# Patient Record
Sex: FEMALE | Race: WHITE | NOT HISPANIC OR LATINO | Employment: FULL TIME | URBAN - METROPOLITAN AREA
[De-identification: names, ages, dates, MRNs, and addresses within clinical notes are randomized per-mention and may not be internally consistent; named-entity substitution may affect disease eponyms.]

---

## 2017-08-22 ENCOUNTER — APPOINTMENT (OUTPATIENT)
Dept: WOMENS IMAGING | Facility: HOSPITAL | Age: 67
End: 2017-08-22

## 2017-08-22 PROCEDURE — G0202 SCR MAMMO BI INCL CAD: HCPCS | Performed by: RADIOLOGY

## 2017-08-22 PROCEDURE — 77063 BREAST TOMOSYNTHESIS BI: CPT | Performed by: RADIOLOGY

## 2017-08-30 ENCOUNTER — APPOINTMENT (OUTPATIENT)
Dept: WOMENS IMAGING | Facility: HOSPITAL | Age: 67
End: 2017-08-30

## 2017-08-30 PROCEDURE — G0206 DX MAMMO INCL CAD UNI: HCPCS | Performed by: RADIOLOGY

## 2017-09-21 ENCOUNTER — TRANSCRIBE ORDERS (OUTPATIENT)
Dept: ADMINISTRATIVE | Facility: HOSPITAL | Age: 67
End: 2017-09-21

## 2017-09-21 DIAGNOSIS — M81.0 OSTEOPOROSIS: Primary | ICD-10-CM

## 2017-09-27 PROBLEM — M81.0 SENILE OSTEOPOROSIS: Status: ACTIVE | Noted: 2017-09-27

## 2017-09-27 RX ORDER — ZOLEDRONIC ACID 5 MG/100ML
5 INJECTION, SOLUTION INTRAVENOUS ONCE
Status: CANCELLED | OUTPATIENT
Start: 2017-09-28

## 2017-09-28 ENCOUNTER — HOSPITAL ENCOUNTER (OUTPATIENT)
Dept: INFUSION THERAPY | Facility: HOSPITAL | Age: 67
Discharge: HOME | End: 2017-09-28
Admitting: INTERNAL MEDICINE

## 2017-09-28 VITALS
OXYGEN SATURATION: 97 % | WEIGHT: 165 LBS | RESPIRATION RATE: 20 BRPM | TEMPERATURE: 98.5 F | HEART RATE: 78 BPM | SYSTOLIC BLOOD PRESSURE: 122 MMHG | DIASTOLIC BLOOD PRESSURE: 77 MMHG | BODY MASS INDEX: 29.23 KG/M2

## 2017-09-28 DIAGNOSIS — M81.0 OSTEOPOROSIS: ICD-10-CM

## 2017-09-28 PROCEDURE — 25010000002 ZOLEDRONIC ACID 5 MG/100ML SOLUTION: Performed by: INTERNAL MEDICINE

## 2017-09-28 PROCEDURE — 96365 THER/PROPH/DIAG IV INF INIT: CPT

## 2017-09-28 RX ORDER — ZOLEDRONIC ACID 5 MG/100ML
5 INJECTION, SOLUTION INTRAVENOUS ONCE
Status: COMPLETED | OUTPATIENT
Start: 2017-09-28 | End: 2017-09-28

## 2017-09-28 RX ADMIN — ZOLEDRONIC ACID 5 MG: 0.05 INJECTION, SOLUTION INTRAVENOUS at 14:54

## 2017-11-08 RX ORDER — OMEPRAZOLE 40 MG/1
CAPSULE, DELAYED RELEASE ORAL
Qty: 90 CAPSULE | Refills: 3 | OUTPATIENT
Start: 2017-11-08

## 2017-11-29 ENCOUNTER — TELEPHONE (OUTPATIENT)
Dept: GASTROENTEROLOGY | Facility: CLINIC | Age: 67
End: 2017-11-29

## 2017-11-29 RX ORDER — OMEPRAZOLE 40 MG/1
40 CAPSULE, DELAYED RELEASE ORAL DAILY
Qty: 90 CAPSULE | Refills: 0 | Status: SHIPPED | OUTPATIENT
Start: 2017-11-29 | End: 2018-01-04 | Stop reason: SDUPTHER

## 2017-11-29 NOTE — TELEPHONE ENCOUNTER
----- Message from Mauro Armenta sent at 11/29/2017  2:39 PM EST -----  Regarding: refill meds   Contact: [phone]   pt is calling for a refill on omeprazole (priLOSEC) 40 MG capsule . Pt has a f/u with np on jan 4th. Can you fill the medication until her appt

## 2017-11-29 NOTE — TELEPHONE ENCOUNTER
Called pt back. Advised can call in a refill for her omeprazole until her appt in January and I just need to know where to send her script. Pt verb understanding and states she uses [facility] mail order pharmacy.   Medication e-scribed.

## 2018-01-04 ENCOUNTER — OFFICE VISIT (OUTPATIENT)
Dept: GASTROENTEROLOGY | Facility: CLINIC | Age: 68
End: 2018-01-04

## 2018-01-04 VITALS
TEMPERATURE: 98.1 F | BODY MASS INDEX: 31.79 KG/M2 | WEIGHT: 179.4 LBS | HEIGHT: 63 IN | SYSTOLIC BLOOD PRESSURE: 124 MMHG | DIASTOLIC BLOOD PRESSURE: 78 MMHG

## 2018-01-04 DIAGNOSIS — R19.7 DIARRHEA, UNSPECIFIED TYPE: Primary | ICD-10-CM

## 2018-01-04 DIAGNOSIS — R13.10 DYSPHAGIA, UNSPECIFIED TYPE: ICD-10-CM

## 2018-01-04 DIAGNOSIS — K21.9 GASTROESOPHAGEAL REFLUX DISEASE, ESOPHAGITIS PRESENCE NOT SPECIFIED: ICD-10-CM

## 2018-01-04 PROCEDURE — 99214 OFFICE O/P EST MOD 30 MIN: CPT | Performed by: NURSE PRACTITIONER

## 2018-01-04 RX ORDER — OMEPRAZOLE 40 MG/1
40 CAPSULE, DELAYED RELEASE ORAL 2 TIMES DAILY
Qty: 180 CAPSULE | Refills: 3 | Status: SHIPPED | OUTPATIENT
Start: 2018-01-04 | End: 2018-02-05 | Stop reason: SDUPTHER

## 2018-01-04 RX ORDER — SUCRALFATE 1 G/1
1 TABLET ORAL 4 TIMES DAILY PRN
Qty: 180 TABLET | Refills: 3 | Status: SHIPPED | OUTPATIENT
Start: 2018-01-04 | End: 2018-01-26

## 2018-01-04 NOTE — PROGRESS NOTES
Chief Complaint   Patient presents with   • Heartburn   • Irritable Bowel Syndrome   • Diarrhea         HPI    Pt is being seen today for Diarrhea and GERD.  She was last seen in the office October 2016.  In 2016 pt reported symptoms worse after laproscopic cholecystectomy in 2015.  We had prescribed WelChol, cholestyramine, and Colestid but all medications were 2 expensive with her insurance.  She continues to have 3-8 bowel movements per day which is slightly better than the amount reported in 2016.  Stools are described as watery and loose.  Generally occurring post prandial with fecal urgency.  Rare fecal incontinence.  She denies fever, chills, weight loss, abdominal pain, nausea, vomiting, bright red blood per rectum, or melena.  Associated symptoms include mild bloating and mild abdominal cramping relieved with defecation.  Previous workup includes negative celiac. Stool studies were ordered but not completed.  Sodium does help relieve the diarrhea but she does not take it on a regular basis.  Her last colonoscopy was performed 2010 with results as detailed below.  She denies a family history of celiac disease, irritable bowel disease or colon cancer.     GERD- EGD with grade 1 esophagitis and esophageal web dilation in 2012.  She is currently taking Protonix 40 mg daily and sometimes will take it twice a day.  She continues to have frequent, daily heartburn.  She states it as a tightening in her mid chest that resolves without intervention.  She is also having increased choking while eating  and reports difficulty swallowing.  Risk factors include spicy foods, caffeine.  She denies early satiety or weight loss. Denies nausea or vomiting    Past Medical History:   Diagnosis Date   • Disease of thyroid gland    • GERD (gastroesophageal reflux disease)    • Hypertension    • Osteoporosis    • TIA (transient ischemic attack)      Past Surgical History:   Procedure Laterality Date   • CHOLECYSTECTOMY     •  COLONOSCOPY  11/10/2010    prep of colon fair,IH,stool in transverse colon,hepatic flexure and ascending colon,ileum normal,IBS   • ENDOSCOPY  11/27/2012    grd 1 reflux egitis,web upperd 3rd esoph   • FOOT NEUROMA SURGERY Left    • IR CEREBRAL ANEURYSM COILING     • THUMB ARTHROSCOPY         Current Outpatient Prescriptions   Medication Sig Dispense Refill   • hydrochlorothiazide (HYDRODIURIL) 25 MG tablet Take 25 mg by mouth daily.     • levothyroxine (SYNTHROID, LEVOTHROID) 50 MCG tablet Take 50 mcg by mouth daily.     • omeprazole (PRILOSEC) 40 MG capsule Take 1 capsule by mouth 2 (Two) Times a Day. 180 capsule 3   • cholestyramine light 4 g powder Take 1 packet by mouth 3 (Three) Times a Day. mixed with a liquid 378 g 11   • sucralfate (CARAFATE) 1 g tablet Take 1 tablet by mouth 4 (Four) Times a Day As Needed (heartburn). 180 tablet 3     No current facility-administered medications for this visit.        PRN Meds:.    Allergies   Allergen Reactions   • Penicillins Hives       Social History     Social History   • Marital status:      Spouse name: N/A   • Number of children: N/A   • Years of education: N/A     Occupational History   • Not on file.     Social History Main Topics   • Smoking status: Never Smoker   • Smokeless tobacco: Not on file   • Alcohol use No   • Drug use: Not on file   • Sexual activity: Not on file     Other Topics Concern   • Not on file     Social History Narrative       Family History   Problem Relation Age of Onset   • Irritable bowel syndrome Daughter        Review of Systems   Constitutional: Negative for appetite change, chills, diaphoresis, fatigue, fever and unexpected weight change.   HENT: Positive for trouble swallowing.    Respiratory: Negative for choking and shortness of breath.    Cardiovascular: Negative for chest pain.   Gastrointestinal: Positive for abdominal distention, abdominal pain and diarrhea. Negative for blood in stool, constipation, nausea and  "vomiting.   Musculoskeletal: Negative for back pain.   Skin: Negative for color change.   Allergic/Immunologic: Negative for immunocompromised state.   Neurological: Negative for dizziness.   Hematological: Does not bruise/bleed easily.   Psychiatric/Behavioral: Negative.        Vitals:    01/04/18 1437   BP: 124/78   Temp: 98.1 °F (36.7 °C)     /78  Temp 98.1 °F (36.7 °C)  Ht 160 cm (63\")  Wt 81.4 kg (179 lb 6.4 oz)  BMI 31.78 kg/m2  Physical Exam   Constitutional: She is oriented to person, place, and time. She appears well-developed and well-nourished.   HENT:   Head: Normocephalic and atraumatic.   Eyes: Pupils are equal, round, and reactive to light.   Cardiovascular: Normal rate, regular rhythm and normal heart sounds.    Pulmonary/Chest: Effort normal and breath sounds normal.   Abdominal: Soft. Bowel sounds are normal. She exhibits no shifting dullness, no distension, no pulsatile liver, no fluid wave, no abdominal bruit, no ascites, no pulsatile midline mass and no mass. There is no hepatosplenomegaly. There is no tenderness. There is no rigidity and no guarding. No hernia.   Musculoskeletal: Normal range of motion.   Neurological: She is alert and oriented to person, place, and time.   Skin: Skin is warm and dry.   Psychiatric: She has a normal mood and affect. Her behavior is normal. Thought content normal.   Nursing note and vitals reviewed.      ASSESSMENT AND PLAN    Sari was seen today for heartburn, irritable bowel syndrome and diarrhea.    Diagnoses and all orders for this visit:    Diarrhea, unspecified type  -     Case Request; Standing  -     Case Request    Gastroesophageal reflux disease, esophagitis presence not specified  -     Case Request; Standing  -     Case Request    Dysphagia, unspecified type    Other orders  -     sucralfate (CARAFATE) 1 g tablet; Take 1 tablet by mouth 4 (Four) Times a Day As Needed (heartburn).  -     omeprazole (PRILOSEC) 40 MG capsule; Take 1 capsule by " mouth 2 (Two) Times a Day.  -     Implement Anesthesia orders day of procedure.; Standing  -     Obtain informed consent; Standing  -     Verify bowel prep was successful; Standing  -     Give tap water enema if bowel prep was insufficient; Standing  -     cholestyramine light 4 g powder; Take 1 packet by mouth 3 (Three) Times a Day. mixed with a liquid      Patient is overdue for a colonoscopy.  She is also in need of lower endoscopic evaluation with biopsies for persistent diarrhea.  I have again prescribed Cholestyramine. If it is still not affordable she can take imodium daily with caution given regarding constipation.   Seen you current PPI until EGD with Dr. Reza  Please begin a probiotic.  You can try activia yogurt, align, or florastor.  These can be found over-the-counter at a local grocery store.         Anju PUGH   [facility]  [address] [place] [address]  Office: [phone]

## 2018-01-18 ENCOUNTER — APPOINTMENT (OUTPATIENT)
Dept: CT IMAGING | Facility: HOSPITAL | Age: 68
End: 2018-01-18

## 2018-01-18 ENCOUNTER — HOSPITAL ENCOUNTER (EMERGENCY)
Facility: HOSPITAL | Age: 68
Discharge: HOME | End: 2018-01-19
Attending: EMERGENCY MEDICINE | Admitting: EMERGENCY MEDICINE

## 2018-01-18 DIAGNOSIS — R23.2 FACIAL FLUSHING: Primary | ICD-10-CM

## 2018-01-18 LAB
ALBUMIN SERPL-MCNC: 4.4 G/DL (ref 3.5–5.2)
ALBUMIN/GLOB SERPL: 1.2 G/DL
ALP SERPL-CCNC: 85 U/L (ref 39–117)
ALT SERPL W P-5'-P-CCNC: 9 U/L (ref 1–33)
ANION GAP SERPL CALCULATED.3IONS-SCNC: 13.5 MMOL/L
AST SERPL-CCNC: 12 U/L (ref 1–32)
BASOPHILS # BLD AUTO: 0.05 10*3/MM3 (ref 0–0.2)
BASOPHILS NFR BLD AUTO: 0.6 % (ref 0–1.5)
BILIRUB SERPL-MCNC: 0.4 MG/DL (ref 0.1–1.2)
BUN BLD-MCNC: 11 MG/DL (ref 8–23)
BUN/CREAT SERPL: 9.5 (ref 7–25)
CALCIUM SPEC-SCNC: 10.2 MG/DL (ref 8.6–10.5)
CHLORIDE SERPL-SCNC: 98 MMOL/L (ref 98–107)
CO2 SERPL-SCNC: 25.5 MMOL/L (ref 22–29)
CREAT BLD-MCNC: 1.16 MG/DL (ref 0.57–1)
DEPRECATED RDW RBC AUTO: 42.8 FL (ref 37–54)
EOSINOPHIL # BLD AUTO: 0.19 10*3/MM3 (ref 0–0.7)
EOSINOPHIL NFR BLD AUTO: 2.3 % (ref 0.3–6.2)
ERYTHROCYTE [DISTWIDTH] IN BLOOD BY AUTOMATED COUNT: 13.1 % (ref 11.7–13)
GFR SERPL CREATININE-BSD FRML MDRD: 46 ML/MIN/1.73
GLOBULIN UR ELPH-MCNC: 3.8 GM/DL
GLUCOSE BLD-MCNC: 100 MG/DL (ref 65–99)
HCT VFR BLD AUTO: 44.9 % (ref 35.6–45.5)
HGB BLD-MCNC: 14.6 G/DL (ref 11.9–15.5)
IMM GRANULOCYTES # BLD: 0.02 10*3/MM3 (ref 0–0.03)
IMM GRANULOCYTES NFR BLD: 0.2 % (ref 0–0.5)
LYMPHOCYTES # BLD AUTO: 3.19 10*3/MM3 (ref 0.9–4.8)
LYMPHOCYTES NFR BLD AUTO: 37.8 % (ref 19.6–45.3)
MCH RBC QN AUTO: 29.3 PG (ref 26.9–32)
MCHC RBC AUTO-ENTMCNC: 32.5 G/DL (ref 32.4–36.3)
MCV RBC AUTO: 90 FL (ref 80.5–98.2)
MONOCYTES # BLD AUTO: 0.56 10*3/MM3 (ref 0.2–1.2)
MONOCYTES NFR BLD AUTO: 6.6 % (ref 5–12)
NEUTROPHILS # BLD AUTO: 4.43 10*3/MM3 (ref 1.9–8.1)
NEUTROPHILS NFR BLD AUTO: 52.5 % (ref 42.7–76)
PLATELET # BLD AUTO: 369 10*3/MM3 (ref 140–500)
PMV BLD AUTO: 10 FL (ref 6–12)
POTASSIUM BLD-SCNC: 4 MMOL/L (ref 3.5–5.2)
PROT SERPL-MCNC: 8.2 G/DL (ref 6–8.5)
RBC # BLD AUTO: 4.99 10*6/MM3 (ref 3.9–5.2)
SODIUM BLD-SCNC: 137 MMOL/L (ref 136–145)
TROPONIN T SERPL-MCNC: <0.01 NG/ML (ref 0–0.03)
WBC NRBC COR # BLD: 8.44 10*3/MM3 (ref 4.5–10.7)

## 2018-01-18 PROCEDURE — 99284 EMERGENCY DEPT VISIT MOD MDM: CPT

## 2018-01-18 PROCEDURE — 70450 CT HEAD/BRAIN W/O DYE: CPT

## 2018-01-18 PROCEDURE — 93005 ELECTROCARDIOGRAM TRACING: CPT | Performed by: PHYSICIAN ASSISTANT

## 2018-01-18 PROCEDURE — 36415 COLL VENOUS BLD VENIPUNCTURE: CPT | Performed by: PHYSICIAN ASSISTANT

## 2018-01-18 PROCEDURE — 84484 ASSAY OF TROPONIN QUANT: CPT | Performed by: PHYSICIAN ASSISTANT

## 2018-01-18 PROCEDURE — 85025 COMPLETE CBC W/AUTO DIFF WBC: CPT | Performed by: PHYSICIAN ASSISTANT

## 2018-01-18 PROCEDURE — 93010 ELECTROCARDIOGRAM REPORT: CPT | Performed by: INTERNAL MEDICINE

## 2018-01-18 PROCEDURE — 80053 COMPREHEN METABOLIC PANEL: CPT | Performed by: PHYSICIAN ASSISTANT

## 2018-01-18 RX ORDER — GABAPENTIN 300 MG/1
300 CAPSULE ORAL
COMMUNITY
End: 2018-02-05 | Stop reason: SDUPTHER

## 2018-01-18 NOTE — ED NOTES
"Patient states she has an aneurysm that has been coiled in 2012. Tuesday she developed a \"numb feeling to my face, like maxim had a few too many glasses of wine, but I haven't\" She also report today she had a \"light headed feeling\" so she decided to come get checked. Reports slight headache denies nausea currently and denies vision changes.       Kirsten Park RN  01/18/18 5970    "

## 2018-01-19 ENCOUNTER — TELEPHONE (OUTPATIENT)
Dept: SOCIAL WORK | Facility: HOSPITAL | Age: 68
End: 2018-01-19

## 2018-01-19 VITALS
SYSTOLIC BLOOD PRESSURE: 146 MMHG | HEART RATE: 76 BPM | WEIGHT: 165 LBS | BODY MASS INDEX: 29.23 KG/M2 | OXYGEN SATURATION: 99 % | DIASTOLIC BLOOD PRESSURE: 98 MMHG | HEIGHT: 63 IN | RESPIRATION RATE: 18 BRPM | TEMPERATURE: 97.5 F

## 2018-01-19 RX ORDER — AMLODIPINE BESYLATE 5 MG/1
5 TABLET ORAL DAILY
Qty: 30 TABLET | Refills: 0 | Status: SHIPPED | OUTPATIENT
Start: 2018-01-19 | End: 2018-06-04

## 2018-01-19 NOTE — ED PROVIDER NOTES
"The patient presents complaining of intermittent facial flushing and diffuse numbness of entire lower face bilat below the eyes for 3 days. She states the feeling is similar to \"having a couple drinks\". She also has had an intermittent headache over this period of time, and that felt like a \"high BP\" headache. The headache is present, currently a 4/10 without neck/back pain. She has not taken any medication for her headache. She had an aneurysm coiled in 2011 without bleeding. At that time she had an extremely high BP. Notified pt of imaging which did not show any bleed. Discussed plan to discharge the pt. Pt should follow up with a PCP. Pt understands and agrees with plan, all questions addressed.    Limited physical exam:  Patient is nontoxic appearing  Lungs/cardiovascular: CTAB, RRR  Abdomen: non tender, no guarding or rebound  Back/extremities: NROM, normal SLR,   Neuro: strength/sens/ROM, speech, vision intact, neck supple, nonfocal neurologic exam,    I supervised care provided by the midlevel provider.  We have discussed this patient's history, physical exam, and treatment plan.  I have reviewed the note and personally saw and examined the patient and agree with the plan of care.    Documentation assistance provided by monika Rodriguez.  Information recorded by the scribe was done at my direction and has been verified and validated by me.             Reza Rodriguez  01/19/18 0030       Angelika Ramirez MD  01/21/18 5705    "

## 2018-01-19 NOTE — TELEPHONE ENCOUNTER
"F/u phone call per provider request. Patient reports needing a new PCP due to her regular PCP changing to tank. Offered to contact [facility] liaison to obtain new PCP. Pt agreed. Contacted Eugenia alejandra/[facility] who obtained appt w/ for 3/1/18 @4595. Called to notify patient of appt date and time; Pt also has further questions about new RX prescribed and if she should take w/HCTZ. Pt also described continued symptoms of face flushed/facial numbness Discussed questions w/ who advised pt could take both medications as prescribed. Dr. Mesa recommended to monitor blood pressure and \"if she begins to feel funny, have blood pressure checked\" and return for any new or worsening symptoms or problems w/blood pressure. No further needs at this time. Sari Cason RN    "

## 2018-01-19 NOTE — ED PROVIDER NOTES
EMERGENCY DEPARTMENT ENCOUNTER    CHIEF COMPLAINT  Chief Complaint: Intermittent facial numbness  History given by: Patient  History limited by: Nothing  Room Number: 37/37  PMD: No Known Provider      HPI:  Pt is a 67 y.o. female who presents complaining of intermittent facial numbness onset 2 days ago with symptoms worsening in severity today. The pt reports feeling a flushed feeling, numbness, and a hot feeling in her face 2 days ago. The pt states she was working on her computer when the symptoms started. The pt states it felt like her BP was elevated, but when she checked it, it was normal. The pt states she has had intermittent episodes of the facial numbness since the onset of symptoms 2 days ago. Pt reports HA, intermittent lightheadedness, sinus congestion, and chronic CP that is currently being worked up by a gastroenterologist. Pt denies focal weakness, hyperventilation, aphasia, facial paralysis, and SOA. Pt reports a hx of an aneurysm in 2012.    Duration: 2 days ago with symptoms worsening in severity today  Onset: Gradual  Timing: Intermittent  Location: Face  Radiation: None  Quality: Numbness  Intensity/Severity: Moderate  Progression: Worsening  Associated Symptoms: HA, intermittent lightheadedness, sinus congestion, and chronic CP that is currently being worked up by a gastroenterologist  Aggravating Factors: None stated  Alleviating Factors: None stated  Previous Episodes: None  Treatment before arrival: Nothing    PAST MEDICAL HISTORY  Active Ambulatory Problems     Diagnosis Date Noted   • Osteoporosis 08/05/2016   • Senile osteoporosis 09/27/2017   • Gastroesophageal reflux disease 01/04/2018   • Diarrhea 01/04/2018     Resolved Ambulatory Problems     Diagnosis Date Noted   • No Resolved Ambulatory Problems     Past Medical History:   Diagnosis Date   • Disease of thyroid gland    • GERD (gastroesophageal reflux disease)    • Hypertension    • Osteoporosis    • TIA (transient ischemic  attack)        PAST SURGICAL HISTORY  Past Surgical History:   Procedure Laterality Date   • CHOLECYSTECTOMY     • COLONOSCOPY  11/10/2010    prep of colon fair,IH,stool in transverse colon,hepatic flexure and ascending colon,ileum normal,IBS   • ENDOSCOPY  11/27/2012    grd 1 reflux egitis,web upperd 3rd esoph   • FOOT NEUROMA SURGERY Left    • IR CEREBRAL ANEURYSM COILING     • THUMB ARTHROSCOPY         FAMILY HISTORY  Family History   Problem Relation Age of Onset   • Irritable bowel syndrome Daughter        SOCIAL HISTORY  Social History     Social History   • Marital status:      Spouse name: N/A   • Number of children: N/A   • Years of education: N/A     Occupational History   • Not on file.     Social History Main Topics   • Smoking status: Never Smoker   • Smokeless tobacco: Not on file   • Alcohol use No   • Drug use: Not on file   • Sexual activity: Not on file     Other Topics Concern   • Not on file     Social History Narrative       ALLERGIES  Penicillins    REVIEW OF SYSTEMS  Review of Systems   Constitutional: Negative for chills and fever.   HENT: Positive for congestion (Sinus). Negative for sore throat and trouble swallowing.    Eyes: Negative for visual disturbance.   Respiratory: Negative for cough and shortness of breath.    Cardiovascular: Positive for chest pain (Chronic that is being worked up by a gastroenterologist). Negative for palpitations and leg swelling.   Gastrointestinal: Negative for abdominal pain, diarrhea and vomiting.   Endocrine: Negative.    Genitourinary: Negative for decreased urine volume, dysuria and frequency.   Musculoskeletal: Negative for neck pain.   Skin: Negative for rash.   Allergic/Immunologic: Negative.    Neurological: Positive for light-headedness (Intermittent), numbness (Facial) and headaches. Negative for syncope and weakness.   Hematological: Negative.    Psychiatric/Behavioral: Negative.    All other systems reviewed and are  negative.      PHYSICAL EXAM  ED Triage Vitals   Temp Heart Rate Resp BP SpO2   01/18/18 1412 01/18/18 1412 01/18/18 1412 01/18/18 1435 01/18/18 1412   97.6 °F (36.4 °C) 94 16 164/101 99 %      Temp src Heart Rate Source Patient Position BP Location FiO2 (%)   01/18/18 1412 01/18/18 1720 01/18/18 2219 01/18/18 2219 --   Tympanic Monitor Sitting Left arm        Physical Exam   Constitutional: She is oriented to person, place, and time and well-developed, well-nourished, and in no distress. No distress.   HENT:   Head: Normocephalic and atraumatic.   Eyes: EOM are normal. Pupils are equal, round, and reactive to light.   Neck: Normal range of motion. Neck supple.   Cardiovascular: Normal rate, regular rhythm and normal heart sounds.    Pulmonary/Chest: Effort normal and breath sounds normal. No respiratory distress.   Abdominal: Soft. Bowel sounds are normal. There is no tenderness. There is no rebound and no guarding.   Musculoskeletal: Normal range of motion. She exhibits no edema.   Neurological: She is alert and oriented to person, place, and time. She has normal sensation and normal strength.   The pt has no focal neuro deficits.   Skin: Skin is warm and dry. No rash noted.   Psychiatric: Mood and affect normal.   Nursing note and vitals reviewed.      LAB RESULTS  Lab Results (last 24 hours)     Procedure Component Value Units Date/Time    CBC & Differential [[id number]] Collected:  01/18/18 1701    Specimen:  Blood Updated:  01/18/18 1736    Narrative:       The following orders were created for panel order CBC & Differential.  Procedure                               Abnormality         Status                     ---------                               -----------         ------                     CBC Auto Differential[[id number]]        Abnormal            Final result                 Please view results for these tests on the individual orders.    Comprehensive Metabolic Panel [[id number]]  (Abnormal)  Collected:  01/18/18 1701    Specimen:  Blood Updated:  01/18/18 1738     Glucose 100 (H) mg/dL      BUN 11 mg/dL      Creatinine 1.16 (H) mg/dL      Sodium 137 mmol/L      Potassium 4.0 mmol/L      Chloride 98 mmol/L      CO2 25.5 mmol/L      Calcium 10.2 mg/dL      Total Protein 8.2 g/dL      Albumin 4.40 g/dL      ALT (SGPT) 9 U/L      AST (SGOT) 12 U/L      Alkaline Phosphatase 85 U/L      Total Bilirubin 0.4 mg/dL      eGFR Non African Amer 46 (L) mL/min/1.73      Globulin 3.8 gm/dL      A/G Ratio 1.2 g/dL      BUN/Creatinine Ratio 9.5     Anion Gap 13.5 mmol/L     Troponin [[id number]]  (Normal) Collected:  01/18/18 1701    Specimen:  Blood Updated:  01/18/18 1738     Troponin T <0.010 ng/mL     Narrative:       Troponin T Reference Ranges:  Less than 0.03 ng/mL:    Negative for AMI  0.03 to 0.09 ng/mL:      Indeterminant for AMI  Greater than 0.09 ng/mL: Positive for AMI    CBC Auto Differential [[id number]]  (Abnormal) Collected:  01/18/18 1701    Specimen:  Blood Updated:  01/18/18 1736     WBC 8.44 10*3/mm3      RBC 4.99 10*6/mm3      Hemoglobin 14.6 g/dL      Hematocrit 44.9 %      MCV 90.0 fL      MCH 29.3 pg      MCHC 32.5 g/dL      RDW 13.1 (H) %      RDW-SD 42.8 fl      MPV 10.0 fL      Platelets 369 10*3/mm3      Neutrophil % 52.5 %      Lymphocyte % 37.8 %      Monocyte % 6.6 %      Eosinophil % 2.3 %      Basophil % 0.6 %      Immature Grans % 0.2 %      Neutrophils, Absolute 4.43 10*3/mm3      Lymphocytes, Absolute 3.19 10*3/mm3      Monocytes, Absolute 0.56 10*3/mm3      Eosinophils, Absolute 0.19 10*3/mm3      Basophils, Absolute 0.05 10*3/mm3      Immature Grans, Absolute 0.02 10*3/mm3           I ordered the above labs and reviewed the results    RADIOLOGY  CT Head Without Contrast   Preliminary Result   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   The GDC coils result in marked beam hardening and streak artifact which   markedly obscure many portions of the brain  parenchyma in 4 axial   slices. However, otherwise, there is no evidence to suggest acute   intracranial pathology.        These findings were discussed with Kirsten Colon on 01/18/2018 at   approximately 3:06 PM.       Radiation dose reduction techniques were utilized, including automated   exposure control and exposure modulation based on body size.                   I ordered the above noted radiological studies. Interpreted by radiologist. Reviewed by me in PACS.       PROCEDURES  Procedures    EKG           EKG time: 1502  Rhythm/Rate: 78 Normal Sinus  P waves and NC: Normal  QRS, axis: Normal   ST and T waves: Normal     Interpreted Contemporaneously by me, independently viewed  Unchanged compared to prior 11-16-14      PROGRESS AND CONSULTS  ED Course     2317  Upon initial encounter, discussed the negative lab and CT results with the pt.    The pt is not a tPA candidate due to onset of symptoms 2 days ago.    0008  Reviewed pt's history and workup with Dr. Ramirez.  After a bedside evaluation; Dr. Ramirez agrees with the plan of care.    MEDICAL DECISION MAKING  Results were reviewed/discussed with the patient and they were also made aware of online access. Pt also made aware that some labs, such as cultures, will not be resulted during ER visit and follow up with PMD is necessary.     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  The GDC coils result in marked beam hardening and streak artifact which  markedly obscure many portions of the brain parenchyma in 4 axial  slices. However, otherwise, there is no evidence to suggest acute  intracranial pathology. )  Tests in the medicine section of CPT®: reviewed and ordered (Refer to the procedure  section of the note for EKG results  )  Decide to obtain previous medical records or to obtain history from someone other than the patient: yes    Patient Progress  Patient progress: stable         DIAGNOSIS  Final diagnoses:   Facial flushing       DISPOSITION  DISCHARGE    Patient discharged in stable condition.    Reviewed implications of results, diagnosis, meds, responsibility to follow up, warning signs and symptoms of possible worsening, potential complications and reasons to return to ER.    Patient/Family voiced understanding of above instructions.    Discussed plan for discharge, as there is no emergent indication for admission.  Pt/family is agreeable and understands need for follow up and repeat testing.  Pt is aware that discharge does not mean that nothing is wrong but it indicates no emergency is present that requires admission and they must continue care with follow-up as given below or physician of their choice.     FOLLOW-UP  [facility] REFERRAL SERVICE  [facility] [address] [phone]  Schedule an appointment as soon as possible for a visit           Medication List      New Prescriptions          amLODIPine 5 MG tablet   Commonly known as:  NORVASC   Take 1 tablet by mouth Daily.               Latest Documented Vital Signs:  As of 2:00 AM  BP- 146/98 HR- 76 Temp- 97.5 °F (36.4 °C) O2 sat- 99%    --  Documentation assistance provided by monika Thomas for Arian Peterson.  Information recorded by the scribhan was done at my direction and has been verified and validated by me.     Kerwin Thomas  01/19/18 0035       SUZY Koehler  01/19/18 0206

## 2018-01-26 ENCOUNTER — HOSPITAL ENCOUNTER (OUTPATIENT)
Dept: GENERAL RADIOLOGY | Facility: HOSPITAL | Age: 68
Discharge: HOME | End: 2018-01-26
Admitting: NURSE PRACTITIONER

## 2018-01-26 ENCOUNTER — OFFICE VISIT (OUTPATIENT)
Dept: FAMILY MEDICINE CLINIC | Facility: CLINIC | Age: 68
End: 2018-01-26

## 2018-01-26 VITALS
DIASTOLIC BLOOD PRESSURE: 70 MMHG | TEMPERATURE: 97.9 F | BODY MASS INDEX: 31.8 KG/M2 | HEART RATE: 79 BPM | OXYGEN SATURATION: 99 % | HEIGHT: 63 IN | SYSTOLIC BLOOD PRESSURE: 122 MMHG | WEIGHT: 179.5 LBS

## 2018-01-26 DIAGNOSIS — R09.81 SINUS CONGESTION: Primary | ICD-10-CM

## 2018-01-26 DIAGNOSIS — I10 ESSENTIAL HYPERTENSION: ICD-10-CM

## 2018-01-26 PROCEDURE — 70210 X-RAY EXAM OF SINUSES: CPT

## 2018-01-26 PROCEDURE — 99213 OFFICE O/P EST LOW 20 MIN: CPT | Performed by: NURSE PRACTITIONER

## 2018-01-26 RX ORDER — LISINOPRIL 10 MG/1
10 TABLET ORAL DAILY
Qty: 30 TABLET | Refills: 3 | Status: SHIPPED | OUTPATIENT
Start: 2018-01-26 | End: 2018-03-26 | Stop reason: SDUPTHER

## 2018-01-26 NOTE — PROGRESS NOTES
Subjective   Sari Self is a 67 y.o. female presents with recent ER visit. Had facial flushing, lips tingling, headache. BP at that time seemed normal, 135/88. Did go up to 146/91. Went to ER due to hx of aneurysm. ER work up was negative, started on amlodipine 5 mg. Now with lower extremity swelling and still having facial numbness. Compares to drinking a couple of glasses of wine, feeling flushed, ears get hot and she can feel her face, but it feels different. Increased nasal congestion but no pain reported.     Hypertension   This is a chronic problem. The current episode started more than 1 year ago. The problem has been rapidly worsening since onset. The problem is resistant. Associated symptoms include chest pain, headaches, malaise/fatigue, palpitations and peripheral edema. Pertinent negatives include no anxiety, blurred vision, neck pain, orthopnea, PND, shortness of breath or sweats. Agents associated with hypertension include thyroid hormones. Risk factors for coronary artery disease include family history, obesity, post-menopausal state, sedentary lifestyle and stress. Compliance problems include no compliance problems, diet, exercise and psychosocial issues.         The following portions of the patient's history were reviewed and updated as appropriate: allergies, current medications, past family history, past medical history, past social history, past surgical history and problem list.    Review of Systems   Constitutional: Positive for malaise/fatigue.   Eyes: Negative.  Negative for blurred vision.   Respiratory: Negative.  Negative for shortness of breath.    Cardiovascular: Positive for chest pain and palpitations. Negative for orthopnea and PND.   Gastrointestinal: Negative.    Endocrine: Negative.    Genitourinary: Negative.    Musculoskeletal: Negative.  Negative for neck pain.   Skin: Negative.    Allergic/Immunologic: Negative.    Neurological: Positive for headaches.   Hematological:  Negative.    Psychiatric/Behavioral: Negative.        Objective   Physical Exam   Constitutional: She is oriented to person, place, and time. She appears well-developed and well-nourished.   HENT:   Head: Normocephalic and atraumatic.   Right Ear: Tympanic membrane, external ear and ear canal normal.   Left Ear: Tympanic membrane, external ear and ear canal normal.   Nose: Nose normal. Right sinus exhibits no maxillary sinus tenderness and no frontal sinus tenderness. Left sinus exhibits no maxillary sinus tenderness and no frontal sinus tenderness.   Mouth/Throat: Uvula is midline, oropharynx is clear and moist and mucous membranes are normal. No tonsillar exudate.   Eyes: Conjunctivae are normal. Pupils are equal, round, and reactive to light.   Neck: Neck supple.   Cardiovascular: Normal rate, regular rhythm and normal heart sounds.  Exam reveals no gallop and no friction rub.    No murmur heard.  Pulmonary/Chest: Effort normal and breath sounds normal. No respiratory distress. She has no wheezes. She has no rales.   Lymphadenopathy:     She has no cervical adenopathy.   Neurological: She is alert and oriented to person, place, and time.   Skin: Skin is warm and dry.   Psychiatric: She has a normal mood and affect.   Vitals reviewed.      Assessment/Plan   Sari was seen today for hypertension.    Diagnoses and all orders for this visit:    Sinus congestion  -     XR nasal bones    Other orders  -     lisinopril (PRINIVIL,ZESTRIL) 10 MG tablet; Take 1 tablet by mouth Daily.      Will DC amlodipine and start prinivil due to lower extremity swelling that started after the med was started.   For continued symptoms, to follow up with PCP

## 2018-01-29 ENCOUNTER — TELEPHONE (OUTPATIENT)
Dept: FAMILY MEDICINE CLINIC | Facility: CLINIC | Age: 68
End: 2018-01-29

## 2018-01-29 NOTE — TELEPHONE ENCOUNTER
----- Message from Stephenie Gupta MA sent at 1/29/2018  8:37 AM EST -----  Informed pt. Pt forgot to ask you. She was wondering if we can refill her gabapentin until she see ? She also said that over the weekend that her cheeks and ears get really red and she is concerned. I told pt I would ask you what she should do and told her we would probably be sending her to an ENT?

## 2018-02-05 ENCOUNTER — TELEPHONE (OUTPATIENT)
Dept: FAMILY MEDICINE CLINIC | Facility: CLINIC | Age: 68
End: 2018-02-05

## 2018-02-05 DIAGNOSIS — R23.2 FACIAL FLUSHING: Primary | ICD-10-CM

## 2018-02-05 RX ORDER — GABAPENTIN 300 MG/1
300 CAPSULE ORAL
Qty: 180 CAPSULE | Refills: 0 | Status: SHIPPED | OUTPATIENT
Start: 2018-02-05 | End: 2018-05-17 | Stop reason: SDUPTHER

## 2018-02-05 NOTE — TELEPHONE ENCOUNTER
----- Message from LEXY Martinez sent at 2/5/2018  8:13 AM EST -----  Referral to derm for facial flushing, ok to refill gabapentin

## 2018-02-06 RX ORDER — OMEPRAZOLE 40 MG/1
40 CAPSULE, DELAYED RELEASE ORAL 2 TIMES DAILY
Qty: 180 CAPSULE | Refills: 2 | Status: SHIPPED | OUTPATIENT
Start: 2018-02-06 | End: 2019-07-16 | Stop reason: SDUPTHER

## 2018-02-27 ENCOUNTER — ANESTHESIA EVENT (OUTPATIENT)
Dept: GASTROENTEROLOGY | Facility: HOSPITAL | Age: 68
End: 2018-02-27

## 2018-02-27 ENCOUNTER — ANESTHESIA (OUTPATIENT)
Dept: GASTROENTEROLOGY | Facility: HOSPITAL | Age: 68
End: 2018-02-27

## 2018-02-27 ENCOUNTER — HOSPITAL ENCOUNTER (OUTPATIENT)
Facility: HOSPITAL | Age: 68
Setting detail: HOSPITAL OUTPATIENT SURGERY
Discharge: HOME | End: 2018-02-27
Attending: INTERNAL MEDICINE | Admitting: INTERNAL MEDICINE

## 2018-02-27 VITALS
BODY MASS INDEX: 30.48 KG/M2 | RESPIRATION RATE: 16 BRPM | HEART RATE: 68 BPM | DIASTOLIC BLOOD PRESSURE: 70 MMHG | TEMPERATURE: 97.7 F | WEIGHT: 172 LBS | HEIGHT: 63 IN | OXYGEN SATURATION: 100 % | SYSTOLIC BLOOD PRESSURE: 118 MMHG

## 2018-02-27 DIAGNOSIS — K21.9 GASTROESOPHAGEAL REFLUX DISEASE, ESOPHAGITIS PRESENCE NOT SPECIFIED: ICD-10-CM

## 2018-02-27 DIAGNOSIS — R19.7 DIARRHEA, UNSPECIFIED TYPE: ICD-10-CM

## 2018-02-27 PROCEDURE — 88305 TISSUE EXAM BY PATHOLOGIST: CPT | Performed by: INTERNAL MEDICINE

## 2018-02-27 PROCEDURE — 43239 EGD BIOPSY SINGLE/MULTIPLE: CPT | Performed by: INTERNAL MEDICINE

## 2018-02-27 PROCEDURE — 88312 SPECIAL STAINS GROUP 1: CPT | Performed by: INTERNAL MEDICINE

## 2018-02-27 PROCEDURE — S0260 H&P FOR SURGERY: HCPCS | Performed by: INTERNAL MEDICINE

## 2018-02-27 PROCEDURE — 45380 COLONOSCOPY AND BIOPSY: CPT | Performed by: INTERNAL MEDICINE

## 2018-02-27 PROCEDURE — 25010000002 PROPOFOL 10 MG/ML EMULSION: Performed by: NURSE ANESTHETIST, CERTIFIED REGISTERED

## 2018-02-27 RX ORDER — SODIUM CHLORIDE 0.9 % (FLUSH) 0.9 %
3 SYRINGE (ML) INJECTION AS NEEDED
Status: DISCONTINUED | OUTPATIENT
Start: 2018-02-27 | End: 2018-02-27 | Stop reason: HOSPADM

## 2018-02-27 RX ORDER — PROPOFOL 10 MG/ML
VIAL (ML) INTRAVENOUS AS NEEDED
Status: DISCONTINUED | OUTPATIENT
Start: 2018-02-27 | End: 2018-02-27 | Stop reason: SURG

## 2018-02-27 RX ORDER — PROPOFOL 10 MG/ML
VIAL (ML) INTRAVENOUS CONTINUOUS PRN
Status: DISCONTINUED | OUTPATIENT
Start: 2018-02-27 | End: 2018-02-27 | Stop reason: SURG

## 2018-02-27 RX ORDER — SODIUM CHLORIDE, SODIUM LACTATE, POTASSIUM CHLORIDE, CALCIUM CHLORIDE 600; 310; 30; 20 MG/100ML; MG/100ML; MG/100ML; MG/100ML
1000 INJECTION, SOLUTION INTRAVENOUS CONTINUOUS PRN
Status: DISCONTINUED | OUTPATIENT
Start: 2018-02-27 | End: 2018-02-27 | Stop reason: HOSPADM

## 2018-02-27 RX ORDER — LIDOCAINE HYDROCHLORIDE 10 MG/ML
0.5 INJECTION, SOLUTION INFILTRATION; PERINEURAL ONCE AS NEEDED
Status: DISCONTINUED | OUTPATIENT
Start: 2018-02-27 | End: 2018-02-27 | Stop reason: HOSPADM

## 2018-02-27 RX ADMIN — PROPOFOL 180 MCG/KG/MIN: 10 INJECTION, EMULSION INTRAVENOUS at 15:45

## 2018-02-27 RX ADMIN — PROPOFOL 100 MG: 10 INJECTION, EMULSION INTRAVENOUS at 15:46

## 2018-02-27 RX ADMIN — SODIUM CHLORIDE, POTASSIUM CHLORIDE, SODIUM LACTATE AND CALCIUM CHLORIDE 1000 ML: 600; 310; 30; 20 INJECTION, SOLUTION INTRAVENOUS at 15:26

## 2018-02-27 RX ADMIN — PROPOFOL 50 MG: 10 INJECTION, EMULSION INTRAVENOUS at 15:55

## 2018-02-27 RX ADMIN — PROPOFOL 80 MG: 10 INJECTION, EMULSION INTRAVENOUS at 15:58

## 2018-02-27 NOTE — ANESTHESIA PREPROCEDURE EVALUATION
Anesthesia Evaluation     Patient summary reviewed and Nursing notes reviewed   NPO Solid Status: > 8 hours  NPO Liquid Status: > 4 hours           Airway   Mallampati: III  TM distance: <3 FB  Neck ROM: full  possible difficult intubation, small opening and Narrow palate  Dental - normal exam     Pulmonary - normal exam   Cardiovascular - normal exam    (+) hypertension (two meds),       Neuro/Psych  (+) TIA (s/p cerebral aneurysm coiling ),     GI/Hepatic/Renal/Endo    (+)   hypothyroidism,     Musculoskeletal     Abdominal  - normal exam    Bowel sounds: normal.   Substance History      OB/GYN          Other                      Anesthesia Plan    ASA 3     MAC     Anesthetic plan and risks discussed with patient.

## 2018-02-27 NOTE — ANESTHESIA POSTPROCEDURE EVALUATION
Patient: Sari Self    Procedure Summary     Date Anesthesia Start Anesthesia Stop Room / Location    02/27/18 1542 1608  [facility]       Procedure Diagnosis Surgeon Provider    ESOPHAGOGASTRODUODENOSCOPY WITH COLD BIOPSIES (N/A Esophagus); COLONOSCOPY TO CECUM AND INTO TERMINAL ILEUM WITH COLD BIOPSIES AND COLD  BIOPSY POLYPECTOMY (N/A ) Gastroesophageal reflux disease, esophagitis presence not specified; Diarrhea, unspecified type  (Gastroesophageal reflux disease, esophagitis presence not specified [K21.9]; Diarrhea, unspecified type [R19.7]) MD Valeria Hinson MD          Anesthesia Type: MAC  Last vitals  BP   111/66 (02/27/18 1607)   Temp   36.8 °C (98.2 °F) (02/27/18 1502)   Pulse   80 (02/27/18 1607)   Resp   16 (02/27/18 1607)     SpO2   97 % (02/27/18 1607)     Post Anesthesia Care and Evaluation    Patient location during evaluation: PACU  Patient participation: complete - patient participated  Level of consciousness: awake and alert  Pain management: adequate  Airway patency: patent  Anesthetic complications: No anesthetic complications    Cardiovascular status: acceptable  Respiratory status: acceptable  Hydration status: acceptable

## 2018-03-01 PROBLEM — D12.6 TUBULAR ADENOMA OF COLON: Status: ACTIVE | Noted: 2018-03-01

## 2018-03-01 PROBLEM — A04.8 HELICOBACTER PYLORI (H. PYLORI) INFECTION: Status: ACTIVE | Noted: 2018-03-01

## 2018-03-01 LAB
CYTO UR: NORMAL
LAB AP CASE REPORT: NORMAL
Lab: NORMAL
PATH REPORT.FINAL DX SPEC: NORMAL
PATH REPORT.GROSS SPEC: NORMAL

## 2018-03-02 NOTE — PROGRESS NOTES
Path results show H. pylori positive and tubular adenoma    Call in omeprazole 20 mg by mouth twice a day for 10 days  Bismuth 262mg   tablets, 2 tabs  by mouth 3 times a day for 10 days  Doxycycline 100 mg by mouth twice a day for 10 days  Flagyl 500 mg by mouth 3 times a day for 10 days    In 6 weeks to get an H. pylori breath test off of all antibiotics and PPI 2 weeks  Colonoscopy recall 5 years    Nurse practitioner office visit 12 weeks to check symptoms

## 2018-03-05 ENCOUNTER — TELEPHONE (OUTPATIENT)
Dept: GASTROENTEROLOGY | Facility: CLINIC | Age: 68
End: 2018-03-05

## 2018-03-05 DIAGNOSIS — A04.8 BACTERIAL INFECTION DUE TO HELICOBACTER PYLORI: Primary | ICD-10-CM

## 2018-03-05 RX ORDER — OMEPRAZOLE 20 MG/1
20 TABLET, DELAYED RELEASE ORAL 2 TIMES DAILY
Qty: 20 TABLET | Refills: 0 | Status: SHIPPED | OUTPATIENT
Start: 2018-03-05 | End: 2018-03-15

## 2018-03-05 RX ORDER — METRONIDAZOLE 500 MG/1
500 TABLET ORAL 3 TIMES DAILY
Qty: 30 TABLET | Refills: 0 | Status: SHIPPED | OUTPATIENT
Start: 2018-03-05 | End: 2018-03-15

## 2018-03-05 RX ORDER — DOXYCYCLINE HYCLATE 100 MG/1
100 CAPSULE ORAL 2 TIMES DAILY
Qty: 20 CAPSULE | Refills: 0 | Status: SHIPPED | OUTPATIENT
Start: 2018-03-05 | End: 2018-03-15

## 2018-03-05 NOTE — TELEPHONE ENCOUNTER
----- Message from Finesse Reza MD sent at 3/2/2018 11:16 AM EST -----  Path results show H. pylori positive and tubular adenoma    Call in omeprazole 20 mg by mouth twice a day for 10 days  Bismuth 262mg   tablets, 2 tabs  by mouth 3 times a day for 10 days  Doxycycline 100 mg by mouth twice a day for 10 days  Flagyl 500 mg by mouth 3 times a day for 10 days    In 6 weeks to get an H. pylori breath test off of all antibiotics and PPI 2 weeks  Colonoscopy recall 5 years    Nurse practitioner office visit 12 weeks to check symptoms

## 2018-03-05 NOTE — TELEPHONE ENCOUNTER
Called pt and advised of Dr Reza's note. Advised that I will call her medications into her pharmacy for her to start and she should hold her normal daily dose of omeprazole while on this course. Advised that we can schedule both the breath test appt and her f/u appt. Appt for breath test scheduled for 4/12 at 9 AM and f/u scheduled for 5/29 at 9 AM. Advised she should hold omeprazole for 2 weeks prior to lab appt. Advised she can try Tums, Maalox, Pepcid or Zantac in that two weeks if needed. Pt verb understanding.   Medications e-scribed.   Lab order placed for breath test.     Health Maintenance updated to reflect c.s due 2/2023.

## 2018-03-09 ENCOUNTER — TELEPHONE (OUTPATIENT)
Dept: GASTROENTEROLOGY | Facility: CLINIC | Age: 68
End: 2018-03-09

## 2018-03-09 RX ORDER — ONDANSETRON HYDROCHLORIDE 8 MG/1
8 TABLET, FILM COATED ORAL EVERY 8 HOURS PRN
Qty: 60 TABLET | Refills: 2 | Status: SHIPPED | OUTPATIENT
Start: 2018-03-09 | End: 2018-07-25

## 2018-03-09 NOTE — TELEPHONE ENCOUNTER
----- Message from Mauro Armenta sent at 3/9/2018  8:58 AM EST -----  Regarding: medication   Contact: [phone]  Pt is calling about medications (FLAGYL) 500 MG tablet & doxycycline, stated they are making her nausea. Pt would like a call back.

## 2018-03-09 NOTE — TELEPHONE ENCOUNTER
Called pt back. Pt states she is having nausea and diarrhea from the H Pylori treatment. Pt states she had the diarrhea since before treatment, but it is definetly worse now and the nausea is new. She states she has taken Zofran before and it worked well for her. She also heard from a friend that Zofran helps with nausea without the drowsy side effect and also can sometimes help with diarrhea. Advised will send a message to Dr Reza with an update and ask about getting a script for Zofran for her. Pt verb understanding.

## 2018-03-09 NOTE — TELEPHONE ENCOUNTER
"Per Dr Reza: \"Yes the antibiotics cause nausea and diarrhea but she must continue them.  Zofran 8 mg by mouth every 8 hours when necessary, #60, 2 refills\"    Called pt and advised pf note from Dr Reza. Advised will call the Zofran into her [facility] pharmacy. Pt verb understanding.   Medication e-scribed.   "

## 2018-03-26 RX ORDER — LISINOPRIL 10 MG/1
10 TABLET ORAL DAILY
Qty: 90 TABLET | Refills: 1 | Status: SHIPPED | OUTPATIENT
Start: 2018-03-26 | End: 2018-06-25 | Stop reason: SDUPTHER

## 2018-04-09 ENCOUNTER — RESULTS ENCOUNTER (OUTPATIENT)
Dept: GASTROENTEROLOGY | Facility: CLINIC | Age: 68
End: 2018-04-09

## 2018-04-09 DIAGNOSIS — A04.8 BACTERIAL INFECTION DUE TO HELICOBACTER PYLORI: ICD-10-CM

## 2018-04-18 LAB — UREA BREATH TEST QL: NEGATIVE

## 2018-04-19 ENCOUNTER — TELEPHONE (OUTPATIENT)
Dept: GASTROENTEROLOGY | Facility: CLINIC | Age: 68
End: 2018-04-19

## 2018-05-18 RX ORDER — GABAPENTIN 300 MG/1
CAPSULE ORAL
Qty: 180 CAPSULE | Refills: 0 | Status: SHIPPED | OUTPATIENT
Start: 2018-05-18 | End: 2019-06-26

## 2018-06-25 RX ORDER — LISINOPRIL 10 MG/1
10 TABLET ORAL DAILY
Qty: 90 TABLET | Refills: 1 | Status: SHIPPED | OUTPATIENT
Start: 2018-06-25 | End: 2018-07-17

## 2018-07-02 ENCOUNTER — APPOINTMENT (OUTPATIENT)
Dept: CT IMAGING | Facility: HOSPITAL | Age: 68
End: 2018-07-02

## 2018-07-02 ENCOUNTER — OFFICE VISIT (OUTPATIENT)
Dept: GASTROENTEROLOGY | Facility: CLINIC | Age: 68
End: 2018-07-02

## 2018-07-02 ENCOUNTER — HOSPITAL ENCOUNTER (EMERGENCY)
Facility: HOSPITAL | Age: 68
Discharge: HOME | End: 2018-07-02
Attending: EMERGENCY MEDICINE | Admitting: EMERGENCY MEDICINE

## 2018-07-02 ENCOUNTER — HOSPITAL ENCOUNTER (EMERGENCY)
Facility: HOSPITAL | Age: 68
Discharge: HOME | End: 2018-07-02
Attending: EMERGENCY MEDICINE | Admitting: NURSE PRACTITIONER

## 2018-07-02 VITALS
DIASTOLIC BLOOD PRESSURE: 83 MMHG | HEIGHT: 63 IN | TEMPERATURE: 97.9 F | RESPIRATION RATE: 15 BRPM | WEIGHT: 172 LBS | BODY MASS INDEX: 30.48 KG/M2 | OXYGEN SATURATION: 94 % | HEART RATE: 70 BPM | SYSTOLIC BLOOD PRESSURE: 125 MMHG

## 2018-07-02 VITALS
WEIGHT: 172 LBS | RESPIRATION RATE: 16 BRPM | HEART RATE: 99 BPM | SYSTOLIC BLOOD PRESSURE: 142 MMHG | TEMPERATURE: 97.8 F | DIASTOLIC BLOOD PRESSURE: 89 MMHG | OXYGEN SATURATION: 99 % | BODY MASS INDEX: 30.48 KG/M2 | HEIGHT: 63 IN

## 2018-07-02 VITALS
DIASTOLIC BLOOD PRESSURE: 80 MMHG | SYSTOLIC BLOOD PRESSURE: 124 MMHG | WEIGHT: 172 LBS | BODY MASS INDEX: 30.48 KG/M2 | TEMPERATURE: 98 F | HEIGHT: 63 IN

## 2018-07-02 DIAGNOSIS — K58.0 IRRITABLE BOWEL SYNDROME WITH DIARRHEA: ICD-10-CM

## 2018-07-02 DIAGNOSIS — D12.6 TUBULAR ADENOMA OF COLON: ICD-10-CM

## 2018-07-02 DIAGNOSIS — R10.2 PELVIC PAIN: Primary | ICD-10-CM

## 2018-07-02 DIAGNOSIS — K21.00 GASTROESOPHAGEAL REFLUX DISEASE WITH ESOPHAGITIS: Primary | ICD-10-CM

## 2018-07-02 DIAGNOSIS — R52 PAIN: ICD-10-CM

## 2018-07-02 DIAGNOSIS — M54.50 ACUTE LOW BACK PAIN WITHOUT SCIATICA, UNSPECIFIED BACK PAIN LATERALITY: ICD-10-CM

## 2018-07-02 DIAGNOSIS — T82.898A EXTRAVASATION INJURY OF INTRAVENOUS CATHETER SITE WITH OTHER COMPLICATION, INITIAL ENCOUNTER (HCC): Primary | ICD-10-CM

## 2018-07-02 DIAGNOSIS — A04.8 HELICOBACTER PYLORI (H. PYLORI) INFECTION: ICD-10-CM

## 2018-07-02 LAB
ALBUMIN SERPL-MCNC: 4.4 G/DL (ref 3.5–5.2)
ALBUMIN/GLOB SERPL: 1.5 G/DL
ALP SERPL-CCNC: 69 U/L (ref 39–117)
ALT SERPL W P-5'-P-CCNC: 15 U/L (ref 1–33)
ANION GAP SERPL CALCULATED.3IONS-SCNC: 12.5 MMOL/L
AST SERPL-CCNC: 14 U/L (ref 1–32)
BASOPHILS # BLD AUTO: 0.06 10*3/MM3 (ref 0–0.2)
BASOPHILS NFR BLD AUTO: 0.9 % (ref 0–1.5)
BILIRUB SERPL-MCNC: 0.4 MG/DL (ref 0.1–1.2)
BILIRUB UR QL STRIP: NEGATIVE
BUN BLD-MCNC: 17 MG/DL (ref 8–23)
BUN/CREAT SERPL: 15.2 (ref 7–25)
CALCIUM SPEC-SCNC: 9.4 MG/DL (ref 8.6–10.5)
CHLORIDE SERPL-SCNC: 103 MMOL/L (ref 98–107)
CLARITY UR: CLEAR
CO2 SERPL-SCNC: 23.5 MMOL/L (ref 22–29)
COLOR UR: ABNORMAL
CREAT BLD-MCNC: 1.12 MG/DL (ref 0.57–1)
DEPRECATED RDW RBC AUTO: 43.4 FL (ref 37–54)
EOSINOPHIL # BLD AUTO: 0.2 10*3/MM3 (ref 0–0.7)
EOSINOPHIL NFR BLD AUTO: 3 % (ref 0.3–6.2)
ERYTHROCYTE [DISTWIDTH] IN BLOOD BY AUTOMATED COUNT: 12.9 % (ref 11.7–13)
GFR SERPL CREATININE-BSD FRML MDRD: 48 ML/MIN/1.73
GLOBULIN UR ELPH-MCNC: 2.9 GM/DL
GLUCOSE BLD-MCNC: 93 MG/DL (ref 65–99)
GLUCOSE UR STRIP-MCNC: NEGATIVE MG/DL
HCT VFR BLD AUTO: 40.8 % (ref 35.6–45.5)
HGB BLD-MCNC: 12.8 G/DL (ref 11.9–15.5)
HGB UR QL STRIP.AUTO: NEGATIVE
IMM GRANULOCYTES # BLD: 0.02 10*3/MM3 (ref 0–0.03)
IMM GRANULOCYTES NFR BLD: 0.3 % (ref 0–0.5)
KETONES UR QL STRIP: NEGATIVE
LEUKOCYTE ESTERASE UR QL STRIP.AUTO: NEGATIVE
LIPASE SERPL-CCNC: 29 U/L (ref 13–60)
LYMPHOCYTES # BLD AUTO: 2.04 10*3/MM3 (ref 0.9–4.8)
LYMPHOCYTES NFR BLD AUTO: 30.4 % (ref 19.6–45.3)
MCH RBC QN AUTO: 28.8 PG (ref 26.9–32)
MCHC RBC AUTO-ENTMCNC: 31.4 G/DL (ref 32.4–36.3)
MCV RBC AUTO: 91.7 FL (ref 80.5–98.2)
MONOCYTES # BLD AUTO: 0.59 10*3/MM3 (ref 0.2–1.2)
MONOCYTES NFR BLD AUTO: 8.8 % (ref 5–12)
NEUTROPHILS # BLD AUTO: 3.79 10*3/MM3 (ref 1.9–8.1)
NEUTROPHILS NFR BLD AUTO: 56.6 % (ref 42.7–76)
NITRITE UR QL STRIP: NEGATIVE
PH UR STRIP.AUTO: 6 [PH] (ref 5–8)
PLATELET # BLD AUTO: 335 10*3/MM3 (ref 140–500)
PMV BLD AUTO: 9.4 FL (ref 6–12)
POTASSIUM BLD-SCNC: 3.9 MMOL/L (ref 3.5–5.2)
PROT SERPL-MCNC: 7.3 G/DL (ref 6–8.5)
PROT UR QL STRIP: NEGATIVE
RBC # BLD AUTO: 4.45 10*6/MM3 (ref 3.9–5.2)
SODIUM BLD-SCNC: 139 MMOL/L (ref 136–145)
SP GR UR STRIP: 1.01 (ref 1–1.03)
UROBILINOGEN UR QL STRIP: ABNORMAL
WBC NRBC COR # BLD: 6.7 10*3/MM3 (ref 4.5–10.7)

## 2018-07-02 PROCEDURE — 74177 CT ABD & PELVIS W/CONTRAST: CPT

## 2018-07-02 PROCEDURE — 99283 EMERGENCY DEPT VISIT LOW MDM: CPT

## 2018-07-02 PROCEDURE — 36415 COLL VENOUS BLD VENIPUNCTURE: CPT

## 2018-07-02 PROCEDURE — 83690 ASSAY OF LIPASE: CPT | Performed by: NURSE PRACTITIONER

## 2018-07-02 PROCEDURE — 25010000002 IOPAMIDOL 61 % SOLUTION: Performed by: EMERGENCY MEDICINE

## 2018-07-02 PROCEDURE — 85025 COMPLETE CBC W/AUTO DIFF WBC: CPT | Performed by: NURSE PRACTITIONER

## 2018-07-02 PROCEDURE — 80053 COMPREHEN METABOLIC PANEL: CPT | Performed by: NURSE PRACTITIONER

## 2018-07-02 PROCEDURE — 99214 OFFICE O/P EST MOD 30 MIN: CPT | Performed by: NURSE PRACTITIONER

## 2018-07-02 PROCEDURE — 74176 CT ABD & PELVIS W/O CONTRAST: CPT

## 2018-07-02 PROCEDURE — 81003 URINALYSIS AUTO W/O SCOPE: CPT | Performed by: NURSE PRACTITIONER

## 2018-07-02 RX ORDER — SODIUM CHLORIDE 0.9 % (FLUSH) 0.9 %
10 SYRINGE (ML) INJECTION AS NEEDED
Status: DISCONTINUED | OUTPATIENT
Start: 2018-07-02 | End: 2018-07-02 | Stop reason: HOSPADM

## 2018-07-02 RX ORDER — HYDROCODONE BITARTRATE AND ACETAMINOPHEN 5; 325 MG/1; MG/1
1 TABLET ORAL EVERY 6 HOURS PRN
Status: DISCONTINUED | OUTPATIENT
Start: 2018-07-02 | End: 2018-07-02

## 2018-07-02 RX ORDER — NAPROXEN 500 MG/1
500 TABLET ORAL 2 TIMES DAILY WITH MEALS
Qty: 25 TABLET | Refills: 0 | Status: SHIPPED | OUTPATIENT
Start: 2018-07-02 | End: 2018-10-18

## 2018-07-02 RX ADMIN — IOPAMIDOL 85 ML: 612 INJECTION, SOLUTION INTRAVENOUS at 15:18

## 2018-07-02 NOTE — DISCHARGE INSTRUCTIONS
Make a follow up with your Primary Care Provider and with Urology    Return Precautions    Although you are being discharged from the ED today, I encourage you to return for worsening symptoms.  Things can, and do, change such that treatment at home with medication may not be adequate.      Specifically, return for any of the following:    Chest pain, shortness of breath, pain or nausea and vomiting not controlled by medications provided.    Please make a follow up with your Primary Care Provider for a blood pressure recheck.

## 2018-07-02 NOTE — PROGRESS NOTES
Chief Complaint   Patient presents with   • Follow-up   • Hx of H pylori       Sari Self is a  68 y.o. female here for a follow up visit for GERD and H-pylori infection.     HPI  67 yo f presents today for follow up visit for GERD and H-pylori. She is a patient of Dr. Reza. She was last seen in the office on 1/4/18. She has hx GERD, IBS with diarrhea. She underwent EGD and Colonoscopy on 2/27/18. EGD was normal but path + H-pylori. Colonoscopy showed NBIH, diverticulosis and 1 colon polyp. Path TA. She was treated with Pylera x 2 weeks. She admits since stopping the meds for H-pylori all her previous chest pain is gone. She admits the reflux is well controlled now on omeprazole 40 mg daily. She denies any dysphagia, reflux, abd pain, N&V, diarrhea, constipation, rectal bleeding or melena. She admits her appetite is good and her weight is stable. She has been having issues lately with UTI, dysuria, pelvic pain and back pain. She has been to urgent care twice and is currently on levaquin (her 2nd ABX) and she is still having issues. She plans on going to ER for CT scan since she does not have a PCP or urologist.     Past Medical History:   Diagnosis Date   • Disease of thyroid gland    • Diverticulosis    • GERD (gastroesophageal reflux disease)    • Hypertension    • Osteoporosis    • TIA (transient ischemic attack)        Past Surgical History:   Procedure Laterality Date   • CHOLECYSTECTOMY     • COLONOSCOPY  11/10/2010    prep of colon fair,IH,stool in transverse colon,hepatic flexure and ascending colon,ileum normal,IBS   • COLONOSCOPY N/A 2/27/2018    IH, diverticulosis, one 5mm polyp, tubular adenoma with low grade dysplasia   • ENDOSCOPY  11/27/2012    grd 1 reflux egitis,web upperd 3rd esoph   • ENDOSCOPY N/A 2/27/2018    normal biopsies, H Pylori positive   • FOOT NEUROMA SURGERY Left    • IR CEREBRAL ANEURYSM COILING     • THUMB ARTHROSCOPY         Scheduled Meds:    Continuous Infusions:  No current  facility-administered medications for this visit.     PRN Meds:.    Allergies   Allergen Reactions   • Penicillins Hives       Social History     Social History   • Marital status:      Spouse name: N/A   • Number of children: N/A   • Years of education: N/A     Occupational History   • Not on file.     Social History Main Topics   • Smoking status: Never Smoker   • Smokeless tobacco: Never Used   • Alcohol use No   • Drug use: No   • Sexual activity: Not on file     Other Topics Concern   • Not on file     Social History Narrative   • No narrative on file       Family History   Problem Relation Age of Onset   • Irritable bowel syndrome Daughter        Review of Systems   Constitutional: Negative for appetite change, fatigue, fever and unexpected weight change.   HENT: Negative for trouble swallowing.    Respiratory: Negative for cough, choking, chest tightness and shortness of breath.    Cardiovascular: Negative for chest pain.   Gastrointestinal: Negative for abdominal distention, abdominal pain, anal bleeding, blood in stool, constipation, diarrhea, nausea, rectal pain and vomiting.   Genitourinary: Positive for dysuria and urgency.   Musculoskeletal: Positive for back pain.       Vitals:    07/02/18 0901   BP: 124/80   Temp: 98 °F (36.7 °C)       Physical Exam   Constitutional: She is oriented to person, place, and time. She appears well-developed and well-nourished. She does not appear ill. No distress.   HENT:   Head: Normocephalic.   Eyes: Pupils are equal, round, and reactive to light.   Cardiovascular: Normal rate, regular rhythm and normal heart sounds.    Pulmonary/Chest: Effort normal and breath sounds normal.   Abdominal: Soft. Bowel sounds are normal. She exhibits no distension and no mass. There is no hepatosplenomegaly. There is no tenderness. There is no rebound and no guarding. No hernia.   Musculoskeletal: Normal range of motion.   Neurological: She is alert and oriented to person, place,  and time.   Skin: Skin is warm and dry.   Psychiatric: She has a normal mood and affect. Her speech is normal and behavior is normal. Judgment normal.       No images are attached to the encounter.    Assessment & Plan    1. Gastroesophageal reflux disease with esophagitis    2. Helicobacter pylori (H. pylori) infection    3. Tubular adenoma of colon    4. Irritable bowel syndrome with diarrhea    I reviewed EGD and Colonoscopy results with patient. She has finished the H-pylori treatment and is doing much better. She is doing well on omeprazole 40 mg daily. She is off the carafate. She denies any more chest pain or reflux symptoms. Next colonoscopy will be due in 5 years. Follow up with Dr. Reza in 6 months. Call office with any issues.

## 2018-07-02 NOTE — ED PROVIDER NOTES
EMERGENCY DEPARTMENT ENCOUNTER    Room Number:  43/43  Date seen:  7/2/2018  Time seen: 11:39 AM  PCP: Provider Not In System    HPI:  Chief complaint:lower abd pain  Context:Sari Self is a 68 y.o. female who presents to the ED with c/o intermittent lower abd pain that has been ongoing for a month. Pt states she saw [facility] a month ago for hematuria, lower abd pain, and lower back and was given abx. She states she then went on vacation and had lower abd pain and lower back pain again. She states she then went back to [facility] and was put on Levaquin.    Timing:intermittent   Duration: A month  Location:lower abd  Quality:pain  Intensity/Severity:moderate  Associated Symptoms:lower back pain  Aggravating Factors:none stated  Alleviating Factors:none stated  Treatment before arrival:Saw [facility] and was put on Levaquin    MEDICAL RECORD REVIEW      ALLERGIES  Penicillins    PAST MEDICAL HISTORY  Active Ambulatory Problems     Diagnosis Date Noted   • Osteoporosis 08/05/2016   • Senile osteoporosis 09/27/2017   • Gastroesophageal reflux disease 01/04/2018   • Diarrhea 01/04/2018   • Tubular adenoma of colon 03/01/2018   • Helicobacter pylori (H. pylori) infection 03/01/2018     Resolved Ambulatory Problems     Diagnosis Date Noted   • No Resolved Ambulatory Problems     Past Medical History:   Diagnosis Date   • Disease of thyroid gland    • Diverticulosis    • GERD (gastroesophageal reflux disease)    • Hypertension    • Osteoporosis    • TIA (transient ischemic attack)        PAST SURGICAL HISTORY  Past Surgical History:   Procedure Laterality Date   • CHOLECYSTECTOMY     • COLONOSCOPY  11/10/2010    prep of colon fair,IH,stool in transverse colon,hepatic flexure and ascending colon,ileum normal,IBS   • COLONOSCOPY N/A 2/27/2018    IH, diverticulosis, one 5mm polyp, tubular adenoma with low grade dysplasia   • ENDOSCOPY  11/27/2012    grd 1 reflux egitis,web upperd 3rd esoph   • ENDOSCOPY N/A 2/27/2018    normal biopsies, H  Pylori positive   • FOOT NEUROMA SURGERY Left    • IR CEREBRAL ANEURYSM COILING     • THUMB ARTHROSCOPY         FAMILY HISTORY  Family History   Problem Relation Age of Onset   • Irritable bowel syndrome Daughter        SOCIAL HISTORY  Social History     Social History   • Marital status:      Spouse name: N/A   • Number of children: N/A   • Years of education: N/A     Occupational History   • Not on file.     Social History Main Topics   • Smoking status: Never Smoker   • Smokeless tobacco: Never Used   • Alcohol use No   • Drug use: No   • Sexual activity: Not on file     Other Topics Concern   • Not on file     Social History Narrative   • No narrative on file       REVIEW OF SYSTEMS  Review of Systems   Constitutional: Negative for activity change, appetite change, diaphoresis and fever.   HENT: Negative for trouble swallowing.    Eyes: Negative for visual disturbance.   Respiratory: Negative for cough, chest tightness, shortness of breath and wheezing.    Cardiovascular: Negative for chest pain, palpitations and leg swelling.   Gastrointestinal: Positive for abdominal pain (lower). Negative for diarrhea, nausea and vomiting.   Genitourinary: Negative for dysuria.   Musculoskeletal: Positive for back pain (lower).   Skin: Negative for rash.   Neurological: Negative for dizziness, speech difficulty and light-headedness.       PHYSICAL EXAM  ED Triage Vitals [07/02/18 1010]   Temp Heart Rate Resp BP SpO2   97.9 °F (36.6 °C) 80 -- -- 98 %      Temp src Heart Rate Source Patient Position BP Location FiO2 (%)   Tympanic -- -- -- --     Physical Exam   Constitutional: She is oriented to person, place, and time and well-developed, well-nourished, and in no distress. No distress.   HENT:   Head: Normocephalic and atraumatic.   Mouth/Throat: Uvula is midline and mucous membranes are normal.   Neck: Normal range of motion. Neck supple.   Cardiovascular: S1 normal, S2 normal and normal heart sounds.  Exam reveals  no gallop and no friction rub.    No murmur heard.  Pulmonary/Chest: Effort normal and breath sounds normal. She has no decreased breath sounds. She has no wheezes. She has no rhonchi. She has no rales.   Abdominal: Soft. Normal appearance. There is tenderness in the suprapubic area. There is no rebound and no guarding.   Musculoskeletal: Normal range of motion.   Neurological: She is alert and oriented to person, place, and time.   Skin: Skin is warm, dry and intact.   Psychiatric: Affect and judgment normal.   Nursing note and vitals reviewed.      LAB RESULTS  Recent Results (from the past 24 hour(s))   Urinalysis With Microscopic If Indicated (No Culture) - Urine, Clean Catch    Collection Time: 07/02/18 11:55 AM   Result Value Ref Range    Color, UA Dark Yellow (A) Yellow, Straw    Appearance, UA Clear Clear    pH, UA 6.0 5.0 - 8.0    Specific Gravity, UA 1.009 1.005 - 1.030    Glucose, UA Negative Negative    Ketones, UA Negative Negative    Bilirubin, UA Negative Negative    Blood, UA Negative Negative    Protein, UA Negative Negative    Leuk Esterase, UA Negative Negative    Nitrite, UA Negative Negative    Urobilinogen, UA 0.2 E.U./dL 0.2 - 1.0 E.U./dL   Comprehensive Metabolic Panel    Collection Time: 07/02/18 12:22 PM   Result Value Ref Range    Glucose 93 65 - 99 mg/dL    BUN 17 8 - 23 mg/dL    Creatinine 1.12 (H) 0.57 - 1.00 mg/dL    Sodium 139 136 - 145 mmol/L    Potassium 3.9 3.5 - 5.2 mmol/L    Chloride 103 98 - 107 mmol/L    CO2 23.5 22.0 - 29.0 mmol/L    Calcium 9.4 8.6 - 10.5 mg/dL    Total Protein 7.3 6.0 - 8.5 g/dL    Albumin 4.40 3.50 - 5.20 g/dL    ALT (SGPT) 15 1 - 33 U/L    AST (SGOT) 14 1 - 32 U/L    Alkaline Phosphatase 69 39 - 117 U/L    Total Bilirubin 0.4 0.1 - 1.2 mg/dL    eGFR Non African Amer 48 (L) >60 mL/min/1.73    Globulin 2.9 gm/dL    A/G Ratio 1.5 g/dL    BUN/Creatinine Ratio 15.2 7.0 - 25.0    Anion Gap 12.5 mmol/L   Lipase    Collection Time: 07/02/18 12:22 PM   Result Value  Ref Range    Lipase 29 13 - 60 U/L   CBC Auto Differential    Collection Time: 07/02/18 12:22 PM   Result Value Ref Range    WBC 6.70 4.50 - 10.70 10*3/mm3    RBC 4.45 3.90 - 5.20 10*6/mm3    Hemoglobin 12.8 11.9 - 15.5 g/dL    Hematocrit 40.8 35.6 - 45.5 %    MCV 91.7 80.5 - 98.2 fL    MCH 28.8 26.9 - 32.0 pg    MCHC 31.4 (L) 32.4 - 36.3 g/dL    RDW 12.9 11.7 - 13.0 %    RDW-SD 43.4 37.0 - 54.0 fl    MPV 9.4 6.0 - 12.0 fL    Platelets 335 140 - 500 10*3/mm3    Neutrophil % 56.6 42.7 - 76.0 %    Lymphocyte % 30.4 19.6 - 45.3 %    Monocyte % 8.8 5.0 - 12.0 %    Eosinophil % 3.0 0.3 - 6.2 %    Basophil % 0.9 0.0 - 1.5 %    Immature Grans % 0.3 0.0 - 0.5 %    Neutrophils, Absolute 3.79 1.90 - 8.10 10*3/mm3    Lymphocytes, Absolute 2.04 0.90 - 4.80 10*3/mm3    Monocytes, Absolute 0.59 0.20 - 1.20 10*3/mm3    Eosinophils, Absolute 0.20 0.00 - 0.70 10*3/mm3    Basophils, Absolute 0.06 0.00 - 0.20 10*3/mm3    Immature Grans, Absolute 0.02 0.00 - 0.03 10*3/mm3       I ordered the above labs and reviewed the results    RADIOLOGY  CT abd pelvis-NAD    I ordered the above noted radiological studies and reviewed the images on the PACS system.      MEDICATIONS GIVEN IN ER  Medications   iopamidol (ISOVUE-300) 61 % injection 100 mL (85 mL Intravenous Given 7/2/18 7510)         PROCEDURES  Procedures    COURSE & MEDICAL DECISION MAKING  Pertinent Labs and Imaging studies that were ordered and reviewed are noted above.  Results were reviewed/discussed with the patient and they were also made aware of online access.  Pt also made aware that some labs, such as cultures, will not be resulted during ER visit and follow up with PMD is necessary.     PROGRESS AND CONSULTS    Progress Notes:  1203   Reviewed pt's history and workup with Dr. Fowler.  After a bedside evaluation, Dr. Fowler agrees with the plan of care.    1553  The patient's history, physical exam, and lab findings were discussed with the physician, who also performed a face  "to face history and physical exam.  I discussed all results and noted any abnormalities with patient.  Discussed absoute need to recheck abnormalities with their family physician.  I answered any of the patient's questions.  Discussed plan for discharge, as there is no emergent indication for admission.  Pt is agreeable and understands need for follow up and repeat testing.  Pt is aware that discharge does not mean that nothing is wrong but it indicates no emergency is present and they must continue care with their family physician.  Pt is discharged with instructions to follow up with primary care doctor to have their blood pressure rechecked.     1558  BP- 135/84 HR- 73 Temp- 97.9 °F (36.6 °C) (Tympanic) O2 sat- 97%  Rechecked the patient who is in NAD and is resting comfortably. Informed pt of all testing being negative and the plan for discharge with instructions to follow up with urology.Pt understands and agrees with the plan, all questions answered.      Disposition vitals:  /83 (BP Location: Left arm, Patient Position: Sitting)   Pulse 70   Temp 97.9 °F (36.6 °C) (Tympanic)   Resp 15   Ht 160 cm (63\")   Wt 78 kg (172 lb)   SpO2 94%   BMI 30.47 kg/m²       DIAGNOSIS  Final diagnoses:   Pelvic pain   Acute low back pain without sciatica, unspecified back pain laterality       FOLLOW UP   SUZY Keyes  [address] [address] [phone]    Schedule an appointment as soon as possible for a visit in 2 days      Alonzo Connors MD  [address] [place], [place] [id number]  [phone]    Schedule an appointment as soon as possible for a visit         RX     Medication List      New Prescriptions    naproxen 500 MG tablet  Commonly known as:  NAPROSYN  Take 1 tablet by mouth 2 (Two) Times a Day With Meals.        Stop    levoFLOXacin 500 MG tablet  Commonly known as:  LEVAQUIN          Documentation assistance provided by monika Beal for LEXY Meng.  " Information recorded by the scribe was done at my direction and has been verified and validated by me.  Electronically signed by Jordan Beal on 7/2/2018 at time 7:12 PM           Jordan Beal  07/02/18 1559       Sonal Enriquez, LEXY  07/02/18 1912

## 2018-07-02 NOTE — ED NOTES
IV team called and reports will come to attempt IV access. Pt updated     Jacqueline Dooley RN  07/02/18 1638

## 2018-07-02 NOTE — ED NOTES
"Ct calls to report IV \"blown.\" will attempt access again.      Jacqueline Dooley RN  07/02/18 0816    "

## 2018-07-02 NOTE — ED PROVIDER NOTES
Pt presents to the ED complaining of lower abd pain for the last month. Pt also complains of intermittent hematuria and states that she has finished two round of abx without relief. Pt's UA is unremarkable and CT abd/pelvis shows nothing acute. On exam, the pt has minimal suprapubic abd tenderness without rebound or guarding. Pt is in NAD. I agree with the plan to discharge the pt home.    Attestation:  The YOGESH and I have discussed this patient's history, physical exam, and treatment plan.  I have reviewed the documentation and personally had a face to face interaction with the patient. I affirm the documentation and agree with the treatment and plan.  The attached note describes my personal findings.      Documentation assistance provided by bertha Morrison and Zahra Hare for Malcolm Albright. Information recorded by the scribe was done at my direction and has been verified and validated by me.     Zahra Alexandre  07/02/18 1608       Yamile Morrison  07/02/18 1803       Arvin Fowler MD  07/04/18 5645

## 2018-07-02 NOTE — ED PROVIDER NOTES
EMERGENCY DEPARTMENT ENCOUNTER    CHIEF COMPLAINT  Chief Complaint: Right arm swelling  History given by: pt  History limited by: none  Room Number: 34/34  PMD: Provider Not In System      HPI:  Pt is a 68 y.o. female who presents complaining ofpresents c/o RUE swelling that she first noticed earlier today after she had a CT with contrast. Pt denies any RUE pain. Per RN, the CT tech stated that the pt's IV would not flush earlier today but that the IV flushed well when the RN rechecked the pt's IV. Pt denies additional complaint.    Duration:  Couple hours ago  Onset: gradual  Timing: constant  Location: right arm  Radiation: none  Quality: none  Intensity/Severity: moderate  Progression: unchanged  Associated Symptoms: none  Aggravating Factors: none  Alleviating Factors: none  Previous Episodes: none  Treatment before arrival: none    PAST MEDICAL HISTORY  Active Ambulatory Problems     Diagnosis Date Noted   • Osteoporosis 08/05/2016   • Senile osteoporosis 09/27/2017   • Gastroesophageal reflux disease 01/04/2018   • Diarrhea 01/04/2018   • Tubular adenoma of colon 03/01/2018   • Helicobacter pylori (H. pylori) infection 03/01/2018     Resolved Ambulatory Problems     Diagnosis Date Noted   • No Resolved Ambulatory Problems     Past Medical History:   Diagnosis Date   • Disease of thyroid gland    • Diverticulosis    • GERD (gastroesophageal reflux disease)    • Hypertension    • Osteoporosis    • TIA (transient ischemic attack)        PAST SURGICAL HISTORY  Past Surgical History:   Procedure Laterality Date   • CHOLECYSTECTOMY     • COLONOSCOPY  11/10/2010    prep of colon fair,IH,stool in transverse colon,hepatic flexure and ascending colon,ileum normal,IBS   • COLONOSCOPY N/A 2/27/2018    IH, diverticulosis, one 5mm polyp, tubular adenoma with low grade dysplasia   • ENDOSCOPY  11/27/2012    grd 1 reflux egitis,web upperd 3rd esoph   • ENDOSCOPY N/A 2/27/2018    normal biopsies, H Pylori positive   • FOOT  NEUROMA SURGERY Left    • IR CEREBRAL ANEURYSM COILING     • THUMB ARTHROSCOPY         FAMILY HISTORY  Family History   Problem Relation Age of Onset   • Irritable bowel syndrome Daughter        SOCIAL HISTORY  Social History     Social History   • Marital status:      Spouse name: N/A   • Number of children: N/A   • Years of education: N/A     Occupational History   • Not on file.     Social History Main Topics   • Smoking status: Never Smoker   • Smokeless tobacco: Never Used   • Alcohol use No   • Drug use: No   • Sexual activity: Not on file     Other Topics Concern   • Not on file     Social History Narrative   • No narrative on file       ALLERGIES  Penicillins    REVIEW OF SYSTEMS  Review of Systems   Constitutional: Negative for fever.   HENT: Negative for sore throat.    Respiratory: Negative for cough and shortness of breath.    Cardiovascular: Negative for chest pain.   Gastrointestinal: Negative for abdominal pain, diarrhea and vomiting.   Genitourinary: Negative for dysuria.   Musculoskeletal: Negative for neck pain.        Right arm swelling   Skin: Negative for rash.   Neurological: Negative for weakness, numbness and headaches.   All other systems reviewed and are negative.      PHYSICAL EXAM  ED Triage Vitals   Temp Heart Rate Resp BP SpO2   07/02/18 1759 07/02/18 1759 07/02/18 1805 07/02/18 1805 07/02/18 1759   97.8 °F (36.6 °C) 110 16 135/93 98 %      Temp src Heart Rate Source Patient Position BP Location FiO2 (%)   07/02/18 1759 -- 07/02/18 1805 07/02/18 1805 --   Tympanic  Sitting Left arm        Physical Exam   Constitutional: She is oriented to person, place, and time. No distress.   HENT:   Head: Normocephalic and atraumatic.   Eyes: EOM are normal. Pupils are equal, round, and reactive to light.   Neck: Normal range of motion. Neck supple.   Cardiovascular:   Pulses:       Radial pulses are 2+ on the right side, and 2+ on the left side.   Pulmonary/Chest: Effort normal. No  respiratory distress.   Musculoskeletal: Normal range of motion. She exhibits no edema.        Right forearm: She exhibits no tenderness.   Swelling to right AC fossa without tenderness or erythema   Neurological: She is alert and oriented to person, place, and time. She has normal sensation and normal strength.   Skin: Skin is warm and dry. No rash noted.   Psychiatric: Mood and affect normal.   Nursing note and vitals reviewed.    PROCEDURES  Procedures      PROGRESS AND CONSULTS  ED Course as of Jul 02 2225   Mon Jul 02, 2018   1808 Pt is a 68 yof with a cc of right arm swelling onset after CT scan with IV dye at 430p today. Reports arm swelling and tenderness proximal to the IV site. No c/o paresthesia. No vomiting or myalgia.   EXAM: awake and alert. Mild tenderness to right upper arm with noted edema. Puncture site to the Right AC from presumed IV insertion is non tender without induration and not actively bleeding. Radial pulse 2+  [JS]      ED Course User Index  [JS] Miranda Yoon, LEXY     1854  Discussed the plan to observe the pt for several minutes prior to discharging the pt home with home care instructions. Pt understands and agrees with the plan, all questions answered.    1911  Rechecked pt. Pt is resting comfortably. I gave the pt instructions from radiology regarding care after IV infiltration, including using warm compresses and no pushing or pulling with RUE. Discussed the plan to discharge the pt home. Pt understands and agrees with the plan, all questions answered.       MEDICAL DECISION MAKING  Pt also made aware that follow up with PMD is necessary.     MDM  Number of Diagnoses or Management Options  Extravasation injury of intravenous catheter site with other complication, initial encounter (CMS/[facility]):      Amount and/or Complexity of Data Reviewed  Decide to obtain previous medical records or to obtain history from someone other than the patient: yes  Review and summarize past medical  records: yes (Pt was seen in the ED earlier today for lower abd pain. Pt had a CT abd/pelvis with contrast that showed nothing acute.)    Patient Progress  Patient progress: stable         DIAGNOSIS  Final diagnoses:   Extravasation injury of intravenous catheter site with other complication, initial encounter (CMS/[facility])       DISPOSITION  DISCHARGE    Patient discharged in stable condition.    Reviewed implications of results, diagnosis, meds, responsibility to follow up, warning signs and symptoms of possible worsening, potential complications and reasons to return to ER.    Patient/Family voiced understanding of above instructions.    Discussed plan for discharge, as there is no emergent indication for admission. Patient referred to primary care provider for BP management due to today's BP. Pt/family is agreeable and understands need for follow up and repeat testing.  Pt is aware that discharge does not mean that nothing is wrong but it indicates no emergency is present that requires admission and they must continue care with follow-up as given below or physician of their choice.     FOLLOW-UP  Stevenson Perez MD  [address] [address]  [phone]    Call   As needed, If symptoms worsen         Medication List      No changes were made to your prescriptions during this visit.           Latest Documented Vital Signs:  As of 10:25 PM  BP- 142/89 HR- 99 Temp- 97.8 °F (36.6 °C) (Tympanic) O2 sat- 99%    --  Documentation assistance provided by bertha Morrison and Zahra Hare for Dr. Fowler.  Information recorded by the monika was done at my direction and has been verified and validated by me.     Zahra Alexandre  07/02/18 1915       Yamile Morrison  07/02/18 2220       Arvin Fowler MD  07/04/18 4132

## 2018-07-02 NOTE — ED NOTES
"Pt reports to ED 43 c/o low back pain and \"urethral pain.\" Pt states that she originally went to urgent care on 6/4/18 for low back pain and peeing blood and received antibiotics. Returned to urgent care last Thursday because she was still not feeling better but was no longer peeing blood. Received a new antibiotic, but is still not feeling better today. Reports no new sex partners.    NP at bedside. NAD noted, VSS. Patient resting comfortably. Will continue to monitor. NP to treat.     Anel Forbes RN  07/02/18 1152    "

## 2018-07-17 ENCOUNTER — APPOINTMENT (OUTPATIENT)
Dept: GENERAL RADIOLOGY | Facility: HOSPITAL | Age: 68
End: 2018-07-17

## 2018-07-17 PROCEDURE — 72050 X-RAY EXAM NECK SPINE 4/5VWS: CPT | Performed by: EMERGENCY MEDICINE

## 2018-07-25 ENCOUNTER — OFFICE VISIT (OUTPATIENT)
Dept: ORTHOPEDIC SURGERY | Facility: CLINIC | Age: 68
End: 2018-07-25

## 2018-07-25 VITALS — BODY MASS INDEX: 29.23 KG/M2 | WEIGHT: 165 LBS | HEIGHT: 63 IN

## 2018-07-25 DIAGNOSIS — M25.512 CHRONIC LEFT SHOULDER PAIN: Primary | ICD-10-CM

## 2018-07-25 DIAGNOSIS — G89.29 CHRONIC LEFT SHOULDER PAIN: Primary | ICD-10-CM

## 2018-07-25 PROCEDURE — 73030 X-RAY EXAM OF SHOULDER: CPT | Performed by: ORTHOPAEDIC SURGERY

## 2018-07-25 PROCEDURE — 99203 OFFICE O/P NEW LOW 30 MIN: CPT | Performed by: ORTHOPAEDIC SURGERY

## 2018-07-25 PROCEDURE — 20610 DRAIN/INJ JOINT/BURSA W/O US: CPT | Performed by: ORTHOPAEDIC SURGERY

## 2018-07-25 RX ADMIN — METHYLPREDNISOLONE ACETATE 80 MG: 80 INJECTION, SUSPENSION INTRA-ARTICULAR; INTRALESIONAL; INTRAMUSCULAR; SOFT TISSUE at 13:18

## 2018-07-25 RX ADMIN — BUPIVACAINE HYDROCHLORIDE 2 ML: 5 INJECTION, SOLUTION EPIDURAL; INTRACAUDAL at 13:18

## 2018-07-25 NOTE — PROGRESS NOTES
Patient: Sari Self    YOB: 1950    Medical Record Number: [id number]    Chief Complaints:  Left shoulder pain    History of Present Illness:     68 y.o. female patient who presents for evaluation of her left shoulder.  She reports a long history of intermittent problems with the left shoulder.  Over the past month, she has been moving, doing quite a bit of lifting boxes.  She does not recall any specific injury but she feels that the problem has been aggravated recently due to overuse.  She describes her pain as moderate to severe, constant and aching.  She has noticed some occasional clicking and popping.  She has trouble sleeping because of the pain.  She reports increased pain with reaching and lifting.  She has not noticed any alleviating factors recently.  In the past, she has responded well to injections.    Allergies:   Allergies   Allergen Reactions   • Penicillins Hives       Home Medications:      Current Outpatient Prescriptions:   •  baclofen (LIORESAL) 10 MG tablet, Take 1 tablet by mouth 3 (Three) Times a Day for 15 days., Disp: 45 tablet, Rfl: 0  •  gabapentin (NEURONTIN) 300 MG capsule, TAKE 1 CAPSULE TWICE DAILY, Disp: 180 capsule, Rfl: 0  •  hydrochlorothiazide (HYDRODIURIL) 25 MG tablet, Take 25 mg by mouth daily., Disp: , Rfl:   •  levothyroxine (SYNTHROID, LEVOTHROID) 50 MCG tablet, Take 50 mcg by mouth daily., Disp: , Rfl:   •  meloxicam (MOBIC) 15 MG tablet, Take 1 tablet by mouth Daily for 15 days., Disp: 15 tablet, Rfl: 0  •  naproxen (NAPROSYN) 500 MG tablet, Take 1 tablet by mouth 2 (Two) Times a Day With Meals., Disp: 25 tablet, Rfl: 0  •  omeprazole (priLOSEC) 40 MG capsule, Take 1 capsule by mouth 2 (Two) Times a Day., Disp: 180 capsule, Rfl: 2    Past Medical History:   Diagnosis Date   • Disease of thyroid gland    • Diverticulosis    • GERD (gastroesophageal reflux disease)    • Hypertension    • Osteoporosis    • TIA (transient ischemic attack)        Past  "Surgical History:   Procedure Laterality Date   • CHOLECYSTECTOMY     • COLONOSCOPY  11/10/2010    prep of colon fair,IH,stool in transverse colon,hepatic flexure and ascending colon,ileum normal,IBS   • COLONOSCOPY N/A 2/27/2018    IH, diverticulosis, one 5mm polyp, tubular adenoma with low grade dysplasia   • ENDOSCOPY  11/27/2012    grd 1 reflux egitis,web upperd 3rd esoph   • ENDOSCOPY N/A 2/27/2018    normal biopsies, H Pylori positive   • FOOT NEUROMA SURGERY Left    • IR CEREBRAL ANEURYSM COILING     • THUMB ARTHROSCOPY         Social History     Occupational History   • Not on file.     Social History Main Topics   • Smoking status: Never Smoker   • Smokeless tobacco: Never Used   • Alcohol use No   • Drug use: No   • Sexual activity: Not on file      Social History     Social History Narrative   • No narrative on file       Family History   Problem Relation Age of Onset   • Irritable bowel syndrome Daughter        Review of Systems:      Constitutional: Denies fever, shaking or chills   Eyes: Denies change in visual acuity   HEENT: Denies nasal congestion or sore throat   Respiratory: Denies cough or shortness of breath   Cardiovascular: Denies chest pain or edema  Endocrine: Denies tremors, palpitations, intolerance of heat or cold, polyuria, polydipsia.  GI: Denies abdominal pain, nausea, vomiting, bloody stools or diarrhea  : Denies frequency, urgency, incontinence, retention, or nocturia.  Musculoskeletal: Denies numbness tingling or loss of motor function   Integument: Denies rash, lesion or ulceration   Neurologic: Denies headache or focal weakness, deficits  Heme: Denies spontaneous or excessive bleeding, epistaxis, hematuria, melena, fatigue, enlarged or tender lymph nodes.      All other pertinent positives and negatives as noted above in HPI.      Physical Exam: 68 y.o. female    Vitals:    07/25/18 1243   Weight: 74.8 kg (165 lb)   Height: 160 cm (63\")       General:  Patient is awake and " alert.  Appears in no acute distress or discomfort.    Psych:  Affect and demeanor are appropriate.    Eyes:  Conjunctiva and sclera appear grossly normal.  Eyes track well and EOM seem to be intact.    Ears:  No gross abnormalities.  Hearing adequate for the exam.    Cardiovascular:  Regular rate and rhythm.    Lungs:  Good chest expansion.  Breathing unlabored.    Lymph:  No palpable masses or adenopathy in the affected extremity    Extremities:  Left shoulder is examined.  Skin is benign.  No gross abnormalities on inspection including any atrophy, swellings, or masses.  No palpable masses or adenopathy. No focal tenderness.  Full shoulder motion.  No evident instability or apprehension.  Positive Neer and Ruiz manuevers.  Negative active compression maneuver.  Negative Speeds and Yergasons manuevers.  Moderate discomfort with elevation in the scapular plane and external rotation.  Strength is roughly 5 minus out of 5 with these maneuvers but her exam is limited by discomfort.  Good strength in the deltoid, biceps, triceps, and .  Intact sensation throughout the arm.  Brisk cap refill.  Palpable radial pulse.         Radiology:   AP, scapular Y, and axillary views of the left shoulder are ordered by myself and reviewed to evaluate the patient's complaint.  No comparison films are immediately available.  The x-rays show no obvious acute abnormalities, lesions, masses, significant degenerative changes, or other concerning findings.  The acromiohumeral interval is normal.  Glenoid version appears normal as well.    Assessment/Plan:  Left rotator cuff tendinopathy versus tear    We talked about options for her.  Given the long history of symptoms, it is reasonable to consider an MRI.  She tells me that she is not really interested in pursuing any surgical options, even if she does have a tear.  Based on her exam, if she has a tear, I think it is likely small or partial thickness.  As such, conservative  treatment is reasonable.  The risks, benefits, and alternatives to a repeat injection were discussed.  She consented.  I've also given her a referral to physical therapy.  Going forward, she will follow up as needed.    Jarret Butterfield MD    07/25/2018    Large Joint Arthrocentesis  Date/Time: 7/25/2018 1:18 PM  Consent given by: patient  Site marked: site marked  Timeout: Immediately prior to procedure a time out was called to verify the correct patient, procedure, equipment, support staff and site/side marked as required   Supporting Documentation  Indications: pain   Procedure Details  Location: shoulder - L subacromial bursa  Preparation: Patient was prepped and draped in the usual sterile fashion  Needle gauge: 21 G.  Approach: posterior  Medications administered: 80 mg methylPREDNISolone acetate 80 MG/ML; 2 mL bupivacaine (PF) 0.5 %  Patient tolerance: patient tolerated the procedure well with no immediate complications

## 2018-07-26 RX ORDER — METHYLPREDNISOLONE ACETATE 80 MG/ML
80 INJECTION, SUSPENSION INTRA-ARTICULAR; INTRALESIONAL; INTRAMUSCULAR; SOFT TISSUE
Status: COMPLETED | OUTPATIENT
Start: 2018-07-25 | End: 2018-07-25

## 2018-07-26 RX ORDER — BUPIVACAINE HYDROCHLORIDE 5 MG/ML
2 INJECTION, SOLUTION EPIDURAL; INTRACAUDAL
Status: COMPLETED | OUTPATIENT
Start: 2018-07-25 | End: 2018-07-25

## 2018-07-30 ENCOUNTER — TELEPHONE (OUTPATIENT)
Dept: ORTHOPEDIC SURGERY | Facility: CLINIC | Age: 68
End: 2018-07-30

## 2018-07-30 DIAGNOSIS — M25.512 LEFT SHOULDER PAIN, UNSPECIFIED CHRONICITY: Primary | ICD-10-CM

## 2018-07-30 RX ORDER — METHYLPREDNISOLONE 4 MG/1
TABLET ORAL
Qty: 21 TABLET | Refills: 0 | Status: SHIPPED | OUTPATIENT
Start: 2018-07-30 | End: 2018-10-18

## 2018-07-30 NOTE — TELEPHONE ENCOUNTER
I spoke to patient regarding pain after injection. She has tried meloxicam, naproxen, and Tylenol without relief. She will go ahead with PT as discussed with Dr. Butterfield. I am prescribing a Medrol dose emma for her today. I instructed her to take the medication per package instruction. Encouraged her to call the office is she is not better after completing the course of steroids. Patient acknowledged understanding.

## 2018-07-31 ENCOUNTER — TELEPHONE (OUTPATIENT)
Dept: ORTHOPEDIC SURGERY | Facility: CLINIC | Age: 68
End: 2018-07-31

## 2018-07-31 NOTE — TELEPHONE ENCOUNTER
RX WAS ESCRIBE  TO [facility] INSTEAD OF THE LOCAL PHARM [facility]     RX CALLED INTO THE CORRECT PHARM   LM ON PAT VM RX HAS BEEN CALLED INTO [facility] 07/31/18 2391

## 2018-08-06 ENCOUNTER — TREATMENT (OUTPATIENT)
Dept: PHYSICAL THERAPY | Facility: CLINIC | Age: 68
End: 2018-08-06

## 2018-08-06 DIAGNOSIS — M25.312 SHOULDER INSTABILITY, LEFT: ICD-10-CM

## 2018-08-06 DIAGNOSIS — M75.42 IMPINGEMENT SYNDROME OF LEFT SHOULDER: Primary | ICD-10-CM

## 2018-08-06 DIAGNOSIS — M25.512 LEFT SHOULDER PAIN, UNSPECIFIED CHRONICITY: ICD-10-CM

## 2018-08-06 PROCEDURE — 97110 THERAPEUTIC EXERCISES: CPT | Performed by: PHYSICAL THERAPIST

## 2018-08-06 PROCEDURE — 97161 PT EVAL LOW COMPLEX 20 MIN: CPT | Performed by: PHYSICAL THERAPIST

## 2018-08-06 PROCEDURE — G8984 CARRY CURRENT STATUS: HCPCS | Performed by: PHYSICAL THERAPIST

## 2018-08-06 PROCEDURE — G8985 CARRY GOAL STATUS: HCPCS | Performed by: PHYSICAL THERAPIST

## 2018-08-06 NOTE — PROGRESS NOTES
Physical Therapy Initial Evaluation and Plan of Care    Patient: Sari Self   : 1950  Diagnosis/ICD-10 Code:  Impingement syndrome of left shoulder M75.42; Left shoulder pain, unspecified chronicity M25.512; Shoulder instability, left M25.312  Referring practitioner: Jarret Butterfield MD    Subjective Evaluation    History of Present Illness  Mechanism of injury: I have dislocated my right shoulder 5x before. It hasn't happened in my left shoulder, but I still feel some instability like it is going to happen.     On  I was moving out of my house carrying boxes and lifting all day. I didn't notice anything until later on that day, but my L shoulder started hurting really bad. It hurt even sitting still. The pain was in my L shoulder and going down the arm.     I went to urgent care on 18. The doctor X-rayed my cervical spine, but did not look at my shoulder. He said that the issue was coming from my neck and he gave me muscle relaxers. I only took one, and it wasn't helping. I put heat on my neck and that helped a bit.     I saw Dr. Butterfield on 18. He did a shoulder x-ray which was normal, and he gave me a cortisone shot in my left shoulder. It did not help at all. I have been putting heat on it and have been just living with it. I got a dose pack when I called to say that the shot didn't help. I started it last Thursday and it has been helping.     Chief Complaint: I have the most difficulty with anything overhead or when I have to reach for something at work or home. I also have a lot of trouble sleeping and difficulty getting comfortable.       Occupation: Supervisor of medical records at [employer]   Activities: Cook and clean   PLOF: Independent  Medical Hx Reviewed.      Quality of life: excellent    Pain  Current pain rating: 3  At best pain rating: 3  At worst pain ratin  Location: L shoulder into the arm   Quality: dull ache and discomfort  Relieving factors: heat, medications  and rest  Aggravating factors: outstretched reach, overhead activity, lifting, sleeping and keyboarding    Social Support  Lives in: one-story house  Lives with: adult children (1 son - 34 years old)    Hand dominance: right    Diagnostic Tests  X-ray: normal (Shoulder x-ray on July 25th)    Treatments  Previous treatment: injection treatment, medication and physical therapy  Patient Goals  Patient goals for therapy: decreased pain, increased strength and independence with ADLs/IADLs             Objective       Postural Observations  Seated posture: poor  Standing posture: fair        Tenderness     Left Shoulder   Tenderness in the supraspinatus tendon.     Active Range of Motion   Left Shoulder   Flexion: 113 degrees with pain  Extension: 51 degrees   Abduction: 128 degrees with pain  External rotation 90°: 52 degrees with pain  Internal rotation 90°: WFL    Strength/Myotome Testing     Left Shoulder     Planes of Motion   Flexion: 4-   Abduction: 4-   External rotation at 0°: 4-   Internal rotation at 0°: 4+     Isolated Muscles   Lower trapezius: 3+   Middle deltoid: 3+   Rhomboids: 3+     Right Shoulder     Planes of Motion   Flexion: 4+   Abduction: 5   External rotation at 0°: 4+   Internal rotation at 0°: 5     Isolated Muscles   Lower trapezius: 3+   Middle deltoid: 3+   Rhomboids: 3+     Tests     Left Shoulder   Positive apprehension, empty can, Hawkin's, Neer's and relocation.          Assessment & Plan     Assessment  Impairments: abnormal muscle firing, abnormal or restricted ROM, activity intolerance, impaired physical strength, lacks appropriate home exercise program and pain with function  Assessment details: Pt presented with signs and symptoms consistent with L supraspinatus impingement such as positive findings noted during special tests (Neers, Ruiz, Empty Can), limitations in shoulder AROM, and tenderness over the L supraspinatus tendon.    Pt demonstrated decreased strength of the scapular  stabilizers which could be contributing to issues with impingement and instability. The nature of the pt's job sitting and typing, in addition to poor sitting posture may be further exacerbating symptoms. Gradual onset of pt's symptoms does not suggest a tear, but a progressive impingement acquired over time.    Pt would benefit from skilled therapy to address impairments and assist pt in performing daily activities without pain and limitations.   Prognosis: good  Functional Limitations: carrying objects, lifting, sleeping, pulling, pushing, uncomfortable because of pain, moving in bed, reaching behind back, reaching overhead and unable to perform repetitive tasks  Goals  Plan Goals: SHORT TERM GOALS: 6 visits  1. Pt is compliant with HEP.  2. Pt will increase L shoulder flexion, abduction, and ER by 0-10 degrees to assist pt in sleeping undisturbed.   3. Pt will increase MMT of all UE musculature by one grade to assist pt in performing overhead activities at home.   4. Pt will report pain < 5/10 to assist pt in performing self care activities with minimal discomfort.   5. Pt will improve DASH score to < 25% to assist pt in performing ADLs with minimal limitations/restrictions.  6. Pt will report less tenderness over L supraspinatus tendon to assist pt in tolerating work activities.     LONG TERM GOALS: 12 visits  1. Pt will be compliant with HEP and understand importance of performing exercises on a regular basis.   2. Pt will increase L shoulder flexion, abduction, and ER by 10-15 degrees to assist pt in sleeping undisturbed.   3. Pt will increase MMT of all UE musculature by two grades to assist pt in performing overhead activities at home.   4. Pt will report pain < 2/10 to assist pt in performing self care activities with minimal discomfort.  5. Pt will improve DASH score to < 15% to assist pt in performing ADLs with minimal limitations/restrictions.  6. Pt will demonstrate improvements in sitting posture as  observed in the clinic to assist pt in tolerating work-related activities without exacerbation of symptoms.                  Plan  Therapy options: will be seen for skilled physical therapy services  Planned modality interventions: cryotherapy, thermotherapy (hydrocollator packs), TENS and ultrasound  Planned therapy interventions: body mechanics training, fine motor coordination training, home exercise program, joint mobilization, manual therapy, motor coordination training, neuromuscular re-education, postural training, strengthening, stretching and therapeutic activities  Frequency: 2x week  Duration in visits: 12  Treatment plan discussed with: patient  Plan details: Treatment will focus on scapular strengthening, shoulder stability, and postural re-education to assist pt in tolerating work and personal daily activities without exacerbation of symptoms.    Pt will be provided with a comprehensive HEP to perform on a regular basis in conjunction with PT.         Manual Therapy:         mins  64242;  Therapeutic Exercise:    10     mins  03758;   +5 minutes concurrent   Neuromuscular Stevan:        mins  70964;    Therapeutic Activity:          mins  71323;     Gait Training:           mins  31858;     Ultrasound:          mins  88694;    Electrical Stimulation:         mins  36575 ( );  Dry Needling          mins self-pay    Timed Treatment:   10   mins   Total Treatment:     60   mins    Christina Meehan [facility]  Student Physical Therapist    PT SIGNATURE: Daniel Cotton PT   [place] Lic #[id number]    DATE TREATMENT INITIATED: 8/6/2018    Initial Certification  Certification Period: 11/4/2018  I certify that the therapy services are furnished while this patient is under my care.  The services outlined above are required by this patient, and will be reviewed every 90 days.     PHYSICIAN: Jarret Butterfield MD      DATE:     Please sign and return via fax to [phone].. Thank you, [facility].

## 2018-08-10 ENCOUNTER — TREATMENT (OUTPATIENT)
Dept: PHYSICAL THERAPY | Facility: CLINIC | Age: 68
End: 2018-08-10

## 2018-08-10 DIAGNOSIS — M25.312 SHOULDER INSTABILITY, LEFT: ICD-10-CM

## 2018-08-10 DIAGNOSIS — M75.42 IMPINGEMENT SYNDROME OF LEFT SHOULDER: Primary | ICD-10-CM

## 2018-08-10 DIAGNOSIS — M25.512 LEFT SHOULDER PAIN, UNSPECIFIED CHRONICITY: ICD-10-CM

## 2018-08-10 PROCEDURE — 97140 MANUAL THERAPY 1/> REGIONS: CPT | Performed by: PHYSICAL THERAPIST

## 2018-08-10 PROCEDURE — 97110 THERAPEUTIC EXERCISES: CPT | Performed by: PHYSICAL THERAPIST

## 2018-08-10 PROCEDURE — 97112 NEUROMUSCULAR REEDUCATION: CPT | Performed by: PHYSICAL THERAPIST

## 2018-08-10 NOTE — PROGRESS NOTES
Physical Therapy Daily Progress Note  Visit: 2    Sari Self reports: My L shoulder is sore today. I haven't really done anything differently, it is just bothering me. I am wondering if the cortisone is wearing off.    The exercises have been going well at home.     Subjective     Objective   See Exercise, Manual, and Modality Logs for complete treatment. Manual therapy performed by FLORIDALMA Koehler (student physical therapist).      Assessment & Plan     Assessment  Assessment details: Pt tolerated addition of new exercises well in clinic today.     Pt demonstrated improved L shoulder flexion AROM after AAROM/lats neuro re-ed exercise with theraband. Pt tolerated manual therapy well and continued to demonstrate improvements in L shoulder flexion/ER after several repetitions.     Plan  Plan details: Continue with current plan of strengthening, ROM, and stabilization exercises as pt tolerates.                  Manual Therapy:    10     mins  86603;  Therapeutic Exercise:    18     mins  26307;   + 10 minutes concurrent   Neuromuscular Stevan:    10    mins  41533;    Therapeutic Activity:          mins  71410;     Gait Training:           mins  29040;     Ultrasound:          mins  04727;    Electrical Stimulation:         mins  00102 ( );  Dry Needling          mins self-pay    Timed Treatment:   38   mins   Total Treatment:     60   mins    FLORIDALMA Koehler  Student Physical Therapist

## 2018-08-14 ENCOUNTER — TREATMENT (OUTPATIENT)
Dept: PHYSICAL THERAPY | Facility: CLINIC | Age: 68
End: 2018-08-14

## 2018-08-14 DIAGNOSIS — M75.42 IMPINGEMENT SYNDROME OF LEFT SHOULDER: Primary | ICD-10-CM

## 2018-08-14 DIAGNOSIS — M25.512 LEFT SHOULDER PAIN, UNSPECIFIED CHRONICITY: ICD-10-CM

## 2018-08-14 DIAGNOSIS — M25.312 SHOULDER INSTABILITY, LEFT: ICD-10-CM

## 2018-08-14 PROCEDURE — 97110 THERAPEUTIC EXERCISES: CPT | Performed by: PHYSICAL THERAPIST

## 2018-08-14 PROCEDURE — 97112 NEUROMUSCULAR REEDUCATION: CPT | Performed by: PHYSICAL THERAPIST

## 2018-08-14 PROCEDURE — 97140 MANUAL THERAPY 1/> REGIONS: CPT | Performed by: PHYSICAL THERAPIST

## 2018-08-14 NOTE — PROGRESS NOTES
Physical Therapy Daily Progress Note  Visit: 3    Sari Self reports: Last session before coming in my L shoulder was bothering me, but when I left PT, my shoulder was actually feeling a lot better. I have noticed that I am able to lay on my L shoulder for a little while longer than I used to without pain.    I have been feeling good the past few days, but last night both of my shoulders started hurting a lot. I have no idea what I did and the pain came out of nowhere. They are still bothering me today. I have had to take some pain pills, which I do not normally do.    Subjective     Objective   See Exercise, Manual, and Modality Logs for complete treatment. Manual therapy performed by FLORIDALMA Koehler (student physical therapist).      Assessment & Plan     Assessment  Assessment details: Pt tolerated an increase in intensity of exercises well in the clinic today.     Pt is demonstrating improvements in L shoulder flexion and ER PROM which could be contributing to pts reports of less pain and ability to sleep better when laying on that side.         Plan  Plan details: Continue with current plan of strengthening, stretching, and functional activities as pt tolerates.                  Manual Therapy:    10     mins  20336;  Therapeutic Exercise:    20     mins  78964;   + 20 minutes concurrent   Neuromuscular Stevan:    10    mins  66906;    Therapeutic Activity:          mins  54331;     Gait Training:           mins  71414;     Ultrasound:          mins  94709;    Electrical Stimulation:         mins  09191 ( );  Dry Needling          mins self-pay    Timed Treatment:   40   mins   Total Treatment:     60   mins    FLORIDALMA Koehler   Student Physical Therapist

## 2018-08-16 RX ORDER — LEVOTHYROXINE SODIUM 0.05 MG/1
50 TABLET ORAL DAILY
Qty: 90 TABLET | Refills: 0 | Status: CANCELLED | OUTPATIENT
Start: 2018-08-16

## 2018-08-16 RX ORDER — LEVOTHYROXINE SODIUM 0.05 MG/1
50 TABLET ORAL DAILY
Qty: 90 TABLET | Refills: 0 | Status: SHIPPED | OUTPATIENT
Start: 2018-08-16 | End: 2020-09-18 | Stop reason: SDUPTHER

## 2018-08-21 ENCOUNTER — TREATMENT (OUTPATIENT)
Dept: PHYSICAL THERAPY | Facility: CLINIC | Age: 68
End: 2018-08-21

## 2018-08-21 DIAGNOSIS — M25.312 SHOULDER INSTABILITY, LEFT: ICD-10-CM

## 2018-08-21 DIAGNOSIS — M25.512 LEFT SHOULDER PAIN, UNSPECIFIED CHRONICITY: ICD-10-CM

## 2018-08-21 DIAGNOSIS — M75.42 IMPINGEMENT SYNDROME OF LEFT SHOULDER: Primary | ICD-10-CM

## 2018-08-21 PROCEDURE — 97140 MANUAL THERAPY 1/> REGIONS: CPT | Performed by: PHYSICAL THERAPIST

## 2018-08-21 PROCEDURE — 97112 NEUROMUSCULAR REEDUCATION: CPT | Performed by: PHYSICAL THERAPIST

## 2018-08-21 PROCEDURE — 97110 THERAPEUTIC EXERCISES: CPT | Performed by: PHYSICAL THERAPIST

## 2018-08-21 NOTE — PROGRESS NOTES
Physical Therapy Daily Progress Note  Visit: 4    Sari Self reports: My (L) shoulder is feeling better but I still have pain in the (L) shoulder.      Subjective     Objective   See Exercise, Manual, and Modality Logs for complete treatment.       Assessment & Plan     Assessment  Assessment details: Pt is improving with less pain, but the needs further stability training for the (L) shdr to reduce re-injury.      Plan  Plan details: Progress ROM / strengthening / stabilization / functional activity as tolerated                   Manual Therapy:    10     mins  62488;  Therapeutic Exercise:    25     mins  99483;     Neuromuscular Stevan:    10    mins  19076;    Therapeutic Activity:          mins  15350;     Gait Training:           mins  14089;     Ultrasound:          mins  26815;    Electrical Stimulation:         mins  71473 ( );  Dry Needling          mins self-pay    Timed Treatment:   45   mins   Total Treatment:     45   mins    Daniel Cotton PT  [place] Lic. # [id number]  Physical Therapist

## 2018-08-28 ENCOUNTER — TREATMENT (OUTPATIENT)
Dept: PHYSICAL THERAPY | Facility: CLINIC | Age: 68
End: 2018-08-28

## 2018-08-28 DIAGNOSIS — M75.42 IMPINGEMENT SYNDROME OF LEFT SHOULDER: Primary | ICD-10-CM

## 2018-08-28 DIAGNOSIS — M25.512 LEFT SHOULDER PAIN, UNSPECIFIED CHRONICITY: ICD-10-CM

## 2018-08-28 DIAGNOSIS — M25.312 SHOULDER INSTABILITY, LEFT: ICD-10-CM

## 2018-08-28 PROCEDURE — 97112 NEUROMUSCULAR REEDUCATION: CPT | Performed by: PHYSICAL THERAPIST

## 2018-08-28 PROCEDURE — 97140 MANUAL THERAPY 1/> REGIONS: CPT | Performed by: PHYSICAL THERAPIST

## 2018-08-28 PROCEDURE — 97110 THERAPEUTIC EXERCISES: CPT | Performed by: PHYSICAL THERAPIST

## 2018-09-01 ENCOUNTER — HOSPITAL ENCOUNTER (EMERGENCY)
Facility: HOSPITAL | Age: 68
Discharge: HOME | End: 2018-09-01
Attending: EMERGENCY MEDICINE | Admitting: EMERGENCY MEDICINE

## 2018-09-01 ENCOUNTER — APPOINTMENT (OUTPATIENT)
Dept: CT IMAGING | Facility: HOSPITAL | Age: 68
End: 2018-09-01

## 2018-09-01 VITALS
RESPIRATION RATE: 15 BRPM | TEMPERATURE: 97.4 F | DIASTOLIC BLOOD PRESSURE: 97 MMHG | WEIGHT: 165 LBS | HEIGHT: 63 IN | HEART RATE: 89 BPM | SYSTOLIC BLOOD PRESSURE: 155 MMHG | BODY MASS INDEX: 29.23 KG/M2 | OXYGEN SATURATION: 100 %

## 2018-09-01 DIAGNOSIS — R51.9 LEFT-SIDED HEADACHE: Primary | ICD-10-CM

## 2018-09-01 LAB
ALBUMIN SERPL-MCNC: 4.4 G/DL (ref 3.5–5.2)
ALBUMIN/GLOB SERPL: 1.6 G/DL
ALP SERPL-CCNC: 72 U/L (ref 39–117)
ALT SERPL W P-5'-P-CCNC: 12 U/L (ref 1–33)
ANION GAP SERPL CALCULATED.3IONS-SCNC: 12.9 MMOL/L
AST SERPL-CCNC: 13 U/L (ref 1–32)
BASOPHILS # BLD AUTO: 0.04 10*3/MM3 (ref 0–0.2)
BASOPHILS NFR BLD AUTO: 0.5 % (ref 0–1.5)
BILIRUB SERPL-MCNC: 0.4 MG/DL (ref 0.1–1.2)
BUN BLD-MCNC: 13 MG/DL (ref 8–23)
BUN/CREAT SERPL: 12.7 (ref 7–25)
CALCIUM SPEC-SCNC: 9.6 MG/DL (ref 8.6–10.5)
CHLORIDE SERPL-SCNC: 107 MMOL/L (ref 98–107)
CO2 SERPL-SCNC: 20.1 MMOL/L (ref 22–29)
CREAT BLD-MCNC: 1.02 MG/DL (ref 0.57–1)
DEPRECATED RDW RBC AUTO: 44.5 FL (ref 37–54)
EOSINOPHIL # BLD AUTO: 0.16 10*3/MM3 (ref 0–0.7)
EOSINOPHIL NFR BLD AUTO: 2.1 % (ref 0.3–6.2)
ERYTHROCYTE [DISTWIDTH] IN BLOOD BY AUTOMATED COUNT: 13.5 % (ref 11.7–13)
ERYTHROCYTE [SEDIMENTATION RATE] IN BLOOD: 31 MM/HR (ref 0–30)
GFR SERPL CREATININE-BSD FRML MDRD: 54 ML/MIN/1.73
GLOBULIN UR ELPH-MCNC: 2.8 GM/DL
GLUCOSE BLD-MCNC: 91 MG/DL (ref 65–99)
HCT VFR BLD AUTO: 40.7 % (ref 35.6–45.5)
HGB BLD-MCNC: 12.8 G/DL (ref 11.9–15.5)
IMM GRANULOCYTES # BLD: 0.03 10*3/MM3 (ref 0–0.03)
IMM GRANULOCYTES NFR BLD: 0.4 % (ref 0–0.5)
LYMPHOCYTES # BLD AUTO: 2.76 10*3/MM3 (ref 0.9–4.8)
LYMPHOCYTES NFR BLD AUTO: 35.7 % (ref 19.6–45.3)
MCH RBC QN AUTO: 28.7 PG (ref 26.9–32)
MCHC RBC AUTO-ENTMCNC: 31.4 G/DL (ref 32.4–36.3)
MCV RBC AUTO: 91.3 FL (ref 80.5–98.2)
MONOCYTES # BLD AUTO: 0.55 10*3/MM3 (ref 0.2–1.2)
MONOCYTES NFR BLD AUTO: 7.1 % (ref 5–12)
NEUTROPHILS # BLD AUTO: 4.2 10*3/MM3 (ref 1.9–8.1)
NEUTROPHILS NFR BLD AUTO: 54.2 % (ref 42.7–76)
PLATELET # BLD AUTO: 309 10*3/MM3 (ref 140–500)
PMV BLD AUTO: 9.7 FL (ref 6–12)
POTASSIUM BLD-SCNC: 4.3 MMOL/L (ref 3.5–5.2)
PROT SERPL-MCNC: 7.2 G/DL (ref 6–8.5)
RBC # BLD AUTO: 4.46 10*6/MM3 (ref 3.9–5.2)
SODIUM BLD-SCNC: 140 MMOL/L (ref 136–145)
WBC NRBC COR # BLD: 7.74 10*3/MM3 (ref 4.5–10.7)

## 2018-09-01 PROCEDURE — 85025 COMPLETE CBC W/AUTO DIFF WBC: CPT | Performed by: EMERGENCY MEDICINE

## 2018-09-01 PROCEDURE — 80053 COMPREHEN METABOLIC PANEL: CPT | Performed by: EMERGENCY MEDICINE

## 2018-09-01 PROCEDURE — 85652 RBC SED RATE AUTOMATED: CPT | Performed by: EMERGENCY MEDICINE

## 2018-09-01 PROCEDURE — 99284 EMERGENCY DEPT VISIT MOD MDM: CPT

## 2018-09-01 PROCEDURE — 70450 CT HEAD/BRAIN W/O DYE: CPT

## 2018-09-01 RX ORDER — HYDROCODONE BITARTRATE AND ACETAMINOPHEN 5; 325 MG/1; MG/1
1 TABLET ORAL EVERY 6 HOURS PRN
Qty: 10 TABLET | Refills: 0 | Status: SHIPPED | OUTPATIENT
Start: 2018-09-01 | End: 2018-10-18

## 2018-09-01 NOTE — ED PROVIDER NOTES
" EMERGENCY DEPARTMENT ENCOUNTER    Room Number:  16/16  Date seen:  9/1/2018  Time seen: 3:30 PM  PCP: System, Provider Not In   Neurosurgeon: Dr. Yang   Historian: Patient      HPI  Chief Complaint: Headache  Context: Sari Self is a 68 y.o. female with h/o cerebral aneurysm, which was coiled in 2011 and for which pt is currently being followed by a neurosurgeon. Pt presents to the ED c/o intermittent episodes of \"throbbing\" left retro-orbital headache onset about 3 weeks ago. Pt reports that her episodes of headaches are gradual in onset. Pt states that she has taken Tylenol/Ibuprofen with transient sx relief. Pt has also had nausea, mild vomiting, and sinus drainage. Pt denies dizziness/lightheadedness, documented fever, neck pain/stiffness, focal weakness/numbness, speech/visual difficulties, photophobia, jaw pain, chest pain, dyspnea, abdominal pain, cough, and congestion. Pt called her neurosurgeon for this earlier today and was referred to the ER for further evaluation. There are no other complaints at this time.       Pain Location: Left retro-orbital aspect of the head  Radiation: None  Character: \"throbbing\"  Duration: Onset about 3 weeks ago  Severity: Moderate  Progression: Waxing and waning   Aggravating Factors: Nothing  Alleviating Factors: Tylenol/Ibuprofen           MEDICAL RECORD REVIEW    Pt had a cerebral aneurysm coiling performed in 2011. Pt had an MRA Head performed in 2016 that showed:   1. No change over the 2 prior MRAs dating back to 01/16/2013. There is a 9 mm nest of aneurysm coils along the posterior margin of the supracavernous segment of the left internal carotid artery where there is previous coiling of a large aneurysm at the site. There is a tiny 2.8 x 1.4 mm area of flow related signal at the neck of the aneurysm which may be some flow in the neck of the aneurysm but no flow signal is seen in the nest of coils in the aneurysm itself.  2. A 2 mm nodular protuberance off the " left side of the distal hypoplastic basilar artery likely a prominent infundibulum at the level of the left superior cerebellar artery origin. The remainder of the MRA of the head is normal.            PAST MEDICAL HISTORY  Active Ambulatory Problems     Diagnosis Date Noted   • Osteoporosis 08/05/2016   • Senile osteoporosis 09/27/2017   • Gastroesophageal reflux disease 01/04/2018   • Diarrhea 01/04/2018   • Tubular adenoma of colon 03/01/2018   • Helicobacter pylori (H. pylori) infection 03/01/2018     Resolved Ambulatory Problems     Diagnosis Date Noted   • No Resolved Ambulatory Problems     Past Medical History:   Diagnosis Date   • Allergic    • Arthritis    • Chronic diarrhea    • Disease of thyroid gland    • Diverticulosis    • GERD (gastroesophageal reflux disease)    • Hypertension    • Osteoporosis    • Stroke (CMS/HCC)    • TIA (transient ischemic attack)          PAST SURGICAL HISTORY  Past Surgical History:   Procedure Laterality Date   • CHOLECYSTECTOMY     • COLONOSCOPY  11/10/2010    prep of colon fair,IH,stool in transverse colon,hepatic flexure and ascending colon,ileum normal,IBS   • COLONOSCOPY N/A 2/27/2018    IH, diverticulosis, one 5mm polyp, tubular adenoma with low grade dysplasia   • ENDOSCOPY  11/27/2012    grd 1 reflux egitis,web upperd 3rd esoph   • ENDOSCOPY N/A 2/27/2018    normal biopsies, H Pylori positive   • FOOT NEUROMA SURGERY Left    • IR CEREBRAL ANEURYSM COILING     • THUMB ARTHROSCOPY           FAMILY HISTORY  Family History   Problem Relation Age of Onset   • Irritable bowel syndrome Daughter          SOCIAL HISTORY  Social History     Social History   • Marital status:      Spouse name: N/A   • Number of children: N/A   • Years of education: N/A     Occupational History   • Not on file.     Social History Main Topics   • Smoking status: Never Smoker   • Smokeless tobacco: Never Used   • Alcohol use 0.6 oz/week     1 Glasses of wine per week   • Drug use: No   •  Sexual activity: Not on file     Other Topics Concern   • Not on file     Social History Narrative   • No narrative on file         ALLERGIES  Penicillins        REVIEW OF SYSTEMS  Review of Systems   Constitutional: Negative for chills and fever (pt denies documented fever).   HENT: Negative for congestion and sore throat.         Sinus drainage   Eyes: Negative for photophobia and visual disturbance.   Respiratory: Negative for cough and shortness of breath.    Cardiovascular: Negative for chest pain, palpitations and leg swelling.   Gastrointestinal: Positive for nausea and vomiting. Negative for abdominal pain and diarrhea.   Genitourinary: Negative for difficulty urinating, dysuria, flank pain and frequency.   Musculoskeletal: Negative for myalgias, neck pain and neck stiffness.   Skin: Negative for rash.   Neurological: Positive for headaches. Negative for dizziness, speech difficulty, weakness, light-headedness and numbness.   Psychiatric/Behavioral: Negative.    All other systems reviewed and are negative.           PHYSICAL EXAM  ED Triage Vitals   Temp Heart Rate Resp BP SpO2   09/01/18 1359 09/01/18 1359 09/01/18 1359 09/01/18 1409 09/01/18 1359   97.4 °F (36.3 °C) (!) 134 16 114/87 95 %      Temp src Heart Rate Source Patient Position BP Location FiO2 (%)   09/01/18 1359 09/01/18 1359 -- -- --   Tympanic Monitor          Physical Exam   Constitutional: She is oriented to person, place, and time. No distress.   HENT:   Head: Normocephalic.   Nose: Right sinus exhibits no maxillary sinus tenderness and no frontal sinus tenderness. Left sinus exhibits no maxillary sinus tenderness and no frontal sinus tenderness.   Mouth/Throat: Mucous membranes are normal.   Eyes: Pupils are equal, round, and reactive to light. EOM are normal.   Neck: Normal range of motion. Neck supple. No neck rigidity.   No meningismus.    Cardiovascular: Normal rate, regular rhythm and normal heart sounds.    Pulmonary/Chest: Effort  normal and breath sounds normal. No respiratory distress. She has no decreased breath sounds. She has no wheezes. She has no rhonchi. She has no rales.   Abdominal: Soft. There is no tenderness. There is no rebound and no guarding.   Musculoskeletal: Normal range of motion.   Neurological: She is alert and oriented to person, place, and time. She has normal sensation.   No facial droop. There is no dysarthria, aphasia, or slurred speech.  Sensation is intact to light touch bilaterally and is symmetrical in the face, BUEs, and BLEs. Strength is 5/5 and symmetrical in the BUEs and BLEs.     Skin: Skin is warm and dry.   Psychiatric: Mood and affect normal.   Nursing note and vitals reviewed.          LAB RESULTS  Recent Results (from the past 24 hour(s))   Comprehensive Metabolic Panel    Collection Time: 09/01/18  3:53 PM   Result Value Ref Range    Glucose 91 65 - 99 mg/dL    BUN 13 8 - 23 mg/dL    Creatinine 1.02 (H) 0.57 - 1.00 mg/dL    Sodium 140 136 - 145 mmol/L    Potassium 4.3 3.5 - 5.2 mmol/L    Chloride 107 98 - 107 mmol/L    CO2 20.1 (L) 22.0 - 29.0 mmol/L    Calcium 9.6 8.6 - 10.5 mg/dL    Total Protein 7.2 6.0 - 8.5 g/dL    Albumin 4.40 3.50 - 5.20 g/dL    ALT (SGPT) 12 1 - 33 U/L    AST (SGOT) 13 1 - 32 U/L    Alkaline Phosphatase 72 39 - 117 U/L    Total Bilirubin 0.4 0.1 - 1.2 mg/dL    eGFR Non African Amer 54 (L) >60 mL/min/1.73    Globulin 2.8 gm/dL    A/G Ratio 1.6 g/dL    BUN/Creatinine Ratio 12.7 7.0 - 25.0    Anion Gap 12.9 mmol/L   Sedimentation Rate    Collection Time: 09/01/18  3:53 PM   Result Value Ref Range    Sed Rate 31 (H) 0 - 30 mm/hr   CBC Auto Differential    Collection Time: 09/01/18  3:53 PM   Result Value Ref Range    WBC 7.74 4.50 - 10.70 10*3/mm3    RBC 4.46 3.90 - 5.20 10*6/mm3    Hemoglobin 12.8 11.9 - 15.5 g/dL    Hematocrit 40.7 35.6 - 45.5 %    MCV 91.3 80.5 - 98.2 fL    MCH 28.7 26.9 - 32.0 pg    MCHC 31.4 (L) 32.4 - 36.3 g/dL    RDW 13.5 (H) 11.7 - 13.0 %    RDW-SD 44.5  37.0 - 54.0 fl    MPV 9.7 6.0 - 12.0 fL    Platelets 309 140 - 500 10*3/mm3    Neutrophil % 54.2 42.7 - 76.0 %    Lymphocyte % 35.7 19.6 - 45.3 %    Monocyte % 7.1 5.0 - 12.0 %    Eosinophil % 2.1 0.3 - 6.2 %    Basophil % 0.5 0.0 - 1.5 %    Immature Grans % 0.4 0.0 - 0.5 %    Neutrophils, Absolute 4.20 1.90 - 8.10 10*3/mm3    Lymphocytes, Absolute 2.76 0.90 - 4.80 10*3/mm3    Monocytes, Absolute 0.55 0.20 - 1.20 10*3/mm3    Eosinophils, Absolute 0.16 0.00 - 0.70 10*3/mm3    Basophils, Absolute 0.04 0.00 - 0.20 10*3/mm3    Immature Grans, Absolute 0.03 0.00 - 0.03 10*3/mm3       Ordered the above labs and reviewed the results.        RADIOLOGY  CT Head Without Contrast   Final Result           No acute intracranial hemorrhage or hydrocephalus is identified,   limited by hardware artifact, as described. If there is further clinical   concern, MRI could be considered for further evaluation.       This report was finalized on 9/1/2018 4:19 PM by Dr. Napoleon Mcnair M.D.                 Ordered the above noted radiological studies. Reviewed by me in PACS.  Spoke with Dr. Mcnair (radiologist) regarding CT Head scan results.          PROCEDURES  Procedures            MEDICATIONS GIVEN IN ER  Medications - No data to display          PROGRESS AND CONSULTS  ED Course as of Sep 01 1710   Sat Sep 01, 2018   1653 4:53 PM  Patient with left sided headache for three weeks.  Appears nontoxic.  Head CT shows no bleeding, mass.  Labs normal.  Will discharge home.  She will call her neurosurgeon for follow up MR angiogram.    [SL]      ED Course User Index  [SL] Matthew Dorsey MD       3:40 PM:  Sed rate, blood work, and CT Head ordered for further evaluation.     4:41 PM:  Rechecked pt. Pt is resting comfortably and appears in no acute distress. Informed pt that her sed rate is appropriate for her age. Pt's CT Head is negative for acute intracranial hemorrhage and hydrocephalus. Pt's coils are in place. Pt was strongly  recommended to have an outpatient MRA study performed and for pt to f/u with her neurosurgeon. Pt will be prescribed with rx for small amount of pain medicine for discomfort. Strict RTER warnings given. Pt agrees with plan for discharge.         MEDICAL DECISION MAKING      MDM  Number of Diagnoses or Management Options     Amount and/or Complexity of Data Reviewed  Clinical lab tests: ordered and reviewed (Hgb is 12.8.)  Tests in the radiology section of CPT®: ordered and reviewed (CT Head:  No acute intracranial hemorrhage or hydrocephalus is identified,  limited by hardware artifact, as described)  Discussion of test results with the performing providers: yes (CT Head results d/w radiologist.   )  Decide to obtain previous medical records or to obtain history from someone other than the patient: yes    Patient Progress  Patient progress: stable             DIAGNOSIS  Final diagnoses:   Left-sided headache             DISPOSITION  Pt discharged.      DISCHARGE    Patient discharged in stable condition.    Reviewed implications of results, diagnosis, meds, responsibility to follow up, warning signs and symptoms of possible worsening, potential complications and reasons to return to ER.    Patient/Family voiced understanding of above instructions.    Discussed plan for discharge, as there is no emergent indication for admission. Pt/family is agreeable and understands need for follow up and repeat testing. Pt is aware that discharge does not mean that nothing is wrong but it indicates no emergency is present that requires admission and they must continue care with follow-up as given below or physician of their choice.     FOLLOW-UP  Mikel Yang MD PhD  [address] [id number] [phone]    Call in 2 days           Medication List      New Prescriptions    HYDROcodone-acetaminophen 5-325 MG per tablet  Commonly known as:  NORCO  Take 1 tablet by mouth Every 6 (Six) Hours As  Needed for Mild Pain .                        Latest Documented Vital Signs:  As of 5:08 PM  BP- 155/97 HR- 89 Temp- 97.4 °F (36.3 °C) (Tympanic) O2 sat- 100%        --  Documentation assistance provided by monika Louis for Dr. Sunita MD.  Information recorded by the scribe was done at my direction and has been verified and validated by me.           Олег Louis  09/01/18 [id number]       Matthew Dorsey MD  09/01/18 0069

## 2018-09-12 ENCOUNTER — TRANSCRIBE ORDERS (OUTPATIENT)
Dept: ADMINISTRATIVE | Facility: HOSPITAL | Age: 68
End: 2018-09-12

## 2018-09-12 DIAGNOSIS — J32.8 OTHER CHRONIC SINUSITIS: Primary | ICD-10-CM

## 2018-09-17 ENCOUNTER — HOSPITAL ENCOUNTER (OUTPATIENT)
Dept: CT IMAGING | Facility: HOSPITAL | Age: 68
Discharge: HOME | End: 2018-09-17
Attending: OTOLARYNGOLOGY | Admitting: OTOLARYNGOLOGY

## 2018-09-17 DIAGNOSIS — J32.8 OTHER CHRONIC SINUSITIS: ICD-10-CM

## 2018-09-17 PROCEDURE — 70486 CT MAXILLOFACIAL W/O DYE: CPT

## 2018-12-14 ENCOUNTER — OFFICE VISIT (OUTPATIENT)
Dept: ORTHOPEDIC SURGERY | Facility: CLINIC | Age: 68
End: 2018-12-14

## 2018-12-14 VITALS — TEMPERATURE: 97.3 F | HEIGHT: 63 IN | BODY MASS INDEX: 30.12 KG/M2 | WEIGHT: 170 LBS

## 2018-12-14 DIAGNOSIS — IMO0002 BURSITIS/TENDONITIS, SHOULDER: Primary | ICD-10-CM

## 2018-12-14 PROCEDURE — 99213 OFFICE O/P EST LOW 20 MIN: CPT | Performed by: ORTHOPAEDIC SURGERY

## 2018-12-14 PROCEDURE — 20610 DRAIN/INJ JOINT/BURSA W/O US: CPT | Performed by: ORTHOPAEDIC SURGERY

## 2018-12-14 RX ADMIN — METHYLPREDNISOLONE ACETATE 80 MG: 80 INJECTION, SUSPENSION INTRA-ARTICULAR; INTRALESIONAL; INTRAMUSCULAR; SOFT TISSUE at 12:09

## 2018-12-14 RX ADMIN — LIDOCAINE HYDROCHLORIDE 4 ML: 20 INJECTION, SOLUTION EPIDURAL; INFILTRATION; INTRACAUDAL; PERINEURAL at 12:09

## 2018-12-14 NOTE — PROGRESS NOTES
New Left Shoulder      Patient: Sari Self        YOB: 1950    Medical Record Number: [id number]        Chief Complaints: Left shoulder pain    Chief Complaint   Patient presents with   • Left Shoulder - Establish Care       History of Present Illness: This is a  68-year-old female who presents complaining of left shoulder pain she saw Dr. Butterfield in July for something similar however I've seen her in the past and she wanted to follow-up with me.  In moving in July she was moving a thinks she overdid her shoulder.  She has had night pain at the time it did improve with injection from Dr. Butterfield she states her symptoms have returned she has pain with overhead activity pain with reaching she does have night pain symptoms are moderate to severe aching clicking worse with activity better with rest she is a supervisor at [employer] past medical history marked for osteoporosis COPD and thyroid disease      Allergies:   Allergies   Allergen Reactions   • Penicillins Hives       Medications:   Home Medications:  Current Outpatient Medications on File Prior to Visit   Medication Sig   • gabapentin (NEURONTIN) 300 MG capsule TAKE 1 CAPSULE TWICE DAILY   • hydrochlorothiazide (HYDRODIURIL) 25 MG tablet Take 25 mg by mouth daily.   • levothyroxine (SYNTHROID, LEVOTHROID) 50 MCG tablet Take 1 tablet by mouth Daily. PT NEEDS APPOINTMENT   • omeprazole (priLOSEC) 40 MG capsule Take 1 capsule by mouth 2 (Two) Times a Day.   • sulfamethoxazole-trimethoprim (BACTRIM DS,SEPTRA DS) 800-160 MG per tablet Take 1 tablet by mouth 2 (Two) Times a Day.     No current facility-administered medications on file prior to visit.      Current Medications:  Scheduled Meds:  Continuous Infusions:  No current facility-administered medications for this visit.   PRN Meds:.    Past Medical History:   Diagnosis Date   • Allergic    • Arthritis    • Chronic diarrhea    • Disease of thyroid gland    • Diverticulosis    • GERD  (gastroesophageal reflux disease)    • Hypertension    • Osteoporosis    • Stroke (CMS/HCC)    • TIA (transient ischemic attack)         Past Surgical History:   Procedure Laterality Date   • CHOLECYSTECTOMY     • COLONOSCOPY  11/10/2010    prep of colon fair,IH,stool in transverse colon,hepatic flexure and ascending colon,ileum normal,IBS   • ENDOSCOPY  11/27/2012    grd 1 reflux egitis,web upperd 3rd esoph   • FOOT NEUROMA SURGERY Left    • IR CEREBRAL ANEURYSM COILING     • THUMB ARTHROSCOPY          Social History     Occupational History   • Not on file   Tobacco Use   • Smoking status: Never Smoker   • Smokeless tobacco: Never Used   Substance and Sexual Activity   • Alcohol use: Yes     Alcohol/week: 0.6 oz     Types: 1 Glasses of wine per week   • Drug use: No   • Sexual activity: Not on file    Social History     Social History Narrative   • Not on file        Family History   Problem Relation Age of Onset   • Irritable bowel syndrome Daughter              Review of Systems: 14 point review of systems are remarkable for pertinent positives listed in the chart by the patient the remainder are negative    Review of Systems      Physical Exam: 68 y.o. female  General Appearance:    Alert, cooperative, in no acute distress                 There were no vitals filed for this visit.   Patient is alert and read ×3 no acute distress appears her above-listed at height weight and age.  Affect is normal respiratory rate is normal unlabored. Heart rate regular rate rhythm, sclera, dentition and hearing are normal for the purpose of this exam.    Ortho Exam  Physical exam of the left shoulder reveals no overlying skin changes no lymphedema no lymphadenopathy.  Patient has active flexion 180 with mild symptoms abduction is similar external rotation is to 50 and internal rotation to the upper lumbar spine with mild symptoms.  Patient has good rotator cuff strength 4+ over 5 with isometric strength testing with pain.   Patient has a positive impingement and a positive Ruiz sign.  Patient has good cervical range of motion which is full and asymptomatic no radicular symptoms.  Patient has a normal elbow exam.  Good distal pulses are presentPatient has pain with overhead activity and a positive Neer sign and a positive empty can sign  They have a positive drop arm any definitive painful arc    Large Joint Arthrocentesis: L subacromial bursa  Date/Time: 12/14/2018 12:09 PM  Consent given by: patient  Site marked: site marked  Timeout: Immediately prior to procedure a time out was called to verify the correct patient, procedure, equipment, support staff and site/side marked as required   Supporting Documentation  Indications: pain   Procedure Details  Location: shoulder - L subacromial bursa  Preparation: Patient was prepped and draped in the usual sterile fashion  Needle size: 22 G  Approach: posterior  Medications administered: 4 mL lidocaine PF 2% 2 %; 80 mg methylPREDNISolone acetate 80 MG/ML  Patient tolerance: patient tolerated the procedure well with no immediate complications                Radiology:   AP, Scapular Y and Axillary Lateral of the left shoulder were ordered/reviewed to evauate shoulder pain.  These were taken by Dr. Butterfield in July they show some acromial clavicular arthritis no acute bony pathology I have reviewed these I did not repeat her x-rays  Imaging Results (most recent)     None        Assessment/Plan:  Cortisone Injection. See procedure note.  Cortisone Injection for DIAGNOSTIC and THERAPUTIC purposes.

## 2018-12-16 RX ORDER — METHYLPREDNISOLONE ACETATE 80 MG/ML
80 INJECTION, SUSPENSION INTRA-ARTICULAR; INTRALESIONAL; INTRAMUSCULAR; SOFT TISSUE
Status: COMPLETED | OUTPATIENT
Start: 2018-12-14 | End: 2018-12-14

## 2018-12-16 RX ORDER — LIDOCAINE HYDROCHLORIDE 20 MG/ML
4 INJECTION, SOLUTION EPIDURAL; INFILTRATION; INTRACAUDAL; PERINEURAL
Status: COMPLETED | OUTPATIENT
Start: 2018-12-14 | End: 2018-12-14

## 2018-12-20 ENCOUNTER — APPOINTMENT (OUTPATIENT)
Dept: WOMENS IMAGING | Facility: HOSPITAL | Age: 68
End: 2018-12-20

## 2018-12-20 PROCEDURE — 77063 BREAST TOMOSYNTHESIS BI: CPT | Performed by: RADIOLOGY

## 2018-12-20 PROCEDURE — 77067 SCR MAMMO BI INCL CAD: CPT | Performed by: RADIOLOGY

## 2018-12-20 PROCEDURE — 77080 DXA BONE DENSITY AXIAL: CPT | Performed by: RADIOLOGY

## 2019-01-07 ENCOUNTER — APPOINTMENT (OUTPATIENT)
Dept: WOMENS IMAGING | Facility: HOSPITAL | Age: 69
End: 2019-01-07

## 2019-01-07 PROCEDURE — G0279 TOMOSYNTHESIS, MAMMO: HCPCS | Performed by: RADIOLOGY

## 2019-01-07 PROCEDURE — 76641 ULTRASOUND BREAST COMPLETE: CPT | Performed by: RADIOLOGY

## 2019-01-07 PROCEDURE — 77065 DX MAMMO INCL CAD UNI: CPT | Performed by: RADIOLOGY

## 2019-05-14 ENCOUNTER — OFFICE VISIT (OUTPATIENT)
Dept: ORTHOPEDIC SURGERY | Facility: CLINIC | Age: 69
End: 2019-05-14

## 2019-05-14 VITALS — BODY MASS INDEX: 30.48 KG/M2 | HEIGHT: 63 IN | WEIGHT: 172 LBS | TEMPERATURE: 97.6 F

## 2019-05-14 DIAGNOSIS — M25.562 CHRONIC PAIN OF BOTH KNEES: ICD-10-CM

## 2019-05-14 DIAGNOSIS — M25.562 ACUTE PAIN OF LEFT KNEE: Primary | ICD-10-CM

## 2019-05-14 DIAGNOSIS — G89.29 CHRONIC PAIN OF BOTH KNEES: ICD-10-CM

## 2019-05-14 DIAGNOSIS — M25.561 CHRONIC PAIN OF BOTH KNEES: ICD-10-CM

## 2019-05-14 PROCEDURE — 20610 DRAIN/INJ JOINT/BURSA W/O US: CPT | Performed by: ORTHOPAEDIC SURGERY

## 2019-05-14 PROCEDURE — 99213 OFFICE O/P EST LOW 20 MIN: CPT | Performed by: ORTHOPAEDIC SURGERY

## 2019-05-14 PROCEDURE — 73562 X-RAY EXAM OF KNEE 3: CPT | Performed by: ORTHOPAEDIC SURGERY

## 2019-05-14 RX ADMIN — METHYLPREDNISOLONE ACETATE 80 MG: 80 INJECTION, SUSPENSION INTRA-ARTICULAR; INTRALESIONAL; INTRAMUSCULAR; SOFT TISSUE at 13:56

## 2019-05-14 NOTE — PROGRESS NOTES
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   • levothyroxine (SYNTHROID, LEVOTHROID) 50 MCG tablet Take 1 tablet by mouth Daily. PT NEEDS APPOINTMENT   • nitrofurantoin, macrocrystal-monohydrate, (MACROBID) 100 MG capsule Take 1 capsule by mouth 2 (Two) Times a Day.   • omeprazole (priLOSEC) 40 MG capsule Take 1 capsule by mouth 2 (Two) Times a Day.   • sulfamethoxazole-trimethoprim (BACTRIM DS) 800-160 MG per tablet Take 1 tablet by mouth 2 (Two) Times a Day.     No current facility-administered medications on file prior to visit.      Current Medications:  Scheduled Meds:  Continuous Infusions:  No current facility-administered medications for this visit.   PRN Meds:.    Past Medical History:   Diagnosis Date   • Allergic    • Arthritis    • Chronic diarrhea    • Disease of thyroid gland    • Diverticulosis    • GERD (gastroesophageal reflux disease)    • Hypertension    • Osteoporosis    • Stroke (CMS/[facility])    • TIA (transient ischemic attack)         Past Surgical  History:   Procedure Laterality Date   • CHOLECYSTECTOMY     • COLONOSCOPY  11/10/2010    prep of colon fair,IH,stool in transverse colon,hepatic flexure and ascending colon,ileum normal,IBS   • COLONOSCOPY N/A 2/27/2018    IH, diverticulosis, one 5mm polyp, tubular adenoma with low grade dysplasia   • ENDOSCOPY  11/27/2012    grd 1 reflux egitis,web upperd 3rd esoph   • ENDOSCOPY N/A 2/27/2018    normal biopsies, H Pylori positive   • FOOT NEUROMA SURGERY Left    • IR CEREBRAL ANEURYSM COILING     • THUMB ARTHROSCOPY          Social History     Occupational History   • Not on file   Tobacco Use   • Smoking status: Never Smoker   • Smokeless tobacco: Never Used   Substance and Sexual Activity   • Alcohol use: Yes     Alcohol/week: 0.6 oz     Types: 1 Glasses of wine per week   • Drug use: No   • Sexual activity: Not on file    Social History     Social History Narrative   • Not on file        Family History   Problem Relation Age of Onset   • Irritable bowel syndrome Daughter              Review of Systems: 14 point review of systems are remarkable for the pertinent positives listed in the chart by the patient the remainder negative have reviewed    Review of Systems      Physical Exam: 68 y.o. female  General Appearance:    Alert, cooperative, in no acute distress                 There were no vitals filed for this visit.   Patient is alert and read ×3 no acute distress appears her above-listed at height weight and age.  Affect is normal respiratory rate is normal unlabored. Heart rate regular rate rhythm, sclera, dentition and hearing are normal for the purpose of this exam.        Ortho Exam Physical exam of the left knee reveals no effusion, no erythema.  The patient has no palpable tenderness along the medial joint line, no tenderness about the lateral joint line.  Patient does have crepitus with patellofemoral range of motion.  They also have subjective symptoms anteriorly during exam.  The patient has a  negative bounce home, negative Geraldine and a stable ligamentous exam.  Quad tone is reasonable and symmetric.  There are no overlying skin changes no lymphedema no lymphadenopathy.  There is good hip range of motion which is full symmetric and asymptomatic and a normal ankle exam.  Hamstrings and IT band are tight bilaterally.      Large Joint Arthrocentesis: L knee  Date/Time: 5/14/2019 1:56 PM  Consent given by: patient  Site marked: site marked  Timeout: Immediately prior to procedure a time out was called to verify the correct patient, procedure, equipment, support staff and site/side marked as required   Supporting Documentation  Indications: pain   Procedure Details  Location: knee - L knee  Preparation: Patient was prepped and draped in the usual sterile fashion  Needle size: 22 G  Approach: anteromedial  Medications administered: 80 mg methylPREDNISolone acetate 80 MG/ML; 4 mL lidocaine (cardiac)  Patient tolerance: patient tolerated the procedure well with no immediate complications                   Radiology:   AP, Lateral and merchant views of the left and right knee  were ordered/reviewed to evauateknee pain.  Have no compared to films she does have some medial patellofemoral OA on the left knee there is an area on the medial aspect of the patella on the merchant view that may be old it could be a small loose body or piece of cartilage that is loose  Imaging Results (most recent)     Procedure Component Value Units Date/Time    XR Knee 3 View Bilateral [[id number]] Resulted:  05/14/19 1323     Updated:  05/14/19 1323    Impression:       Ordering physician's impression is located in the Encounter Note dated 05/14/19. X-ray performed in the DR room.          Assessment/Plan:      Left knee pain this may be related the findings on x-ray it may be degenerative in origin she could have meniscal pathology plan is to proceed with an injection as a diagnostic and therapeutic tool she fails to improve we will  pursue other means of testing                          .loren

## 2019-05-15 ENCOUNTER — TELEPHONE (OUTPATIENT)
Dept: ORTHOPEDIC SURGERY | Facility: CLINIC | Age: 69
End: 2019-05-15

## 2019-05-15 RX ORDER — METHYLPREDNISOLONE ACETATE 80 MG/ML
80 INJECTION, SUSPENSION INTRA-ARTICULAR; INTRALESIONAL; INTRAMUSCULAR; SOFT TISSUE
Status: COMPLETED | OUTPATIENT
Start: 2019-05-14 | End: 2019-05-14

## 2019-05-15 RX ORDER — IBUPROFEN 800 MG/1
800 TABLET ORAL 3 TIMES DAILY
Qty: 90 TABLET | Refills: 0 | Status: SHIPPED | OUTPATIENT
Start: 2019-05-15 | End: 2020-10-28

## 2019-05-15 NOTE — TELEPHONE ENCOUNTER
Regarding: Prescription Question  Contact: [phone]  ----- Message from [employer], Generic sent at 5/14/2019  7:20 PM EDT -----  MY CHART MESSAGE:    I meant to ask you today if i could get an rx for 800 mg Ibuprofen? They work better than OTC for me. I have some Naproxen, but makes me swell so bad.

## 2019-05-23 ENCOUNTER — TELEPHONE (OUTPATIENT)
Dept: ORTHOPEDIC SURGERY | Facility: CLINIC | Age: 69
End: 2019-05-23

## 2019-05-23 DIAGNOSIS — M23.42 LOOSE BODY OF LEFT KNEE: ICD-10-CM

## 2019-05-23 DIAGNOSIS — S83.242A ACUTE MEDIAL MENISCUS TEAR OF LEFT KNEE, INITIAL ENCOUNTER: Primary | ICD-10-CM

## 2019-05-23 NOTE — TELEPHONE ENCOUNTER
Regarding: RE: Visit Follow-Up Question  Contact: [phone]  ----- Message from Mychart, Generic sent at 5/23/2019  4:18 PM EDT -----    Do I schedule it myself and use the order in the chart?    ----- Message -----  From: Maya JENNINGS  Sent: 5/23/19 3:27 PM  To: Sari Self  Subject: RE: Visit Follow-Up Question    Nuris Bender MD   You 29 minutes ago (2:57 PM)     Sari  I would proceed with an MRI and an order is in the chart.  Thanks      Documentation         ----- Message -----     From: Sari Self     Sent: 5/23/2019 11:42 AM EDT       To: Nuris Bender MD  Subject: Visit Follow-Up Question    Knee was better for a few days but today can hardly put weight on it again.

## 2019-05-28 ENCOUNTER — TELEPHONE (OUTPATIENT)
Dept: ORTHOPEDIC SURGERY | Facility: CLINIC | Age: 69
End: 2019-05-28

## 2019-05-28 NOTE — TELEPHONE ENCOUNTER
Regarding: RE: Visit Follow-Up Question  Contact: [phone]  ----- Message from Mychart, Generic sent at 5/28/2019 11:27 AM EDT -----    I scheduled MRI since i havent heard back; in so much pain now and cant walk.  Cant get MRI until next week. Can you get it any sooner or tell me what to do until then?      ----- Message -----  From: Maya JENNINGS  Sent: 5/23/19 4:55 PM  To: Sari Self  Subject: RE: Visit Follow-Up Question    Sari  Mallory our office will schedule it for you.  Thanks    ----- Message -----     From: Sari RAEANN      Sent: 5/23/2019  4:18 PM EDT       To: Nuris Bender MD  Subject: RE: Visit Follow-Up Question    Do I schedule it myself and use the order in the chart?    ----- Message -----  From: aMya JENNINGS  Sent: 5/23/19 3:27 PM  To: Sari Self  Subject: RE: Visit Follow-Up Question    Nuris Bender MD   You 29 minutes ago (2:57 PM)     Sari,  I would proceed with an MRI and an order is in the chart.  Thanks      Documentation         ----- Message -----     From: Sari Self     Sent: 5/23/2019 11:42 AM EDT       To: Nuris Bender MD  Subject: Visit Follow-Up Question    Knee was better for a few days but today can hardly put weight on it again.

## 2019-06-03 ENCOUNTER — HOSPITAL ENCOUNTER (OUTPATIENT)
Dept: MRI IMAGING | Facility: HOSPITAL | Age: 69
Discharge: HOME | End: 2019-06-03
Admitting: ORTHOPAEDIC SURGERY

## 2019-06-03 DIAGNOSIS — S83.242A ACUTE MEDIAL MENISCUS TEAR OF LEFT KNEE, INITIAL ENCOUNTER: ICD-10-CM

## 2019-06-03 DIAGNOSIS — M23.42 LOOSE BODY OF LEFT KNEE: ICD-10-CM

## 2019-06-03 PROCEDURE — 73721 MRI JNT OF LWR EXTRE W/O DYE: CPT

## 2019-06-04 ENCOUNTER — TELEPHONE (OUTPATIENT)
Dept: ORTHOPEDIC SURGERY | Facility: CLINIC | Age: 69
End: 2019-06-04

## 2019-06-05 ENCOUNTER — TELEPHONE (OUTPATIENT)
Dept: ORTHOPEDIC SURGERY | Facility: CLINIC | Age: 69
End: 2019-06-05

## 2019-06-05 NOTE — TELEPHONE ENCOUNTER
Regarding: Test Results Question  Contact: [phone]  ----- Message from On The [employer] Generic sent at 6/5/2019  4:14 PM EDT -----  MY CHART MESSAGE:    I have not gotten a call back about my MRI. I can hardly walk and in a lot of pain. Ibuprofen is not helping. Tried biofreeze as well. What can I do??

## 2019-06-06 NOTE — TELEPHONE ENCOUNTER
So sorry for the delay tell her she does have a defect in the cartilage which is arthritis but also has a big medial meniscus tear I would recommend arthroscopy if she is hurting that bad we can get her on the schedule and then I can see her back before the surgery to discuss details if she wants

## 2019-06-07 DIAGNOSIS — S83.242D ACUTE MEDIAL MENISCUS TEAR OF LEFT KNEE, SUBSEQUENT ENCOUNTER: Primary | ICD-10-CM

## 2019-06-14 ENCOUNTER — TELEPHONE (OUTPATIENT)
Dept: ORTHOPEDIC SURGERY | Facility: CLINIC | Age: 69
End: 2019-06-14

## 2019-06-14 PROBLEM — S83.242A ACUTE MEDIAL MENISCUS TEAR OF LEFT KNEE: Status: ACTIVE | Noted: 2019-06-14

## 2019-06-14 NOTE — TELEPHONE ENCOUNTER
Regarding: RE: Test Results Question  Contact: [phone]  ----- Message from Mychart, Generic sent at 6/13/2019 12:27 PM EDT -----  MY CHART MESSAGE:    Akira Trejo,  Left message for surgery scheduler Monday and have never heard back. You said surgery for week if 17th and have to see Dr. Bender too so trying to get this worked out because of work. Can you find out anything?

## 2019-06-26 ENCOUNTER — APPOINTMENT (OUTPATIENT)
Dept: PREADMISSION TESTING | Facility: HOSPITAL | Age: 69
End: 2019-06-26

## 2019-06-26 VITALS
BODY MASS INDEX: 30.65 KG/M2 | RESPIRATION RATE: 16 BRPM | SYSTOLIC BLOOD PRESSURE: 130 MMHG | OXYGEN SATURATION: 99 % | DIASTOLIC BLOOD PRESSURE: 85 MMHG | HEART RATE: 75 BPM | WEIGHT: 173 LBS | HEIGHT: 63 IN | TEMPERATURE: 97.6 F

## 2019-06-26 LAB
ANION GAP SERPL CALCULATED.3IONS-SCNC: 10.9 MMOL/L (ref 5–15)
BUN BLD-MCNC: 16 MG/DL (ref 8–23)
BUN/CREAT SERPL: 17.2 (ref 7–25)
CALCIUM SPEC-SCNC: 9.7 MG/DL (ref 8.6–10.5)
CHLORIDE SERPL-SCNC: 104 MMOL/L (ref 98–107)
CO2 SERPL-SCNC: 21.1 MMOL/L (ref 22–29)
CREAT BLD-MCNC: 0.93 MG/DL (ref 0.57–1)
DEPRECATED RDW RBC AUTO: 44.2 FL (ref 37–54)
ERYTHROCYTE [DISTWIDTH] IN BLOOD BY AUTOMATED COUNT: 13.1 % (ref 12.3–15.4)
GFR SERPL CREATININE-BSD FRML MDRD: 60 ML/MIN/1.73
GLUCOSE BLD-MCNC: 96 MG/DL (ref 65–99)
HCT VFR BLD AUTO: 43.1 % (ref 34–46.6)
HGB BLD-MCNC: 13.6 G/DL (ref 12–15.9)
MCH RBC QN AUTO: 29.2 PG (ref 26.6–33)
MCHC RBC AUTO-ENTMCNC: 31.6 G/DL (ref 31.5–35.7)
MCV RBC AUTO: 92.5 FL (ref 79–97)
PLATELET # BLD AUTO: 339 10*3/MM3 (ref 140–450)
PMV BLD AUTO: 9.7 FL (ref 6–12)
POTASSIUM BLD-SCNC: 4.3 MMOL/L (ref 3.5–5.2)
RBC # BLD AUTO: 4.66 10*6/MM3 (ref 3.77–5.28)
SODIUM BLD-SCNC: 136 MMOL/L (ref 136–145)
WBC NRBC COR # BLD: 7.52 10*3/MM3 (ref 3.4–10.8)

## 2019-06-26 PROCEDURE — 80048 BASIC METABOLIC PNL TOTAL CA: CPT | Performed by: ORTHOPAEDIC SURGERY

## 2019-06-26 PROCEDURE — 36415 COLL VENOUS BLD VENIPUNCTURE: CPT

## 2019-06-26 PROCEDURE — 93005 ELECTROCARDIOGRAM TRACING: CPT

## 2019-06-26 PROCEDURE — 85027 COMPLETE CBC AUTOMATED: CPT | Performed by: ORTHOPAEDIC SURGERY

## 2019-06-26 PROCEDURE — 93010 ELECTROCARDIOGRAM REPORT: CPT | Performed by: INTERNAL MEDICINE

## 2019-06-26 RX ORDER — GABAPENTIN 300 MG/1
600 CAPSULE ORAL 4 TIMES DAILY
COMMUNITY
End: 2021-08-18 | Stop reason: DRUGHIGH

## 2019-07-03 ENCOUNTER — ANESTHESIA (OUTPATIENT)
Dept: PERIOP | Facility: HOSPITAL | Age: 69
End: 2019-07-03

## 2019-07-03 ENCOUNTER — ANESTHESIA EVENT (OUTPATIENT)
Dept: PERIOP | Facility: HOSPITAL | Age: 69
End: 2019-07-03

## 2019-07-03 ENCOUNTER — HOSPITAL ENCOUNTER (OUTPATIENT)
Facility: HOSPITAL | Age: 69
Setting detail: HOSPITAL OUTPATIENT SURGERY
Discharge: HOME | End: 2019-07-03
Attending: ORTHOPAEDIC SURGERY | Admitting: ORTHOPAEDIC SURGERY

## 2019-07-03 VITALS
RESPIRATION RATE: 18 BRPM | WEIGHT: 172.62 LBS | OXYGEN SATURATION: 96 % | SYSTOLIC BLOOD PRESSURE: 135 MMHG | HEART RATE: 95 BPM | BODY MASS INDEX: 30.58 KG/M2 | TEMPERATURE: 97.6 F | DIASTOLIC BLOOD PRESSURE: 80 MMHG

## 2019-07-03 PROCEDURE — 25010000002 DEXAMETHASONE PER 1 MG: Performed by: NURSE ANESTHETIST, CERTIFIED REGISTERED

## 2019-07-03 PROCEDURE — 25010000003 CEFAZOLIN IN DEXTROSE 2-4 GM/100ML-% SOLUTION: Performed by: ORTHOPAEDIC SURGERY

## 2019-07-03 PROCEDURE — 25010000002 FENTANYL CITRATE (PF) 100 MCG/2ML SOLUTION: Performed by: NURSE ANESTHETIST, CERTIFIED REGISTERED

## 2019-07-03 PROCEDURE — 29880 ARTHRS KNE SRG MNISECTMY M&L: CPT | Performed by: ORTHOPAEDIC SURGERY

## 2019-07-03 PROCEDURE — 25010000002 KETOROLAC TROMETHAMINE PER 15 MG: Performed by: NURSE ANESTHETIST, CERTIFIED REGISTERED

## 2019-07-03 PROCEDURE — 25010000002 METHYLPREDNISOLONE PER 80 MG: Performed by: ORTHOPAEDIC SURGERY

## 2019-07-03 PROCEDURE — 25010000002 ONDANSETRON PER 1 MG: Performed by: NURSE ANESTHETIST, CERTIFIED REGISTERED

## 2019-07-03 PROCEDURE — 25010000002 HYDROMORPHONE PER 4 MG: Performed by: NURSE ANESTHETIST, CERTIFIED REGISTERED

## 2019-07-03 PROCEDURE — 25010000002 PROPOFOL 10 MG/ML EMULSION: Performed by: NURSE ANESTHETIST, CERTIFIED REGISTERED

## 2019-07-03 RX ORDER — HYDROMORPHONE HYDROCHLORIDE 1 MG/ML
0.5 INJECTION, SOLUTION INTRAMUSCULAR; INTRAVENOUS; SUBCUTANEOUS
Status: DISCONTINUED | OUTPATIENT
Start: 2019-07-03 | End: 2019-07-03 | Stop reason: HOSPADM

## 2019-07-03 RX ORDER — SODIUM CHLORIDE, SODIUM LACTATE, POTASSIUM CHLORIDE, CALCIUM CHLORIDE 600; 310; 30; 20 MG/100ML; MG/100ML; MG/100ML; MG/100ML
9 INJECTION, SOLUTION INTRAVENOUS CONTINUOUS
Status: DISCONTINUED | OUTPATIENT
Start: 2019-07-03 | End: 2019-07-03 | Stop reason: HOSPADM

## 2019-07-03 RX ORDER — PROMETHAZINE HYDROCHLORIDE 25 MG/ML
12.5 INJECTION, SOLUTION INTRAMUSCULAR; INTRAVENOUS ONCE AS NEEDED
Status: DISCONTINUED | OUTPATIENT
Start: 2019-07-03 | End: 2019-07-03 | Stop reason: HOSPADM

## 2019-07-03 RX ORDER — CEFAZOLIN SODIUM 2 G/100ML
2 INJECTION, SOLUTION INTRAVENOUS ONCE
Status: COMPLETED | OUTPATIENT
Start: 2019-07-03 | End: 2019-07-03

## 2019-07-03 RX ORDER — HYDROCODONE BITARTRATE AND ACETAMINOPHEN 7.5; 325 MG/1; MG/1
1 TABLET ORAL ONCE AS NEEDED
Status: DISCONTINUED | OUTPATIENT
Start: 2019-07-03 | End: 2019-07-03 | Stop reason: HOSPADM

## 2019-07-03 RX ORDER — FAMOTIDINE 10 MG/ML
20 INJECTION, SOLUTION INTRAVENOUS ONCE
Status: COMPLETED | OUTPATIENT
Start: 2019-07-03 | End: 2019-07-03

## 2019-07-03 RX ORDER — PROPOFOL 10 MG/ML
VIAL (ML) INTRAVENOUS AS NEEDED
Status: DISCONTINUED | OUTPATIENT
Start: 2019-07-03 | End: 2019-07-03 | Stop reason: SURG

## 2019-07-03 RX ORDER — KETOROLAC TROMETHAMINE 30 MG/ML
INJECTION, SOLUTION INTRAMUSCULAR; INTRAVENOUS AS NEEDED
Status: DISCONTINUED | OUTPATIENT
Start: 2019-07-03 | End: 2019-07-03 | Stop reason: SURG

## 2019-07-03 RX ORDER — FENTANYL CITRATE 50 UG/ML
INJECTION, SOLUTION INTRAMUSCULAR; INTRAVENOUS AS NEEDED
Status: DISCONTINUED | OUTPATIENT
Start: 2019-07-03 | End: 2019-07-03 | Stop reason: SURG

## 2019-07-03 RX ORDER — FLUMAZENIL 0.1 MG/ML
0.2 INJECTION INTRAVENOUS AS NEEDED
Status: DISCONTINUED | OUTPATIENT
Start: 2019-07-03 | End: 2019-07-03 | Stop reason: HOSPADM

## 2019-07-03 RX ORDER — PROMETHAZINE HYDROCHLORIDE 25 MG/ML
6.25 INJECTION, SOLUTION INTRAMUSCULAR; INTRAVENOUS
Status: DISCONTINUED | OUTPATIENT
Start: 2019-07-03 | End: 2019-07-03 | Stop reason: HOSPADM

## 2019-07-03 RX ORDER — HYDROCODONE BITARTRATE AND ACETAMINOPHEN 5; 325 MG/1; MG/1
1 TABLET ORAL EVERY 4 HOURS PRN
Qty: 40 TABLET | Refills: 0 | Status: SHIPPED | OUTPATIENT
Start: 2019-07-03 | End: 2019-08-27

## 2019-07-03 RX ORDER — HYDRALAZINE HYDROCHLORIDE 20 MG/ML
5 INJECTION INTRAMUSCULAR; INTRAVENOUS
Status: DISCONTINUED | OUTPATIENT
Start: 2019-07-03 | End: 2019-07-03 | Stop reason: HOSPADM

## 2019-07-03 RX ORDER — ONDANSETRON 2 MG/ML
4 INJECTION INTRAMUSCULAR; INTRAVENOUS ONCE AS NEEDED
Status: DISCONTINUED | OUTPATIENT
Start: 2019-07-03 | End: 2019-07-03 | Stop reason: HOSPADM

## 2019-07-03 RX ORDER — SODIUM CHLORIDE 0.9 % (FLUSH) 0.9 %
1-10 SYRINGE (ML) INJECTION AS NEEDED
Status: DISCONTINUED | OUTPATIENT
Start: 2019-07-03 | End: 2019-07-03 | Stop reason: HOSPADM

## 2019-07-03 RX ORDER — FENTANYL CITRATE 50 UG/ML
50 INJECTION, SOLUTION INTRAMUSCULAR; INTRAVENOUS
Status: DISCONTINUED | OUTPATIENT
Start: 2019-07-03 | End: 2019-07-03 | Stop reason: HOSPADM

## 2019-07-03 RX ORDER — NALOXONE HCL 0.4 MG/ML
0.2 VIAL (ML) INJECTION AS NEEDED
Status: DISCONTINUED | OUTPATIENT
Start: 2019-07-03 | End: 2019-07-03 | Stop reason: HOSPADM

## 2019-07-03 RX ORDER — DEXAMETHASONE SODIUM PHOSPHATE 10 MG/ML
INJECTION INTRAMUSCULAR; INTRAVENOUS AS NEEDED
Status: DISCONTINUED | OUTPATIENT
Start: 2019-07-03 | End: 2019-07-03 | Stop reason: SURG

## 2019-07-03 RX ORDER — PROMETHAZINE HYDROCHLORIDE 25 MG/1
25 TABLET ORAL ONCE AS NEEDED
Status: DISCONTINUED | OUTPATIENT
Start: 2019-07-03 | End: 2019-07-03 | Stop reason: HOSPADM

## 2019-07-03 RX ORDER — PROMETHAZINE HYDROCHLORIDE 25 MG/1
25 SUPPOSITORY RECTAL ONCE AS NEEDED
Status: DISCONTINUED | OUTPATIENT
Start: 2019-07-03 | End: 2019-07-03 | Stop reason: HOSPADM

## 2019-07-03 RX ORDER — SODIUM CHLORIDE, SODIUM LACTATE, POTASSIUM CHLORIDE, AND CALCIUM CHLORIDE .6; .31; .03; .02 G/100ML; G/100ML; G/100ML; G/100ML
IRRIGANT IRRIGATION AS NEEDED
Status: DISCONTINUED | OUTPATIENT
Start: 2019-07-03 | End: 2019-07-03 | Stop reason: HOSPADM

## 2019-07-03 RX ORDER — MIDAZOLAM HYDROCHLORIDE 1 MG/ML
1 INJECTION INTRAMUSCULAR; INTRAVENOUS
Status: DISCONTINUED | OUTPATIENT
Start: 2019-07-03 | End: 2019-07-03 | Stop reason: HOSPADM

## 2019-07-03 RX ORDER — ACETAMINOPHEN 650 MG/1
650 SUPPOSITORY RECTAL ONCE AS NEEDED
Status: DISCONTINUED | OUTPATIENT
Start: 2019-07-03 | End: 2019-07-03 | Stop reason: HOSPADM

## 2019-07-03 RX ORDER — LIDOCAINE HYDROCHLORIDE 20 MG/ML
INJECTION, SOLUTION INFILTRATION; PERINEURAL AS NEEDED
Status: DISCONTINUED | OUTPATIENT
Start: 2019-07-03 | End: 2019-07-03 | Stop reason: SURG

## 2019-07-03 RX ORDER — LIDOCAINE HYDROCHLORIDE 10 MG/ML
0.5 INJECTION, SOLUTION EPIDURAL; INFILTRATION; INTRACAUDAL; PERINEURAL ONCE AS NEEDED
Status: DISCONTINUED | OUTPATIENT
Start: 2019-07-03 | End: 2019-07-03 | Stop reason: HOSPADM

## 2019-07-03 RX ORDER — OXYCODONE AND ACETAMINOPHEN 7.5; 325 MG/1; MG/1
1 TABLET ORAL ONCE AS NEEDED
Status: COMPLETED | OUTPATIENT
Start: 2019-07-03 | End: 2019-07-03

## 2019-07-03 RX ORDER — ONDANSETRON 2 MG/ML
INJECTION INTRAMUSCULAR; INTRAVENOUS AS NEEDED
Status: DISCONTINUED | OUTPATIENT
Start: 2019-07-03 | End: 2019-07-03 | Stop reason: SURG

## 2019-07-03 RX ORDER — ACETAMINOPHEN 325 MG/1
650 TABLET ORAL ONCE AS NEEDED
Status: DISCONTINUED | OUTPATIENT
Start: 2019-07-03 | End: 2019-07-03 | Stop reason: HOSPADM

## 2019-07-03 RX ORDER — EPHEDRINE SULFATE 50 MG/ML
5 INJECTION, SOLUTION INTRAVENOUS ONCE AS NEEDED
Status: DISCONTINUED | OUTPATIENT
Start: 2019-07-03 | End: 2019-07-03 | Stop reason: HOSPADM

## 2019-07-03 RX ORDER — DIPHENHYDRAMINE HCL 25 MG
25 CAPSULE ORAL
Status: DISCONTINUED | OUTPATIENT
Start: 2019-07-03 | End: 2019-07-03 | Stop reason: HOSPADM

## 2019-07-03 RX ORDER — MIDAZOLAM HYDROCHLORIDE 1 MG/ML
2 INJECTION INTRAMUSCULAR; INTRAVENOUS
Status: DISCONTINUED | OUTPATIENT
Start: 2019-07-03 | End: 2019-07-03 | Stop reason: HOSPADM

## 2019-07-03 RX ADMIN — FENTANYL CITRATE 50 MCG: 50 INJECTION, SOLUTION INTRAMUSCULAR; INTRAVENOUS at 10:03

## 2019-07-03 RX ADMIN — LIDOCAINE HYDROCHLORIDE 80 MG: 20 INJECTION, SOLUTION INFILTRATION; PERINEURAL at 08:56

## 2019-07-03 RX ADMIN — CEFAZOLIN SODIUM 2 G: 2 INJECTION, SOLUTION INTRAVENOUS at 09:00

## 2019-07-03 RX ADMIN — OXYCODONE HYDROCHLORIDE AND ACETAMINOPHEN 1 TABLET: 7.5; 325 TABLET ORAL at 09:59

## 2019-07-03 RX ADMIN — ONDANSETRON 4 MG: 2 INJECTION INTRAMUSCULAR; INTRAVENOUS at 09:25

## 2019-07-03 RX ADMIN — HYDROMORPHONE HYDROCHLORIDE 0.5 MG: 1 INJECTION, SOLUTION INTRAMUSCULAR; INTRAVENOUS; SUBCUTANEOUS at 10:32

## 2019-07-03 RX ADMIN — SODIUM CHLORIDE, POTASSIUM CHLORIDE, SODIUM LACTATE AND CALCIUM CHLORIDE 9 ML/HR: 600; 310; 30; 20 INJECTION, SOLUTION INTRAVENOUS at 10:36

## 2019-07-03 RX ADMIN — DEXAMETHASONE SODIUM PHOSPHATE 8 MG: 10 INJECTION INTRAMUSCULAR; INTRAVENOUS at 09:05

## 2019-07-03 RX ADMIN — FENTANYL CITRATE 50 MCG: 50 INJECTION INTRAMUSCULAR; INTRAVENOUS at 08:56

## 2019-07-03 RX ADMIN — FAMOTIDINE 20 MG: 10 INJECTION INTRAVENOUS at 07:56

## 2019-07-03 RX ADMIN — FENTANYL CITRATE 50 MCG: 50 INJECTION INTRAMUSCULAR; INTRAVENOUS at 09:17

## 2019-07-03 RX ADMIN — FENTANYL CITRATE 50 MCG: 50 INJECTION, SOLUTION INTRAMUSCULAR; INTRAVENOUS at 10:14

## 2019-07-03 RX ADMIN — SODIUM CHLORIDE, POTASSIUM CHLORIDE, SODIUM LACTATE AND CALCIUM CHLORIDE 9 ML/HR: 600; 310; 30; 20 INJECTION, SOLUTION INTRAVENOUS at 07:19

## 2019-07-03 RX ADMIN — KETOROLAC TROMETHAMINE 30 MG: 30 INJECTION, SOLUTION INTRAMUSCULAR; INTRAVENOUS at 09:32

## 2019-07-03 RX ADMIN — PROPOFOL 150 MG: 10 INJECTION, EMULSION INTRAVENOUS at 08:56

## 2019-07-03 NOTE — ANESTHESIA PREPROCEDURE EVALUATION
Anesthesia Evaluation     Patient summary reviewed and Nursing notes reviewed   NPO Solid Status: > 8 hours  NPO Liquid Status: > 2 hours           Airway   Mallampati: II  no difficulty expected  Dental - normal exam     Pulmonary     breath sounds clear to auscultation  Cardiovascular     ECG reviewed  Rhythm: regular  Rate: normal    (+) hypertension, PVD,       Neuro/Psych  (+) TIA, CVA,     GI/Hepatic/Renal/Endo    (+)  GERD,      Musculoskeletal     Abdominal    Substance History      OB/GYN          Other   (+) arthritis            coiling       Anesthesia Plan    ASA 3     general     intravenous induction

## 2019-07-03 NOTE — ANESTHESIA PROCEDURE NOTES
Airway  Urgency: elective    Airway not difficult    General Information and Staff    Patient location during procedure: OR  Anesthesiologist: Jatinder Roach MD  CRNA: Ofelia Gavin CRNA    Indications and Patient Condition  Indications for airway management: airway protection    Preoxygenated: yes  MILS not maintained throughout  Mask difficulty assessment: 1 - vent by mask    Final Airway Details  Final airway type: supraglottic airway      Successful airway: classic  Size 4    Number of attempts at approach: 1    Additional Comments  Inflated to seal.

## 2019-07-03 NOTE — ANESTHESIA POSTPROCEDURE EVALUATION
Patient: Sari Self    Procedure Summary     Date:  07/03/19 Room / Location:   [facility]    Anesthesia Start:  0852 Anesthesia Stop:  0945    Procedure:  KNEE ARTHROSCOPY ARTHRITIS DEBRIDEMENT PARTIAL MEDIAL AND LATERAL MENISECTOMY (Left Knee) Diagnosis:       Acute medial meniscus tear of left knee, subsequent encounter      (Acute medial meniscus tear of left knee, subsequent encounter [S83.242D])    Surgeon:  Nuris Bender MD Provider:  Jatinder Roach MD    Anesthesia Type:  general ASA Status:  3          Anesthesia Type: general  Last vitals  BP   135/80 (07/03/19 1050)   Temp   36.4 °C (97.6 °F) (07/03/19 0943)   Pulse   95 (07/03/19 1050)   Resp   18 (07/03/19 1050)     SpO2   96 % (07/03/19 1050)     Post Anesthesia Care and Evaluation    Patient location during evaluation: bedside  Patient participation: complete - patient participated  Level of consciousness: awake  Pain score: 2  Pain management: adequate  Airway patency: patent  Anesthetic complications: No anesthetic complications    Cardiovascular status: acceptable  Respiratory status: acceptable  Hydration status: acceptable    Comments: /80 (BP Location: Left arm, Patient Position: Lying)   Pulse 95   Temp 36.4 °C (97.6 °F) (Oral)   Resp 18   Wt 78.3 kg (172 lb 9.9 oz)   LMP  (LMP Unknown)   SpO2 96%   BMI 30.58 kg/m²

## 2019-07-03 NOTE — OP NOTE
Operative Note      Facility: [facility]  Patient Name: Sari Self  YOB: 1950  Date: 7/3/2019  Medical Record Number: [id number]      Pre-op Diagnosis:   Acute medial meniscus tear of left knee, subsequent encounter [S83.242D]    Post-Op Diagnosis Codes:     * Acute medial meniscus tear of left knee, subsequent encounter [S83.242D]  Same with lateral meniscus tear, diffuse grade 3 changes on the medial femoral condyle, grade 3 changes on the patella  Procedure(s):  Left KNEE ARTHROSCOPY ARTHRITIS DEBRIDEMENT PARTIAL MEDIAL AND LATERAL MENISECTOMY and chondroplasty of the medial femoral condyle patella    Surgeon(s):  Nuris Bender MD    Anesthesia: General  Anesthesiologist: Jatinder Roach MD  CRNA: Ofelia Gavin CRNA    Staff:   Circulator: Edna Cano RN  Scrub Person: Helena Bond    Assistants : none      Estimated Blood Loss: 5 cc    Specimens:    none     Drains: None    Findings: See Dictation    Complications: None      Indication for procedure: This patient has had a several month history of knee pain and has an exam and an MRI which are consistent with meniscal pathology. They understand all options and wished to proceed with arthroscopy.      Description of procedure: The patient was taken to the operating room. They were placed supine on the operating room table. After induction of adequate LMA anesthesia, IV antibiotics the underwent exam under anesthesia was symmetric full range of motion. Nonsterile tourniquet was applied patient was placed in the thigh jamison all prominent areas well padded and into the table dropped. The leg was prepped and draped in usual sterile fashion. Standard lateral incision was made with 11 blade. Blunt trocar penetrated into the joint scope followed in the evaluation began patella appeared to sit centrally within the trochlear groove she had degenerative changes on patella felt to be grade 3 synovitis throughout the knee.   Gutters were normal I then entered the medial compartment under spinal needle localization direct visualization a medial portal was established.  She had a complex tear of the medial meniscus that was resected with various angled biters baskets motorized anahi and ultimately the Apollo device.  She had diffuse grade 3 changes perhaps even some small areas of grade 4 on the medial femoral condyle that were debrided with motorized shaver.  The notch was normal with regard to ACL PCL lateral compartment looked good except for she did have a small central tear lateral meniscus it was resected with a motorized shaver and the Apollo device.  I then turned my attention patellofemoral joint gently debrided the patella with a motorized shaver             At this point everything was thoroughly irrigated it was suctioned all 3 compartments, the gutters the suprapatellar pouch were all evaluated there was no further acute pathology seen.  Everything was thoroughly irrigated it was injected with Marcaine Depo-Medrol.  The portals were closed with 3-0 nylon interrupted fashion.  Sterile dressings and Ace wraps were applied.  The patient tolerated the procedure well and was taken to recovery room in good condition.  All sponge and needle count were correct                    Date: 7/3/2019  Time: 9:52 AM

## 2019-07-03 NOTE — H&P
History & Physical       Patient: Sari Self    Date of Admission: 7/3/2019  6:45 AM    YOB: 1950    Medical Record Number: [id number]    Attending Physician: Nuris Bender MD        Chief Complaints: Acute medial meniscus tear of left knee, subsequent encounter [K47.571F]      History of Present Illness: Patient has a several month history of left knee pain with an MRI which shows a large medial meniscus tear as well as some degenerative changes she presents     Allergies:   Allergies   Allergen Reactions   • Penicillins Hives       Medications:   Home Medications:  No current facility-administered medications on file prior to encounter.      Current Outpatient Medications on File Prior to Encounter   Medication Sig   • hydrochlorothiazide (HYDRODIURIL) 25 MG tablet Take 12.5 mg by mouth As Needed. FOR ANKLE EDEMA   • ibuprofen (ADVIL,MOTRIN) 800 MG tablet Take 1 tablet by mouth 3 (Three) Times a Day. (Patient taking differently: Take 800 mg by mouth Every 8 (Eight) Hours As Needed. PT HOLDING FOR SURGERY)   • levothyroxine (SYNTHROID, LEVOTHROID) 50 MCG tablet Take 1 tablet by mouth Daily. PT NEEDS APPOINTMENT (Patient taking differently: Take 50 mcg by mouth Every Morning.)   • omeprazole (priLOSEC) 40 MG capsule Take 1 capsule by mouth 2 (Two) Times a Day. (Patient taking differently: Take 40 mg by mouth Every Morning.)     Current Medications:  Scheduled Meds:  Continuous Infusions:  No current facility-administered medications for this encounter.   PRN Meds:.    Past Medical History:   Diagnosis Date   • Arthritis    • At risk for sleep apnea    • Cerebral aneurysm    • Disease of thyroid gland    • Diverticulosis    • Frequent UTI    • GERD (gastroesophageal reflux disease)    • Hypertension    • IBS (irritable bowel syndrome)    • Osteoporosis    • RLS (restless legs syndrome)    • Stroke (CMS/[facility])    • TIA (transient ischemic attack)     2011   • Torn meniscus     LEFT KNEE         Past Surgical History:   Procedure Laterality Date   • CHOLECYSTECTOMY     • COLONOSCOPY  11/10/2010    prep of colon fair,IH,stool in transverse colon,hepatic flexure and ascending colon,ileum normal,IBS   • COLONOSCOPY N/A 2/27/2018    IH, diverticulosis, one 5mm polyp, tubular adenoma with low grade dysplasia   • CYSTOSCOPY     • ENDOSCOPY  11/27/2012    grd 1 reflux egitis,web upperd 3rd esoph   • ENDOSCOPY N/A 2/27/2018    normal biopsies, H Pylori positive   • FOOT NEUROMA SURGERY Left    • IR CEREBRAL ANEURYSM COILING     • THUMB ARTHROSCOPY          Social History     Occupational History   • Not on file   Tobacco Use   • Smoking status: Never Smoker   • Smokeless tobacco: Never Used   Substance and Sexual Activity   • Alcohol use: Yes     Comment: SELDOM   • Drug use: No   • Sexual activity: Defer    Social History     Social History Narrative   • Not on file        Family History   Problem Relation Age of Onset   • Irritable bowel syndrome Daughter    • Malig Hyperthermia Neg Hx        Review of Systems      Physical Exam: 69 y.o. female  General Appearance:    Alert, cooperative, in no acute distress                    There were no vitals filed for this visit.     Head:    Normocephalic, without obvious abnormality, atraumatic   Eyes:            conjunctivae and sclerae normal, no pallor, corneas clear,    Ears:    Ears appear intact with no abnormalities noted   Throat:   No oral lesions, no thrush, oral mucosa moist   Neck:   No adenopathy, supple, trachea midline, no thyromegaly,    Back:     No kyphosis present, no scoliosis present, no skin lesions,      erythema or scars, no tenderness to percussion or                   palpation,   range of motion normal   Lungs:     Clear to auscultation,respirations regular, even and                  unlabored    Heart:    Regular rhythm and normal rate               Chest Wall:    No abnormalities observed   Abdomen:     Normal bowel sounds, no masses, no  organomegaly, soft        non-tender, non-distended, no guarding, no rebound                tenderness   Rectal:     Deferred   Extremities:    Moves all extremities well, no edema,   no cyanosis, no redness   Pulses:   Pulses palpable and equal bilaterally   Skin:   No bleeding, bruising or rash   Lymph nodes:   No palpable adenopathy   Neurologic:   Appears neurologic intact             Assessment:  Patient Active Problem List   Diagnosis   • Osteoporosis   • Senile osteoporosis   • Gastroesophageal reflux disease   • Diarrhea   • Tubular adenoma of colon   • Helicobacter pylori (H. pylori) infection   • Acute medial meniscus tear of left knee           Plan: All risks, benefits and alternatives were discussed.  Risks including to but not exclusive to anesthetic complications, including death, MI, CVA, infection, bleeding DVT, PE,  fracture, residual pain and need for future surgery.  Patient understood all and agrees to proceed.

## 2019-07-08 ENCOUNTER — TELEPHONE (OUTPATIENT)
Dept: GASTROENTEROLOGY | Facility: CLINIC | Age: 69
End: 2019-07-08

## 2019-07-08 RX ORDER — OMEPRAZOLE 40 MG/1
40 CAPSULE, DELAYED RELEASE ORAL 2 TIMES DAILY
Qty: 60 CAPSULE | Refills: 0 | Status: SHIPPED | OUTPATIENT
Start: 2019-07-08 | End: 2019-07-16 | Stop reason: SDUPTHER

## 2019-07-08 NOTE — TELEPHONE ENCOUNTER
----- Message from Paz Rxe sent at 7/8/2019  3:50 PM EDT -----  Regarding: refill prilosec  Contact: [phone]  Pt is asking to please get her refill of Omeprazole(prilosec) until she comes to the office next Tuesday 7/16. Send it to Pharmacy:  [facility] [address] [facility] [phone] [facility] [phone] [place] Phone:  [phone]

## 2019-07-16 ENCOUNTER — OFFICE VISIT (OUTPATIENT)
Dept: ORTHOPEDIC SURGERY | Facility: CLINIC | Age: 69
End: 2019-07-16

## 2019-07-16 ENCOUNTER — OFFICE VISIT (OUTPATIENT)
Dept: GASTROENTEROLOGY | Facility: CLINIC | Age: 69
End: 2019-07-16

## 2019-07-16 VITALS
TEMPERATURE: 97.9 F | SYSTOLIC BLOOD PRESSURE: 112 MMHG | DIASTOLIC BLOOD PRESSURE: 80 MMHG | BODY MASS INDEX: 29.55 KG/M2 | HEIGHT: 63 IN | WEIGHT: 166.8 LBS

## 2019-07-16 VITALS — WEIGHT: 166 LBS | BODY MASS INDEX: 28.34 KG/M2 | HEIGHT: 64 IN | TEMPERATURE: 97.2 F

## 2019-07-16 DIAGNOSIS — R19.7 DIARRHEA, UNSPECIFIED TYPE: ICD-10-CM

## 2019-07-16 DIAGNOSIS — Z98.890 S/P ARTHROSCOPY OF KNEE: Primary | ICD-10-CM

## 2019-07-16 DIAGNOSIS — D12.6 TUBULAR ADENOMA OF COLON: ICD-10-CM

## 2019-07-16 DIAGNOSIS — R10.31 RIGHT LOWER QUADRANT ABDOMINAL PAIN: Primary | ICD-10-CM

## 2019-07-16 DIAGNOSIS — K21.9 GASTROESOPHAGEAL REFLUX DISEASE, ESOPHAGITIS PRESENCE NOT SPECIFIED: ICD-10-CM

## 2019-07-16 PROCEDURE — 99024 POSTOP FOLLOW-UP VISIT: CPT | Performed by: ORTHOPAEDIC SURGERY

## 2019-07-16 PROCEDURE — 99214 OFFICE O/P EST MOD 30 MIN: CPT | Performed by: NURSE PRACTITIONER

## 2019-07-16 RX ORDER — OMEPRAZOLE 40 MG/1
40 CAPSULE, DELAYED RELEASE ORAL 2 TIMES DAILY
Qty: 180 CAPSULE | Refills: 3 | Status: SHIPPED | OUTPATIENT
Start: 2019-07-16 | End: 2021-06-01

## 2019-07-16 NOTE — PROGRESS NOTES
Chief Complaint   Patient presents with   • Follow-up   • Heartburn   • Abdominal Pain   • Diarrhea     HPI    Sari Self is a  69 y.o. female here for a follow up visit for abdominal pain, diarrhea, and heartburn.  This patient has a history of GERD and H. pylori infection.  She is an established patient of Dr. Reza's, new to me.  Last seen in the office in 2018.    I reviewed EGD and colonoscopy performed on 2/27/2018.  EGD was normal but path is positive for H. pylori.  Colonoscopy revealed nonbleeding internal hemorrhoids, diverticulosis, and polyp.  Pathology positive tubular adenoma.  Patient was treated with Pylera for 2 weeks.  Patient is due for follow-up colonoscopy in 2023.    On visit today patient's primary complaint is right lower abdominal pain onset a week ago and chronic diarrhea.  Pain is described as a mild ache that comes and goes occasionally sharp in nature.  Patient struggles with diarrhea ever since cholecystectomy 3 years ago.  She is having 3-6 liquid stools per day.  Denies nocturnal symptoms.  Denies rectal bleeding.  Patient self treats with Imodium which does seem to help.    No nausea, vomiting, or dysphagia.  Her appetite is good.  Her weight is stable.    She has a long-standing history of GERD.  Currently on Prilosec 40 mg once daily.  Previously prescribed twice daily dosing.    Patient is status post knee arthroscopy 7/3/2019.  She has a follow-up today with Dr. Bender.  She has been taking ibuprofen for the past several months for knee pain.  During this timeframe she is had exacerbation of daily acid reflux/heartburn.  She has been gradually cutting back on ibuprofen.    Patient also zhu issues with recurrent urinary tract infections and has had multiple courses of antibiotics over the last 6 months.    Past Medical History:   Diagnosis Date   • Arthritis    • At risk for sleep apnea    • Cerebral aneurysm    • Cholelithiasis 2016    Gallbladder removed   • Disease of  thyroid gland    • Diverticulosis    • Frequent UTI    • GERD (gastroesophageal reflux disease)    • Hypertension    • IBS (irritable bowel syndrome)    • Osteoporosis    • RLS (restless legs syndrome)    • Stroke (CMS/HCC)    • TIA (transient ischemic attack)     2011   • Torn meniscus     LEFT KNEE       Past Surgical History:   Procedure Laterality Date   • CHOLECYSTECTOMY     • COLONOSCOPY  11/10/2010    prep of colon fair,IH,stool in transverse colon,hepatic flexure and ascending colon,ileum normal,IBS   • COLONOSCOPY N/A 2/27/2018    IH, diverticulosis, one 5mm polyp, tubular adenoma with low grade dysplasia   • CYSTOSCOPY     • ENDOSCOPY  11/27/2012    grd 1 reflux egitis,web upperd 3rd esoph   • ENDOSCOPY N/A 2/27/2018    normal biopsies, H Pylori positive   • FOOT NEUROMA SURGERY Left    • IR CEREBRAL ANEURYSM COILING     • KNEE ARTHROSCOPY Left 7/3/2019    Procedure: KNEE ARTHROSCOPY ARTHRITIS DEBRIDEMENT PARTIAL MEDIAL AND LATERAL MENISECTOMY;  Surgeon: Nuris Bender MD;  Location: [facility];  Service: Orthopedics   • THUMB ARTHROSCOPY     • UPPER GASTROINTESTINAL ENDOSCOPY  02/27/2018       Scheduled Meds:  Outpatient Encounter Medications as of 7/16/2019   Medication Sig Dispense Refill   • EVENING PRIMROSE OIL PO Take 1 capsule by mouth Daily. PT HOLDING FOR SURGERY     • gabapentin (NEURONTIN) 300 MG capsule Take 300 mg by mouth 5 (Five) Times a Day.     • hydrochlorothiazide (HYDRODIURIL) 25 MG tablet Take 12.5 mg by mouth As Needed. FOR ANKLE EDEMA     • ibuprofen (ADVIL,MOTRIN) 800 MG tablet Take 1 tablet by mouth 3 (Three) Times a Day. (Patient taking differently: Take 800 mg by mouth Every 8 (Eight) Hours As Needed. PT HOLDING FOR SURGERY) 90 tablet 0   • levothyroxine (SYNTHROID, LEVOTHROID) 50 MCG tablet Take 1 tablet by mouth Daily. PT NEEDS APPOINTMENT (Patient taking differently: Take 50 mcg by mouth Every Morning.) 90 tablet 0   • omeprazole (PRILOSEC) 40 MG capsule Take 1 capsule  by mouth 2 (Two) Times a Day. 180 capsule 3   • [DISCONTINUED] omeprazole (PRILOSEC) 40 MG capsule Take 1 capsule by mouth 2 (Two) Times a Day. 60 capsule 0   • cholestyramine light 4 g powder Take 1 packet by mouth 2 (Two) Times a Day. mixed with a liquid 378 g 3   • HYDROcodone-acetaminophen (NORCO) 5-325 MG per tablet Take 1 tablet by mouth Every 4 (Four) Hours As Needed for Severe Pain . 40 tablet 0   • [DISCONTINUED] omeprazole (priLOSEC) 40 MG capsule Take 1 capsule by mouth 2 (Two) Times a Day. (Patient taking differently: Take 40 mg by mouth Every Morning.) 180 capsule 2     No facility-administered encounter medications on file as of 7/16/2019.        Continuous Infusions:  No current facility-administered medications for this visit.     PRN Meds:.    Allergies   Allergen Reactions   • Penicillins Hives       Social History     Socioeconomic History   • Marital status:      Spouse name: Not on file   • Number of children: Not on file   • Years of education: Not on file   • Highest education level: Not on file   Tobacco Use   • Smoking status: Never Smoker   • Smokeless tobacco: Never Used   Substance and Sexual Activity   • Alcohol use: Yes     Comment: SELDOM   • Drug use: No   • Sexual activity: Not Currently       Family History   Problem Relation Age of Onset   • Irritable bowel syndrome Daughter    • Malig Hyperthermia Neg Hx        Review of Systems   Constitutional: Negative for activity change, appetite change, fatigue, fever and unexpected weight change.   HENT: Negative for trouble swallowing.    Respiratory: Negative for apnea, cough, choking, chest tightness, shortness of breath and wheezing.    Cardiovascular: Negative for chest pain, palpitations and leg swelling.   Gastrointestinal: Positive for abdominal pain and diarrhea. Negative for abdominal distention, anal bleeding, blood in stool, constipation, nausea, rectal pain and vomiting.       Vitals:    07/16/19 1041   BP: 112/80    Temp: 97.9 °F (36.6 °C)       Physical Exam   Constitutional: She is oriented to person, place, and time. She appears well-developed and well-nourished.   Eyes: Pupils are equal, round, and reactive to light.   Cardiovascular: Normal rate, regular rhythm and normal heart sounds.   Pulmonary/Chest: Effort normal and breath sounds normal. No respiratory distress. She has no wheezes.   Abdominal: Soft. Bowel sounds are normal. She exhibits no distension and no mass. There is tenderness. There is no guarding. No hernia.   RLQ tenderness to palpation   Musculoskeletal: Normal range of motion.   Neurological: She is alert and oriented to person, place, and time.   Skin: Skin is warm and dry. Capillary refill takes less than 2 seconds.   Psychiatric: She has a normal mood and affect. Her behavior is normal.       No images are attached to the encounter.    Sari was seen today for follow-up, heartburn, abdominal pain and diarrhea.    Diagnoses and all orders for this visit:    Right lower quadrant abdominal pain  -     CBC & Differential  -     Comprehensive Metabolic Panel  -     TSH  -     CT Abdomen Pelvis With Contrast; Future    Diarrhea, unspecified type  -     CBC & Differential  -     Comprehensive Metabolic Panel  -     TSH  -     Clostridium Difficile Toxin, PCR - Stool, Per Rectum  -     Gastrointestinal Panel, PCR - Stool, Per Rectum    Gastroesophageal reflux disease, esophagitis presence not specified    Tubular adenoma of colon    Other orders  -     omeprazole (PRILOSEC) 40 MG capsule; Take 1 capsule by mouth 2 (Two) Times a Day.  -     cholestyramine light 4 g powder; Take 1 packet by mouth 2 (Two) Times a Day. mixed with a liquid    Impression:    This is a 69-year-old female seen today in follow-up with multiple GI complaints.    Regarding right lower quadrant abdominal pain, differentials would include diverticulitis, appendicitis, etc.  We will move forward with CT of abdomen pelvis for further  evaluation of pain.    Regarding diarrhea, this is a long-standing issue for her since she had her gallbladder removed.  We discussed starting a good probiotic such as align over-the-counter.  She has been on multiple antibiotics recently as such we will obtain stool testing to rule out infectious pathogens specifically C. difficile.  Start cholestyramine once daily increase to twice daily if necessary.  I did instruct the patient to stagger dosage of thyroid medication not to be taken within 4 hours of bile acid sequestrant.    Labs today with CBC, CMP, and TSH.    Regarding GERD, patient struggling with breakthrough symptoms likely exacerbated by recent NSAID use.  I counseled the patient on avoidance of NSAIDs if possible.  I asked that she increase Prilosec to twice daily dosing for the next 4 to 6 weeks.    Regarding personal history of colon polyps, patient is up-to-date in terms of screening.  Due for surveillance colonoscopy in 2023.

## 2019-07-16 NOTE — PROGRESS NOTES
Left Knee Scope follow Up 1st Visit      Patient: Sari Self        YOB: 1950      Chief Complaints: Left knee pain      History of Present Illness: Pt is here f/u knee arthroscopy knee she states she is doing good she had great relief the first week to 10 days has been a little bit achy at this week but is probably doing a little too much      Allergies:   Allergies   Allergen Reactions   • Penicillins Hives       Medications:   Home Medications:  Current Outpatient Medications on File Prior to Visit   Medication Sig   • EVENING PRIMROSE OIL PO Take 1 capsule by mouth Daily. PT HOLDING FOR SURGERY   • gabapentin (NEURONTIN) 300 MG capsule Take 300 mg by mouth 5 (Five) Times a Day.   • hydrochlorothiazide (HYDRODIURIL) 25 MG tablet Take 12.5 mg by mouth As Needed. FOR ANKLE EDEMA   • ibuprofen (ADVIL,MOTRIN) 800 MG tablet Take 1 tablet by mouth 3 (Three) Times a Day. (Patient taking differently: Take 800 mg by mouth Every 8 (Eight) Hours As Needed. PT HOLDING FOR SURGERY)   • levothyroxine (SYNTHROID, LEVOTHROID) 50 MCG tablet Take 1 tablet by mouth Daily. PT NEEDS APPOINTMENT (Patient taking differently: Take 50 mcg by mouth Every Morning.)   • HYDROcodone-acetaminophen (NORCO) 5-325 MG per tablet Take 1 tablet by mouth Every 4 (Four) Hours As Needed for Severe Pain .   • [DISCONTINUED] omeprazole (priLOSEC) 40 MG capsule Take 1 capsule by mouth 2 (Two) Times a Day. (Patient taking differently: Take 40 mg by mouth Every Morning.)   • [DISCONTINUED] omeprazole (PRILOSEC) 40 MG capsule Take 1 capsule by mouth 2 (Two) Times a Day.     No current facility-administered medications on file prior to visit.      Current Medications:  Scheduled Meds:  Continuous Infusions:  No current facility-administered medications for this visit.   PRN Meds:.          Physical Exam: 69 y.o. female  General Appearance:    Alert, cooperative, in no acute distress                 Vitals:    07/16/19 1304   Temp: 97.2 °F  "(36.2 °C)   TempSrc: Temporal   Weight: 75.3 kg (166 lb)   Height: 161.3 cm (63.5\")   PainSc:   4   PainLoc: Knee      Patient is alert and oriented ×3 no acute distress normal mood physical exam.  Physical exam of the knee, incisions looked good there is no erythema, calf is soft and non-tender.  No sign or sx of DVT      Assessment  S/P knee scope.  I did review intraoperative findings and arthroscopic pictures with the patient.          Plan: To remove sutures today place Steri-Strips and start into  physical therapy and I will have thrm follow up in I think is best that she work from home at this point time we will also start her into physical therapy3 weeks.  Have her continue ice and do some anti-inflammatories over-the-counter                "

## 2019-07-19 ENCOUNTER — APPOINTMENT (OUTPATIENT)
Dept: WOMENS IMAGING | Facility: HOSPITAL | Age: 69
End: 2019-07-19

## 2019-07-19 PROCEDURE — G0279 TOMOSYNTHESIS, MAMMO: HCPCS | Performed by: RADIOLOGY

## 2019-07-19 PROCEDURE — 77065 DX MAMMO INCL CAD UNI: CPT | Performed by: RADIOLOGY

## 2019-07-19 PROCEDURE — 76641 ULTRASOUND BREAST COMPLETE: CPT | Performed by: RADIOLOGY

## 2019-07-21 LAB
ADV 40+41 DNA STL QL NAA+NON-PROBE: NOT DETECTED
ASTRO TYP 1-8 RNA STL QL NAA+NON-PROBE: NOT DETECTED
C CAYETANENSIS DNA STL QL NAA+NON-PROBE: NOT DETECTED
C COLI+JEJ+UPSA DNA STL QL NAA+NON-PROBE: NOT DETECTED
C DIF TOX TCDA+TCDB STL QL NAA+NON-PROBE: NOT DETECTED
CRYPTOSP DNA STL QL NAA+NON-PROBE: NOT DETECTED
E COLI O157 DNA STL QL NAA+NON-PROBE: NORMAL
E HISTOLYT DNA STL QL NAA+NON-PROBE: NOT DETECTED
EAEC PAA PLAS AGGR+AATA ST NAA+NON-PRB: NOT DETECTED
EC STX1+STX2 GENES STL QL NAA+NON-PROBE: NOT DETECTED
EPEC EAE GENE STL QL NAA+NON-PROBE: NOT DETECTED
ETEC LTA+ST1A+ST1B TOX ST NAA+NON-PROBE: NOT DETECTED
G LAMBLIA DNA STL QL NAA+NON-PROBE: NOT DETECTED
NOROVIRUS GI+II RNA STL QL NAA+NON-PROBE: NOT DETECTED
P SHIGELLOIDES DNA STL QL NAA+NON-PROBE: NOT DETECTED
RVA RNA STL QL NAA+NON-PROBE: NOT DETECTED
S ENT+BONG DNA STL QL NAA+NON-PROBE: NOT DETECTED
SAPO I+II+IV+V RNA STL QL NAA+NON-PROBE: NOT DETECTED
SHIGELLA SP+EIEC IPAH ST NAA+NON-PROBE: NOT DETECTED
V CHOL+PARA+VUL DNA STL QL NAA+NON-PROBE: NOT DETECTED
V CHOLERAE DNA STL QL NAA+NON-PROBE: NOT DETECTED
Y ENTEROCOL DNA STL QL NAA+NON-PROBE: NOT DETECTED

## 2019-07-22 ENCOUNTER — HOSPITAL ENCOUNTER (OUTPATIENT)
Dept: CT IMAGING | Facility: HOSPITAL | Age: 69
Discharge: HOME | End: 2019-07-22
Admitting: NURSE PRACTITIONER

## 2019-07-22 DIAGNOSIS — R10.31 RIGHT LOWER QUADRANT ABDOMINAL PAIN: ICD-10-CM

## 2019-07-22 PROCEDURE — 74177 CT ABD & PELVIS W/CONTRAST: CPT

## 2019-07-22 PROCEDURE — 82565 ASSAY OF CREATININE: CPT

## 2019-07-22 PROCEDURE — 0 DIATRIZOATE MEGLUMINE & SODIUM PER 1 ML: Performed by: NURSE PRACTITIONER

## 2019-07-22 PROCEDURE — 25010000002 IOPAMIDOL 61 % SOLUTION: Performed by: NURSE PRACTITIONER

## 2019-07-22 RX ADMIN — IOPAMIDOL 85 ML: 612 INJECTION, SOLUTION INTRAVENOUS at 09:37

## 2019-07-22 RX ADMIN — DIATRIZOATE MEGLUMINE AND DIATRIZOATE SODIUM 30 ML: 660; 100 LIQUID ORAL; RECTAL at 08:40

## 2019-07-23 ENCOUNTER — TELEPHONE (OUTPATIENT)
Dept: GASTROENTEROLOGY | Facility: CLINIC | Age: 69
End: 2019-07-23

## 2019-07-23 LAB — CREAT BLDA-MCNC: 0.9 MG/DL (ref 0.6–1.3)

## 2019-07-23 NOTE — PROGRESS NOTES
Please notify patient that CT of abdomen pelvis shows diverticulosis but no evidence of infection.  Nothing worrisome.

## 2019-07-23 NOTE — TELEPHONE ENCOUNTER
----- Message from LEXY Enriquez sent at 7/23/2019 11:14 AM EDT -----  Please notify patient that CT of abdomen pelvis shows diverticulosis but no evidence of infection.  Nothing worrisome.

## 2019-07-23 NOTE — TELEPHONE ENCOUNTER
----- Message from LEXY Enriquez sent at 7/22/2019  9:45 AM EDT -----  Regarding: Stool testing  Please notify patient that stool testing is negative for viral or bacterial pathogens.

## 2019-07-23 NOTE — TELEPHONE ENCOUNTER
Called pt and advised that per Charlene: the stool test she did was negative for any kind of viral or bacterial pathogen and the CT scan of the abdomen showed diverticulosis (pockets in the colon) but no infection. Advised overall, CT was negative and showed nothing worrisome. Pt verb understanding and states the cholestyramine was a little over $100.00 for the script and that was with insurance coverage. She picked up the probiotic, but she could not afford the other. Advised can look into other options for the cholestyramine and see if Welchol or Colestipol are better covered for her. Pt states we tried Welchol previously but it was over $800.00. Advised will follow-up with the pharmacy and see if there is any other option for her. Pt verb understanding.

## 2019-07-24 LAB — C DIFF TOX A+B STL QL IA: NEGATIVE

## 2019-08-06 ENCOUNTER — OFFICE VISIT (OUTPATIENT)
Dept: ORTHOPEDIC SURGERY | Facility: CLINIC | Age: 69
End: 2019-08-06

## 2019-08-06 VITALS — TEMPERATURE: 97.7 F | BODY MASS INDEX: 29.41 KG/M2 | WEIGHT: 166 LBS | HEIGHT: 63 IN

## 2019-08-06 DIAGNOSIS — Z98.890 S/P ARTHROSCOPY OF KNEE: Primary | ICD-10-CM

## 2019-08-06 PROCEDURE — 99024 POSTOP FOLLOW-UP VISIT: CPT | Performed by: ORTHOPAEDIC SURGERY

## 2019-08-06 NOTE — PROGRESS NOTES
Left Knee Scope follow Up       Patient: Sari Self        YOB: 1950      Chief Complaints: Left knee pain      History of Present Illness: Pt is here f/u knee arthroscopy she is doing well progressing her activities    Allergies:   Allergies   Allergen Reactions   • Penicillins Hives       Medications:   Home Medications:  Current Outpatient Medications on File Prior to Visit   Medication Sig   • cholestyramine light 4 g powder Take 1 packet by mouth 2 (Two) Times a Day. mixed with a liquid   • EVENING PRIMROSE OIL PO Take 1 capsule by mouth Daily. PT HOLDING FOR SURGERY   • gabapentin (NEURONTIN) 300 MG capsule Take 300 mg by mouth 5 (Five) Times a Day.   • hydrochlorothiazide (HYDRODIURIL) 25 MG tablet Take 12.5 mg by mouth As Needed. FOR ANKLE EDEMA   • HYDROcodone-acetaminophen (NORCO) 5-325 MG per tablet Take 1 tablet by mouth Every 4 (Four) Hours As Needed for Severe Pain .   • ibuprofen (ADVIL,MOTRIN) 800 MG tablet Take 1 tablet by mouth 3 (Three) Times a Day. (Patient taking differently: Take 800 mg by mouth Every 8 (Eight) Hours As Needed. PT HOLDING FOR SURGERY)   • levothyroxine (SYNTHROID, LEVOTHROID) 50 MCG tablet Take 1 tablet by mouth Daily. PT NEEDS APPOINTMENT (Patient taking differently: Take 50 mcg by mouth Every Morning.)   • omeprazole (PRILOSEC) 40 MG capsule Take 1 capsule by mouth 2 (Two) Times a Day.     No current facility-administered medications on file prior to visit.      Current Medications:  Scheduled Meds:  Continuous Infusions:  No current facility-administered medications for this visit.   PRN Meds:.          Physical Exam: 69 y.o. female  General Appearance:    Alert, cooperative, in no acute distress                 There were no vitals filed for this visit.   Patient is alert and oriented ×3 no acute distress normal mood physical exam.  Physical exam of the knee, incisions looked good there is no erythema, calf is soft and non-tender.  No sign or sx of  DVT      Assessment  S/P knee scope.  Overall well    Plan: Continue with strengthening, progression of activities

## 2019-08-27 ENCOUNTER — OFFICE VISIT (OUTPATIENT)
Dept: GASTROENTEROLOGY | Facility: CLINIC | Age: 69
End: 2019-08-27

## 2019-08-27 ENCOUNTER — OFFICE VISIT (OUTPATIENT)
Dept: ORTHOPEDIC SURGERY | Facility: CLINIC | Age: 69
End: 2019-08-27

## 2019-08-27 VITALS — TEMPERATURE: 97.2 F | WEIGHT: 168 LBS | HEIGHT: 63 IN | BODY MASS INDEX: 29.77 KG/M2

## 2019-08-27 VITALS
SYSTOLIC BLOOD PRESSURE: 126 MMHG | WEIGHT: 169.8 LBS | TEMPERATURE: 98.4 F | HEIGHT: 63 IN | BODY MASS INDEX: 30.09 KG/M2 | DIASTOLIC BLOOD PRESSURE: 82 MMHG

## 2019-08-27 DIAGNOSIS — Z98.890 S/P ARTHROSCOPY OF KNEE: Primary | ICD-10-CM

## 2019-08-27 DIAGNOSIS — K58.0 IRRITABLE BOWEL SYNDROME WITH DIARRHEA: Primary | ICD-10-CM

## 2019-08-27 DIAGNOSIS — K21.9 GASTROESOPHAGEAL REFLUX DISEASE, ESOPHAGITIS PRESENCE NOT SPECIFIED: ICD-10-CM

## 2019-08-27 PROCEDURE — 99024 POSTOP FOLLOW-UP VISIT: CPT | Performed by: ORTHOPAEDIC SURGERY

## 2019-08-27 PROCEDURE — 99213 OFFICE O/P EST LOW 20 MIN: CPT | Performed by: NURSE PRACTITIONER

## 2019-08-27 NOTE — PROGRESS NOTES
Chief Complaint   Patient presents with   • Abdominal Pain     improved      HPI    Sari Self is a  69 y.o. female here for a follow up visit for abdominal pain.    This is an established patient of Dr. Hilario.  She has a history of GERD and H. pylori infection.  Patient complained of right lower abdominal pain chronic diarrhea on visit in July.  CT abdomen and pelvis dated 7/22/2019 no acute intra-abdominal abnormality demonstrated.  Minimal diverticulosis without diverticulitis.  Normal appendix.  Patient started on trial of cholestyramine and probiotics.  Stool studies were negative.  Patient was also instructed to take PPI therapy twice a day she had been taking NSAIDs for recent knee arthroscopy.    On visit today patient currently being treated for urinary tract infection.  Abdominal pain is completely resolved.  Bowels are moving 4-5 times a day loose.  Patient did not try Questran as previously recommended due to expense.  Patient did start on daily probiotic.  Slight improvement in bowel frequency with probiotics.  Patient has not tried high fiber supplementation.  Denies rectal bleeding.    She is been off of NSAIDs since last office visit.  Had resolution of breakthrough GERD symptoms after stopping ibuprofen.  Currently back on once daily Prilosec.  No nausea, vomiting, or dysphagia.  Appetite is good.  Weight is stable.    She underwent EGD and Colonoscopy on 2/27/18. EGD was normal but path + H-pylori. Colonoscopy showed NBIH, diverticulosis and 1 colon polyp. Path TA. She was treated with Pylera x 2 weeks.  Recall 5 years.    Past Medical History:   Diagnosis Date   • Arthritis    • At risk for sleep apnea    • Cerebral aneurysm    • Cholelithiasis 2016    Gallbladder removed   • Disease of thyroid gland    • Diverticulosis    • Frequent UTI    • GERD (gastroesophageal reflux disease)    • Hypertension    • IBS (irritable bowel syndrome)    • Osteoporosis    • RLS (restless legs syndrome)    •  Stroke (CMS/HCC)    • TIA (transient ischemic attack)     2011   • Torn meniscus     LEFT KNEE       Past Surgical History:   Procedure Laterality Date   • CHOLECYSTECTOMY     • COLONOSCOPY  11/10/2010    prep of colon fair,IH,stool in transverse colon,hepatic flexure and ascending colon,ileum normal,IBS   • COLONOSCOPY N/A 2/27/2018    IH, diverticulosis, one 5mm polyp, tubular adenoma with low grade dysplasia   • CYSTOSCOPY     • ENDOSCOPY  11/27/2012    grd 1 reflux egitis,web upperd 3rd esoph   • ENDOSCOPY N/A 2/27/2018    normal biopsies, H Pylori positive   • FOOT NEUROMA SURGERY Left    • IR CEREBRAL ANEURYSM COILING     • KNEE ARTHROSCOPY Left 7/3/2019    Procedure: KNEE ARTHROSCOPY ARTHRITIS DEBRIDEMENT PARTIAL MEDIAL AND LATERAL MENISECTOMY;  Surgeon: Nuris Bender MD;  [place] [facility] [place] [facility] [place]  Service: Orthopedics   • THUMB ARTHROSCOPY     • UPPER GASTROINTESTINAL ENDOSCOPY  02/27/2018       Scheduled Meds:  Outpatient Encounter Medications as of 8/27/2019   Medication Sig Dispense Refill   • EVENING PRIMROSE OIL PO Take 1 capsule by mouth Daily. PT HOLDING FOR SURGERY     • gabapentin (NEURONTIN) 300 MG capsule Take 300 mg by mouth 5 (Five) Times a Day.     • hydrochlorothiazide (HYDRODIURIL) 25 MG tablet Take 12.5 mg by mouth As Needed. FOR ANKLE EDEMA     • ibuprofen (ADVIL,MOTRIN) 800 MG tablet Take 1 tablet by mouth 3 (Three) Times a Day. (Patient taking differently: Take 800 mg by mouth Every 8 (Eight) Hours As Needed. PT HOLDING FOR SURGERY) 90 tablet 0   • levothyroxine (SYNTHROID, LEVOTHROID) 50 MCG tablet Take 1 tablet by mouth Daily. PT NEEDS APPOINTMENT (Patient taking differently: Take 50 mcg by mouth Every Morning.) 90 tablet 0   • omeprazole (PRILOSEC) 40 MG capsule Take 1 capsule by mouth 2 (Two) Times a Day. 180 capsule 3   • Probiotic Product (PROBIOTIC PO) Take  by mouth.     • cholestyramine light 4 g powder Take 1 packet by mouth 2 (Two) Times a Day. mixed with a liquid 378 g 3    • [DISCONTINUED] HYDROcodone-acetaminophen (NORCO) 5-325 MG per tablet Take 1 tablet by mouth Every 4 (Four) Hours As Needed for Severe Pain . 40 tablet 0     No facility-administered encounter medications on file as of 8/27/2019.        Continuous Infusions:  No current facility-administered medications for this visit.     PRN Meds:.    Allergies   Allergen Reactions   • Penicillins Hives       Social History     Socioeconomic History   • Marital status:      Spouse name: Not on file   • Number of children: Not on file   • Years of education: Not on file   • Highest education level: Not on file   Tobacco Use   • Smoking status: Never Smoker   • Smokeless tobacco: Never Used   Substance and Sexual Activity   • Alcohol use: Yes     Comment: SELDOM   • Drug use: No   • Sexual activity: Not Currently       Family History   Problem Relation Age of Onset   • Irritable bowel syndrome Daughter    • Malig Hyperthermia Neg Hx        Review of Systems   Constitutional: Negative for activity change, appetite change, fatigue, fever and unexpected weight change.   HENT: Negative for trouble swallowing.    Respiratory: Negative for apnea, cough, choking, chest tightness, shortness of breath and wheezing.    Cardiovascular: Negative for chest pain, palpitations and leg swelling.   Gastrointestinal: Positive for diarrhea. Negative for abdominal distention, abdominal pain, anal bleeding, blood in stool, constipation, nausea, rectal pain and vomiting.       Vitals:    08/27/19 0956   BP: 126/82   Temp: 98.4 °F (36.9 °C)       Physical Exam   Constitutional: She is oriented to person, place, and time. She appears well-developed and well-nourished.   Eyes: Pupils are equal, round, and reactive to light.   Cardiovascular: Normal rate, regular rhythm and normal heart sounds.   Pulmonary/Chest: Effort normal and breath sounds normal. No respiratory distress. She has no wheezes.   Abdominal: Soft. Bowel sounds are normal. She  exhibits no distension and no mass. There is no tenderness. There is no guarding. No hernia.   Musculoskeletal: Normal range of motion.   Neurological: She is alert and oriented to person, place, and time.   Skin: Skin is warm and dry. Capillary refill takes less than 2 seconds.   Psychiatric: She has a normal mood and affect. Her behavior is normal.       No images are attached to the encounter.    Sari was seen today for abdominal pain.    Diagnoses and all orders for this visit:    Irritable bowel syndrome with diarrhea    Gastroesophageal reflux disease, esophagitis presence not specified    Impression:    69-year-old female seen today in follow-up for abdominal pain which is resolved since last office visit.  Still struggle with episodes of diarrhea was unable to afford Questran.  She has had decreased frequency of diarrhea on probiotics.  She is also on antibiotics to treat urinary tract infection currently.  Recommend she continue good daily probiotic such as Culturelle, Florastor, or Wecash.  Recommend she start 1 tablespoon of Metamucil daily can increase to 2 tablespoons if needed.  Provided her with samples.  Patient would like to try fiber supplementation before she moves to another prescription medication.  Consider colestipol in the future.    GERD much improved off of NSAIDs.  No alarm symptoms such as dysphagia.  Recommend she continue daily PPI therapy.    She will return to clinic in 3 months.  Call with any questions or concerns in the interim.    (I spent a total of 20 minutes in direct patient care including face-to-face examination. 15 minutes were spent in coordination of care and counseling the patient extensively on dietary changes related to high-fiber diet, fiber segmentation, benefits of probiotics.)

## 2019-08-27 NOTE — PROGRESS NOTES
"Left Knee Scope follow Up       Patient: Sari Self        YOB: 1950      Chief Complaints: Left knee pain      History of Present Illness: Pt is here f/u knee arthroscopy she is doing well knee is much better than it was before overall very happy with where she has        Allergies:   Allergies   Allergen Reactions   • Penicillins Hives       Medications:   Home Medications:  Current Outpatient Medications on File Prior to Visit   Medication Sig   • cholestyramine light 4 g powder Take 1 packet by mouth 2 (Two) Times a Day. mixed with a liquid   • EVENING PRIMROSE OIL PO Take 1 capsule by mouth Daily. PT HOLDING FOR SURGERY   • gabapentin (NEURONTIN) 300 MG capsule Take 300 mg by mouth 5 (Five) Times a Day.   • hydrochlorothiazide (HYDRODIURIL) 25 MG tablet Take 12.5 mg by mouth As Needed. FOR ANKLE EDEMA   • ibuprofen (ADVIL,MOTRIN) 800 MG tablet Take 1 tablet by mouth 3 (Three) Times a Day. (Patient taking differently: Take 800 mg by mouth Every 8 (Eight) Hours As Needed. PT HOLDING FOR SURGERY)   • levothyroxine (SYNTHROID, LEVOTHROID) 50 MCG tablet Take 1 tablet by mouth Daily. PT NEEDS APPOINTMENT (Patient taking differently: Take 50 mcg by mouth Every Morning.)   • omeprazole (PRILOSEC) 40 MG capsule Take 1 capsule by mouth 2 (Two) Times a Day.   • Probiotic Product (PROBIOTIC PO) Take  by mouth.   • [DISCONTINUED] HYDROcodone-acetaminophen (NORCO) 5-325 MG per tablet Take 1 tablet by mouth Every 4 (Four) Hours As Needed for Severe Pain .     No current facility-administered medications on file prior to visit.      Current Medications:  Scheduled Meds:  Continuous Infusions:  No current facility-administered medications for this visit.   PRN Meds:.          Physical Exam: 69 y.o. female  General Appearance:    Alert, cooperative, in no acute distress                 Vitals:    08/27/19 1414   Temp: 97.2 °F (36.2 °C)   TempSrc: Temporal   Weight: 76.2 kg (168 lb)   Height: 160 cm (63\") "   PainSc:   3   PainLoc: Knee      Patient is alert and oriented ×3 no acute distress normal mood physical exam.  Physical exam of the knee, incisions looked good there is no erythema, calf is soft and non-tender.  No sign or sx of DVT      Assessment  S/P knee scope.  Overall doing well.         Plan: Continue with strengthening, progression of activities as long as she is doing well she may follow-up as needed

## 2019-12-05 ENCOUNTER — OFFICE VISIT (OUTPATIENT)
Dept: GASTROENTEROLOGY | Facility: CLINIC | Age: 69
End: 2019-12-05

## 2019-12-05 VITALS
SYSTOLIC BLOOD PRESSURE: 116 MMHG | TEMPERATURE: 97.9 F | HEIGHT: 63 IN | BODY MASS INDEX: 29.77 KG/M2 | WEIGHT: 168 LBS | DIASTOLIC BLOOD PRESSURE: 72 MMHG

## 2019-12-05 DIAGNOSIS — K21.9 GASTROESOPHAGEAL REFLUX DISEASE, ESOPHAGITIS PRESENCE NOT SPECIFIED: ICD-10-CM

## 2019-12-05 DIAGNOSIS — K58.0 IRRITABLE BOWEL SYNDROME WITH DIARRHEA: Primary | ICD-10-CM

## 2019-12-05 PROCEDURE — 99214 OFFICE O/P EST MOD 30 MIN: CPT | Performed by: NURSE PRACTITIONER

## 2019-12-05 RX ORDER — MONTELUKAST SODIUM 4 MG/1
TABLET, CHEWABLE ORAL
Qty: 60 TABLET | Refills: 11 | Status: SHIPPED | OUTPATIENT
Start: 2019-12-05 | End: 2020-09-18

## 2019-12-05 NOTE — PROGRESS NOTES
Chief Complaint   Patient presents with   • Follow-up   • Irritable Bowel Syndrome   • Heartburn     HPI    Sari Self is a  69 y.o. female here for a follow up visit for GERD and irritable bowel syndrome.  This patient also has a history of H. pylori.  She follows with Dr. Reza known to me.    On visit today she reports adequate control of GERD symptoms on once daily Prilosec 40 mg.  No nausea, vomiting, or dysphagia.    She still having issues with episodes of diarrhea throughout the week mainly postprandial.  Denies nocturnal symptoms or weight loss.  Symptoms are worse with stress and certain dietary triggers.  She could not afford WelChol or cholestyramine.  She has not tried probiotics.    She underwent EGD and Colonoscopy on 2/27/18. EGD was normal but path + H-pylori. Colonoscopy showed NBIH, diverticulosis and 1 colon polyp. Path TA. She was treated with Pylera x 2 weeks.  Recall 5 years.  Past Medical History:   Diagnosis Date   • Arthritis    • At risk for sleep apnea    • Cerebral aneurysm    • Cholelithiasis 2016    Gallbladder removed   • Disease of thyroid gland    • Diverticulosis    • Frequent UTI    • GERD (gastroesophageal reflux disease)    • Hypertension    • IBS (irritable bowel syndrome)    • Osteoporosis    • RLS (restless legs syndrome)    • Stroke (CMS/HCC)    • TIA (transient ischemic attack)     2011   • Torn meniscus     LEFT KNEE       Past Surgical History:   Procedure Laterality Date   • CHOLECYSTECTOMY     • COLONOSCOPY  11/10/2010    prep of colon fair,IH,stool in transverse colon,hepatic flexure and ascending colon,ileum normal,IBS   • COLONOSCOPY N/A 2/27/2018    IH, diverticulosis, one 5mm polyp, tubular adenoma with low grade dysplasia   • CYSTOSCOPY     • ENDOSCOPY  11/27/2012    grd 1 reflux egitis,web upperd 3rd esoph   • ENDOSCOPY N/A 2/27/2018    normal biopsies, H Pylori positive   • FOOT NEUROMA SURGERY Left    • IR CEREBRAL ANEURYSM COILING     • KNEE ARTHROSCOPY  Left 7/3/2019    Procedure: KNEE ARTHROSCOPY ARTHRITIS DEBRIDEMENT PARTIAL MEDIAL AND LATERAL MENISECTOMY;  Surgeon: Nuris Bender MD;  [place] [facility] [place] [facility] [place]  Service: [place]opedics   • THUMB ARTHROSCOPY     • UPPER GASTROINTESTINAL ENDOSCOPY  02/27/2018       Scheduled Meds:  Outpatient Encounter Medications as of 12/5/2019   Medication Sig Dispense Refill   • EVENING PRIMROSE OIL PO Take 1 capsule by mouth Daily. PT HOLDING FOR SURGERY     • gabapentin (NEURONTIN) 300 MG capsule Take 600 mg by mouth 4 (Four) Times a Day.     • hydrochlorothiazide (HYDRODIURIL) 25 MG tablet Take 12.5 mg by mouth As Needed. FOR ANKLE EDEMA     • levothyroxine (SYNTHROID, LEVOTHROID) 50 MCG tablet Take 1 tablet by mouth Daily. PT NEEDS APPOINTMENT (Patient taking differently: Take 50 mcg by mouth Every Morning.) 90 tablet 0   • omeprazole (PRILOSEC) 40 MG capsule Take 1 capsule by mouth 2 (Two) Times a Day. 180 capsule 3   • colestipol (COLESTID) 1 g tablet Start with one pill daily and increase to 2 daily if no improvement after 2 weeks 60 tablet 11   • ibuprofen (ADVIL,MOTRIN) 800 MG tablet Take 1 tablet by mouth 3 (Three) Times a Day. (Patient taking differently: Take 800 mg by mouth Every 8 (Eight) Hours As Needed. PT HOLDING FOR SURGERY) 90 tablet 0   • Probiotic Product (PROBIOTIC PO) Take  by mouth.     • [DISCONTINUED] cholestyramine light 4 g powder Take 1 packet by mouth 2 (Two) Times a Day. mixed with a liquid 378 g 3     No facility-administered encounter medications on file as of 12/5/2019.        Continuous Infusions:  No current facility-administered medications for this visit.     PRN Meds:.    Allergies   Allergen Reactions   • Penicillins Hives       Social History     Socioeconomic History   • Marital status:      Spouse name: Not on file   • Number of children: Not on file   • Years of education: Not on file   • Highest education level: Not on file   Tobacco Use   • Smoking status: Never Smoker   •  Smokeless tobacco: Never Used   Substance and Sexual Activity   • Alcohol use: Yes     Comment: SELDOM   • Drug use: No   • Sexual activity: Not Currently       Family History   Problem Relation Age of Onset   • Irritable bowel syndrome Daughter    • Malig Hyperthermia Neg Hx        Review of Systems   Constitutional: Negative for activity change, appetite change, fatigue, fever and unexpected weight change.   HENT: Negative for trouble swallowing.    Respiratory: Negative for apnea, cough, choking, chest tightness, shortness of breath and wheezing.    Cardiovascular: Negative for chest pain, palpitations and leg swelling.   Gastrointestinal: Positive for diarrhea. Negative for abdominal distention, abdominal pain, anal bleeding, blood in stool, constipation, nausea, rectal pain and vomiting.       Vitals:    12/05/19 0923   BP: 116/72   Temp: 97.9 °F (36.6 °C)       Physical Exam   Constitutional: She is oriented to person, place, and time. She appears well-developed and well-nourished.   Eyes: Pupils are equal, round, and reactive to light.   Cardiovascular: Normal rate, regular rhythm and normal heart sounds.   Pulmonary/Chest: Effort normal and breath sounds normal. No respiratory distress. She has no wheezes.   Abdominal: Soft. Bowel sounds are normal. She exhibits no distension and no mass. There is no tenderness. There is no guarding. No hernia.   Musculoskeletal: Normal range of motion.   Neurological: She is alert and oriented to person, place, and time.   Skin: Skin is warm and dry. Capillary refill takes less than 2 seconds.   Psychiatric: She has a normal mood and affect. Her behavior is normal.       No images are attached to the encounter.    Sari was seen today for follow-up, irritable bowel syndrome and heartburn.    Diagnoses and all orders for this visit:    Irritable bowel syndrome with diarrhea    Gastroesophageal reflux disease, esophagitis presence not specified    Other orders  -      colestipol (COLESTID) 1 g tablet; Start with one pill daily and increase to 2 daily if no improvement after 2 weeks      Assessment/plan    Stable GERD, continue PPI therapy.  I did reinforce GERD diet and lifestyle modifications and the importance of avoidance of NSAIDs.    Irritable bowel syndrome still with episodes of postprandial diarrhea throughout the week.  Recommend course of Xifaxan 550 mg tablet 3 times a day for 14 days.  I provided her with enough samples from our office for an entire course of therapy.  Would like for her to start good daily probiotic such as Vides colon health when she finishes course of therapy.  I will also provide her with a prescription for colestipol to be used daily or as needed and hopefully this will be affordable with her insurance.    RTC 6 months or sooner if needed.

## 2020-01-22 ENCOUNTER — APPOINTMENT (OUTPATIENT)
Dept: WOMENS IMAGING | Facility: HOSPITAL | Age: 70
End: 2020-01-22

## 2020-01-23 ENCOUNTER — APPOINTMENT (OUTPATIENT)
Dept: WOMENS IMAGING | Facility: HOSPITAL | Age: 70
End: 2020-01-23

## 2020-01-23 PROCEDURE — G0279 TOMOSYNTHESIS, MAMMO: HCPCS | Performed by: RADIOLOGY

## 2020-01-23 PROCEDURE — 77066 DX MAMMO INCL CAD BI: CPT | Performed by: RADIOLOGY

## 2020-01-23 PROCEDURE — 76641 ULTRASOUND BREAST COMPLETE: CPT | Performed by: RADIOLOGY

## 2020-08-10 ENCOUNTER — OFFICE VISIT (OUTPATIENT)
Dept: ORTHOPEDIC SURGERY | Facility: CLINIC | Age: 70
End: 2020-08-10

## 2020-08-10 VITALS — HEIGHT: 63 IN | WEIGHT: 162 LBS | TEMPERATURE: 98 F | BODY MASS INDEX: 28.7 KG/M2

## 2020-08-10 DIAGNOSIS — M25.561 ACUTE PAIN OF RIGHT KNEE: Primary | ICD-10-CM

## 2020-08-10 DIAGNOSIS — M17.11 OSTEOARTHRITIS OF RIGHT KNEE, UNSPECIFIED OSTEOARTHRITIS TYPE: ICD-10-CM

## 2020-08-10 PROCEDURE — 20610 DRAIN/INJ JOINT/BURSA W/O US: CPT | Performed by: NURSE PRACTITIONER

## 2020-08-10 PROCEDURE — 99213 OFFICE O/P EST LOW 20 MIN: CPT | Performed by: NURSE PRACTITIONER

## 2020-08-10 PROCEDURE — 73562 X-RAY EXAM OF KNEE 3: CPT | Performed by: NURSE PRACTITIONER

## 2020-08-10 RX ORDER — POTASSIUM CHLORIDE 750 MG/1
10 TABLET, EXTENDED RELEASE ORAL DAILY
COMMUNITY
Start: 2019-05-09 | End: 2022-03-31 | Stop reason: SDUPTHER

## 2020-08-10 NOTE — PROGRESS NOTES
Patient Name: Sari Self   YOB: 1950  Referring Primary Care Physician: Alyssa Monroe MD  BMI: Body mass index is 28.7 kg/m².    Chief Complaint:    Chief Complaint   Patient presents with   • Right Knee - Pain        HPI: right knee has been hurting and could not put weight on it.  She has been walking more and exercising and may have exacerbated it then she is seen Dr. Bender in the past and had her left knee scoped in July.  She has been alternating Tylenol and Advil with no relief.  Mass were warned the myself and the patient throughout the duration of the visit    Sari Self is a 70 y.o. female who presents today for evaluation of   Chief Complaint   Patient presents with   • Right Knee - Pain       This problem is new to this examiner.     Subjective   Medications:   Home Medications:  Current Outpatient Medications on File Prior to Visit   Medication Sig   • gabapentin (NEURONTIN) 300 MG capsule Take 600 mg by mouth 4 (Four) Times a Day.   • hydrochlorothiazide (HYDRODIURIL) 25 MG tablet Take 12.5 mg by mouth As Needed. FOR ANKLE EDEMA   • ibuprofen (ADVIL,MOTRIN) 800 MG tablet Take 1 tablet by mouth 3 (Three) Times a Day. (Patient taking differently: Take 800 mg by mouth Every 8 (Eight) Hours As Needed. PT HOLDING FOR SURGERY)   • levothyroxine (SYNTHROID, LEVOTHROID) 50 MCG tablet Take 1 tablet by mouth Daily. PT NEEDS APPOINTMENT (Patient taking differently: Take 50 mcg by mouth Every Morning.)   • omeprazole (PRILOSEC) 40 MG capsule Take 1 capsule by mouth 2 (Two) Times a Day.   • potassium chloride (K-DUR,KLOR-CON) 10 MEQ CR tablet Take 10 mEq by mouth Daily. PRN   • colestipol (COLESTID) 1 g tablet Start with one pill daily and increase to 2 daily if no improvement after 2 weeks   • EVENING PRIMROSE OIL PO Take 1 capsule by mouth Daily. PT HOLDING FOR SURGERY   • Probiotic Product (PROBIOTIC PO) Take  by mouth.     No current facility-administered medications on file prior to  visit.      Current Medications:  Scheduled Meds:  Continuous Infusions:  No current facility-administered medications for this visit.   PRN Meds:.    I have reviewed the patient's medical history in detail and updated the computerized patient record.  Review and summarization of old records includes:    Past Medical History:   Diagnosis Date   • Arthritis    • At risk for sleep apnea    • Cerebral aneurysm    • Cholelithiasis 2016    Gallbladder removed   • Disease of thyroid gland    • Diverticulosis    • Frequent UTI    • GERD (gastroesophageal reflux disease)    • Hypertension    • IBS (irritable bowel syndrome)    • Osteoporosis    • RLS (restless legs syndrome)    • Stroke (CMS/HCC)    • TIA (transient ischemic attack)     2011   • Torn meniscus     LEFT KNEE        Past Surgical History:   Procedure Laterality Date   • CHOLECYSTECTOMY     • COLONOSCOPY  11/10/2010    prep of colon fair,IH,stool in transverse colon,hepatic flexure and ascending colon,ileum normal,IBS   • COLONOSCOPY N/A 2/27/2018    IH, diverticulosis, one 5mm polyp, tubular adenoma with low grade dysplasia   • CYSTOSCOPY     • ENDOSCOPY  11/27/2012    grd 1 reflux egitis,web upperd 3rd esoph   • ENDOSCOPY N/A 2/27/2018    normal biopsies, H Pylori positive   • FOOT NEUROMA SURGERY Left    • IR CEREBRAL ANEURYSM COILING     • KNEE ARTHROSCOPY Left 7/3/2019    Procedure: KNEE ARTHROSCOPY ARTHRITIS DEBRIDEMENT PARTIAL MEDIAL AND LATERAL MENISECTOMY;  Surgeon: Nuris Bender MD;  Location: [facility] OR [facility] [place];  Service: Orthopedics   • THUMB ARTHROSCOPY     • UPPER GASTROINTESTINAL ENDOSCOPY  02/27/2018        Social History     Occupational History   • Not on file   Tobacco Use   • Smoking status: Never Smoker   • Smokeless tobacco: Never Used   Substance and Sexual Activity   • Alcohol use: Yes     Comment: SELDOM   • Drug use: No   • Sexual activity: Not Currently      Social History     Social History Narrative   • Not on file        Family  "History   Problem Relation Age of Onset   • Irritable bowel syndrome Daughter    • Malig Hyperthermia Neg Hx        ROS: 14 point review of systems was performed and all other systems were reviewed and are negative except for documented findings in HPI and today's encounter.     Allergies:   Allergies   Allergen Reactions   • Penicillins Hives     Constitutional:  Denies fever, shaking or chills   Eyes:  Denies change in visual acuity   HENT:  Denies nasal congestion or sore throat   Respiratory:  Denies cough or shortness of breath   Cardiovascular:  Denies chest pain or severe LE edema   GI:  Denies abdominal pain, nausea, vomiting, bloody stools or diarrhea   Musculoskeletal:  Numbness, tingling, pain, or loss of motor function only as noted above in history of present illness.  : Denies painful urination or hematuria  Integument:  Denies rash, lesion or ulceration   Neurologic:  Denies headache or focal weakness  Endocrine:  Denies lymphadenopathy  Psych:  Denies confusion or change in mental status   Hem:  Denies active bleeding    OBJECTIVE:  Physical Exam: 70 y.o. female  Wt Readings from Last 3 Encounters:   08/10/20 73.5 kg (162 lb)   12/05/19 76.2 kg (168 lb)   08/27/19 76.2 kg (168 lb)     Ht Readings from Last 1 Encounters:   08/10/20 160 cm (63\")     Body mass index is 28.7 kg/m².  Vitals:    08/10/20 1525   Temp: 98 °F (36.7 °C)     Vital signs reviewed.     General Appearance:    Alert, cooperative, in no acute distress                  Eyes: conjunctiva clear  ENT: external ears and nose atraumatic  CV: no peripheral edema  Resp: normal respiratory effort  Skin: no rashes or wounds; normal turgor  Psych: mood and affect appropriate  Lymph: no nodes appreciated  Neuro: gross sensation intact  Vascular:  Palpable peripheral pulse in noted extremity  Musculoskeletal Extremities: Right knee is patellofemoral crepitation and medial joint line tenderness skin is warm and dry intact calves are soft and " nontender hips are nontender    Radiology:   3 views the right knee were done that show tricompartmental arthritic changes medial joint line is pretty well preserved there is some patellofemoral changes done for pain no readily available views for comparison    Assessment:     ICD-10-CM ICD-9-CM   1. Acute pain of right knee M25.561 719.46   2. Osteoarthritis of right knee, unspecified osteoarthritis type M17.11 715.96        Large Joint Arthrocentesis: R knee  Date/Time: 8/12/2020 3:05 PM  Consent given by: patient  Site marked: site marked  Timeout: Immediately prior to procedure a time out was called to verify the correct patient, procedure, equipment, support staff and site/side marked as required   Supporting Documentation  Indications: pain and joint swelling   Procedure Details  Location: knee - R knee  Preparation: Patient was prepped and draped in the usual sterile fashion  Needle gauge: 21 G.  Approach: anterolateral  Medications administered: 2 mL lidocaine (cardiac); 80 mg methylPREDNISolone acetate 80 MG/ML  Patient tolerance: patient tolerated the procedure well with no immediate complications             Plan: Biomechanics of pertinent body area discussed.  Risks, benefits, alternatives, comparisons, and complications of accepted medicines, injections, recommendations, surgical procedures, and therapies explained and education provided in laymen's terms. Natural history and expected course of this patient's diagnosis discussed along with evaluation of therapies. Questions answered. When appropriate I also discussed proper use of cane, walker, trekking poles.   EXERCISES:  Advice on benefits of, and types of regular/moderate exercise including biomechanical forces involved as it pertains to this complaint.  RICE: Rest, ice, compression, and elevation therapy, Cryotherapy/brachy therapy, and or OTC linaments as indicated with instructions.   Cortisone Injection. See procedure  note.      8/12/2020    Much of this encounter note is an electronic transcription/translation of spoken language to printed text. The electronic translation of spoken language may permit erroneous, or at times, nonsensical words or phrases to be inadvertently transcribed; Although I have reviewed the note for such errors, some may still exist

## 2020-08-12 RX ORDER — METHYLPREDNISOLONE ACETATE 80 MG/ML
80 INJECTION, SUSPENSION INTRA-ARTICULAR; INTRALESIONAL; INTRAMUSCULAR; SOFT TISSUE
Status: COMPLETED | OUTPATIENT
Start: 2020-08-12 | End: 2020-08-12

## 2020-08-12 RX ADMIN — METHYLPREDNISOLONE ACETATE 80 MG: 80 INJECTION, SUSPENSION INTRA-ARTICULAR; INTRALESIONAL; INTRAMUSCULAR; SOFT TISSUE at 15:05

## 2020-08-24 ENCOUNTER — TELEPHONE (OUTPATIENT)
Dept: ORTHOPEDIC SURGERY | Facility: CLINIC | Age: 70
End: 2020-08-24

## 2020-08-24 NOTE — TELEPHONE ENCOUNTER
Patient called to request RIGHT Knee MRI be ordered as discussed with [facility] at 8/10 appt. Patient can be reached at [phone]. Thanks /srh

## 2020-08-25 ENCOUNTER — TELEPHONE (OUTPATIENT)
Dept: ORTHOPEDIC SURGERY | Facility: CLINIC | Age: 70
End: 2020-08-25

## 2020-08-25 DIAGNOSIS — S83.242D ACUTE MEDIAL MENISCUS TEAR OF LEFT KNEE, SUBSEQUENT ENCOUNTER: ICD-10-CM

## 2020-08-25 DIAGNOSIS — M25.561 ACUTE PAIN OF RIGHT KNEE: Primary | ICD-10-CM

## 2020-08-25 NOTE — TELEPHONE ENCOUNTER
----- Message from Sari Self sent at 8/25/2020 10:24 AM EDT -----  Regarding: Complaint  Contact: [phone]  I called and left message yesterday. Did not hear back. Trying to get to work, cannot bear weight on right knee. Got injection 2 weeks ago. Can we schedule an MRI? I am miserable like this.

## 2020-08-27 ENCOUNTER — TELEPHONE (OUTPATIENT)
Dept: ORTHOPEDIC SURGERY | Facility: CLINIC | Age: 70
End: 2020-08-27

## 2020-08-27 NOTE — TELEPHONE ENCOUNTER
----- Message from Sari Self sent at 8/27/2020  8:51 AM EDT -----  Regarding: RE: Complaint  Contact: [phone]  The order was put in and Radha told me to call and schedule myself. Have it 09/06 which was the soonest I could get. In the meantime, I cant walk and trying to work. This is ridiculous that I cant get help and have to wait like this in pain for probably at least another month!!  ----- Message -----  From: WILEY CLAUDIO  Sent: 8/27/20 8:43 AM  To: Sari Self  Subject: RE: Complaint    Dr. Napoleon Guo put in an order for you MRI someone will be calling you to schedule it.     ThanksStacia    ----- Message -----     From: Sari Self     Sent: 8/25/2020 10:24 AM EDT       To: Nuris Bender MD  Subject: Complaint    I called and left message yesterday. Did not hear back. Trying to get to work, cannot bear weight on right knee. Got injection 2 weeks ago. Can we schedule an MRI? I am miserable like this.

## 2020-09-02 ENCOUNTER — TELEPHONE (OUTPATIENT)
Dept: ORTHOPEDIC SURGERY | Facility: CLINIC | Age: 70
End: 2020-09-02

## 2020-09-02 NOTE — TELEPHONE ENCOUNTER
----- Message from Sari Self sent at 9/2/2020  8:44 AM EDT -----  Regarding: Visit Follow-Up Question  Contact: [phone]  I know I have MRI Sunday, but this knee pain has gotten worse and is horrible. Before surgery on the left one, the pain was only with weight bearing. This one now hurts 24/7and I cannot get comfortable, cant sleep. Feels like a hot poker in there and throbs. I am mostly staying off it. I dont think theres anything you can do until MRI, but was wondering if you have any other suggestions? Taking ibuprofen but seems to have stopped helping.

## 2020-09-04 ENCOUNTER — TELEPHONE (OUTPATIENT)
Dept: ORTHOPEDIC SURGERY | Facility: CLINIC | Age: 70
End: 2020-09-04

## 2020-09-04 NOTE — TELEPHONE ENCOUNTER
----- Message from Sari MÉNDEZ  sent at 9/4/2020  9:49 AM EDT -----  Regarding: RE: Visit Follow-Up Question  Contact: [phone]  Any idea when? Going to be a long weekend and I have MRI Sunday and will need help getting in bldg.    ----- Message -----  From: WILEY CLAUDIO  Sent: 9/2/20 9:43 AM  To: Sari Self  Subject: RE: Visit Follow-Up Question    I will have Dave from our office call you to set up a time to come in.      Thanks.    ----- Message -----     From: Sari RAEANN      Sent: 9/2/2020  9:42 AM EDT       To: LEXY Wynn  Subject: RE: Visit Follow-Up Question    Yes I would if it will help! I tried getting MRI sooner but put of three places, sunday is the soonest.     ----- Message -----  From: WILEY CLAUDIO  Sent: 9/2/20 9:39 AM  To: Sari Self  Subject: RE: Visit Follow-Up Question    Stella Champion said that you can come into the office to get a knee immobilizer and a walker.  She also mentioned of maybe trying to move up your MRI appointment.  If you would like to call centralized scheduling to see if there is an opening any sooner that works with your schedule, their number is [phone].  Let me know if you would like to get the brace and walker and I will have someone call you to arrange a time to come in today for that.    Massiel Vincent      ----- Message -----     From: Sari RAEANN      Sent: 9/2/2020  8:44 AM EDT       To: LEXY Wynn  Subject: Visit Follow-Up Question    I know I have MRI Sunday, but this knee pain has gotten worse and is horrible. Before surgery on the left one, the pain was only with weight bearing. This one now hurts 24/7and I cannot get comfortable, cant sleep. Feels like a hot poker in there and throbs. I am mostly staying off it. I dont think theres anything you can do until MRI, but was wondering if you have any other suggestions? Taking ibuprofen but seems to have stopped helping.

## 2020-09-04 NOTE — TELEPHONE ENCOUNTER
Gave information to Dave and he said he was going to contact patient to schedule a time to come in.

## 2020-09-06 ENCOUNTER — HOSPITAL ENCOUNTER (OUTPATIENT)
Dept: MRI IMAGING | Facility: HOSPITAL | Age: 70
Discharge: HOME | End: 2020-09-06
Admitting: ORTHOPAEDIC SURGERY

## 2020-09-06 DIAGNOSIS — M25.561 ACUTE PAIN OF RIGHT KNEE: ICD-10-CM

## 2020-09-06 PROCEDURE — 73721 MRI JNT OF LWR EXTRE W/O DYE: CPT

## 2020-09-09 ENCOUNTER — TELEPHONE (OUTPATIENT)
Dept: ORTHOPEDIC SURGERY | Facility: CLINIC | Age: 70
End: 2020-09-09

## 2020-09-09 NOTE — TELEPHONE ENCOUNTER
Pt tested POS for covid-19 on 8/29/2020 pt states she was released to go back to work by [employer] as of 9/6/2020 but since she is working from home I contacted Leti Barahona  and confirmed that pt would have to be quarantined for 14days in order to be seen in office. Will speak with MARK in the morning to confirm she can do a telephone visit with her. Informed pt what was going on and she gave verbal understanding and said a telephone visit would be sufficient.

## 2020-09-10 ENCOUNTER — OFFICE VISIT (OUTPATIENT)
Dept: ORTHOPEDIC SURGERY | Facility: CLINIC | Age: 70
End: 2020-09-10

## 2020-09-10 DIAGNOSIS — S83.231D COMPLEX TEAR OF MEDIAL MENISCUS OF RIGHT KNEE AS CURRENT INJURY, SUBSEQUENT ENCOUNTER: Primary | ICD-10-CM

## 2020-09-10 DIAGNOSIS — M84.361A STRESS FRACTURE OF RIGHT TIBIA, INITIAL ENCOUNTER: ICD-10-CM

## 2020-09-10 PROCEDURE — 99441 PR PHYS/QHP TELEPHONE EVALUATION 5-10 MIN: CPT | Performed by: ORTHOPAEDIC SURGERY

## 2020-09-10 RX ORDER — CEFAZOLIN SODIUM 2 G/100ML
2 INJECTION, SOLUTION INTRAVENOUS ONCE
Status: CANCELLED | OUTPATIENT
Start: 2020-09-28 | End: 2020-09-10

## 2020-09-10 NOTE — PROGRESS NOTES
This patient tested positive for COVID 10 days ago so she was not able to come in for her follow-up.  I did call her and discussed in detail her MRI findings of meniscal pathology some degenerative changes primarily patellofemoral joint and a medial tibial plateau stress fracture.  We talked about addressing this with knee arthroscopy possible subchondral plasty we discussed about the perioperative regimen risk benefits and alternatives she understands these and would like to go on and proceed

## 2020-09-15 PROBLEM — M84.361A STRESS FRACTURE OF RIGHT TIBIA: Status: ACTIVE | Noted: 2020-09-15

## 2020-09-15 PROBLEM — S83.231A COMPLEX TEAR OF MEDIAL MENISCUS OF RIGHT KNEE AS CURRENT INJURY: Status: ACTIVE | Noted: 2020-09-15

## 2020-09-18 ENCOUNTER — RESULTS ENCOUNTER (OUTPATIENT)
Dept: FAMILY MEDICINE CLINIC | Facility: CLINIC | Age: 70
End: 2020-09-18

## 2020-09-18 ENCOUNTER — TELEMEDICINE (OUTPATIENT)
Dept: FAMILY MEDICINE CLINIC | Facility: CLINIC | Age: 70
End: 2020-09-18

## 2020-09-18 DIAGNOSIS — E03.9 HYPOTHYROIDISM, UNSPECIFIED TYPE: ICD-10-CM

## 2020-09-18 DIAGNOSIS — R23.2 HOT FLASHES: ICD-10-CM

## 2020-09-18 DIAGNOSIS — K21.9 GASTROESOPHAGEAL REFLUX DISEASE, ESOPHAGITIS PRESENCE NOT SPECIFIED: ICD-10-CM

## 2020-09-18 DIAGNOSIS — E55.9 VITAMIN D DEFICIENCY: ICD-10-CM

## 2020-09-18 DIAGNOSIS — Z11.59 NEED FOR HEPATITIS C SCREENING TEST: ICD-10-CM

## 2020-09-18 DIAGNOSIS — G25.81 RLS (RESTLESS LEGS SYNDROME): ICD-10-CM

## 2020-09-18 DIAGNOSIS — I10 ESSENTIAL HYPERTENSION: ICD-10-CM

## 2020-09-18 DIAGNOSIS — Z87.19 HISTORY OF IBS: ICD-10-CM

## 2020-09-18 DIAGNOSIS — E03.9 HYPOTHYROIDISM, UNSPECIFIED TYPE: Primary | ICD-10-CM

## 2020-09-18 DIAGNOSIS — M81.0 OSTEOPOROSIS, UNSPECIFIED OSTEOPOROSIS TYPE, UNSPECIFIED PATHOLOGICAL FRACTURE PRESENCE: ICD-10-CM

## 2020-09-18 PROCEDURE — G0439 PPPS, SUBSEQ VISIT: HCPCS | Performed by: FAMILY MEDICINE

## 2020-09-18 RX ORDER — HYDROCHLOROTHIAZIDE 25 MG/1
12.5 TABLET ORAL AS NEEDED
Qty: 45 TABLET | Refills: 5 | Status: SHIPPED | OUTPATIENT
Start: 2020-09-18 | End: 2020-10-28

## 2020-09-18 RX ORDER — LEVOTHYROXINE SODIUM 0.05 MG/1
50 TABLET ORAL EVERY MORNING
Qty: 90 TABLET | Refills: 0 | Status: SHIPPED | OUTPATIENT
Start: 2020-09-18 | End: 2021-07-12 | Stop reason: SDUPTHER

## 2020-09-18 NOTE — PROGRESS NOTES
"Subjective   Sari Self is a 70 y.o. female.     No chief complaint on file.      History of Present Illness   Sarah presents as a new patient to become established.  Her previous PCP moved out of town.   She has had a lot of issues for the past 6 weeks.   She had an MRI of her R knee- and has meniscal tear and fracture- she is scheduled for surgery on 9/28. There was an event where she felt a \"snap\" while she was at the park which occurred 6 weeks ago on 8/7/20- she was seen by [facility] and had injection on 8/10 which did not help.   She was also diagnosed with covid 8/28 which was \"very mild\" and she is doing well currently.  She lost 25 lbs since 6/2020 by walking and cutting back on carbs.   Now she can't walk much at all due to her knee.   She has history of meniscal tear of L knee in the past- 7/2019- after a twisting injury where she fell.     She has been doing keto diet and feels like she's having hot flashes all the time.     PMH:  Hypothyroidism- on Synthroid- had lab work done over a year ago.   HTN- but no longer takes BP medication every day- takes it as needed when she can tell her BP is up. She will take KCl when she takes HCTZ- which is usually every other day which she says keeps her BP controlled.   Osteoporosis- was getting reclast injections but hasn't had any in two years.  H. Pylori- diagnosed in 2018  GERD- takes omeprazole  IBS, diverticulosis- has a GI doc, is utd on colonoscopy since 2/2018  She has mammogram scheduled for this Tuesday.     She sees a neurologist and takes gabapentin for RLS- it is helpful  She believes she is UTD on pneumonia vaccinations.  She has not had flu vx this year yet.     Surgical History:  Brain aneurysm with coiling in 4/2011  Hysterectomy in 1985  BSO in 1993  Two foot surgeries in 2009 and 2010  Hand surgery in 9/2017   Cholecystectomy in 2018  L knee arthroscopy 7/2019    Soc History:  She is , Lives with her son at home- he is " 36 yrs old- he is in good health  She is employed- works from home- she is a supervisor of medical records at [employer]  She has never been a smoker.  She does not drink alcohol often- none since starting Keto. Would have occasional glass of wine before that.    Fam History:  Mother, CANDI- heart disease  Cousin- had MI at age 30  Patient had nuclear stress test which was normal prior to being diagnosed with H. Pylori.            Past Medical History:   Diagnosis Date   • Arthritis    • At risk for sleep apnea    • Cerebral aneurysm    • Cholelithiasis 2016    Gallbladder removed   • Disease of thyroid gland    • Diverticulosis    • Frequent UTI    • GERD (gastroesophageal reflux disease)    • Hypertension    • IBS (irritable bowel syndrome)    • Osteoporosis    • RLS (restless legs syndrome)    • Stroke (CMS/HCC)    • TIA (transient ischemic attack)     2011   • Torn meniscus     LEFT KNEE     Past Surgical History:   Procedure Laterality Date   • CHOLECYSTECTOMY     • COLONOSCOPY  11/10/2010    prep of colon fair,IH,stool in transverse colon,hepatic flexure and ascending colon,ileum normal,IBS   • COLONOSCOPY N/A 2/27/2018    IH, diverticulosis, one 5mm polyp, tubular adenoma with low grade dysplasia   • CYSTOSCOPY     • ENDOSCOPY  11/27/2012    grd 1 reflux egitis,web upperd 3rd esoph   • ENDOSCOPY N/A 2/27/2018    normal biopsies, H Pylori positive   • FOOT NEUROMA SURGERY Left    • IR CEREBRAL ANEURYSM COILING     • KNEE ARTHROSCOPY Left 7/3/2019    Procedure: KNEE ARTHROSCOPY ARTHRITIS DEBRIDEMENT PARTIAL MEDIAL AND LATERAL MENISECTOMY;  Surgeon: Nuris Bender MD;  Location: [facility] [place];  Service: Orthopedics   • THUMB ARTHROSCOPY     • UPPER GASTROINTESTINAL ENDOSCOPY  02/27/2018     Social History     Tobacco Use   • Smoking status: Never Smoker   • Smokeless tobacco: Never Used   Substance Use Topics   • Alcohol use: Yes     Comment: SELDOM   • Drug use: No     Family History   Problem Relation Age of  Onset   • Irritable bowel syndrome Daughter    • Malig Hyperthermia Neg Hx        Review of Systems    Objective   LMP  (LMP Unknown)     Physical Exam    Procedures    Assessment/Plan   There are no diagnoses linked to this encounter.               Patient Instructions    Lab work ordered- please have done fasted as planned.  Follow up with knee surgery and mammogram as scheduled.  Return to clinic in 2-3 weeks for physical exam and to go over lab work results- will call if needed to discuss labs beforehand.

## 2020-09-18 NOTE — PROGRESS NOTES
"The ABCs of the Annual Wellness Visit  Subsequent Medicare Wellness Visit    No chief complaint on file.      Subjective   History of Present Illness:  Sari Self is a 70 y.o. female who presents for a Subsequent Medicare Wellness Visit.    Sarah presents for video visit as a new patient to become established.  Her previous PCP moved out of town.   She has had a lot of issues for the past 6 weeks.   She had an MRI of her R knee- and has meniscal tear and fracture- she is scheduled for surgery on 9/28. There was an event where she felt a \"snap\" while she was at the park which occurred 6 weeks ago on 8/7/20- she was seen by [facility] and had injection on 8/10 which did not help.   She was also diagnosed with covid 8/28 which was \"very mild\" and she is doing well currently.  She lost 25 lbs since 6/2020 by walking and cutting back on carbs.   Now she can't walk much at all due to her knee.   She has history of meniscal tear of L knee in the past- 7/2019- after a twisting injury where she fell.     She has been doing keto diet and feels like she's having hot flashes all the time.     PMH:  Hypothyroidism- on Synthroid- had lab work done over a year ago.   HTN- but no longer takes BP medication every day- takes it as needed when she can tell her BP is up. She will take KCl when she takes HCTZ- which is usually every other day which she says keeps her BP controlled.   Osteoporosis- was getting reclast injections but hasn't had any in two years.  H. Pylori- diagnosed in 2018  GERD- takes omeprazole  IBS, diverticulosis- has a GI doc, is utd on colonoscopy since 2/2018  She has mammogram scheduled for this Tuesday.     She sees a neurologist and takes gabapentin for RLS- it is helpful  She believes she is UTD on pneumonia vaccinations.  She has not had flu vx this year yet.     Surgical History:  Brain aneurysm with coiling in 4/2011  Hysterectomy in 1985  BSO in 1993  Two foot surgeries in 2009 and " 2010  Hand surgery in 9/2017   Cholecystectomy in 2018  L knee arthroscopy 7/2019    Soc History:  She is , Lives with her son at home- he is 36 yrs old- he is in good health  She is employed- works from home- she is a supervisor of medical records at [employer]  She has never been a smoker.  She does not drink alcohol often- none since starting Keto. Would have occasional glass of wine before that.    Fam History:  Mother, MGM- heart disease  Cousin- had MI at age 30  Patient had nuclear stress test which was normal prior to being diagnosed with H. Pylori.    HEALTH RISK ASSESSMENT    Recent Hospitalizations:  No hospitalization(s) within the last year.    Current Medical Providers:  Patient Care Team:  Carol Ann Rosenberg MD as PCP - General (Family Medicine)  Charlene Cortez APRN as PCP - Claims Attributed    Smoking Status:  Social History     Tobacco Use   Smoking Status Never Smoker   Smokeless Tobacco Never Used       Alcohol Consumption:  Social History     Substance and Sexual Activity   Alcohol Use Yes    Comment: SELDOM       Depression Screen:   PHQ-2/PHQ-9 Depression Screening 1/26/2018   Little interest or pleasure in doing things 0   Feeling down, depressed, or hopeless 0   Total Score 0       Fall Risk Screen:  STEADI Fall Risk Assessment has not been completed.    Health Habits and Functional and Cognitive Screening:  No flowsheet data found.      Does the patient have evidence of cognitive impairment? No    Asprin use counseling:Does not need ASA (and currently is not on it)    Age-appropriate Screening Schedule:  Refer to the list below for future screening recommendations based on patient's age, sex and/or medical conditions. Orders for these recommended tests are listed in the plan section. The patient has been provided with a written plan.    Health Maintenance   Topic Date Due   • TDAP/TD VACCINES (1 - Tdap) 06/24/1968   • ZOSTER VACCINE (1 of 2) 06/24/1999   • MAMMOGRAM  08/30/2019   •  INFLUENZA VACCINE  08/01/2020   • DXA SCAN  12/31/2020   • COLONOSCOPY  02/27/2023          The following portions of the patient's history were reviewed and updated as appropriate: allergies, current medications, past family history, past medical history, past social history, past surgical history and problem list.    Outpatient Medications Prior to Visit   Medication Sig Dispense Refill   • EVENING PRIMROSE OIL PO Take 1 capsule by mouth Daily. PT HOLDING FOR SURGERY     • gabapentin (NEURONTIN) 300 MG capsule Take 600 mg by mouth 4 (Four) Times a Day.     • ibuprofen (ADVIL,MOTRIN) 800 MG tablet Take 1 tablet by mouth 3 (Three) Times a Day. (Patient taking differently: Take 800 mg by mouth Every 8 (Eight) Hours As Needed. PT HOLDING FOR SURGERY) 90 tablet 0   • omeprazole (PRILOSEC) 40 MG capsule Take 1 capsule by mouth 2 (Two) Times a Day. 180 capsule 3   • potassium chloride (K-DUR,KLOR-CON) 10 MEQ CR tablet Take 10 mEq by mouth Daily. PRN     • Probiotic Product (PROBIOTIC PO) Take  by mouth.     • colestipol (COLESTID) 1 g tablet Start with one pill daily and increase to 2 daily if no improvement after 2 weeks 60 tablet 11   • hydrochlorothiazide (HYDRODIURIL) 25 MG tablet Take 12.5 mg by mouth As Needed. FOR ANKLE EDEMA     • levothyroxine (SYNTHROID, LEVOTHROID) 50 MCG tablet Take 1 tablet by mouth Daily. PT NEEDS APPOINTMENT (Patient taking differently: Take 50 mcg by mouth Every Morning.) 90 tablet 0     No facility-administered medications prior to visit.        Patient Active Problem List   Diagnosis   • Osteoporosis   • Senile osteoporosis   • Gastroesophageal reflux disease   • Diarrhea   • Tubular adenoma of colon   • Helicobacter pylori (H. pylori) infection   • Acute medial meniscus tear of left knee   • Complex tear of medial meniscus of right knee as current injury   • Stress fracture of right tibia   • History of IBS   • RLS (restless legs syndrome)   • Hypothyroidism   • Essential hypertension        Advanced Care Planning:  ACP discussion was held with the patient during this visit. Patient does not have an advance directive, information provided.    Review of Systems   Constitutional: Negative for activity change, appetite change, fatigue, fever and unexpected weight change.   HENT: Negative for sore throat and trouble swallowing.    Respiratory: Negative for cough and shortness of breath.    Cardiovascular: Negative for chest pain.   Gastrointestinal: Negative for abdominal pain, constipation and diarrhea.   Musculoskeletal: Positive for arthralgias (See HPI). Negative for back pain.   Neurological: Negative for dizziness and headaches.   Psychiatric/Behavioral: Negative for sleep disturbance. The patient is not nervous/anxious.        Compared to one year ago, the patient feels her physical health is worse.  Compared to one year ago, the patient feels her mental health is the same.    Reviewed chart for potential of high risk medication in the elderly: yes  Reviewed chart for potential of harmful drug interactions in the elderly:yes    Objective       There were no vitals filed for this visit.    There is no height or weight on file to calculate BMI.  Discussed the patient's BMI with her. The BMI is in the acceptable range.    Physical Exam  Constitutional:       Comments: Pleasant elderly female, interviewed over video call. No apparent distress.   HENT:      Head: Normocephalic and atraumatic.   Pulmonary:      Effort: Pulmonary effort is normal. No respiratory distress.   Neurological:      Mental Status: She is alert.   Psychiatric:         Mood and Affect: Mood normal.               Assessment/Plan   Medicare Risks and Personalized Health Plan  CMS Preventative Services Quick Reference  Advance Directive Discussion  Breast Cancer/Mammogram Screening  Cardiovascular risk  Dementia/Memory   Depression/Dysphoria  Diabetic Lab Screening   Immunizations Discussed/Encouraged (specific immunizations;  Influenza, Pneumococcal 23, Prevnar and Shingrix )  Osteoprorosis Risk    The above risks/problems have been discussed with the patient.  Pertinent information has been shared with the patient in the After Visit Summary.  Follow up plans and orders are seen below in the Assessment/Plan Section.    Diagnoses and all orders for this visit:    1. Hypothyroidism, unspecified type (Primary)  -     levothyroxine (SYNTHROID, LEVOTHROID) 50 MCG tablet; Take 1 tablet by mouth Every Morning.  Dispense: 90 tablet; Refill: 0  -     Lipid Panel; Future  -     TSH Rfx On Abnormal To Free T4; Future    2. Essential hypertension  -     hydroCHLOROthiazide (HYDRODIURIL) 25 MG tablet; Take 0.5 tablets by mouth As Needed (take daily for BP above 150/90). FOR ANKLE EDEMA  Dispense: 45 tablet; Refill: 5  -     Comprehensive Metabolic Panel; Future    3. Gastroesophageal reflux disease, esophagitis presence not specified    4. Osteoporosis, unspecified osteoporosis type, unspecified pathological fracture presence    5. History of IBS    6. RLS (restless legs syndrome)    7. Hot flashes  -     CBC & Differential; Future    8. Vitamin D deficiency  -     Vitamin D 25 Hydroxy; Future    9. Need for hepatitis C screening test  -     Hepatitis C Antibody; Future      Follow Up:  No follow-ups on file.     An After Visit Summary and PPPS were given to the patient.       Lab work ordered- please have done fasted as planned.  Follow up with knee surgery and mammogram as scheduled.  Recommend Shingrix vaccination series as discussed- can get at your local pharmacy.  Return to clinic in 2-3 weeks for physical exam and to go over lab work results- will call if needed to discuss labs beforehand.

## 2020-09-21 ENCOUNTER — TRANSCRIBE ORDERS (OUTPATIENT)
Dept: PREADMISSION TESTING | Facility: HOSPITAL | Age: 70
End: 2020-09-21

## 2020-09-21 DIAGNOSIS — Z01.818 OTHER SPECIFIED PRE-OPERATIVE EXAMINATION: Primary | ICD-10-CM

## 2020-09-22 ENCOUNTER — APPOINTMENT (OUTPATIENT)
Dept: WOMENS IMAGING | Facility: HOSPITAL | Age: 70
End: 2020-09-22

## 2020-09-23 ENCOUNTER — APPOINTMENT (OUTPATIENT)
Dept: PREADMISSION TESTING | Facility: HOSPITAL | Age: 70
End: 2020-09-23

## 2020-09-23 VITALS
TEMPERATURE: 97.4 F | SYSTOLIC BLOOD PRESSURE: 145 MMHG | BODY MASS INDEX: 26.93 KG/M2 | HEIGHT: 63 IN | DIASTOLIC BLOOD PRESSURE: 86 MMHG | RESPIRATION RATE: 16 BRPM | OXYGEN SATURATION: 98 % | HEART RATE: 72 BPM | WEIGHT: 152 LBS

## 2020-09-23 DIAGNOSIS — S83.231D COMPLEX TEAR OF MEDIAL MENISCUS OF RIGHT KNEE AS CURRENT INJURY, SUBSEQUENT ENCOUNTER: ICD-10-CM

## 2020-09-23 DIAGNOSIS — M84.361A STRESS FRACTURE OF RIGHT TIBIA, INITIAL ENCOUNTER: ICD-10-CM

## 2020-09-23 LAB
25(OH)D3 SERPL-MCNC: 29.3 NG/ML (ref 30–100)
ABO GROUP BLD: NORMAL
ALBUMIN SERPL-MCNC: 4.1 G/DL (ref 3.5–5.2)
ALBUMIN/GLOB SERPL: 1.3 G/DL
ALP SERPL-CCNC: 77 U/L (ref 39–117)
ALT SERPL W P-5'-P-CCNC: 12 U/L (ref 1–33)
ANION GAP SERPL CALCULATED.3IONS-SCNC: 13.2 MMOL/L (ref 5–15)
AST SERPL-CCNC: 10 U/L (ref 1–32)
BASOPHILS # BLD AUTO: 0.09 10*3/MM3 (ref 0–0.2)
BASOPHILS NFR BLD AUTO: 1.5 % (ref 0–1.5)
BILIRUB SERPL-MCNC: 0.5 MG/DL (ref 0–1.2)
BUN SERPL-MCNC: 18 MG/DL (ref 8–23)
BUN/CREAT SERPL: 26.5 (ref 7–25)
CALCIUM SPEC-SCNC: 9.5 MG/DL (ref 8.6–10.5)
CHLORIDE SERPL-SCNC: 104 MMOL/L (ref 98–107)
CHOLEST SERPL-MCNC: 207 MG/DL (ref 0–200)
CO2 SERPL-SCNC: 18.8 MMOL/L (ref 22–29)
CREAT SERPL-MCNC: 0.68 MG/DL (ref 0.57–1)
DEPRECATED RDW RBC AUTO: 42.3 FL (ref 37–54)
EOSINOPHIL # BLD AUTO: 0.15 10*3/MM3 (ref 0–0.4)
EOSINOPHIL NFR BLD AUTO: 2.5 % (ref 0.3–6.2)
ERYTHROCYTE [DISTWIDTH] IN BLOOD BY AUTOMATED COUNT: 13.4 % (ref 12.3–15.4)
GFR SERPL CREATININE-BSD FRML MDRD: 85 ML/MIN/1.73
GLOBULIN UR ELPH-MCNC: 3.2 GM/DL
GLUCOSE SERPL-MCNC: 95 MG/DL (ref 65–99)
HCT VFR BLD AUTO: 39.2 % (ref 34–46.6)
HCV AB SER DONR QL: NORMAL
HDLC SERPL-MCNC: 50 MG/DL (ref 40–60)
HGB BLD-MCNC: 13 G/DL (ref 12–15.9)
IMM GRANULOCYTES # BLD AUTO: 0.03 10*3/MM3 (ref 0–0.05)
IMM GRANULOCYTES NFR BLD AUTO: 0.5 % (ref 0–0.5)
LDLC SERPL CALC-MCNC: 143 MG/DL (ref 0–100)
LDLC/HDLC SERPL: 2.86 {RATIO}
LYMPHOCYTES # BLD AUTO: 2.04 10*3/MM3 (ref 0.7–3.1)
LYMPHOCYTES NFR BLD AUTO: 33.5 % (ref 19.6–45.3)
MCH RBC QN AUTO: 28.9 PG (ref 26.6–33)
MCHC RBC AUTO-ENTMCNC: 33.2 G/DL (ref 31.5–35.7)
MCV RBC AUTO: 87.1 FL (ref 79–97)
MONOCYTES # BLD AUTO: 0.57 10*3/MM3 (ref 0.1–0.9)
MONOCYTES NFR BLD AUTO: 9.4 % (ref 5–12)
NEUTROPHILS NFR BLD AUTO: 3.21 10*3/MM3 (ref 1.7–7)
NEUTROPHILS NFR BLD AUTO: 52.6 % (ref 42.7–76)
NRBC BLD AUTO-RTO: 0 /100 WBC (ref 0–0.2)
PLATELET # BLD AUTO: 304 10*3/MM3 (ref 140–450)
PMV BLD AUTO: 9.6 FL (ref 6–12)
POTASSIUM SERPL-SCNC: 4 MMOL/L (ref 3.5–5.2)
PROT SERPL-MCNC: 7.3 G/DL (ref 6–8.5)
RBC # BLD AUTO: 4.5 10*6/MM3 (ref 3.77–5.28)
RH BLD: POSITIVE
SODIUM SERPL-SCNC: 136 MMOL/L (ref 136–145)
TRIGL SERPL-MCNC: 69 MG/DL (ref 0–150)
TSH SERPL DL<=0.05 MIU/L-ACNC: 2.4 UIU/ML (ref 0.27–4.2)
VLDLC SERPL-MCNC: 13.8 MG/DL (ref 5–40)
WBC # BLD AUTO: 6.09 10*3/MM3 (ref 3.4–10.8)

## 2020-09-23 PROCEDURE — 93005 ELECTROCARDIOGRAM TRACING: CPT

## 2020-09-23 PROCEDURE — 85025 COMPLETE CBC W/AUTO DIFF WBC: CPT | Performed by: FAMILY MEDICINE

## 2020-09-23 PROCEDURE — 84443 ASSAY THYROID STIM HORMONE: CPT | Performed by: FAMILY MEDICINE

## 2020-09-23 PROCEDURE — 80053 COMPREHEN METABOLIC PANEL: CPT | Performed by: FAMILY MEDICINE

## 2020-09-23 PROCEDURE — 86803 HEPATITIS C AB TEST: CPT | Performed by: FAMILY MEDICINE

## 2020-09-23 PROCEDURE — 80061 LIPID PANEL: CPT | Performed by: FAMILY MEDICINE

## 2020-09-23 PROCEDURE — 86901 BLOOD TYPING SEROLOGIC RH(D): CPT | Performed by: ORTHOPAEDIC SURGERY

## 2020-09-23 PROCEDURE — 36415 COLL VENOUS BLD VENIPUNCTURE: CPT | Performed by: FAMILY MEDICINE

## 2020-09-23 PROCEDURE — 82306 VITAMIN D 25 HYDROXY: CPT | Performed by: FAMILY MEDICINE

## 2020-09-23 PROCEDURE — 86900 BLOOD TYPING SEROLOGIC ABO: CPT | Performed by: ORTHOPAEDIC SURGERY

## 2020-09-23 PROCEDURE — 93010 ELECTROCARDIOGRAM REPORT: CPT | Performed by: INTERNAL MEDICINE

## 2020-09-23 NOTE — DISCHARGE INSTRUCTIONS
Take the following medications the morning of surgery:    GABAPENTIN AND LEVOTHYROXINE    ARRIVE AT 6:45    If you are on prescription narcotic pain medication to control your pain you may also take that medication the morning of surgery.    General Instructions:  • Do not eat solid food after midnight the night before surgery.  • You may drink clear liquids day of surgery but must stop at least one hour before your hospital arrival time.  • It is beneficial for you to have a clear drink that contains carbohydrates the day of surgery.  We suggest a 12 to 20 ounce bottle of Gatorade or Powerade for non-diabetic patients or a 12 to 20 ounce bottle of G2 or Powerade Zero for diabetic patients. (Pediatric patients, are not advised to drink a 12 to 20 ounce carbohydrate drink)    Clear liquids are liquids you can see through.  Nothing red in color.     Plain water                               Sports drinks  Sodas                                   Gelatin (Jell-O)  Fruit juices without pulp such as white grape juice and apple juice  Popsicles that contain no fruit or yogurt  Tea or coffee (no cream or milk added)  Gatorade / Powerade  G2 / Powerade Zero    • Infants may have breast milk up to four hours before surgery.  • Infants drinking formula may drink formula up to six hours before surgery.   • Patients who avoid smoking, chewing tobacco and alcohol for 4 weeks prior to surgery have a reduced risk of post-operative complications.  Quit smoking as many days before surgery as you can.  • Do not smoke, use chewing tobacco or drink alcohol the day of surgery.   • If applicable bring your C-PAP/ BI-PAP machine.  • Bring any papers given to you in the doctor’s office.  • Wear clean comfortable clothes.  • Do not wear contact lenses, false eyelashes or make-up.  Bring a case for your glasses.   • Bring crutches or walker if applicable.  • Remove all piercings.  Leave jewelry and any other valuables at home.  • Hair  extensions with metal clips must be removed prior to surgery.  • The Pre-Admission Testing nurse will instruct you to bring medications if unable to obtain an accurate list in Pre-Admission Testing.        If you were given a blood bank ID arm band remember to bring it with you the day of surgery.    Preventing a Surgical Site Infection:  • For 2 to 3 days before surgery, avoid shaving with a razor because the razor can irritate skin and make it easier to develop an infection.    • Any areas of open skin can increase the risk of a post-operative wound infection by allowing bacteria to enter and travel throughout the body.  Notify your surgeon if you have any skin wounds / rashes even if it is not near the expected surgical site.  The area will need assessed to determine if surgery should be delayed until it is healed.  • The night prior to surgery shower using a fresh bar of anti-bacterial soap (such as Dial) and clean washcloth.  Sleep in a clean bed with clean clothing.  Do not allow pets to sleep with you.  • Shower on the morning of surgery using a fresh bar of anti-bacterial soap (such as Dial) and clean washcloth.  Dry with a clean towel and dress in clean clothing.  • Ask your surgeon if you will be receiving antibiotics prior to surgery.  • Make sure you, your family, and all healthcare providers clean their hands with soap and water or an alcohol based hand  before caring for you or your wound.    Day of surgery:  Your arrival time is approximately two hours before your scheduled surgery time.  Upon arrival, a Pre-op nurse and Anesthesiologist will review your health history, obtain vital signs, and answer questions you may have.  The only belongings needed at this time will be a list of your home medications and if applicable your C-PAP/BI-PAP machine.  If you are staying overnight your family can leave the rest of your belongings in the car and bring them to your room later.  A Pre-op nurse will  start an IV and you may receive medication in preparation for surgery, including something to help you relax.  Your family will be able to see you in the Pre-op area.  Two visitors at a time will be allowed in the Pre-op room.  While you are in surgery your family should notify the waiting room  if they leave the waiting room area and provide a contact phone number.    Please be aware that surgery does come with discomfort.  We want to make every effort to control your discomfort so please discuss any uncontrolled symptoms with your nurse.   Your doctor will most likely have prescribed pain medications.      If you are going home after surgery you will receive individualized written care instructions before being discharged.  A responsible adult must drive you to and from the hospital on the day of your surgery and stay with you for 24 hours.    If you are staying overnight following surgery, you will be transported to your hospital room following the recovery period.  [facility] has all private rooms.    If you have any questions please call Pre-Admission Testing at [phone].  Deductibles and co-payments are collected on the day of service. Please be prepared to pay the required co-pay, deductible or deposit on the day of service as defined by your plan.    Patient Education for Self-Quarantine Process    Following your COVID testing, we strongly recommend that you do not leave your home after you have been tested for COVID except to get medical care. This includes not going to work, school or to public areas.  If this is not possible for you to do please limit your activities to only required outings.  Be sure to wear a mask when you are with other people, practice social distancing and wash your hands frequently.      The following items provide additional details to keep you safe.  • Wash your hands with soap and water frequently for at least 20 seconds.   • Avoid touching your  eyes, nose and mouth with unwashed hands.  • Do not share anything - utensils, towels, food from the same bowl.   • Have your own utensils, drinking glass, dishes, towels and bedding.   • Do not have visitors.   • Do use FaceTime to stay in touch with family and friends.  • You should stay in a specific room away from others if possible.   • Stay at least 6 feet away from others in the home if you cannot have a dedicated room to yourself.   • Do not snuggle with your pet. While the CDC says there is no evidence that pets can spread COVID-19 or be infected from humans, it is probably best to avoid “petting, snuggling, being kissed or licked and sharing food (during self-quarantine)”, according to the CDC.   • Sanitize household surfaces daily. Include all high touch areas (door handles, light switches, phones, countertops, etc.)  • Do not share a bathroom with others, if possible.   • Wear a mask around others in your home if you are unable to stay in a separate room or 6 feet apart. If  you are unable to wear a mask, have your family member wear a mask if they must be within 6 feet of you.   Call your surgeon immediately if you experience any of the following symptoms:  • Sore Throat  • Shortness of Breath or difficulty breathing  • Cough  • Chills  • Body soreness or muscle pain  • Headache  • Fever  • New loss of taste or smell  • Do not arrive for your surgery ill.  Your procedure will need to be rescheduled to another time.  You will need to call your physician before the day of surgery to avoid any unnecessary exposure to hospital staff as well as other patients.

## 2020-09-24 ENCOUNTER — TELEPHONE (OUTPATIENT)
Dept: ORTHOPEDIC SURGERY | Facility: CLINIC | Age: 70
End: 2020-09-24

## 2020-09-24 NOTE — TELEPHONE ENCOUNTER
[employer] message    ----- Message from Sari Self sent at 9/23/2020  6:25 PM EDT -----  Regarding: Prescription Question  Contact: [phone]  I had PAT today. Now I cant take any more ibuprofen for the pain. Tylenol does nothing. My son has some left over hydrocodone from a dental procedure. Can I take that?

## 2020-09-24 NOTE — TELEPHONE ENCOUNTER
I have always found that taking a narcotic before surgery will make it less effective after surgery I would recommend doing other things such as ice rest in order to give you some relief prior to surgery

## 2020-09-25 ENCOUNTER — LAB (OUTPATIENT)
Dept: LAB | Facility: HOSPITAL | Age: 70
End: 2020-09-25

## 2020-09-25 DIAGNOSIS — Z01.818 OTHER SPECIFIED PRE-OPERATIVE EXAMINATION: ICD-10-CM

## 2020-09-25 PROCEDURE — C9803 HOPD COVID-19 SPEC COLLECT: HCPCS

## 2020-09-25 PROCEDURE — U0004 COV-19 TEST NON-CDC HGH THRU: HCPCS

## 2020-09-28 ENCOUNTER — ANESTHESIA (OUTPATIENT)
Dept: PERIOP | Facility: HOSPITAL | Age: 70
End: 2020-09-28

## 2020-09-28 ENCOUNTER — APPOINTMENT (OUTPATIENT)
Dept: GENERAL RADIOLOGY | Facility: HOSPITAL | Age: 70
End: 2020-09-28

## 2020-09-28 ENCOUNTER — ANESTHESIA EVENT (OUTPATIENT)
Dept: PERIOP | Facility: HOSPITAL | Age: 70
End: 2020-09-28

## 2020-09-28 ENCOUNTER — HOSPITAL ENCOUNTER (OUTPATIENT)
Facility: HOSPITAL | Age: 70
Setting detail: HOSPITAL OUTPATIENT SURGERY
Discharge: HOME | End: 2020-09-28
Attending: ORTHOPAEDIC SURGERY | Admitting: ORTHOPAEDIC SURGERY

## 2020-09-28 VITALS
DIASTOLIC BLOOD PRESSURE: 99 MMHG | RESPIRATION RATE: 16 BRPM | HEART RATE: 69 BPM | OXYGEN SATURATION: 95 % | TEMPERATURE: 97.7 F | SYSTOLIC BLOOD PRESSURE: 134 MMHG

## 2020-09-28 DIAGNOSIS — S83.231D COMPLEX TEAR OF MEDIAL MENISCUS OF RIGHT KNEE AS CURRENT INJURY, SUBSEQUENT ENCOUNTER: ICD-10-CM

## 2020-09-28 DIAGNOSIS — M84.361A STRESS FRACTURE OF RIGHT TIBIA, INITIAL ENCOUNTER: ICD-10-CM

## 2020-09-28 LAB
B PARAPERT DNA SPEC QL NAA+PROBE: NOT DETECTED
B PERT DNA SPEC QL NAA+PROBE: NOT DETECTED
C PNEUM DNA NPH QL NAA+NON-PROBE: NOT DETECTED
FLUAV H1 2009 PAND RNA NPH QL NAA+PROBE: NOT DETECTED
FLUAV H1 HA GENE NPH QL NAA+PROBE: NOT DETECTED
FLUAV H3 RNA NPH QL NAA+PROBE: NOT DETECTED
FLUAV SUBTYP SPEC NAA+PROBE: NOT DETECTED
FLUBV RNA ISLT QL NAA+PROBE: NOT DETECTED
HADV DNA SPEC NAA+PROBE: NOT DETECTED
HCOV 229E RNA SPEC QL NAA+PROBE: NOT DETECTED
HCOV HKU1 RNA SPEC QL NAA+PROBE: NOT DETECTED
HCOV NL63 RNA SPEC QL NAA+PROBE: NOT DETECTED
HCOV OC43 RNA SPEC QL NAA+PROBE: NOT DETECTED
HMPV RNA NPH QL NAA+NON-PROBE: NOT DETECTED
HPIV1 RNA SPEC QL NAA+PROBE: NOT DETECTED
HPIV2 RNA SPEC QL NAA+PROBE: NOT DETECTED
HPIV3 RNA NPH QL NAA+PROBE: NOT DETECTED
HPIV4 P GENE NPH QL NAA+PROBE: NOT DETECTED
M PNEUMO IGG SER IA-ACNC: NOT DETECTED
RHINOVIRUS RNA SPEC NAA+PROBE: NOT DETECTED
RSV RNA NPH QL NAA+NON-PROBE: NOT DETECTED
SARS-COV-2 RNA NPH QL NAA+NON-PROBE: NOT DETECTED
SARS-COV-2 RNA RESP QL NAA+PROBE: ABNORMAL

## 2020-09-28 PROCEDURE — 25010000002 FENTANYL CITRATE (PF) 100 MCG/2ML SOLUTION: Performed by: NURSE ANESTHETIST, CERTIFIED REGISTERED

## 2020-09-28 PROCEDURE — 25010000003 CEFAZOLIN IN DEXTROSE 2-4 GM/100ML-% SOLUTION: Performed by: ORTHOPAEDIC SURGERY

## 2020-09-28 PROCEDURE — 25010000002 HYDROMORPHONE PER 4 MG: Performed by: NURSE ANESTHETIST, CERTIFIED REGISTERED

## 2020-09-28 PROCEDURE — 25010000002 KETOROLAC TROMETHAMINE PER 15 MG: Performed by: NURSE ANESTHETIST, CERTIFIED REGISTERED

## 2020-09-28 PROCEDURE — 25010000002 PROPOFOL 10 MG/ML EMULSION: Performed by: NURSE ANESTHETIST, CERTIFIED REGISTERED

## 2020-09-28 PROCEDURE — 29880 ARTHRS KNE SRG MNISECTMY M&L: CPT | Performed by: ORTHOPAEDIC SURGERY

## 2020-09-28 PROCEDURE — 25010000002 ONDANSETRON PER 1 MG: Performed by: NURSE ANESTHETIST, CERTIFIED REGISTERED

## 2020-09-28 PROCEDURE — C1713 ANCHOR/SCREW BN/BN,TIS/BN: HCPCS | Performed by: ORTHOPAEDIC SURGERY

## 2020-09-28 PROCEDURE — 25010000002 METHYLPREDNISOLONE PER 80 MG: Performed by: ORTHOPAEDIC SURGERY

## 2020-09-28 PROCEDURE — 0202U NFCT DS 22 TRGT SARS-COV-2: CPT | Performed by: ORTHOPAEDIC SURGERY

## 2020-09-28 PROCEDURE — 29999 UNLISTED PX ARTHROSCOPY: CPT | Performed by: ORTHOPAEDIC SURGERY

## 2020-09-28 PROCEDURE — 73560 X-RAY EXAM OF KNEE 1 OR 2: CPT

## 2020-09-28 PROCEDURE — 76000 FLUOROSCOPY <1 HR PHYS/QHP: CPT

## 2020-09-28 PROCEDURE — 63710000001 PROMETHAZINE PER 25 MG: Performed by: NURSE ANESTHETIST, CERTIFIED REGISTERED

## 2020-09-28 PROCEDURE — 25010000002 DEXAMETHASONE PER 1 MG: Performed by: NURSE ANESTHETIST, CERTIFIED REGISTERED

## 2020-09-28 DEVICE — IMPLANTABLE DEVICE
Type: IMPLANTABLE DEVICE | Site: KNEE | Status: FUNCTIONAL
Brand: ACCUFILL®

## 2020-09-28 RX ORDER — PROMETHAZINE HYDROCHLORIDE 25 MG/1
25 TABLET ORAL ONCE AS NEEDED
Status: COMPLETED | OUTPATIENT
Start: 2020-09-28 | End: 2020-09-28

## 2020-09-28 RX ORDER — EPHEDRINE SULFATE 50 MG/ML
INJECTION, SOLUTION INTRAVENOUS AS NEEDED
Status: DISCONTINUED | OUTPATIENT
Start: 2020-09-28 | End: 2020-09-28 | Stop reason: SURG

## 2020-09-28 RX ORDER — EPHEDRINE SULFATE 50 MG/ML
5 INJECTION, SOLUTION INTRAVENOUS ONCE AS NEEDED
Status: DISCONTINUED | OUTPATIENT
Start: 2020-09-28 | End: 2020-09-28 | Stop reason: HOSPADM

## 2020-09-28 RX ORDER — DIPHENHYDRAMINE HYDROCHLORIDE 50 MG/ML
12.5 INJECTION INTRAMUSCULAR; INTRAVENOUS
Status: DISCONTINUED | OUTPATIENT
Start: 2020-09-28 | End: 2020-09-28 | Stop reason: HOSPADM

## 2020-09-28 RX ORDER — HYDROCODONE BITARTRATE AND ACETAMINOPHEN 5; 325 MG/1; MG/1
1 TABLET ORAL EVERY 4 HOURS PRN
Qty: 40 TABLET | Refills: 0 | Status: SHIPPED | OUTPATIENT
Start: 2020-09-28 | End: 2020-11-12

## 2020-09-28 RX ORDER — HYDROCODONE BITARTRATE AND ACETAMINOPHEN 7.5; 325 MG/1; MG/1
1 TABLET ORAL ONCE AS NEEDED
Status: DISCONTINUED | OUTPATIENT
Start: 2020-09-28 | End: 2020-09-28 | Stop reason: HOSPADM

## 2020-09-28 RX ORDER — SODIUM CHLORIDE, SODIUM LACTATE, POTASSIUM CHLORIDE, AND CALCIUM CHLORIDE .6; .31; .03; .02 G/100ML; G/100ML; G/100ML; G/100ML
IRRIGANT IRRIGATION AS NEEDED
Status: DISCONTINUED | OUTPATIENT
Start: 2020-09-28 | End: 2020-09-28 | Stop reason: HOSPADM

## 2020-09-28 RX ORDER — LIDOCAINE HYDROCHLORIDE 20 MG/ML
INJECTION, SOLUTION INFILTRATION; PERINEURAL AS NEEDED
Status: DISCONTINUED | OUTPATIENT
Start: 2020-09-28 | End: 2020-09-28 | Stop reason: SURG

## 2020-09-28 RX ORDER — HYDROMORPHONE HYDROCHLORIDE 1 MG/ML
0.5 INJECTION, SOLUTION INTRAMUSCULAR; INTRAVENOUS; SUBCUTANEOUS
Status: DISCONTINUED | OUTPATIENT
Start: 2020-09-28 | End: 2020-09-28 | Stop reason: HOSPADM

## 2020-09-28 RX ORDER — SODIUM CHLORIDE 0.9 % (FLUSH) 0.9 %
3 SYRINGE (ML) INJECTION EVERY 12 HOURS SCHEDULED
Status: DISCONTINUED | OUTPATIENT
Start: 2020-09-28 | End: 2020-09-28 | Stop reason: HOSPADM

## 2020-09-28 RX ORDER — ONDANSETRON 2 MG/ML
INJECTION INTRAMUSCULAR; INTRAVENOUS AS NEEDED
Status: DISCONTINUED | OUTPATIENT
Start: 2020-09-28 | End: 2020-09-28 | Stop reason: SURG

## 2020-09-28 RX ORDER — MIDAZOLAM HYDROCHLORIDE 1 MG/ML
1 INJECTION INTRAMUSCULAR; INTRAVENOUS
Status: DISCONTINUED | OUTPATIENT
Start: 2020-09-28 | End: 2020-09-28 | Stop reason: HOSPADM

## 2020-09-28 RX ORDER — LABETALOL HYDROCHLORIDE 5 MG/ML
5 INJECTION, SOLUTION INTRAVENOUS
Status: DISCONTINUED | OUTPATIENT
Start: 2020-09-28 | End: 2020-09-28 | Stop reason: HOSPADM

## 2020-09-28 RX ORDER — FLUMAZENIL 0.1 MG/ML
0.2 INJECTION INTRAVENOUS AS NEEDED
Status: DISCONTINUED | OUTPATIENT
Start: 2020-09-28 | End: 2020-09-28 | Stop reason: HOSPADM

## 2020-09-28 RX ORDER — KETOROLAC TROMETHAMINE 30 MG/ML
INJECTION, SOLUTION INTRAMUSCULAR; INTRAVENOUS AS NEEDED
Status: DISCONTINUED | OUTPATIENT
Start: 2020-09-28 | End: 2020-09-28 | Stop reason: SURG

## 2020-09-28 RX ORDER — ONDANSETRON 2 MG/ML
4 INJECTION INTRAMUSCULAR; INTRAVENOUS ONCE AS NEEDED
Status: COMPLETED | OUTPATIENT
Start: 2020-09-28 | End: 2020-09-28

## 2020-09-28 RX ORDER — PROMETHAZINE HYDROCHLORIDE 25 MG/1
25 SUPPOSITORY RECTAL ONCE AS NEEDED
Status: COMPLETED | OUTPATIENT
Start: 2020-09-28 | End: 2020-09-28

## 2020-09-28 RX ORDER — CEFAZOLIN SODIUM 2 G/100ML
2 INJECTION, SOLUTION INTRAVENOUS ONCE
Status: COMPLETED | OUTPATIENT
Start: 2020-09-28 | End: 2020-09-28

## 2020-09-28 RX ORDER — DIPHENHYDRAMINE HCL 25 MG
25 CAPSULE ORAL
Status: DISCONTINUED | OUTPATIENT
Start: 2020-09-28 | End: 2020-09-28 | Stop reason: HOSPADM

## 2020-09-28 RX ORDER — DEXAMETHASONE SODIUM PHOSPHATE 10 MG/ML
INJECTION INTRAMUSCULAR; INTRAVENOUS AS NEEDED
Status: DISCONTINUED | OUTPATIENT
Start: 2020-09-28 | End: 2020-09-28 | Stop reason: SURG

## 2020-09-28 RX ORDER — PROPOFOL 10 MG/ML
VIAL (ML) INTRAVENOUS AS NEEDED
Status: DISCONTINUED | OUTPATIENT
Start: 2020-09-28 | End: 2020-09-28 | Stop reason: SURG

## 2020-09-28 RX ORDER — NALOXONE HCL 0.4 MG/ML
0.2 VIAL (ML) INJECTION AS NEEDED
Status: DISCONTINUED | OUTPATIENT
Start: 2020-09-28 | End: 2020-09-28 | Stop reason: HOSPADM

## 2020-09-28 RX ORDER — FENTANYL CITRATE 50 UG/ML
INJECTION, SOLUTION INTRAMUSCULAR; INTRAVENOUS AS NEEDED
Status: DISCONTINUED | OUTPATIENT
Start: 2020-09-28 | End: 2020-09-28 | Stop reason: SURG

## 2020-09-28 RX ORDER — OXYCODONE AND ACETAMINOPHEN 7.5; 325 MG/1; MG/1
1 TABLET ORAL ONCE AS NEEDED
Status: COMPLETED | OUTPATIENT
Start: 2020-09-28 | End: 2020-09-28

## 2020-09-28 RX ORDER — ACETAMINOPHEN 500 MG
500 TABLET ORAL ONCE
Status: DISCONTINUED | OUTPATIENT
Start: 2020-09-28 | End: 2020-09-28 | Stop reason: HOSPADM

## 2020-09-28 RX ORDER — FENTANYL CITRATE 50 UG/ML
50 INJECTION, SOLUTION INTRAMUSCULAR; INTRAVENOUS
Status: DISCONTINUED | OUTPATIENT
Start: 2020-09-28 | End: 2020-09-28 | Stop reason: HOSPADM

## 2020-09-28 RX ORDER — SODIUM CHLORIDE, SODIUM LACTATE, POTASSIUM CHLORIDE, CALCIUM CHLORIDE 600; 310; 30; 20 MG/100ML; MG/100ML; MG/100ML; MG/100ML
9 INJECTION, SOLUTION INTRAVENOUS CONTINUOUS
Status: DISCONTINUED | OUTPATIENT
Start: 2020-09-28 | End: 2020-09-28 | Stop reason: HOSPADM

## 2020-09-28 RX ORDER — SODIUM CHLORIDE 0.9 % (FLUSH) 0.9 %
3-10 SYRINGE (ML) INJECTION AS NEEDED
Status: DISCONTINUED | OUTPATIENT
Start: 2020-09-28 | End: 2020-09-28 | Stop reason: HOSPADM

## 2020-09-28 RX ADMIN — FENTANYL CITRATE 25 MCG: 50 INJECTION INTRAMUSCULAR; INTRAVENOUS at 08:52

## 2020-09-28 RX ADMIN — FENTANYL CITRATE 25 MCG: 50 INJECTION INTRAMUSCULAR; INTRAVENOUS at 09:27

## 2020-09-28 RX ADMIN — HYDROMORPHONE HYDROCHLORIDE 0.5 MG: 1 INJECTION, SOLUTION INTRAMUSCULAR; INTRAVENOUS; SUBCUTANEOUS at 09:50

## 2020-09-28 RX ADMIN — PROPOFOL 150 MG: 10 INJECTION, EMULSION INTRAVENOUS at 08:13

## 2020-09-28 RX ADMIN — FENTANYL CITRATE 25 MCG: 50 INJECTION INTRAMUSCULAR; INTRAVENOUS at 08:11

## 2020-09-28 RX ADMIN — ONDANSETRON HYDROCHLORIDE 4 MG: 2 SOLUTION INTRAMUSCULAR; INTRAVENOUS at 09:19

## 2020-09-28 RX ADMIN — HYDROMORPHONE HYDROCHLORIDE 0.5 MG: 1 INJECTION, SOLUTION INTRAMUSCULAR; INTRAVENOUS; SUBCUTANEOUS at 10:15

## 2020-09-28 RX ADMIN — FENTANYL CITRATE 50 MCG: 50 INJECTION, SOLUTION INTRAMUSCULAR; INTRAVENOUS at 09:45

## 2020-09-28 RX ADMIN — PROMETHAZINE HYDROCHLORIDE 25 MG: 25 TABLET ORAL at 10:00

## 2020-09-28 RX ADMIN — LIDOCAINE HYDROCHLORIDE 100 MG: 20 INJECTION, SOLUTION INFILTRATION; PERINEURAL at 08:13

## 2020-09-28 RX ADMIN — SODIUM CHLORIDE, POTASSIUM CHLORIDE, SODIUM LACTATE AND CALCIUM CHLORIDE: 600; 310; 30; 20 INJECTION, SOLUTION INTRAVENOUS at 08:10

## 2020-09-28 RX ADMIN — FENTANYL CITRATE 25 MCG: 50 INJECTION INTRAMUSCULAR; INTRAVENOUS at 08:36

## 2020-09-28 RX ADMIN — EPHEDRINE SULFATE 10 MG: 50 INJECTION INTRAVENOUS at 08:57

## 2020-09-28 RX ADMIN — EPHEDRINE SULFATE 10 MG: 50 INJECTION INTRAVENOUS at 08:17

## 2020-09-28 RX ADMIN — KETOROLAC TROMETHAMINE 15 MG: 30 INJECTION, SOLUTION INTRAMUSCULAR; INTRAVENOUS at 09:19

## 2020-09-28 RX ADMIN — CEFAZOLIN SODIUM 2 G: 2 INJECTION, SOLUTION INTRAVENOUS at 08:10

## 2020-09-28 RX ADMIN — FENTANYL CITRATE 50 MCG: 50 INJECTION, SOLUTION INTRAMUSCULAR; INTRAVENOUS at 10:00

## 2020-09-28 RX ADMIN — ONDANSETRON 4 MG: 2 INJECTION INTRAMUSCULAR; INTRAVENOUS at 09:45

## 2020-09-28 RX ADMIN — OXYCODONE HYDROCHLORIDE AND ACETAMINOPHEN 1 TABLET: 7.5; 325 TABLET ORAL at 09:45

## 2020-09-28 RX ADMIN — DEXAMETHASONE SODIUM PHOSPHATE 8 MG: 10 INJECTION INTRAMUSCULAR; INTRAVENOUS at 08:21

## 2020-09-28 NOTE — ANESTHESIA POSTPROCEDURE EVALUATION
Patient: Sari Self    Procedure Summary     Date: 09/28/20 Room / Location:  [facility]  [facility]    Anesthesia Start: 0810 Anesthesia Stop: 0932    Procedure: RIGHT KNEE ARTHROSCOPY, PARTIAL MEDIAL AND LATERAL MENISCECTOMIES, DEBRIDEMENT OF ARTHRITIS, AND INTERNAL FIXATION TIBAL PLATEAU INSUFFICIENCY FRACTURE (Right Knee) Diagnosis:       Complex tear of medial meniscus of right knee as current injury, subsequent encounter      Stress fracture of right tibia, initial encounter      (Complex tear of medial meniscus of right knee as current injury, subsequent encounter [S83.231D])      (Stress fracture of right tibia, initial encounter [M84.361A])    Surgeon: Nuris Bender MD Provider: Kristian Cardona MD    Anesthesia Type: general ASA Status: 3          Anesthesia Type: general    Vitals  Vitals Value Taken Time   /81 09/28/20 1030   Temp 36.7 °C (98 °F) 09/28/20 0930   Pulse 65 09/28/20 1033   Resp 16 09/28/20 1030   SpO2 93 % 09/28/20 1034   Vitals shown include unvalidated device data.        Post Anesthesia Care and Evaluation    Patient location during evaluation: bedside  Patient participation: complete - patient participated  Level of consciousness: awake  Pain management: adequate  Airway patency: patent  Anesthetic complications: No anesthetic complications    Cardiovascular status: acceptable  Respiratory status: acceptable  Hydration status: acceptable    Comments: */81 (BP Location: Right arm, Patient Position: Lying)   Pulse 66   Temp 36.7 °C (98 °F) (Oral)   Resp 16   LMP  (LMP Unknown)   SpO2 93%

## 2020-09-28 NOTE — ANESTHESIA PREPROCEDURE EVALUATION
Anesthesia Evaluation     no history of anesthetic complications:  NPO Solid Status: > 8 hours  NPO Liquid Status: > 2 hours           Airway   Mallampati: II  Neck ROM: full  no difficulty expected  Dental - normal exam     Pulmonary - negative pulmonary ROS and normal exam   (-) COPD, asthma, sleep apnea, not a smoker    PE comment: nonlabored  Cardiovascular - normal exam    Rhythm: regular  Rate: normal    (+) hypertension well controlled,   (-) valvular problems/murmurs, past MI, CAD, dysrhythmias, angina      Neuro/Psych  (+) TIA (2011),     (-) seizures, CVA    ROS Comment: Cerebral aneurysm s/p coiling    GI/Hepatic/Renal/Endo    (+)  GERD well controlled,  thyroid problem hypothyroidism  (-) liver disease, no renal disease, diabetes    Musculoskeletal     (+) arthralgias,   Abdominal    Substance History      OB/GYN          Other   arthritis,      ROS/Med Hx Other: H/o Covid-19 in late august--mild symptoms only                  Anesthesia Plan    ASA 3     general     intravenous induction     Anesthetic plan, all risks, benefits, and alternatives have been provided, discussed and informed consent has been obtained with: patient.

## 2020-09-28 NOTE — ANESTHESIA PROCEDURE NOTES
Airway  Urgency: elective    Date/Time: 9/28/2020 8:14 AM    General Information and Staff    Patient location during procedure: OR  Anesthesiologist: Kristian Cardona MD  CRNA: Reagan Conti CRNA    Indications and Patient Condition  Indications for airway management: airway protection    Preoxygenated: yes  MILS maintained throughout  Mask difficulty assessment: 1 - vent by mask    Final Airway Details  Final airway type: supraglottic airway      Successful airway: unique  Size 4    Number of attempts at approach: 1  Assessment: lips, teeth, and gum same as pre-op and atraumatic intubation

## 2020-10-05 ENCOUNTER — TELEPHONE (OUTPATIENT)
Dept: ORTHOPEDIC SURGERY | Facility: CLINIC | Age: 70
End: 2020-10-05

## 2020-10-05 NOTE — TELEPHONE ENCOUNTER
----- Message from Sari Self sent at 10/5/2020 10:07 AM EDT -----  Regarding: Visit Follow-Up Question  Contact: [phone]  MY CHART MESSAGE:    Would it be ok for me to work from home? If so, I need a note stating I can work.

## 2020-10-12 ENCOUNTER — OFFICE VISIT (OUTPATIENT)
Dept: ORTHOPEDIC SURGERY | Facility: CLINIC | Age: 70
End: 2020-10-12

## 2020-10-12 VITALS — TEMPERATURE: 97.7 F | WEIGHT: 147 LBS | BODY MASS INDEX: 26.05 KG/M2 | HEIGHT: 63 IN

## 2020-10-12 DIAGNOSIS — Z98.890 S/P ARTHROSCOPY OF KNEE: Primary | ICD-10-CM

## 2020-10-12 PROCEDURE — 73560 X-RAY EXAM OF KNEE 1 OR 2: CPT | Performed by: ORTHOPAEDIC SURGERY

## 2020-10-12 PROCEDURE — 99024 POSTOP FOLLOW-UP VISIT: CPT | Performed by: ORTHOPAEDIC SURGERY

## 2020-10-12 NOTE — PROGRESS NOTES
Right Knee Scope follow Up       Patient: Sari Self        YOB: 1950      Chief Complaints: right knee pain      History of Present Illness: Pt is here f/u knee arthroscopy with sub-chondroplasty she states she is doing well she still having some pain she still using her crutches but weightbearing        Allergies:   Allergies   Allergen Reactions   • Penicillins Hives       Medications:   Home Medications:  Current Outpatient Medications on File Prior to Visit   Medication Sig   • gabapentin (NEURONTIN) 300 MG capsule Take 600 mg by mouth 4 (Four) Times a Day.   • hydroCHLOROthiazide (HYDRODIURIL) 25 MG tablet Take 0.5 tablets by mouth As Needed (take daily for BP above 150/90). FOR ANKLE EDEMA   • HYDROcodone-acetaminophen (NORCO) 5-325 MG per tablet Take 1 tablet by mouth Every 4 (Four) Hours As Needed for Severe Pain .   • ibuprofen (ADVIL,MOTRIN) 800 MG tablet Take 1 tablet by mouth 3 (Three) Times a Day. (Patient taking differently: Take 800 mg by mouth Every 8 (Eight) Hours As Needed. PT HOLDING FOR SURGERY.  LAST USED 09/23/20)   • levothyroxine (SYNTHROID, LEVOTHROID) 50 MCG tablet Take 1 tablet by mouth Every Morning.   • omeprazole (PRILOSEC) 40 MG capsule Take 1 capsule by mouth 2 (Two) Times a Day. (Patient taking differently: Take 40 mg by mouth Every Night.)   • potassium chloride (K-DUR,KLOR-CON) 10 MEQ CR tablet Take 10 mEq by mouth Daily. PRN WITH HCTZ   • Probiotic Product (PROBIOTIC PO) Take  by mouth.     No current facility-administered medications on file prior to visit.      Current Medications:  Scheduled Meds:  Continuous Infusions:No current facility-administered medications for this visit.     PRN Meds:.          Physical Exam: 70 y.o. female  General Appearance:    Alert, cooperative, in no acute distress                 There were no vitals filed for this visit.   Patient is alert and oriented ×3 no acute distress normal mood physical exam.  Physical exam of the  knee, incisions looked good there is no erythema, calf is soft and non-tender.  No sign or sx of DVT    X-ray AP and lateral of the right knee were taken to evaluate her sub-chondroplasty compared to preop films sub-chondroplasty substance appears to be in the right location will be comparative to her MRI  Assessment  S/P knee scope.  Overall doing well.         Plan: Continue with strengthening, progression of activities she can progress to 1 crutch on the opposite hand we will start her into some physical therapy have her continue ice I will see her back in 4 weeks

## 2020-10-27 ENCOUNTER — TELEPHONE (OUTPATIENT)
Dept: FAMILY MEDICINE CLINIC | Facility: CLINIC | Age: 70
End: 2020-10-27

## 2020-10-27 NOTE — TELEPHONE ENCOUNTER
LM informing patient we are unable to transfer providers in our office. HUB if patient calls back please let her know that it is our policy that we are unable to transfer within.

## 2020-10-27 NOTE — TELEPHONE ENCOUNTER
Caller: Sari Self    Relationship: Self    Best call back number: [phone]    Who is your current provider: LULÚ SAMANO    Who would you like your new provider to be: ZAINAB ESCOBAR    What are your reasons for transferring care: PATIENT STATES SHE WANTS INTERNALIST

## 2020-10-28 ENCOUNTER — OFFICE VISIT (OUTPATIENT)
Dept: FAMILY MEDICINE CLINIC | Facility: CLINIC | Age: 70
End: 2020-10-28

## 2020-10-28 VITALS
OXYGEN SATURATION: 98 % | BODY MASS INDEX: 26.58 KG/M2 | DIASTOLIC BLOOD PRESSURE: 78 MMHG | TEMPERATURE: 96.9 F | WEIGHT: 150 LBS | HEIGHT: 63 IN | RESPIRATION RATE: 16 BRPM | HEART RATE: 78 BPM | SYSTOLIC BLOOD PRESSURE: 114 MMHG

## 2020-10-28 DIAGNOSIS — K21.9 GASTROESOPHAGEAL REFLUX DISEASE, UNSPECIFIED WHETHER ESOPHAGITIS PRESENT: ICD-10-CM

## 2020-10-28 DIAGNOSIS — Z87.19 HISTORY OF IBS: ICD-10-CM

## 2020-10-28 DIAGNOSIS — G25.81 RLS (RESTLESS LEGS SYNDROME): ICD-10-CM

## 2020-10-28 DIAGNOSIS — Z00.00 ANNUAL PHYSICAL EXAM: Primary | ICD-10-CM

## 2020-10-28 DIAGNOSIS — E03.9 HYPOTHYROIDISM, UNSPECIFIED TYPE: ICD-10-CM

## 2020-10-28 DIAGNOSIS — M81.0 OSTEOPOROSIS, UNSPECIFIED OSTEOPOROSIS TYPE, UNSPECIFIED PATHOLOGICAL FRACTURE PRESENCE: ICD-10-CM

## 2020-10-28 PROCEDURE — 90715 TDAP VACCINE 7 YRS/> IM: CPT | Performed by: FAMILY MEDICINE

## 2020-10-28 PROCEDURE — G0008 ADMIN INFLUENZA VIRUS VAC: HCPCS | Performed by: FAMILY MEDICINE

## 2020-10-28 PROCEDURE — 90471 IMMUNIZATION ADMIN: CPT | Performed by: FAMILY MEDICINE

## 2020-10-28 PROCEDURE — 90694 VACC AIIV4 NO PRSRV 0.5ML IM: CPT | Performed by: FAMILY MEDICINE

## 2020-10-28 RX ORDER — HYDROCHLOROTHIAZIDE 12.5 MG/1
12.5 TABLET ORAL DAILY
Qty: 30 TABLET | Refills: 1 | Status: SHIPPED | OUTPATIENT
Start: 2020-10-28 | End: 2021-08-18

## 2020-11-12 ENCOUNTER — OFFICE VISIT (OUTPATIENT)
Dept: ORTHOPEDIC SURGERY | Facility: CLINIC | Age: 70
End: 2020-11-12

## 2020-11-12 VITALS — BODY MASS INDEX: 26.59 KG/M2 | TEMPERATURE: 98.6 F | WEIGHT: 150.1 LBS | HEIGHT: 63 IN

## 2020-11-12 DIAGNOSIS — M17.12 PRIMARY LOCALIZED OSTEOARTHROSIS OF LEFT LOWER LEG: Primary | ICD-10-CM

## 2020-11-12 DIAGNOSIS — Z98.890 S/P ARTHROSCOPY OF KNEE: ICD-10-CM

## 2020-11-12 PROCEDURE — 20610 DRAIN/INJ JOINT/BURSA W/O US: CPT | Performed by: ORTHOPAEDIC SURGERY

## 2020-11-12 PROCEDURE — 99213 OFFICE O/P EST LOW 20 MIN: CPT | Performed by: ORTHOPAEDIC SURGERY

## 2020-11-12 RX ORDER — METHYLPREDNISOLONE ACETATE 80 MG/ML
80 INJECTION, SUSPENSION INTRA-ARTICULAR; INTRALESIONAL; INTRAMUSCULAR; SOFT TISSUE
Status: COMPLETED | OUTPATIENT
Start: 2020-11-12 | End: 2020-11-12

## 2020-11-12 RX ADMIN — METHYLPREDNISOLONE ACETATE 80 MG: 80 INJECTION, SUSPENSION INTRA-ARTICULAR; INTRALESIONAL; INTRAMUSCULAR; SOFT TISSUE at 08:14

## 2020-11-12 NOTE — PROGRESS NOTES
New Left Knee and Right knee scope follow up      Patient: Sari Self        YOB: 1950    Medical Record Number: [id number]        Chief Complaints: left knee pain  Right knee pain      History of Present Illness: This is a 70-year-old female who is here in follow-up of right knee arthroscopy and subchondroplasty.  She states her right knee is doing great the left knee is now bothering her.  Her son is in the hospital with sepsis and an infection on one of his cardiac valve so she been in the hospital for several days sleeping in a chair she is not sure if that has anything to do with it no one history injury change in activities she can recall symptoms are severe intermittent aching worse with activity somewhat better with rest.  As mentioned, the right knee is doing great her past medical historyIs remarkable for cerebral aneurysm diverticulitis GERD hypertension osteoporosis         Allergies:   Allergies   Allergen Reactions   • Penicillins Hives       Medications:   Home Medications:  Current Outpatient Medications on File Prior to Visit   Medication Sig   • gabapentin (NEURONTIN) 300 MG capsule Take 600 mg by mouth 4 (Four) Times a Day.   • hydroCHLOROthiazide (HYDRODIURIL) 12.5 MG tablet Take 1 tablet by mouth Daily.   • levothyroxine (SYNTHROID, LEVOTHROID) 50 MCG tablet Take 1 tablet by mouth Every Morning.   • omeprazole (PRILOSEC) 40 MG capsule Take 1 capsule by mouth 2 (Two) Times a Day. (Patient taking differently: Take 40 mg by mouth Every Night.)   • potassium chloride (K-DUR,KLOR-CON) 10 MEQ CR tablet Take 10 mEq by mouth Daily. PRN WITH HCTZ   • [DISCONTINUED] HYDROcodone-acetaminophen (NORCO) 5-325 MG per tablet Take 1 tablet by mouth Every 4 (Four) Hours As Needed for Severe Pain .     No current facility-administered medications on file prior to visit.      Current Medications:  Scheduled Meds:  Continuous Infusions:No current facility-administered medications for this visit.      PRN Meds:.    Past Medical History:   Diagnosis Date   • Arthritis    • Cerebral aneurysm    • Cholelithiasis 2016    Gallbladder removed   • Disease of thyroid gland    • Diverticulosis    • GERD (gastroesophageal reflux disease)    • History of 2019 novel coronavirus disease (COVID-19)     AUGUST 2020 STATES VERY MILD SYMTOMS   • Hypertension    • IBS (irritable bowel syndrome)    • Osteoporosis    • Right knee pain    • RLS (restless legs syndrome)    • Stroke (CMS/HCC)    • TIA (transient ischemic attack)     2011   • Torn meniscus     RIGHT KNEE        Past Surgical History:   Procedure Laterality Date   • CHOLECYSTECTOMY     • COLONOSCOPY  11/10/2010    prep of colon fair,IH,stool in transverse colon,hepatic flexure and ascending colon,ileum normal,IBS   • COLONOSCOPY N/A 2/27/2018    IH, diverticulosis, one 5mm polyp, tubular adenoma with low grade dysplasia   • CYSTOSCOPY     • ENDOSCOPY  11/27/2012    grd 1 reflux egitis,web upperd 3rd esoph   • ENDOSCOPY N/A 2/27/2018    normal biopsies, H Pylori positive   • FOOT NEUROMA SURGERY Left    • IR CEREBRAL ANEURYSM COILING     • KNEE ARTHROSCOPY Left 7/3/2019    Procedure: KNEE ARTHROSCOPY ARTHRITIS DEBRIDEMENT PARTIAL MEDIAL AND LATERAL MENISECTOMY;  Surgeon: Nuris Bender MD;  Location: [facility] [place];  Service: Orthopedics   • KNEE ARTHROSCOPY Right 9/28/2020    Procedure: RIGHT KNEE ARTHROSCOPY, PARTIAL MEDIAL AND LATERAL MENISCECTOMIES, DEBRIDEMENT OF ARTHRITIS, AND INTERNAL FIXATION TIBAL PLATEAU INSUFFICIENCY FRACTURE;  Surgeon: Nuris Bender MD;  Location: [facility] OR [facility] [place];  Service: Orthopedics;  Laterality: Right;   • THUMB ARTHROSCOPY     • UPPER GASTROINTESTINAL ENDOSCOPY  02/27/2018        Social History     Occupational History   • Not on file   Tobacco Use   • Smoking status: Never Smoker   • Smokeless tobacco: Never Used   Substance and Sexual Activity   • Alcohol use: Yes     Comment: SELDOM   • Drug use: No   • Sexual activity: Not  "Currently      Social History     Social History Narrative   • Not on file        Family History   Problem Relation Age of Onset   • Irritable bowel syndrome Daughter    • Malig Hyperthermia Neg Hx              Review of Systems: 14 point review of systems remarkable for left knee pain only remainder negative per the patient    Review of Systems      Physical Exam: 70 y.o. female  General Appearance:    Alert, cooperative, in no acute distress                   Vitals:    11/12/20 0800   Temp: 98.6 °F (37 °C)   Weight: 68.1 kg (150 lb 1.6 oz)   Height: 160 cm (63\")   PainSc:   2      Patient is alert and read ×3 no acute distress appears her above-listed at height weight and age.  Affect is normal respiratory rate is normal unlabored. Heart rate regular rate rhythm, sclera, dentition and hearing are normal for the purpose of this exam.        Ortho Exam Physical exam of the left knee reveals no effusion, no erythema.  It mild loss of extension and full flexion  Patient has mild varus alignment.  They have mild tenderness to palpation about the medial compartment, no tenderness laterally..  The patient has a negative bounce home, negative Geraldine and a stable ligamentous exam.  Quad tone is reasonable and symmetric.  There are no overlying skin changes no lymphedema no lymphadenopathy.  There is good hip range of motion which is full symmetric and asymptomatic and a normal ankle exam.      Physical exam of the right knee reveals no effusion, no erythema.  The patient has no palpable tenderness along the medial joint line, no tenderness about the lateral joint line.  The patient has a negative bounce home, negative Geraldine and a stable ligamentous exam.  Quad tone is reasonable and symmetric.  There are no overlying skin changes no lymphedema no lymphadenopathy.  There is good hip range of motion which is full symmetric and asymptomatic and a normal ankle exam.      Large Joint Arthrocentesis: L knee  Date/Time: " 11/12/2020 8:14 AM  Consent given by: patient  Site marked: site marked  Timeout: Immediately prior to procedure a time out was called to verify the correct patient, procedure, equipment, support staff and site/side marked as required   Supporting Documentation  Indications: pain   Procedure Details  Location: knee - L knee  Preparation: Patient was prepped and draped in the usual sterile fashion  Needle size: 22 G  Approach: anteromedial  Medications administered: 4 mL lidocaine (cardiac); 80 mg methylPREDNISolone acetate 80 MG/ML  Patient tolerance: patient tolerated the procedure well with no immediate complications                   Radiology:   AP, Lateral and merchant views of the left knee  were reviewed to evauateknee pain. she does have narrowing of the medial joint space probably 50% of what I would consider normal this is on the left.  On the right she has evidence of subchondroplasty in the medial tibial plateau  Imaging Results (Most Recent)     None        Assessment/Plan:    Status post right knee arthroscopy that knee is doing great, left knee pain she does have some narrowing of the joint space it could be that she could have meniscal pathology she is tender right over the medial tibial plateau certainly she could have another stress fracture.  Plan is to proceed with an injection as a diagnostic and therapeutic tool if she fails to improve would pursue an MRI

## 2020-11-12 NOTE — PROGRESS NOTES
Right Knee Scope follow Up       Patient: Sari Self        YOB: 1950      Chief Complaints: right knee pain      History of Present Illness: Pt is here f/u knee arthroscopy        Allergies:   Allergies   Allergen Reactions   • Penicillins Hives       Medications:   Home Medications:  Current Outpatient Medications on File Prior to Visit   Medication Sig   • gabapentin (NEURONTIN) 300 MG capsule Take 600 mg by mouth 4 (Four) Times a Day.   • hydroCHLOROthiazide (HYDRODIURIL) 12.5 MG tablet Take 1 tablet by mouth Daily.   • HYDROcodone-acetaminophen (NORCO) 5-325 MG per tablet Take 1 tablet by mouth Every 4 (Four) Hours As Needed for Severe Pain .   • levothyroxine (SYNTHROID, LEVOTHROID) 50 MCG tablet Take 1 tablet by mouth Every Morning.   • omeprazole (PRILOSEC) 40 MG capsule Take 1 capsule by mouth 2 (Two) Times a Day. (Patient taking differently: Take 40 mg by mouth Every Night.)   • potassium chloride (K-DUR,KLOR-CON) 10 MEQ CR tablet Take 10 mEq by mouth Daily. PRN WITH HCTZ     No current facility-administered medications on file prior to visit.      Current Medications:  Scheduled Meds:  Continuous Infusions:No current facility-administered medications for this visit.     PRN Meds:.          Physical Exam: 70 y.o. female  General Appearance:    Alert, cooperative, in no acute distress                 There were no vitals filed for this visit.   Patient is alert and oriented ×3 no acute distress normal mood physical exam.  Physical exam of the knee, incisions looked good there is no erythema, calf is soft and non-tender.  No sign or sx of DVT      Assessment  S/P knee scope.  Overall doing well.         Plan: Continue with strengthening, progression of activities

## 2020-11-25 ENCOUNTER — TELEPHONE (OUTPATIENT)
Dept: ORTHOPEDICS | Facility: OTHER | Age: 70
End: 2020-11-25

## 2020-11-25 NOTE — TELEPHONE ENCOUNTER
Caller:PATIENT    Relationship: SELF    Best call back number:[phone]    What form or medical record are you requesting: NEEDS NOTE STATING THAT SHE CAN RETURN TO THE OFFICE FOR WORK.     Who is requesting this form or medical record from you: PT'S EMPLOYER    How would you like to receive the form or medical records (pick-up, mail, fax):  If fax, what is the fax number:   [phone]-PT'S FAX    Timeframe paperwork needed: IN THE NEXT WEEK    Additional notes:PT. STATES THAT DR. RODRIGUEZ DID KNEE SURGERY AND SHE WAS RELEASED TO WORK FROM HOME. SHE IS NOW GOING TO RETURN TO THE OFFICE. HER EMPLOYER IS REQUESTING A NOTE FROM DR. RODRIGUEZ SPECIFICALLY STATING THAT SHE CAN RETURN TO WORK AT THE OFFICE.   PLEASE CALL PT. TO LET HER KNOW WHEN NOTE IS BEING FAXED.

## 2020-11-30 ENCOUNTER — CLINICAL SUPPORT (OUTPATIENT)
Dept: FAMILY MEDICINE CLINIC | Facility: CLINIC | Age: 70
End: 2020-11-30

## 2020-11-30 DIAGNOSIS — Z23 NEED FOR VACCINATION: Primary | ICD-10-CM

## 2020-11-30 PROCEDURE — 90732 PPSV23 VACC 2 YRS+ SUBQ/IM: CPT | Performed by: FAMILY MEDICINE

## 2020-11-30 PROCEDURE — G0009 ADMIN PNEUMOCOCCAL VACCINE: HCPCS | Performed by: FAMILY MEDICINE

## 2020-12-01 ENCOUNTER — TELEPHONE (OUTPATIENT)
Dept: ORTHOPEDIC SURGERY | Facility: CLINIC | Age: 70
End: 2020-12-01

## 2020-12-01 DIAGNOSIS — M25.562 CHRONIC PAIN OF LEFT KNEE: Primary | ICD-10-CM

## 2020-12-01 DIAGNOSIS — G89.29 CHRONIC PAIN OF LEFT KNEE: Primary | ICD-10-CM

## 2020-12-01 DIAGNOSIS — M84.361G STRESS FRACTURE OF RIGHT TIBIA WITH DELAYED HEALING, SUBSEQUENT ENCOUNTER: ICD-10-CM

## 2020-12-01 NOTE — TELEPHONE ENCOUNTER
Patient says that the injection in her left knee hasn't helped and would like for  [facility] to order a MRI. Patient would prefer to have the MRI done at [facility].

## 2020-12-03 ENCOUNTER — TELEPHONE (OUTPATIENT)
Dept: ORTHOPEDIC SURGERY | Facility: CLINIC | Age: 70
End: 2020-12-03

## 2020-12-03 NOTE — TELEPHONE ENCOUNTER
PATIENT STATES HER MRI WAS ORDER FOR THE RIGHT KNEE AND IT SHOULD BE THE LEFT.  PLEASE PUT IN A NEW ORDER

## 2020-12-08 ENCOUNTER — TELEPHONE (OUTPATIENT)
Dept: FAMILY MEDICINE CLINIC | Facility: CLINIC | Age: 70
End: 2020-12-08

## 2020-12-08 DIAGNOSIS — M81.0 OSTEOPOROSIS, UNSPECIFIED OSTEOPOROSIS TYPE, UNSPECIFIED PATHOLOGICAL FRACTURE PRESENCE: Primary | ICD-10-CM

## 2020-12-08 DIAGNOSIS — Z12.31 BREAST CANCER SCREENING BY MAMMOGRAM: ICD-10-CM

## 2020-12-08 NOTE — TELEPHONE ENCOUNTER
PT CALLED REQUESTING ORDERS FOR A SHINGLES VACCINE AND FOR A DEXA SCAN (AT [facility]) SO SHE CAN SCHEDULE BOTH. SHE HAS HAD TWO STRESS FRACTURES AND NEEDS THE DEXA ORDERS LABELED STAT SO THEY CAN GET HER IN BEFORE MARCH.    SHE WAS A PATIENT OF DR. SAMANO AND IS ONBOARDING WITH DR. CARREON.    SHE IS DUE FOR BOTH NOW, AND IS SCHEDULED TO SEE DR. CARREON IN [facility].    CALLBACK NUMBER: [phone]

## 2020-12-09 ENCOUNTER — TELEPHONE (OUTPATIENT)
Dept: ORTHOPEDIC SURGERY | Facility: CLINIC | Age: 70
End: 2020-12-09

## 2020-12-09 NOTE — TELEPHONE ENCOUNTER
I suspect it is the arthritis that we saw at the time of surgery that will bother her periodically.  We could try to get her in next week to look at it and possibly inject it and see if that does not calm it down in the meantime I would do ice rest

## 2020-12-09 NOTE — TELEPHONE ENCOUNTER
Pt had Sx 9/28 right knee says it is hot and swollen she wants to know if this is normal this long after surgery. Please advise

## 2020-12-15 ENCOUNTER — OFFICE VISIT (OUTPATIENT)
Dept: ORTHOPEDIC SURGERY | Facility: CLINIC | Age: 70
End: 2020-12-15

## 2020-12-15 VITALS — WEIGHT: 150 LBS | BODY MASS INDEX: 26.58 KG/M2 | TEMPERATURE: 96.8 F | HEIGHT: 63 IN

## 2020-12-15 DIAGNOSIS — M25.561 RIGHT KNEE PAIN, UNSPECIFIED CHRONICITY: Primary | ICD-10-CM

## 2020-12-15 DIAGNOSIS — S83.241D ACUTE MEDIAL MENISCUS TEAR OF RIGHT KNEE, SUBSEQUENT ENCOUNTER: ICD-10-CM

## 2020-12-15 PROCEDURE — 99024 POSTOP FOLLOW-UP VISIT: CPT | Performed by: ORTHOPAEDIC SURGERY

## 2020-12-15 PROCEDURE — 73562 X-RAY EXAM OF KNEE 3: CPT | Performed by: ORTHOPAEDIC SURGERY

## 2020-12-15 RX ADMIN — METHYLPREDNISOLONE ACETATE 80 MG: 80 INJECTION, SUSPENSION INTRA-ARTICULAR; INTRALESIONAL; INTRAMUSCULAR; SOFT TISSUE at 16:09

## 2020-12-15 NOTE — PROGRESS NOTES
"Right Knee Scope follow Up       Patient: Sari Self        YOB: 1950      Chief Complaints: right knee pain      History of Present Illness: Pt is here f/u knee arthroscopy this was in July she also had a subchondroplasty with it she states she was doing well but recently has had increase in swelling in the knee pain on the medial aspect having some pain in the left knee she thinks is compensatory        Allergies:   Allergies   Allergen Reactions   • Penicillins Hives       Medications:   Home Medications:  Current Outpatient Medications on File Prior to Visit   Medication Sig   • gabapentin (NEURONTIN) 300 MG capsule Take 600 mg by mouth 4 (Four) Times a Day.   • levothyroxine (SYNTHROID, LEVOTHROID) 50 MCG tablet Take 1 tablet by mouth Every Morning.   • omeprazole (PRILOSEC) 40 MG capsule Take 1 capsule by mouth 2 (Two) Times a Day. (Patient taking differently: Take 40 mg by mouth Every Night.)   • hydroCHLOROthiazide (HYDRODIURIL) 12.5 MG tablet Take 1 tablet by mouth Daily.   • potassium chloride (K-DUR,KLOR-CON) 10 MEQ CR tablet Take 10 mEq by mouth Daily. PRN WITH HCTZ     No current facility-administered medications on file prior to visit.      Current Medications:  Scheduled Meds:  Continuous Infusions:No current facility-administered medications for this visit.     PRN Meds:.          Physical Exam: 70 y.o. female  General Appearance:    Alert, cooperative, in no acute distress                   Vitals:    12/15/20 1519   Temp: 96.8 °F (36 °C)   Weight: 68 kg (150 lb)   Height: 160 cm (63\")   PainSc:   6      Patient is alert and oriented ×3 no acute distress normal mood physical exam.  Physical exam of the knee, incisions looked good there is no erythema, calf is soft and non-tender.  No sign or sx of DVT  Physical exam of the right  knee reveals no effusion no redness.  The patient does have tenderness about the medial l joint line.  No tenderness about the lateral l joint line.  A " negative bounce home and a positive l medial Geraldine.    Patient has a stable ligamentous exam.  The patient has a negative Lachman and negative anterior drawer and a negative pivot shift.  Quads are reasonable and symmetric bilaterally.  Calf is soft and nontender.  There is no overlying skin changes no lymphedema lymphadenopathy.  Patient has good hip range of motion full symmetric and asymptomatic and a normal ankle exam.  She has good distal pulses and sensation distally is intact    X-rays AP lateral merchant view of the right knee were taken to evaluate her knee and compared to previous films she does not have a lot of narrowing of the medial compartment I do see evidence of the subchondroplasty I think it looks fine    Assessment  S/P knee scope.  Overall doing ok still having some pain and swelling she did have some degenerative changes at the time of surgery that is most likely what is causing this.  Still could be the stress fracture or could be recurrent meniscal pathology I am going to get an MRI to better evaluate the meniscus and the stress fracture she is pretty swollen I think an injection is reasonable to calm this down at this time as well ates a stabbing aching pain that she had is gone I think an injection is quite reasonable if she fails to improve with this I would consider an MRI I did review her intraoperative pictures as well    Large Joint Arthrocentesis: R knee  Date/Time: 12/15/2020 4:09 PM  Consent given by: patient  Site marked: site marked  Timeout: Immediately prior to procedure a time out was called to verify the correct patient, procedure, equipment, support staff and site/side marked as required   Supporting Documentation  Indications: pain   Procedure Details  Location: knee - R knee  Preparation: Patient was prepped and draped in the usual sterile fashion  Needle gauge: 21.  Approach: anteromedial  Medications administered: 4 mL lidocaine (cardiac); 80 mg methylPREDNISolone  acetate 80 MG/ML  Patient tolerance: patient tolerated the procedure well with no immediate complications                Plan: Continue with strengthening, progression of activities plan is as above

## 2020-12-15 NOTE — PROGRESS NOTES
Patient: Sari Self  YOB: 1950  Date of Service: 12/15/2020    Chief Complaints: No chief complaint on file.      Subjective:    History of Present Illness: Pt is seen in the office today with complaints of No chief complaint on file.  .          Allergies:   Allergies   Allergen Reactions   • Penicillins Hives       Medications:   Home Medications:  Current Outpatient Medications on File Prior to Visit   Medication Sig   • gabapentin (NEURONTIN) 300 MG capsule Take 600 mg by mouth 4 (Four) Times a Day.   • hydroCHLOROthiazide (HYDRODIURIL) 12.5 MG tablet Take 1 tablet by mouth Daily.   • levothyroxine (SYNTHROID, LEVOTHROID) 50 MCG tablet Take 1 tablet by mouth Every Morning.   • omeprazole (PRILOSEC) 40 MG capsule Take 1 capsule by mouth 2 (Two) Times a Day. (Patient taking differently: Take 40 mg by mouth Every Night.)   • potassium chloride (K-DUR,KLOR-CON) 10 MEQ CR tablet Take 10 mEq by mouth Daily. PRN WITH HCTZ     No current facility-administered medications on file prior to visit.      Current Medications:  Scheduled Meds:  Continuous Infusions:No current facility-administered medications for this visit.     PRN Meds:.    I have reviewed the patient's medical history in detail and updated the computerized patient record.  Review and summarization of old records include:    Past Medical History:   Diagnosis Date   • Arthritis    • Cerebral aneurysm    • Cholelithiasis 2016    Gallbladder removed   • Disease of thyroid gland    • Diverticulosis    • GERD (gastroesophageal reflux disease)    • History of 2019 novel coronavirus disease (COVID-19)     AUGUST 2020 STATES VERY MILD SYMTOMS   • Hypertension    • IBS (irritable bowel syndrome)    • Osteoporosis    • Right knee pain    • RLS (restless legs syndrome)    • Stroke (CMS/HCC)    • TIA (transient ischemic attack)     2011   • Torn meniscus     RIGHT KNEE        Past Surgical History:   Procedure Laterality Date   • CHOLECYSTECTOMY      • COLONOSCOPY  11/10/2010    prep of colon fair,IH,stool in transverse colon,hepatic flexure and ascending colon,ileum normal,IBS   • COLONOSCOPY N/A 2/27/2018    IH, diverticulosis, one 5mm polyp, tubular adenoma with low grade dysplasia   • CYSTOSCOPY     • ENDOSCOPY  11/27/2012    grd 1 reflux egitis,web upperd 3rd esoph   • ENDOSCOPY N/A 2/27/2018    normal biopsies, H Pylori positive   • FOOT NEUROMA SURGERY Left    • IR CEREBRAL ANEURYSM COILING     • KNEE ARTHROSCOPY Left 7/3/2019    Procedure: KNEE ARTHROSCOPY ARTHRITIS DEBRIDEMENT PARTIAL MEDIAL AND LATERAL MENISECTOMY;  Surgeon: Nuris Bender MD;  [facility];  Service: Orthopedics   • KNEE ARTHROSCOPY Right 9/28/2020    Procedure: RIGHT KNEE ARTHROSCOPY, PARTIAL MEDIAL AND LATERAL MENISCECTOMIES, DEBRIDEMENT OF ARTHRITIS, AND INTERNAL FIXATION TIBAL PLATEAU INSUFFICIENCY FRACTURE;  Surgeon: Nuris Bender MD;  Location:  [facility];  Service: Orthopedics;  Laterality: Right;   • THUMB ARTHROSCOPY     • UPPER GASTROINTESTINAL ENDOSCOPY  02/27/2018        Social History     Occupational History   • Not on file   Tobacco Use   • Smoking status: Never Smoker   • Smokeless tobacco: Never Used   Substance and Sexual Activity   • Alcohol use: Yes     Comment: SELDOM   • Drug use: No   • Sexual activity: Not Currently      Social History     Social History Narrative   • Not on file        Family History   Problem Relation Age of Onset   • Irritable bowel syndrome Daughter    • Malig Hyperthermia Neg Hx        ROS: 14 point review of systems was performed and was negative except for documented findings in HPI and today's encounter.     Allergies:   Allergies   Allergen Reactions   • Penicillins Hives     Constitutional:  Denies fever, shaking or chills   Eyes:  Denies change in visual acuity   HENT:  Denies nasal congestion or sore throat   Respiratory:  Denies cough or shortness of breath   Cardiovascular:  Denies chest pain or severe LE  edema   GI:  Denies abdominal pain, nausea, vomiting, bloody stools or diarrhea   Musculoskeletal:  Numbness, tingling, or loss of motor function only as noted above in history of present illness.  : Denies painful urination or hematuria  Integument:  Denies rash, lesion or ulceration   Neurologic:  Denies headache or focal weakness  Endocrine:  Denies lymphadenopathy  Psych:  Denies confusion or change in mental status   Hem:  Denies active bleeding      Physical Exam: 70 y.o. female  Wt Readings from Last 3 Encounters:   11/12/20 68.1 kg (150 lb 1.6 oz)   10/28/20 68 kg (150 lb)   10/12/20 66.7 kg (147 lb)     *** HELP TEXT ***    This SmartLink requires parameters. Parameters are variables that are added to the SmartLink name to request specific information. The parameter for .lastht is the number of readings to display.    For example: .lastht[4    In this example, the SmartLink displays the last four encounter readings.    There is no height or weight on file to calculate BMI.  No height and weight on file for this encounter.  There were no vitals filed for this visit.  Vital signs reviewed.   General Appearance:    Alert, cooperative, in no acute distress                    Ortho exam             .time    Assessment:     Plan:   Follow up as indicated.  Ice, elevate, and rest as needed.  Discussed conservative measures of pain control including ice, bracing.  Also talked about the importance of strengthening and maintaining ideal body weight    Nuris Bender M.D.      Large Joint Arthrocentesis: R knee  Date/Time: 12/15/2020 9:46 AM  Consent given by: patient  Site marked: site marked  Timeout: Immediately prior to procedure a time out was called to verify the correct patient, procedure, equipment, support staff and site/side marked as required   Supporting Documentation  Indications: pain   Procedure Details  Location: knee - R knee  Preparation: Patient was prepped and draped in the usual sterile  fashion  Needle size: 22 G  Approach: anteromedial  Medications administered: 4 mL lidocaine (cardiac); 80 mg methylPREDNISolone acetate 80 MG/ML  Patient tolerance: patient tolerated the procedure well with no immediate complications

## 2020-12-16 ENCOUNTER — TELEPHONE (OUTPATIENT)
Dept: ORTHOPEDIC SURGERY | Facility: CLINIC | Age: 70
End: 2020-12-16

## 2020-12-16 RX ORDER — METHYLPREDNISOLONE ACETATE 80 MG/ML
80 INJECTION, SUSPENSION INTRA-ARTICULAR; INTRALESIONAL; INTRAMUSCULAR; SOFT TISSUE
Status: COMPLETED | OUTPATIENT
Start: 2020-12-15 | End: 2020-12-15

## 2020-12-16 NOTE — TELEPHONE ENCOUNTER
Caller: JEANETTE OLSON-PATIENT    Relationship: SELF    Best call back number:[phone]     What orders are you requesting (i.e. lab or imaging): RIGHT KNEE MRI ORDER    In what timeframe would the patient need to come in: PATIENT HAS APPT AT [facility] ON 12/22/20. NEED ORDER FAXED [phone]    Where will you receive your lab/imaging services: [facility]

## 2020-12-26 ENCOUNTER — HOSPITAL ENCOUNTER (OUTPATIENT)
Dept: MRI IMAGING | Facility: HOSPITAL | Age: 70
Discharge: HOME | End: 2020-12-26
Admitting: ORTHOPAEDIC SURGERY

## 2020-12-26 DIAGNOSIS — M25.562 CHRONIC PAIN OF LEFT KNEE: ICD-10-CM

## 2020-12-26 DIAGNOSIS — G89.29 CHRONIC PAIN OF LEFT KNEE: ICD-10-CM

## 2020-12-26 PROCEDURE — 73721 MRI JNT OF LWR EXTRE W/O DYE: CPT

## 2021-01-04 ENCOUNTER — IMMUNIZATION (OUTPATIENT)
Dept: VACCINE CLINIC | Facility: HOSPITAL | Age: 71
End: 2021-01-04

## 2021-01-04 PROCEDURE — 0001A: CPT | Performed by: INTERNAL MEDICINE

## 2021-01-04 PROCEDURE — 91300 HC SARSCOV02 VAC 30MCG/0.3ML IM: CPT | Performed by: INTERNAL MEDICINE

## 2021-01-07 ENCOUNTER — TELEPHONE (OUTPATIENT)
Dept: ORTHOPEDIC SURGERY | Facility: CLINIC | Age: 71
End: 2021-01-07

## 2021-01-07 ENCOUNTER — APPOINTMENT (OUTPATIENT)
Dept: MRI IMAGING | Facility: HOSPITAL | Age: 71
End: 2021-01-07

## 2021-01-07 NOTE — TELEPHONE ENCOUNTER
Patient called needing to know her mri results of her right knee and also when you would like to see her for her left knee.

## 2021-01-07 NOTE — TELEPHONE ENCOUNTER
Right knee shows some arthritis but also does show evidence of a stress fracture.  Nothing that I think is operative it is decreasing the weight on that side with either picking a crutch or cane in the opposite hand or at least limiting amount of time she is up on her feet.  Injections can also help I will see her back in 2 weeks for the opposite knee we can discuss the MRI of this knee at that time if she wishes

## 2021-01-19 NOTE — PROGRESS NOTES
Bilateral Knee MRI Follow Up      Patient: Sari Self        YOB: 1950            Chief Complaints: bilateral Knee pain      History of Present Illness: The patient is here follow-up of an MRI of the knee really both knees she is here to discuss both both knees are really bothering her she is status post arthroscopy on the right knee she was found at that time to have a stress fracture we did a subchondroplasty and partial meniscectomy.  Her repeat MRI of the right knee shows a medial compartment OA that is worse also is a still has a insufficiency fracture on the left knee it is primarily medial pain symptoms are moderate to severe depending on activity level MRI of the left knee shows partial medial meniscectomy and chondroplasty she has subchondral stress reaction of the medial tibial plateau and a recurrent posterior horn medial meniscus tear most of her symptoms seem to be in the area of the tibial plateau      Physical Exam: 70 y.o. female  General Appearance:    Alert, cooperative, in no acute distress                 There were no vitals filed for this visit.     Patient is alert and read ×3 no acute distress appears her above-listed at height weight and age.  Affect is normal respiratory rate is normal unlabored. Heart rate regular rate rhythm, sclera, dentition and hearing are normal for the purpose of this exam.      Ortho Exam Physical exam of the left knee reveals no effusion, no erythema.  It mild loss of extension and full flexion  Patient has mild varus alignment.  They have mild tenderness to palpation about the medial compartment, no tenderness laterally..  The patient has a negative bounce home, negative Geraldine she does have some laxity to valgus stressing quad tone is reasonable and symmetric.  There are no overlying skin changes no lymphedema no lymphadenopathy.  There is good hip range of motion which is full symmetric and asymptomatic and a normal ankle exam.    Physical  exam of the right knee reveals no effusion, no erythema.  It mild loss of extension and full flexion  Patient has mild varus alignment.  They have mild tenderness to palpation about the medial compartment, no tenderness laterally..  The patient has a negative bounce home, negative Geraldine she does have some laxity to valgus stressing quad tone is reasonable and symmetric.  There are no overlying skin changes no lymphedema no lymphadenopathy.  There is good hip range of motion which is full symmetric and asymptomatic and a normal ankle exam.        MRI Results: MRI of both knees are well listed above I did review them all agree with the findings and reviewed them with the patient    Large Joint Arthrocentesis: R knee  Date/Time: 1/21/2021 8:32 AM  Consent given by: patient  Site marked: site marked  Timeout: Immediately prior to procedure a time out was called to verify the correct patient, procedure, equipment, support staff and site/side marked as required   Supporting Documentation  Indications: pain   Procedure Details  Location: knee - R knee  Preparation: Patient was prepped and draped in the usual sterile fashion  Needle size: 22 G  Approach: anteromedial  Medications administered: 80 mg methylPREDNISolone acetate 80 MG/ML; 4 mL lidocaine (cardiac)      Large Joint Arthrocentesis: L knee  Date/Time: 1/21/2021 8:39 AM  Consent given by: patient  Site marked: site marked  Timeout: Immediately prior to procedure a time out was called to verify the correct patient, procedure, equipment, support staff and site/side marked as required   Supporting Documentation  Indications: pain   Procedure Details  Location: knee - L knee  Preparation: Patient was prepped and draped in the usual sterile fashion  Needle size: 22 G  Approach: anteromedial  Medications administered: 4 mL lidocaine (cardiac); 80 mg methylPREDNISolone acetate 80 MG/ML  Patient tolerance: patient tolerated the procedure well with no immediate  complications            Assessment/Plan: Bilateral knee pain she does have a recurrent meniscus tear on the left the most of her symptoms seem to be related to the tibial plateau probably the stress fracture we talked about options I think injections reasonable we did inject the left 1 few months ago but I think it is reasonable to do that one again in fact I would probably do both.  On the right when this is a insufficiency fracture I think the best thing for her she is got to get off of these we will put her in an  on the right knee and she will continue to work on quad and core strengthening again, I think injections of both will be of great benefit

## 2021-01-21 ENCOUNTER — OFFICE VISIT (OUTPATIENT)
Dept: ORTHOPEDIC SURGERY | Facility: CLINIC | Age: 71
End: 2021-01-21

## 2021-01-21 ENCOUNTER — APPOINTMENT (OUTPATIENT)
Dept: VACCINE CLINIC | Facility: HOSPITAL | Age: 71
End: 2021-01-21

## 2021-01-21 VITALS — WEIGHT: 145 LBS | HEIGHT: 63 IN | BODY MASS INDEX: 25.69 KG/M2 | TEMPERATURE: 97.4 F

## 2021-01-21 DIAGNOSIS — S83.242D ACUTE MEDIAL MENISCUS TEAR OF LEFT KNEE, SUBSEQUENT ENCOUNTER: ICD-10-CM

## 2021-01-21 DIAGNOSIS — M84.361G STRESS FRACTURE OF RIGHT TIBIA WITH DELAYED HEALING, SUBSEQUENT ENCOUNTER: ICD-10-CM

## 2021-01-21 DIAGNOSIS — M17.0 PRIMARY OSTEOARTHRITIS OF BOTH KNEES: Primary | ICD-10-CM

## 2021-01-21 PROCEDURE — 99214 OFFICE O/P EST MOD 30 MIN: CPT | Performed by: ORTHOPAEDIC SURGERY

## 2021-01-21 PROCEDURE — 20610 DRAIN/INJ JOINT/BURSA W/O US: CPT | Performed by: ORTHOPAEDIC SURGERY

## 2021-01-21 RX ORDER — METHYLPREDNISOLONE ACETATE 80 MG/ML
80 INJECTION, SUSPENSION INTRA-ARTICULAR; INTRALESIONAL; INTRAMUSCULAR; SOFT TISSUE
Status: COMPLETED | OUTPATIENT
Start: 2021-01-21 | End: 2021-01-21

## 2021-01-21 RX ADMIN — METHYLPREDNISOLONE ACETATE 80 MG: 80 INJECTION, SUSPENSION INTRA-ARTICULAR; INTRALESIONAL; INTRAMUSCULAR; SOFT TISSUE at 08:39

## 2021-01-21 RX ADMIN — METHYLPREDNISOLONE ACETATE 80 MG: 80 INJECTION, SUSPENSION INTRA-ARTICULAR; INTRALESIONAL; INTRAMUSCULAR; SOFT TISSUE at 08:32

## 2021-01-25 ENCOUNTER — IMMUNIZATION (OUTPATIENT)
Dept: VACCINE CLINIC | Facility: HOSPITAL | Age: 71
End: 2021-01-25

## 2021-01-25 PROCEDURE — 91300 HC SARSCOV02 VAC 30MCG/0.3ML IM: CPT | Performed by: INTERNAL MEDICINE

## 2021-01-25 PROCEDURE — 0002A: CPT | Performed by: INTERNAL MEDICINE

## 2021-02-01 ENCOUNTER — OFFICE VISIT (OUTPATIENT)
Dept: FAMILY MEDICINE CLINIC | Facility: CLINIC | Age: 71
End: 2021-02-01

## 2021-02-01 VITALS
WEIGHT: 150 LBS | BODY MASS INDEX: 26.58 KG/M2 | TEMPERATURE: 95 F | HEIGHT: 63 IN | OXYGEN SATURATION: 98 % | SYSTOLIC BLOOD PRESSURE: 122 MMHG | DIASTOLIC BLOOD PRESSURE: 98 MMHG | HEART RATE: 68 BPM

## 2021-02-01 DIAGNOSIS — E78.2 MIXED HYPERLIPIDEMIA: ICD-10-CM

## 2021-02-01 DIAGNOSIS — Z51.81 MEDICATION MONITORING ENCOUNTER: ICD-10-CM

## 2021-02-01 DIAGNOSIS — E03.9 HYPOTHYROIDISM, UNSPECIFIED TYPE: Primary | ICD-10-CM

## 2021-02-01 DIAGNOSIS — I10 ESSENTIAL HYPERTENSION: ICD-10-CM

## 2021-02-01 DIAGNOSIS — N39.41 URGE INCONTINENCE: ICD-10-CM

## 2021-02-01 PROBLEM — G50.0 LEFT-SIDED TRIGEMINAL NEURALGIA: Status: ACTIVE | Noted: 2018-10-19

## 2021-02-01 LAB
BILIRUB BLD-MCNC: NEGATIVE MG/DL
CLARITY, POC: CLEAR
COLOR UR: ABNORMAL
GLUCOSE UR STRIP-MCNC: NEGATIVE MG/DL
KETONES UR QL: NEGATIVE
LEUKOCYTE EST, POC: NEGATIVE
NITRITE UR-MCNC: NEGATIVE MG/ML
PH UR: 5 [PH] (ref 5–8)
PROT UR STRIP-MCNC: NEGATIVE MG/DL
RBC # UR STRIP: NEGATIVE /UL
SP GR UR: 1.03 (ref 1–1.03)
UROBILINOGEN UR QL: NORMAL

## 2021-02-01 PROCEDURE — 81003 URINALYSIS AUTO W/O SCOPE: CPT | Performed by: FAMILY MEDICINE

## 2021-02-01 PROCEDURE — 99214 OFFICE O/P EST MOD 30 MIN: CPT | Performed by: FAMILY MEDICINE

## 2021-02-01 RX ORDER — OXYBUTYNIN CHLORIDE 5 MG/1
5 TABLET, EXTENDED RELEASE ORAL DAILY
Qty: 30 TABLET | Refills: 0 | Status: SHIPPED | OUTPATIENT
Start: 2021-02-01 | End: 2021-03-03

## 2021-02-01 NOTE — PROGRESS NOTES
"Chief Complaint  Hypertension (Noble trans, F/u for HTN, Hypothyroid. @Questions about paps as she has chornic bladder pains or spasms) and Hypothyroidism    Subjective        Sari Self presents to [facility]   History of Present Illness to establish care and follow-up on hypertension, hypothyroidism.  Patient is also complaining of chronic bladder spasm and increased urinary frequency.  He is s/p bladder surgery.  Patient with long history of hypertension, not taking medication since she is not taking medication for few days.  She has a history of hypertension and not taking blood pressure medication regularly after losing weight.  Denies any headache blurring of vision.  Check blood pressure at home regularly.  Hypothyroidism, denies any weight gain, constipation, or intolerance to cold or heat.  Symptoms are stable on current dose of Synthyroid.      Objective   Vital Signs:   /98   Pulse 68   Temp 95 °F (35 °C)   Ht 160 cm (63\")   Wt 68 kg (150 lb)   SpO2 98%   BMI 26.57 kg/m²     Physical Exam   Result Review :     CMP    CMP 9/23/20   BUN 18   Creatinine 0.68   eGFR Non African Am 85   Sodium 136   Potassium 4.0   Chloride 104   Calcium 9.5   Albumin 4.10   Total Bilirubin 0.5   Alkaline Phosphatase 77   AST (SGOT) 10   ALT (SGPT) 12           CBC    CBC 9/23/20   WBC 6.09   RBC 4.50   Hemoglobin 13.0   Hematocrit 39.2   MCV 87.1   MCH 28.9   MCHC 33.2   RDW 13.4   Platelets 304           Lipid Panel    Lipid Panel 9/23/20   Triglycerides 69   HDL Cholesterol 50   VLDL Cholesterol 13.8   LDL Cholesterol  143 (A)   LDL/HDL Ratio 2.86   (A) Abnormal value            TSH    TSH 9/23/20   TSH 2.400                    Assessment and Plan    Problem List Items Addressed This Visit        Cardiac and Vasculature    Essential hypertension    Relevant Orders    Comprehensive Metabolic Panel       Endocrine and Metabolic    Hypothyroidism - Primary    Relevant Orders    " TSH+Free T4      Other Visit Diagnoses     Urge incontinence        Relevant Orders    POC Urinalysis Dipstick, Automated (Completed)    Mixed hyperlipidemia        Relevant Orders    Lipid Panel    Medication monitoring encounter        Relevant Orders    CBC & Differential      English Sari MÉNDEZ  Is a 74-year-old female patient came here for transfer of care and follow-up on  Hypertension: She is not taking hydrochlorothiazide every day.  I advised her to keep a blood pressure log.  Her diastolic blood pressure was more than 90 today.  We will check CMP.  Hypothyroidism, TSH at goal.  Last labs were done in September.  We will check TSH today.  Continue with Synthyroid.  Urge  Incontinence, patient giving history of increased urinary frequency and urgency .  Urine dip done in the office to rule out infection.  I will start her on oxybutynin.            Follow Up   Return in about 3 months (around 5/1/2021).  Patient was given instructions and counseling regarding her condition or for health maintenance advice. Please see specific information pulled into the AVS if appropriate.

## 2021-02-02 LAB
ALBUMIN SERPL-MCNC: 4.5 G/DL (ref 3.5–5.2)
ALBUMIN/GLOB SERPL: 1.5 G/DL
ALP SERPL-CCNC: 80 U/L (ref 39–117)
ALT SERPL-CCNC: 13 U/L (ref 1–33)
AST SERPL-CCNC: 11 U/L (ref 1–32)
BASOPHILS # BLD AUTO: 0.1 10*3/MM3 (ref 0–0.2)
BASOPHILS NFR BLD AUTO: 1 % (ref 0–1.5)
BILIRUB SERPL-MCNC: 0.5 MG/DL (ref 0–1.2)
BUN SERPL-MCNC: 24 MG/DL (ref 8–23)
BUN/CREAT SERPL: 28.9 (ref 7–25)
CALCIUM SERPL-MCNC: 10.1 MG/DL (ref 8.6–10.5)
CHLORIDE SERPL-SCNC: 101 MMOL/L (ref 98–107)
CHOLEST SERPL-MCNC: 261 MG/DL (ref 0–200)
CO2 SERPL-SCNC: 24.6 MMOL/L (ref 22–29)
CREAT SERPL-MCNC: 0.83 MG/DL (ref 0.57–1)
EOSINOPHIL # BLD AUTO: 0.08 10*3/MM3 (ref 0–0.4)
EOSINOPHIL NFR BLD AUTO: 0.8 % (ref 0.3–6.2)
ERYTHROCYTE [DISTWIDTH] IN BLOOD BY AUTOMATED COUNT: 11.8 % (ref 12.3–15.4)
GLOBULIN SER CALC-MCNC: 3.1 GM/DL
GLUCOSE SERPL-MCNC: 94 MG/DL (ref 65–99)
HCT VFR BLD AUTO: 45.7 % (ref 34–46.6)
HDLC SERPL-MCNC: 88 MG/DL (ref 40–60)
HGB BLD-MCNC: 15.5 G/DL (ref 12–15.9)
IMM GRANULOCYTES # BLD AUTO: 0.11 10*3/MM3 (ref 0–0.05)
IMM GRANULOCYTES NFR BLD AUTO: 1.1 % (ref 0–0.5)
LDLC SERPL CALC-MCNC: 157 MG/DL (ref 0–100)
LYMPHOCYTES # BLD AUTO: 3.75 10*3/MM3 (ref 0.7–3.1)
LYMPHOCYTES NFR BLD AUTO: 36.3 % (ref 19.6–45.3)
MCH RBC QN AUTO: 29.9 PG (ref 26.6–33)
MCHC RBC AUTO-ENTMCNC: 33.9 G/DL (ref 31.5–35.7)
MCV RBC AUTO: 88.2 FL (ref 79–97)
MONOCYTES # BLD AUTO: 0.65 10*3/MM3 (ref 0.1–0.9)
MONOCYTES NFR BLD AUTO: 6.3 % (ref 5–12)
NEUTROPHILS # BLD AUTO: 5.65 10*3/MM3 (ref 1.7–7)
NEUTROPHILS NFR BLD AUTO: 54.5 % (ref 42.7–76)
NRBC BLD AUTO-RTO: 0 /100 WBC (ref 0–0.2)
PLATELET # BLD AUTO: 422 10*3/MM3 (ref 140–450)
POTASSIUM SERPL-SCNC: 4.4 MMOL/L (ref 3.5–5.2)
PROT SERPL-MCNC: 7.6 G/DL (ref 6–8.5)
RBC # BLD AUTO: 5.18 10*6/MM3 (ref 3.77–5.28)
SODIUM SERPL-SCNC: 137 MMOL/L (ref 136–145)
T4 FREE SERPL-MCNC: 1.32 NG/DL (ref 0.93–1.7)
TRIGL SERPL-MCNC: 93 MG/DL (ref 0–150)
TSH SERPL DL<=0.005 MIU/L-ACNC: 2.47 UIU/ML (ref 0.27–4.2)
VLDLC SERPL CALC-MCNC: 16 MG/DL (ref 5–40)
WBC # BLD AUTO: 10.34 10*3/MM3 (ref 3.4–10.8)

## 2021-02-24 RX ADMIN — METHYLPREDNISOLONE ACETATE 80 MG: 80 INJECTION, SUSPENSION INTRA-ARTICULAR; INTRALESIONAL; INTRAMUSCULAR; SOFT TISSUE at 15:39

## 2021-02-24 RX ADMIN — METHYLPREDNISOLONE ACETATE 80 MG: 80 INJECTION, SUSPENSION INTRA-ARTICULAR; INTRALESIONAL; INTRAMUSCULAR; SOFT TISSUE at 15:40

## 2021-02-24 NOTE — PROGRESS NOTES
Patient: Sari Self  YOB: 1950  Date of Service: 2/24/2021    Chief Complaints: Bilateral knee pain    Subjective:    History of Present Illness: Pt is seen in the office today with complaints of bilateral knee pain she is status post knee arthroscopy in both with a subchondroplasty we did get her an  on the right she is actually doing great both knees are feeling good she is progressing her activities and overall very happy.          Allergies:   Allergies   Allergen Reactions   • Penicillins Hives       Medications:   Home Medications:  Current Outpatient Medications on File Prior to Visit   Medication Sig   • Calcium Carbonate (CALCIUM 500 PO) Take  by mouth.   • gabapentin (NEURONTIN) 300 MG capsule Take 600 mg by mouth 4 (Four) Times a Day.   • hydroCHLOROthiazide (HYDRODIURIL) 12.5 MG tablet Take 1 tablet by mouth Daily.   • levothyroxine (SYNTHROID, LEVOTHROID) 50 MCG tablet Take 1 tablet by mouth Every Morning.   • omeprazole (PRILOSEC) 40 MG capsule Take 1 capsule by mouth 2 (Two) Times a Day. (Patient taking differently: Take 40 mg by mouth Every Night.)   • oxybutynin XL (DITROPAN-XL) 5 MG 24 hr tablet Take 1 tablet by mouth Daily.   • potassium chloride (K-DUR,KLOR-CON) 10 MEQ CR tablet Take 10 mEq by mouth Daily. PRN WITH HCTZ     No current facility-administered medications on file prior to visit.      Current Medications:  Scheduled Meds:  Continuous Infusions:No current facility-administered medications for this visit.     PRN Meds:.    I have reviewed the patient's medical history in detail and updated the computerized patient record.  Review and summarization of old records include:    Past Medical History:   Diagnosis Date   • Arthritis    • Cerebral aneurysm    • Cholelithiasis 2016    Gallbladder removed   • Disease of thyroid gland    • Diverticulosis    • GERD (gastroesophageal reflux disease)    • H/O cerebral aneurysm repair    • History of 2019 novel coronavirus  disease (COVID-19)     AUGUST 2020 STATES VERY MILD SYMTOMS   • Hypertension    • IBS (irritable bowel syndrome)    • Osteoporosis    • Right knee pain    • RLS (restless legs syndrome)    • Stroke (CMS/HCC)    • TIA (transient ischemic attack)     2011   • Torn meniscus     RIGHT KNEE        Past Surgical History:   Procedure Laterality Date   • CHOLECYSTECTOMY     • COLONOSCOPY  11/10/2010    prep of colon fair,IH,stool in transverse colon,hepatic flexure and ascending colon,ileum normal,IBS   • COLONOSCOPY N/A 2/27/2018    IH, diverticulosis, one 5mm polyp, tubular adenoma with low grade dysplasia   • CYSTOSCOPY     • ENDOSCOPY  11/27/2012    grd 1 reflux egitis,web upperd 3rd esoph   • ENDOSCOPY N/A 2/27/2018    normal biopsies, H Pylori positive   • FOOT NEUROMA SURGERY Left    • IR CEREBRAL ANEURYSM COILING  2012   • KNEE ARTHROSCOPY Left 7/3/2019    Procedure: KNEE ARTHROSCOPY ARTHRITIS DEBRIDEMENT PARTIAL MEDIAL AND LATERAL MENISECTOMY;  Surgeon: Nuris Bender MD;  Location: [facility] OR [facility] [place];  Service: Orthopedics   • KNEE ARTHROSCOPY Right 9/28/2020    Procedure: RIGHT KNEE ARTHROSCOPY, PARTIAL MEDIAL AND LATERAL MENISCECTOMIES, DEBRIDEMENT OF ARTHRITIS, AND INTERNAL FIXATION TIBAL PLATEAU INSUFFICIENCY FRACTURE;  Surgeon: Nuris Bender MD;  Location: [facility] OR [facility] [place];  Service: Orthopedics;  Laterality: Right;   • THUMB ARTHROSCOPY     • UPPER GASTROINTESTINAL ENDOSCOPY  02/27/2018        Social History     Occupational History   • Not on file   Tobacco Use   • Smoking status: Never Smoker   • Smokeless tobacco: Never Used   Substance and Sexual Activity   • Alcohol use: Yes     Comment: SELDOM   • Drug use: No   • Sexual activity: Not Currently      Social History     Social History Narrative   • Not on file        Family History   Problem Relation Age of Onset   • Irritable bowel syndrome Daughter    • Malig Hyperthermia Neg Hx        ROS: 14 point review of systems was performed and was negative  except for documented findings in HPI and today's encounter.     Allergies:   Allergies   Allergen Reactions   • Penicillins Hives     Constitutional:  Denies fever, shaking or chills   Eyes:  Denies change in visual acuity   HENT:  Denies nasal congestion or sore throat   Respiratory:  Denies cough or shortness of breath   Cardiovascular:  Denies chest pain or severe LE edema   GI:  Denies abdominal pain, nausea, vomiting, bloody stools or diarrhea   Musculoskeletal:  Numbness, tingling, or loss of motor function only as noted above in history of present illness.  : Denies painful urination or hematuria  Integument:  Denies rash, lesion or ulceration   Neurologic:  Denies headache or focal weakness  Endocrine:  Denies lymphadenopathy  Psych:  Denies confusion or change in mental status   Hem:  Denies active bleeding      Physical Exam: 70 y.o. female  Wt Readings from Last 3 Encounters:   02/01/21 68 kg (150 lb)   01/21/21 65.8 kg (145 lb)   12/15/20 68 kg (150 lb)       There is no height or weight on file to calculate BMI.  No height and weight on file for this encounter.  There were no vitals filed for this visit.  Vital signs reviewed.   General Appearance:    Alert, cooperative, in no acute distress                    Ortho exam  Both knees show a healed surgical incisions full range of motion quads are improving calf is soft nontender no effusion           .time    Assessment: Large Joint Arthrocentesis: R knee  Date/Time: 2/24/2021 3:39 PM  Consent given by: patient  Site marked: site marked  Timeout: Immediately prior to procedure a time out was called to verify the correct patient, procedure, equipment, support staff and site/side marked as required   Supporting Documentation  Indications: pain and joint swelling   Procedure Details  Location: knee - R knee  Preparation: Patient was prepped and draped in the usual sterile fashion  Needle size: 22 G  Approach: anterolateral  Medications administered: 80  mg methylPREDNISolone acetate 80 MG/ML; 4 mL lidocaine (cardiac)  Patient tolerance: patient tolerated the procedure well with no immediate complications    Large Joint Arthrocentesis: L knee  Date/Time: 2/24/2021 3:40 PM  Consent given by: patient  Site marked: site marked  Timeout: Immediately prior to procedure a time out was called to verify the correct patient, procedure, equipment, support staff and site/side marked as required   Supporting Documentation  Indications: pain and joint swelling   Procedure Details  Location: knee - L knee  Preparation: Patient was prepped and draped in the usual sterile fashion  Needle size: 22 G  Approach: anterolateral  Medications administered: 80 mg methylPREDNISolone acetate 80 MG/ML; 4 mL lidocaine (cardiac)  Patient tolerance: patient tolerated the procedure well with no immediate complications      Bilateral knee arthroscopy with stress fractures in bothBilateral knee arthroscopy with stress fractures in both I think she is finally turned the corner I think she can periodically wean out of her brace and see how she does and figure out just when she needs it if her symptoms return in the future we will proceed with injections as needed     Plan:   Follow up as indicated.  Ice, elevate, and rest as needed.  Discussed conservative measures of pain control including ice, bracing.  Also talked about the importance of strengthening and maintaining ideal body weight    Nuris Bender M.D.

## 2021-02-25 ENCOUNTER — OFFICE VISIT (OUTPATIENT)
Dept: ORTHOPEDIC SURGERY | Facility: CLINIC | Age: 71
End: 2021-02-25

## 2021-02-25 VITALS — TEMPERATURE: 98.2 F | HEIGHT: 63 IN | BODY MASS INDEX: 26.22 KG/M2 | WEIGHT: 148 LBS

## 2021-02-25 DIAGNOSIS — R52 PAIN: Primary | ICD-10-CM

## 2021-02-25 DIAGNOSIS — Z98.890 S/P ARTHROSCOPY OF KNEE: ICD-10-CM

## 2021-02-25 PROCEDURE — 20610 DRAIN/INJ JOINT/BURSA W/O US: CPT | Performed by: ORTHOPAEDIC SURGERY

## 2021-02-25 PROCEDURE — 99212 OFFICE O/P EST SF 10 MIN: CPT | Performed by: ORTHOPAEDIC SURGERY

## 2021-02-25 RX ORDER — METHYLPREDNISOLONE ACETATE 80 MG/ML
80 INJECTION, SUSPENSION INTRA-ARTICULAR; INTRALESIONAL; INTRAMUSCULAR; SOFT TISSUE
Status: COMPLETED | OUTPATIENT
Start: 2021-02-24 | End: 2021-02-24

## 2021-03-03 RX ORDER — OXYBUTYNIN CHLORIDE 5 MG/1
TABLET, EXTENDED RELEASE ORAL
Qty: 90 TABLET | Refills: 0 | Status: SHIPPED | OUTPATIENT
Start: 2021-03-03 | End: 2021-05-05

## 2021-03-04 ENCOUNTER — APPOINTMENT (OUTPATIENT)
Dept: WOMENS IMAGING | Facility: HOSPITAL | Age: 71
End: 2021-03-04

## 2021-03-04 PROCEDURE — 77080 DXA BONE DENSITY AXIAL: CPT | Performed by: RADIOLOGY

## 2021-03-09 DIAGNOSIS — M81.0 OSTEOPOROSIS, UNSPECIFIED OSTEOPOROSIS TYPE, UNSPECIFIED PATHOLOGICAL FRACTURE PRESENCE: ICD-10-CM

## 2021-03-11 RX ORDER — SODIUM CHLORIDE 9 MG/ML
250 INJECTION, SOLUTION INTRAVENOUS ONCE
Status: CANCELLED | OUTPATIENT
Start: 2021-03-11

## 2021-03-15 ENCOUNTER — LAB (OUTPATIENT)
Dept: LAB | Facility: HOSPITAL | Age: 71
End: 2021-03-15

## 2021-03-15 DIAGNOSIS — M81.0 AGE-RELATED OSTEOPOROSIS WITHOUT CURRENT PATHOLOGICAL FRACTURE: Primary | ICD-10-CM

## 2021-03-15 DIAGNOSIS — M81.0 OSTEOPOROSIS, UNSPECIFIED OSTEOPOROSIS TYPE, UNSPECIFIED PATHOLOGICAL FRACTURE PRESENCE: Primary | ICD-10-CM

## 2021-03-16 DIAGNOSIS — M81.0 OSTEOPOROSIS, UNSPECIFIED OSTEOPOROSIS TYPE, UNSPECIFIED PATHOLOGICAL FRACTURE PRESENCE: Primary | ICD-10-CM

## 2021-03-16 DIAGNOSIS — M81.0 OSTEOPOROSIS, UNSPECIFIED OSTEOPOROSIS TYPE, UNSPECIFIED PATHOLOGICAL FRACTURE PRESENCE: ICD-10-CM

## 2021-03-16 DIAGNOSIS — I10 ESSENTIAL HYPERTENSION: ICD-10-CM

## 2021-03-16 DIAGNOSIS — M84.361D STRESS FRACTURE OF RIGHT TIBIA WITH ROUTINE HEALING, SUBSEQUENT ENCOUNTER: Primary | ICD-10-CM

## 2021-03-19 RX ORDER — ZOLEDRONIC ACID 5 MG/100ML
5 INJECTION, SOLUTION INTRAVENOUS ONCE
Status: CANCELLED
Start: 2021-03-19 | End: 2021-03-19

## 2021-03-23 ENCOUNTER — HOSPITAL ENCOUNTER (OUTPATIENT)
Dept: INFUSION THERAPY | Facility: HOSPITAL | Age: 71
Discharge: HOME | End: 2021-03-23
Admitting: FAMILY MEDICINE

## 2021-03-23 VITALS
DIASTOLIC BLOOD PRESSURE: 70 MMHG | HEART RATE: 68 BPM | BODY MASS INDEX: 27.14 KG/M2 | SYSTOLIC BLOOD PRESSURE: 102 MMHG | TEMPERATURE: 97.3 F | OXYGEN SATURATION: 97 % | RESPIRATION RATE: 16 BRPM | WEIGHT: 153.2 LBS

## 2021-03-23 DIAGNOSIS — I10 ESSENTIAL HYPERTENSION: ICD-10-CM

## 2021-03-23 DIAGNOSIS — M81.0 AGE-RELATED OSTEOPOROSIS WITHOUT CURRENT PATHOLOGICAL FRACTURE: ICD-10-CM

## 2021-03-23 DIAGNOSIS — M81.0 OSTEOPOROSIS, UNSPECIFIED OSTEOPOROSIS TYPE, UNSPECIFIED PATHOLOGICAL FRACTURE PRESENCE: Primary | ICD-10-CM

## 2021-03-23 DIAGNOSIS — M81.0 SENILE OSTEOPOROSIS: ICD-10-CM

## 2021-03-23 LAB
ANION GAP SERPL CALCULATED.3IONS-SCNC: 10 MMOL/L (ref 5–15)
BUN SERPL-MCNC: 18 MG/DL (ref 8–23)
BUN/CREAT SERPL: 25 (ref 7–25)
CALCIUM SPEC-SCNC: 9.4 MG/DL (ref 8.6–10.5)
CHLORIDE SERPL-SCNC: 106 MMOL/L (ref 98–107)
CO2 SERPL-SCNC: 22 MMOL/L (ref 22–29)
CREAT SERPL-MCNC: 0.72 MG/DL (ref 0.57–1)
GFR SERPL CREATININE-BSD FRML MDRD: 80 ML/MIN/1.73
GLUCOSE SERPL-MCNC: 90 MG/DL (ref 65–99)
POTASSIUM SERPL-SCNC: 3.8 MMOL/L (ref 3.5–5.2)
SODIUM SERPL-SCNC: 138 MMOL/L (ref 136–145)

## 2021-03-23 PROCEDURE — 80048 BASIC METABOLIC PNL TOTAL CA: CPT | Performed by: FAMILY MEDICINE

## 2021-03-23 PROCEDURE — 36415 COLL VENOUS BLD VENIPUNCTURE: CPT

## 2021-03-23 PROCEDURE — 25010000002 ZOLEDRONIC ACID 5 MG/100ML SOLUTION: Performed by: FAMILY MEDICINE

## 2021-03-23 PROCEDURE — 96365 THER/PROPH/DIAG IV INF INIT: CPT

## 2021-03-23 RX ORDER — ZOLEDRONIC ACID 5 MG/100ML
5 INJECTION, SOLUTION INTRAVENOUS ONCE
Status: COMPLETED | OUTPATIENT
Start: 2021-03-23 | End: 2021-03-23

## 2021-03-23 RX ORDER — ZOLEDRONIC ACID 5 MG/100ML
5 INJECTION, SOLUTION INTRAVENOUS ONCE
Status: CANCELLED
Start: 2021-03-23 | End: 2021-03-23

## 2021-03-23 RX ADMIN — ZOLEDRONIC ACID 5 MG: 0.05 INJECTION, SOLUTION INTRAVENOUS at 11:47

## 2021-03-31 ENCOUNTER — APPOINTMENT (OUTPATIENT)
Dept: WOMENS IMAGING | Facility: HOSPITAL | Age: 71
End: 2021-03-31

## 2021-03-31 PROCEDURE — G0279 TOMOSYNTHESIS, MAMMO: HCPCS | Performed by: RADIOLOGY

## 2021-03-31 PROCEDURE — 76641 ULTRASOUND BREAST COMPLETE: CPT | Performed by: RADIOLOGY

## 2021-03-31 PROCEDURE — 77066 DX MAMMO INCL CAD BI: CPT | Performed by: RADIOLOGY

## 2021-04-14 NOTE — PROGRESS NOTES
Patient: Sari Self  YOB: 1950  Date of Service: 4/14/2021    Chief Complaints: bilateral knee pain     Subjective:    History of Present Illness: Pt is seen in the office today with complaints of bilateral knee pain.  She had no stress fractures which I really think have healed she has some degenerative changes we inject her almost 3 months ago she would like another round she understands importance of quad and core strengthening and weight management        Allergies:   Allergies   Allergen Reactions   • Penicillins Hives       Medications:   Home Medications:  Current Outpatient Medications on File Prior to Visit   Medication Sig   • Calcium Carbonate-Vitamin D (Calcium 600/Vitamin D) 600-400 MG-UNIT chewable tablet Chew 2 tablets Daily.   • gabapentin (NEURONTIN) 300 MG capsule Take 600 mg by mouth 4 (Four) Times a Day.   • hydroCHLOROthiazide (HYDRODIURIL) 12.5 MG tablet Take 1 tablet by mouth Daily.   • levothyroxine (SYNTHROID, LEVOTHROID) 50 MCG tablet Take 1 tablet by mouth Every Morning.   • omeprazole (PRILOSEC) 40 MG capsule Take 1 capsule by mouth 2 (Two) Times a Day. (Patient taking differently: Take 40 mg by mouth Every Night.)   • oxybutynin XL (DITROPAN-XL) 5 MG 24 hr tablet Take 1 tablet by mouth once daily   • potassium chloride (K-DUR,KLOR-CON) 10 MEQ CR tablet Take 10 mEq by mouth Daily. PRN WITH HCTZ     No current facility-administered medications on file prior to visit.     Current Medications:  Scheduled Meds:  Continuous Infusions:No current facility-administered medications for this visit.    PRN Meds:.    I have reviewed the patient's medical history in detail and updated the computerized patient record.  Review and summarization of old records include:    Past Medical History:   Diagnosis Date   • Arthritis    • Cerebral aneurysm    • Cholelithiasis 2016    Gallbladder removed   • Disease of thyroid gland    • Diverticulosis    • GERD (gastroesophageal reflux  disease)    • H/O cerebral aneurysm repair    • History of 2019 novel coronavirus disease (COVID-19)     AUGUST 2020 STATES VERY MILD SYMTOMS   • Hypertension    • IBS (irritable bowel syndrome)    • Osteoporosis    • Right knee pain    • RLS (restless legs syndrome)    • Stroke (CMS/HCC)    • TIA (transient ischemic attack)     2011   • Torn meniscus     RIGHT KNEE        Past Surgical History:   Procedure Laterality Date   • CHOLECYSTECTOMY     • COLONOSCOPY  11/10/2010    prep of colon fair,IH,stool in transverse colon,hepatic flexure and ascending colon,ileum normal,IBS   • COLONOSCOPY N/A 2/27/2018    IH, diverticulosis, one 5mm polyp, tubular adenoma with low grade dysplasia   • CYSTOSCOPY     • ENDOSCOPY  11/27/2012    grd 1 reflux egitis,web upperd 3rd esoph   • ENDOSCOPY N/A 2/27/2018    normal biopsies, H Pylori positive   • FOOT NEUROMA SURGERY Left    • IR CEREBRAL ANEURYSM COILING  2012   • KNEE ARTHROSCOPY Left 7/3/2019    Procedure: KNEE ARTHROSCOPY ARTHRITIS DEBRIDEMENT PARTIAL MEDIAL AND LATERAL MENISECTOMY;  Surgeon: Nuris Bender MD;  [place]: [facility] [place] [facility] [place];  Service: Orthopedics   • KNEE ARTHROSCOPY Right 9/28/2020    Procedure: RIGHT KNEE ARTHROSCOPY, PARTIAL MEDIAL AND LATERAL MENISCECTOMIES, DEBRIDEMENT OF ARTHRITIS, AND INTERNAL FIXATION TIBAL PLATEAU INSUFFICIENCY FRACTURE;  Surgeon: Nuris Bender MD;  Location: [facility] OR [facility] [place];  Service: Orthopedics;  Laterality: Right;   • THUMB ARTHROSCOPY     • UPPER GASTROINTESTINAL ENDOSCOPY  02/27/2018        Social History     Occupational History   • Not on file   Tobacco Use   • Smoking status: Never Smoker   • Smokeless tobacco: Never Used   Vaping Use   • Vaping Use: Never used   Substance and Sexual Activity   • Alcohol use: Yes     Comment: SELDOM   • Drug use: No   • Sexual activity: Not Currently      Social History     Social History Narrative   • Not on file        Family History   Problem Relation Age of Onset   • Irritable bowel  syndrome Daughter    • Malig Hyperthermia Neg Hx        ROS: 14 point review of systems was performed and was negative except for documented findings in HPI and today's encounter.     Allergies:   Allergies   Allergen Reactions   • Penicillins Hives     Constitutional:  Denies fever, shaking or chills   Eyes:  Denies change in visual acuity   HENT:  Denies nasal congestion or sore throat   Respiratory:  Denies cough or shortness of breath   Cardiovascular:  Denies chest pain or severe LE edema   GI:  Denies abdominal pain, nausea, vomiting, bloody stools or diarrhea   Musculoskeletal:  Numbness, tingling, or loss of motor function only as noted above in history of present illness.  : Denies painful urination or hematuria  Integument:  Denies rash, lesion or ulceration   Neurologic:  Denies headache or focal weakness  Endocrine:  Denies lymphadenopathy  Psych:  Denies confusion or change in mental status   Hem:  Denies active bleeding      Physical Exam: 70 y.o. female  Wt Readings from Last 3 Encounters:   03/23/21 69.5 kg (153 lb 3.2 oz)   02/25/21 67.1 kg (148 lb)   02/01/21 68 kg (150 lb)       There is no height or weight on file to calculate BMI.  No height and weight on file for this encounter.  There were no vitals filed for this visit.  Vital signs reviewed.   General Appearance:    Alert, cooperative, in no acute distress                    Ortho exam  Exam unchanged           .time    Assessment: Bilateral knee DJD I think it is reasonable for this 1 time to proceed I will up and inject her little bit sooner I told her we would do about 3 of these a year maximum talking about the importance of quad and core strengthening weight management    Large Joint Arthrocentesis: R knee  Date/Time: 4/22/2021 4:44 PM  Consent given by: patient  Site marked: site marked  Timeout: Immediately prior to procedure a time out was called to verify the correct patient, procedure, equipment, support staff and site/side  marked as required   Supporting Documentation  Indications: pain   Procedure Details  Location: knee - R knee  Preparation: Patient was prepped and draped in the usual sterile fashion  Needle gauge: 21G.  Approach: anteromedial  Medications administered: 4 mL lidocaine PF 2% 2 %; 80 mg methylPREDNISolone acetate 80 MG/ML  Patient tolerance: patient tolerated the procedure well with no immediate complications    Large Joint Arthrocentesis: L knee  Date/Time: 4/22/2021 4:44 PM  Consent given by: patient  Site marked: site marked  Timeout: Immediately prior to procedure a time out was called to verify the correct patient, procedure, equipment, support staff and site/side marked as required   Supporting Documentation  Indications: pain   Procedure Details  Location: knee - L knee  Preparation: Patient was prepped and draped in the usual sterile fashion  Needle gauge: 21G.  Approach: anteromedial  Medications administered: 4 mL lidocaine PF 2% 2 %; 80 mg methylPREDNISolone acetate 80 MG/ML  Patient tolerance: patient tolerated the procedure well with no immediate complications          Plan:   Follow up as indicated.  Ice, elevate, and rest as needed.  Discussed conservative measures of pain control including ice, bracing.  Also talked about the importance of strengthening and maintaining ideal body weight    Nuris Bender M.D.

## 2021-04-22 ENCOUNTER — OFFICE VISIT (OUTPATIENT)
Dept: ORTHOPEDIC SURGERY | Facility: CLINIC | Age: 71
End: 2021-04-22

## 2021-04-22 VITALS — TEMPERATURE: 96.9 F | HEIGHT: 63 IN | BODY MASS INDEX: 26.22 KG/M2 | WEIGHT: 148 LBS

## 2021-04-22 DIAGNOSIS — M17.0 PRIMARY OSTEOARTHRITIS OF BOTH KNEES: Primary | ICD-10-CM

## 2021-04-22 PROCEDURE — 20610 DRAIN/INJ JOINT/BURSA W/O US: CPT | Performed by: ORTHOPAEDIC SURGERY

## 2021-04-22 RX ORDER — BESIFLOXACIN 6 MG/ML
SUSPENSION OPHTHALMIC
COMMUNITY
Start: 2021-03-16 | End: 2021-08-11

## 2021-04-22 RX ORDER — PREDNISOLONE ACETATE 10 MG/ML
SUSPENSION/ DROPS OPHTHALMIC
COMMUNITY
Start: 2021-03-15 | End: 2021-08-11

## 2021-04-22 RX ORDER — DICLOFENAC SODIUM 1 MG/ML
SOLUTION/ DROPS OPHTHALMIC
COMMUNITY
Start: 2021-04-19 | End: 2021-08-11

## 2021-04-22 RX ADMIN — LIDOCAINE HYDROCHLORIDE 4 ML: 20 INJECTION, SOLUTION EPIDURAL; INFILTRATION; INTRACAUDAL; PERINEURAL at 16:44

## 2021-04-22 RX ADMIN — METHYLPREDNISOLONE ACETATE 80 MG: 80 INJECTION, SUSPENSION INTRA-ARTICULAR; INTRALESIONAL; INTRAMUSCULAR; SOFT TISSUE at 16:44

## 2021-04-26 RX ORDER — LIDOCAINE HYDROCHLORIDE 20 MG/ML
4 INJECTION, SOLUTION EPIDURAL; INFILTRATION; INTRACAUDAL; PERINEURAL
Status: COMPLETED | OUTPATIENT
Start: 2021-04-22 | End: 2021-04-22

## 2021-04-26 RX ORDER — METHYLPREDNISOLONE ACETATE 80 MG/ML
80 INJECTION, SUSPENSION INTRA-ARTICULAR; INTRALESIONAL; INTRAMUSCULAR; SOFT TISSUE
Status: COMPLETED | OUTPATIENT
Start: 2021-04-22 | End: 2021-04-22

## 2021-05-05 ENCOUNTER — OFFICE VISIT (OUTPATIENT)
Dept: FAMILY MEDICINE CLINIC | Facility: CLINIC | Age: 71
End: 2021-05-05

## 2021-05-05 VITALS
DIASTOLIC BLOOD PRESSURE: 88 MMHG | OXYGEN SATURATION: 98 % | HEIGHT: 63 IN | SYSTOLIC BLOOD PRESSURE: 140 MMHG | HEART RATE: 68 BPM | WEIGHT: 155 LBS | BODY MASS INDEX: 27.46 KG/M2

## 2021-05-05 DIAGNOSIS — I10 ESSENTIAL HYPERTENSION: Primary | ICD-10-CM

## 2021-05-05 DIAGNOSIS — E78.2 MIXED HYPERLIPIDEMIA: ICD-10-CM

## 2021-05-05 DIAGNOSIS — N39.41 URGE INCONTINENCE: ICD-10-CM

## 2021-05-05 PROCEDURE — 99214 OFFICE O/P EST MOD 30 MIN: CPT | Performed by: FAMILY MEDICINE

## 2021-05-05 RX ORDER — ATORVASTATIN CALCIUM 10 MG/1
10 TABLET, FILM COATED ORAL DAILY
Qty: 90 TABLET | Refills: 0 | Status: SHIPPED | OUTPATIENT
Start: 2021-05-05 | End: 2021-08-23

## 2021-06-01 RX ORDER — OMEPRAZOLE 40 MG/1
CAPSULE, DELAYED RELEASE ORAL
Qty: 180 CAPSULE | Refills: 0 | Status: SHIPPED | OUTPATIENT
Start: 2021-06-01 | End: 2021-11-05

## 2021-07-08 RX ORDER — OXYBUTYNIN CHLORIDE 5 MG/1
TABLET, EXTENDED RELEASE ORAL
Qty: 90 TABLET | Refills: 0 | OUTPATIENT
Start: 2021-07-08

## 2021-07-12 ENCOUNTER — PATIENT MESSAGE (OUTPATIENT)
Dept: FAMILY MEDICINE CLINIC | Facility: CLINIC | Age: 71
End: 2021-07-12

## 2021-07-12 DIAGNOSIS — E03.9 HYPOTHYROIDISM, UNSPECIFIED TYPE: ICD-10-CM

## 2021-07-12 RX ORDER — OXYBUTYNIN CHLORIDE 5 MG/1
5 TABLET, EXTENDED RELEASE ORAL DAILY
Qty: 90 TABLET | Refills: 0 | Status: SHIPPED | OUTPATIENT
Start: 2021-07-12 | End: 2022-02-08

## 2021-07-12 RX ORDER — LEVOTHYROXINE SODIUM 0.05 MG/1
50 TABLET ORAL EVERY MORNING
Qty: 90 TABLET | Refills: 0 | Status: SHIPPED | OUTPATIENT
Start: 2021-07-12 | End: 2021-11-05

## 2021-07-12 NOTE — TELEPHONE ENCOUNTER
From: Sari Self  To: Nurys Edwards MD  Sent: 7/12/2021 5:16 AM EDT  Subject: Prescription Question    I don't know how many times I have to tell you that I am not going to take this as it is too expensive!! Then you said you woukd let me try Ditropan or something and I said ok but you never called it in. Now I'm having the spasms again because I don't have anything to take for them!!!

## 2021-07-20 NOTE — PROGRESS NOTES
Patient: Sari Self  YOB: 1950  Date of Service: 7/20/2021    Chief Complaints: bilateral knee pain     Subjective:    History of Present Illness: Pt is seen in the office today with complaints of bilateral knee pain I last saw her in April she states her knees are getting worse pain is worse her activity tolerance is worse         Allergies:   Allergies   Allergen Reactions   • Penicillins Hives       Medications:   Home Medications:  Current Outpatient Medications on File Prior to Visit   Medication Sig   • atorvastatin (Lipitor) 10 MG tablet Take 1 tablet by mouth Daily.   • Besivance 0.6 % suspension ophthalmic suspension INSTILL 1 DROP INTO SURGICAL EYE 4 TIMES DAILY BEGINNING 1 DAY BEFORE SURGERY   • Calcium Carbonate-Vitamin D (Calcium 600/Vitamin D) 600-400 MG-UNIT chewable tablet Chew 2 tablets Daily.   • diclofenac (VOLTAREN) 0.1 % ophthalmic solution INSTILL 1 DROP IN SURGICAL EYE 4 TIMES DAILY BEGINNING 1 DAY BEFORE SURGERY   • gabapentin (NEURONTIN) 300 MG capsule Take 600 mg by mouth 4 (Four) Times a Day.   • hydroCHLOROthiazide (HYDRODIURIL) 12.5 MG tablet Take 1 tablet by mouth Daily.   • levothyroxine (SYNTHROID, LEVOTHROID) 50 MCG tablet Take 1 tablet by mouth Every Morning.   • omeprazole (priLOSEC) 40 MG capsule Take 1 capsule by mouth twice daily   • oxybutynin XL (DITROPAN-XL) 5 MG 24 hr tablet Take 1 tablet by mouth Daily.   • potassium chloride (K-DUR,KLOR-CON) 10 MEQ CR tablet Take 10 mEq by mouth Daily. PRN WITH HCTZ   • prednisoLONE acetate (PRED FORTE) 1 % ophthalmic suspension INSTILL 1 DROP IN SURGICAL EYE 4 TIMES DAILY (BEGINNING 1 DAY BEFORE SURGERY)     No current facility-administered medications on file prior to visit.     Current Medications:  Scheduled Meds:  Continuous Infusions:No current facility-administered medications for this visit.    PRN Meds:.    I have reviewed the patient's medical history in detail and updated the computerized patient record.   Review and summarization of old records include:    Past Medical History:   Diagnosis Date   • Arthritis    • Cerebral aneurysm    • Cholelithiasis 2016    Gallbladder removed   • Disease of thyroid gland    • Diverticulosis    • GERD (gastroesophageal reflux disease)    • H/O cerebral aneurysm repair    • History of 2019 novel coronavirus disease (COVID-19)     AUGUST 2020 STATES VERY MILD SYMTOMS   • Hypertension    • IBS (irritable bowel syndrome)    • Osteoporosis    • Right knee pain    • RLS (restless legs syndrome)    • Stroke (CMS/HCC)    • TIA (transient ischemic attack)     2011   • Torn meniscus     RIGHT KNEE        Past Surgical History:   Procedure Laterality Date   • CATARACT EXTRACTION, BILATERAL     • CHOLECYSTECTOMY     • COLONOSCOPY  11/10/2010    prep of colon fair,IH,stool in transverse colon,hepatic flexure and ascending colon,ileum normal,IBS   • COLONOSCOPY N/A 2/27/2018    IH, diverticulosis, one 5mm polyp, tubular adenoma with low grade dysplasia   • CYSTOSCOPY     • ENDOSCOPY  11/27/2012    grd 1 reflux egitis,web upperd 3rd esoph   • ENDOSCOPY N/A 2/27/2018    normal biopsies, H Pylori positive   • FOOT NEUROMA SURGERY Left    • IR CEREBRAL ANEURYSM COILING  2012   • KNEE ARTHROSCOPY Left 7/3/2019    Procedure: KNEE ARTHROSCOPY ARTHRITIS DEBRIDEMENT PARTIAL MEDIAL AND LATERAL MENISECTOMY;  Surgeon: Nuris Bender MD;  [place] [facility] [place] [facility] [place]opedics   • KNEE ARTHROSCOPY Right 9/28/2020    Procedure: RIGHT KNEE ARTHROSCOPY, PARTIAL MEDIAL AND LATERAL MENISCECTOMIES, DEBRIDEMENT OF ARTHRITIS, AND INTERNAL FIXATION TIBAL PLATEAU INSUFFICIENCY FRACTURE;  Surgeon: Nuris Bender MD;  [place] [facility] [place] [facility] [place]  Service: [place]opedics;  Laterality: Right;   • THUMB ARTHROSCOPY     • UPPER GASTROINTESTINAL ENDOSCOPY  02/27/2018        Social History     Occupational History   • Not on file   Tobacco Use   • Smoking status: Never Smoker   • Smokeless tobacco: Never Used   Vaping  Use   • Vaping Use: Never used   Substance and Sexual Activity   • Alcohol use: Yes     Comment: SELDOM   • Drug use: No   • Sexual activity: Not Currently      Social History     Social History Narrative   • Not on file        Family History   Problem Relation Age of Onset   • Irritable bowel syndrome Daughter    • Malig Hyperthermia Neg Hx        ROS: 14 point review of systems was performed and was negative except for documented findings in HPI and today's encounter.     Allergies:   Allergies   Allergen Reactions   • Penicillins Hives     Constitutional:  Denies fever, shaking or chills   Eyes:  Denies change in visual acuity   HENT:  Denies nasal congestion or sore throat   Respiratory:  Denies cough or shortness of breath   Cardiovascular:  Denies chest pain or severe LE edema   GI:  Denies abdominal pain, nausea, vomiting, bloody stools or diarrhea   Musculoskeletal:  Numbness, tingling, or loss of motor function only as noted above in history of present illness.  : Denies painful urination or hematuria  Integument:  Denies rash, lesion or ulceration   Neurologic:  Denies headache or focal weakness  Endocrine:  Denies lymphadenopathy  Psych:  Denies confusion or change in mental status   Hem:  Denies active bleeding      Physical Exam: 71 y.o. female  Wt Readings from Last 3 Encounters:   05/05/21 70.3 kg (155 lb)   04/22/21 67.1 kg (148 lb)   03/23/21 69.5 kg (153 lb 3.2 oz)       There is no height or weight on file to calculate BMI.  No height and weight on file for this encounter.  There were no vitals filed for this visit.  Vital signs reviewed.   General Appearance:    Alert, cooperative, in no acute distress                    Ortho exam    Physical exam of the right knee reveals no effusion, no erythema.  It mild loss of extension and full flexion  Patient has mild varus alignment.  They have mild tenderness to palpation about the medial compartment, no tenderness laterally..  The patient has a  negative bounce home, negative Geraldine and a stable ligamentous exam.  Quad tone is reasonable and symmetric.  There are no overlying skin changes no lymphedema no lymphadenopathy.  There is good hip range of motion which is full symmetric and asymptomatic and a normal ankle exam.    Physical exam of the left knee reveals no effusion, no erythema.  It mild loss of extension and full flexion  Patient has mild varus alignment.  They have mild tenderness to palpation about the medial compartment, no tenderness laterally..  The patient has a negative bounce home, negative Geraldine and a stable ligamentous exam.  Quad tone is reasonable and symmetric.  There are no overlying skin changes no lymphedema no lymphadenopathy.  There is good hip range of motion which is full symmetric and asymptomatic and a normal ankle exam.         X-rays AP lateral merchant view of both knees were taken to evaluate her joints and compared to x-rays done 6 months ago.  She does have significant medial compartment OA I think greater than 50% loss of joint space there has not been too significant progression since last x-rays    Assessment:   Bilateral knee DJD.  She has significant medial compartment narrowing based on her alignment and watching her walk I really expected her x-rays to be worse.  They are still greater than 50% of her joint space loss.  Think is reasonable to inject her today talked about the importance of quad and core strengthening and weight management I will also have her talk to Dr. Zuniga about arthroplasty which I think at some point she will need if not now  Plan:   Follow up as indicated.  Ice, elevate, and rest as needed.  Discussed conservative measures of pain control including ice, bracing.  Also talked about the importance of strengthening and maintaining ideal body weight    Nuris Bender M.D.    Large Joint Arthrocentesis: R knee  Date/Time: 7/23/2021 9:34 AM  Consent given by: patient  Site marked: site  marked  Timeout: Immediately prior to procedure a time out was called to verify the correct patient, procedure, equipment, support staff and site/side marked as required   Supporting Documentation  Indications: pain and joint swelling   Procedure Details  Location: knee - R knee  Preparation: Patient was prepped and draped in the usual sterile fashion  Needle size: 22 G  Approach: anterolateral  Medications administered: 80 mg methylPREDNISolone acetate 80 MG/ML; 4 mL lidocaine PF 1% 1 %  Patient tolerance: patient tolerated the procedure well with no immediate complications    Large Joint Arthrocentesis: L knee  Date/Time: 7/23/2021 9:34 AM  Consent given by: patient  Site marked: site marked  Timeout: Immediately prior to procedure a time out was called to verify the correct patient, procedure, equipment, support staff and site/side marked as required   Supporting Documentation  Indications: pain and joint swelling   Procedure Details  Location: knee - L knee  Preparation: Patient was prepped and draped in the usual sterile fashion  Needle size: 22 G  Approach: anterolateral  Medications administered: 80 mg methylPREDNISolone acetate 80 MG/ML; 4 mL lidocaine PF 1% 1 %  Patient tolerance: patient tolerated the procedure well with no immediate complications

## 2021-07-23 ENCOUNTER — OFFICE VISIT (OUTPATIENT)
Dept: ORTHOPEDIC SURGERY | Facility: CLINIC | Age: 71
End: 2021-07-23

## 2021-07-23 VITALS — TEMPERATURE: 95.7 F | HEIGHT: 63 IN | BODY MASS INDEX: 26.58 KG/M2 | WEIGHT: 150 LBS

## 2021-07-23 DIAGNOSIS — M17.10 PRIMARY LOCALIZED OSTEOARTHROSIS OF LOWER LEG, UNSPECIFIED LATERALITY: Primary | ICD-10-CM

## 2021-07-23 DIAGNOSIS — M25.561 PAIN IN BOTH KNEES, UNSPECIFIED CHRONICITY: ICD-10-CM

## 2021-07-23 DIAGNOSIS — M25.562 PAIN IN BOTH KNEES, UNSPECIFIED CHRONICITY: ICD-10-CM

## 2021-07-23 PROCEDURE — 73562 X-RAY EXAM OF KNEE 3: CPT | Performed by: ORTHOPAEDIC SURGERY

## 2021-07-23 PROCEDURE — 99212 OFFICE O/P EST SF 10 MIN: CPT | Performed by: ORTHOPAEDIC SURGERY

## 2021-07-23 PROCEDURE — 20610 DRAIN/INJ JOINT/BURSA W/O US: CPT | Performed by: ORTHOPAEDIC SURGERY

## 2021-07-23 RX ORDER — LIDOCAINE HYDROCHLORIDE 10 MG/ML
4 INJECTION, SOLUTION EPIDURAL; INFILTRATION; INTRACAUDAL; PERINEURAL
Status: COMPLETED | OUTPATIENT
Start: 2021-07-23 | End: 2021-07-23

## 2021-07-23 RX ORDER — METHYLPREDNISOLONE ACETATE 80 MG/ML
80 INJECTION, SUSPENSION INTRA-ARTICULAR; INTRALESIONAL; INTRAMUSCULAR; SOFT TISSUE
Status: COMPLETED | OUTPATIENT
Start: 2021-07-23 | End: 2021-07-23

## 2021-07-23 RX ADMIN — LIDOCAINE HYDROCHLORIDE 4 ML: 10 INJECTION, SOLUTION EPIDURAL; INFILTRATION; INTRACAUDAL; PERINEURAL at 09:34

## 2021-07-23 RX ADMIN — METHYLPREDNISOLONE ACETATE 80 MG: 80 INJECTION, SUSPENSION INTRA-ARTICULAR; INTRALESIONAL; INTRAMUSCULAR; SOFT TISSUE at 09:34

## 2021-08-10 DIAGNOSIS — E03.9 ACQUIRED HYPOTHYROIDISM: ICD-10-CM

## 2021-08-10 DIAGNOSIS — I10 ESSENTIAL HYPERTENSION: Primary | ICD-10-CM

## 2021-08-11 DIAGNOSIS — E03.9 ACQUIRED HYPOTHYROIDISM: ICD-10-CM

## 2021-08-11 DIAGNOSIS — I10 ESSENTIAL HYPERTENSION: ICD-10-CM

## 2021-08-12 LAB
ALBUMIN SERPL-MCNC: 4.7 G/DL (ref 3.5–5.2)
ALBUMIN/GLOB SERPL: 1.7 G/DL
ALP SERPL-CCNC: 68 U/L (ref 39–117)
ALT SERPL-CCNC: 11 U/L (ref 1–33)
AST SERPL-CCNC: 14 U/L (ref 1–32)
BASOPHILS # BLD AUTO: 0.05 10*3/MM3 (ref 0–0.2)
BASOPHILS NFR BLD AUTO: 0.7 % (ref 0–1.5)
BILIRUB SERPL-MCNC: 0.6 MG/DL (ref 0–1.2)
BUN SERPL-MCNC: 17 MG/DL (ref 8–23)
BUN/CREAT SERPL: 20 (ref 7–25)
CALCIUM SERPL-MCNC: 10.6 MG/DL (ref 8.6–10.5)
CHLORIDE SERPL-SCNC: 102 MMOL/L (ref 98–107)
CHOLEST SERPL-MCNC: 227 MG/DL (ref 0–200)
CO2 SERPL-SCNC: 23.4 MMOL/L (ref 22–29)
CREAT SERPL-MCNC: 0.85 MG/DL (ref 0.57–1)
EOSINOPHIL # BLD AUTO: 0.17 10*3/MM3 (ref 0–0.4)
EOSINOPHIL NFR BLD AUTO: 2.3 % (ref 0.3–6.2)
ERYTHROCYTE [DISTWIDTH] IN BLOOD BY AUTOMATED COUNT: 12.7 % (ref 12.3–15.4)
GLOBULIN SER CALC-MCNC: 2.7 GM/DL
GLUCOSE SERPL-MCNC: 87 MG/DL (ref 65–99)
HCT VFR BLD AUTO: 41.7 % (ref 34–46.6)
HDLC SERPL-MCNC: 82 MG/DL (ref 40–60)
HGB BLD-MCNC: 13.8 G/DL (ref 12–15.9)
IMM GRANULOCYTES # BLD AUTO: 0.02 10*3/MM3 (ref 0–0.05)
IMM GRANULOCYTES NFR BLD AUTO: 0.3 % (ref 0–0.5)
LDLC SERPL CALC-MCNC: 134 MG/DL (ref 0–100)
LDLC/HDLC SERPL: 1.61 {RATIO}
LYMPHOCYTES # BLD AUTO: 2.46 10*3/MM3 (ref 0.7–3.1)
LYMPHOCYTES NFR BLD AUTO: 33 % (ref 19.6–45.3)
MCH RBC QN AUTO: 29.9 PG (ref 26.6–33)
MCHC RBC AUTO-ENTMCNC: 33.1 G/DL (ref 31.5–35.7)
MCV RBC AUTO: 90.5 FL (ref 79–97)
MONOCYTES # BLD AUTO: 0.46 10*3/MM3 (ref 0.1–0.9)
MONOCYTES NFR BLD AUTO: 6.2 % (ref 5–12)
NEUTROPHILS # BLD AUTO: 4.3 10*3/MM3 (ref 1.7–7)
NEUTROPHILS NFR BLD AUTO: 57.5 % (ref 42.7–76)
NRBC BLD AUTO-RTO: 0 /100 WBC (ref 0–0.2)
PLATELET # BLD AUTO: 334 10*3/MM3 (ref 140–450)
POTASSIUM SERPL-SCNC: 4.5 MMOL/L (ref 3.5–5.2)
PROT SERPL-MCNC: 7.4 G/DL (ref 6–8.5)
RBC # BLD AUTO: 4.61 10*6/MM3 (ref 3.77–5.28)
SODIUM SERPL-SCNC: 138 MMOL/L (ref 136–145)
TRIGL SERPL-MCNC: 65 MG/DL (ref 0–150)
TSH SERPL DL<=0.005 MIU/L-ACNC: 2.61 UIU/ML (ref 0.27–4.2)
VLDLC SERPL CALC-MCNC: 11 MG/DL (ref 5–40)
WBC # BLD AUTO: 7.46 10*3/MM3 (ref 3.4–10.8)

## 2021-08-18 ENCOUNTER — OFFICE VISIT (OUTPATIENT)
Dept: FAMILY MEDICINE CLINIC | Facility: CLINIC | Age: 71
End: 2021-08-18

## 2021-08-18 VITALS
DIASTOLIC BLOOD PRESSURE: 86 MMHG | WEIGHT: 156.1 LBS | HEART RATE: 72 BPM | SYSTOLIC BLOOD PRESSURE: 120 MMHG | TEMPERATURE: 95.9 F | OXYGEN SATURATION: 96 % | BODY MASS INDEX: 27.66 KG/M2 | HEIGHT: 63 IN

## 2021-08-18 DIAGNOSIS — H81.10 BENIGN PAROXYSMAL POSITIONAL VERTIGO, UNSPECIFIED LATERALITY: ICD-10-CM

## 2021-08-18 DIAGNOSIS — I10 ESSENTIAL HYPERTENSION: Primary | ICD-10-CM

## 2021-08-18 DIAGNOSIS — E83.52 HYPERCALCEMIA: ICD-10-CM

## 2021-08-18 DIAGNOSIS — E03.9 ACQUIRED HYPOTHYROIDISM: ICD-10-CM

## 2021-08-18 PROCEDURE — 99214 OFFICE O/P EST MOD 30 MIN: CPT | Performed by: FAMILY MEDICINE

## 2021-08-18 RX ORDER — MECLIZINE HCL 12.5 MG/1
12.5 TABLET ORAL 4 TIMES DAILY PRN
Qty: 30 TABLET | Refills: 0 | Status: SHIPPED | OUTPATIENT
Start: 2021-08-18 | End: 2021-09-22

## 2021-08-18 RX ORDER — HYDROCHLOROTHIAZIDE 12.5 MG/1
12.5 TABLET ORAL DAILY
Qty: 30 TABLET | Refills: 1
Start: 2021-08-18 | End: 2022-03-29 | Stop reason: SDUPTHER

## 2021-08-18 RX ORDER — GABAPENTIN 600 MG/1
TABLET ORAL
COMMUNITY
Start: 2021-08-11

## 2021-08-23 RX ORDER — ATORVASTATIN CALCIUM 10 MG/1
TABLET, FILM COATED ORAL
Qty: 90 TABLET | Refills: 0 | Status: SHIPPED | OUTPATIENT
Start: 2021-08-23 | End: 2022-09-20

## 2021-08-23 RX ORDER — CETIRIZINE HYDROCHLORIDE 10 MG/1
10 TABLET ORAL DAILY
Qty: 90 TABLET | Refills: 1 | Status: SHIPPED | OUTPATIENT
Start: 2021-08-23 | End: 2023-02-10

## 2021-08-23 NOTE — TELEPHONE ENCOUNTER
Rx Refill Note  Requested Prescriptions     Pending Prescriptions Disp Refills   • atorvastatin (LIPITOR) 10 MG tablet [Pharmacy Med Name: Atorvastatin Calcium 10 MG Oral Tablet] 90 tablet 0     Sig: Take 1 tablet by mouth once daily      Last office visit with prescribing clinician: 8/18/2021      Next office visit with prescribing clinician: 2/18/2022            Mayuri Garza MA  08/23/21, 10:37 EDT

## 2021-08-31 ENCOUNTER — OFFICE VISIT (OUTPATIENT)
Dept: ORTHOPEDIC SURGERY | Facility: CLINIC | Age: 71
End: 2021-08-31

## 2021-08-31 VITALS — TEMPERATURE: 98.2 F | HEIGHT: 63 IN | WEIGHT: 159 LBS | BODY MASS INDEX: 28.17 KG/M2

## 2021-08-31 DIAGNOSIS — M70.50 PES ANSERINE BURSITIS: Primary | ICD-10-CM

## 2021-08-31 PROCEDURE — 20610 DRAIN/INJ JOINT/BURSA W/O US: CPT | Performed by: ORTHOPAEDIC SURGERY

## 2021-08-31 PROCEDURE — 99213 OFFICE O/P EST LOW 20 MIN: CPT | Performed by: ORTHOPAEDIC SURGERY

## 2021-08-31 RX ORDER — METHYLPREDNISOLONE ACETATE 80 MG/ML
80 INJECTION, SUSPENSION INTRA-ARTICULAR; INTRALESIONAL; INTRAMUSCULAR; SOFT TISSUE
Status: COMPLETED | OUTPATIENT
Start: 2021-08-31 | End: 2021-08-31

## 2021-08-31 RX ADMIN — METHYLPREDNISOLONE ACETATE 80 MG: 80 INJECTION, SUSPENSION INTRA-ARTICULAR; INTRALESIONAL; INTRAMUSCULAR; SOFT TISSUE at 14:49

## 2021-08-31 NOTE — PROGRESS NOTES
willowPatient: Sari Self  YOB: 1950 71 y.o. female  Medical Record Number: [id number]    Chief Complaints:   Chief Complaint   Patient presents with   • Left Knee - Establish Care, Pain   • Right Knee - Establish Care, Pain       History of Present Illness:Sari Self is a 71 y.o. female who presents with complaints of left greater than right medial knee pain.  Has had previous arthroscopy and subsequent injections by [facility] unfortunately the pain is fairly severe.  It is somewhat intermittent in nature at times it will feel okay at other times the pain will be fairly severe about 8 out of 10.  She states in general is about a 5 or 6 out of 10.  She did not respond particularly well to intra-articular knee injections.    Allergies:   Allergies   Allergen Reactions   • Penicillins Hives       Medications:   Current Outpatient Medications   Medication Sig Dispense Refill   • atorvastatin (LIPITOR) 10 MG tablet Take 1 tablet by mouth once daily 90 tablet 0   • cetirizine (zyrTEC) 10 MG tablet Take 1 tablet by mouth Daily. 90 tablet 1   • gabapentin (NEURONTIN) 600 MG tablet TAKE 1 TABLET BY MOUTH 4 TIMES DAILY . DO NOT EXCEED 4 PER 24 HOURS     • hydroCHLOROthiazide (HYDRODIURIL) 12.5 MG tablet Take 1 tablet by mouth Daily. Not taking ever day , taking as needed 30 tablet 1   • levothyroxine (SYNTHROID, LEVOTHROID) 50 MCG tablet Take 1 tablet by mouth Every Morning. 90 tablet 0   • meclizine (ANTIVERT) 12.5 MG tablet Take 1 tablet by mouth 4 (Four) Times a Day As Needed for Dizziness. 30 tablet 0   • omeprazole (priLOSEC) 40 MG capsule Take 1 capsule by mouth twice daily 180 capsule 0   • oxybutynin XL (DITROPAN-XL) 5 MG 24 hr tablet Take 1 tablet by mouth Daily. 90 tablet 0   • potassium chloride (K-DUR,KLOR-CON) 10 MEQ CR tablet Take 10 mEq by mouth Daily. PRN WITH HCTZ       No current facility-administered medications for this visit.         The following portions of the patient's history were  "reviewed and updated as appropriate: allergies, current medications, past family history, past medical history, past social history, past surgical history and problem list.    Review of Systems:   A 14 point review of systems was performed. All systems negative except pertinent positives/negative listed in HPI above    Physical Exam:   Vitals:    08/31/21 1357   Temp: 98.2 °F (36.8 °C)   Weight: 72.1 kg (159 lb)   Height: 160 cm (63\")       General: A and O x 3, ASA, NAD    SCLERA:    Normal    DENTITION:   Normal  Knee:  bilateral    ALIGNMENT:     Neutral  ,   Patella tracks   midline    GAIT:     Nonantalgic    SKIN:    No abnormality    RANGE OF MOTION:   0  -  135   DEG    STRENGTH:   5 / 5    LIGAMENTS:    No varus / valgus instability.   Negative  Lachman.    MENISCUS:     Negative   Geraldine       DISTAL PULSES:    Paplable    DISTAL SENSATION :   Intact    LYMPHATICS:     No   lymphadenopathy    OTHER:          - No  effusion      - No crepitance with ROM      +Swelling and tenderness to palpation pes anserine bursa        Radiology:  Xrays 3views (ap,lateral, sunrise) taken previously demonstrating moderate joint space narrowing of the medial joint consistent with osteoarthritis.  Also reviewed MRIs which show some subchondral inflammation and cartilage loss of the medial compartment.    Assessment/Plan: Bilateral knee moderate medial compartment osteoarthritis.  Not bad enough for me to consider surgical intervention on her hip.  I am concerned that she may have more tenderness secondary to the Pez anserine bursitis so under sterile conditions I injected the left knee Pez anserine bursa and we will see how she responds.  Large Joint Arthrocentesis: L knee  Date/Time: 8/31/2021 2:49 PM  Consent given by: patient  Site marked: site marked  Timeout: Immediately prior to procedure a time out was called to verify the correct patient, procedure, equipment, support staff and site/side marked as required "   Supporting Documentation  Indications: joint swelling and pain   Procedure Details  Location: knee - L knee  Preparation: Patient was prepped and draped in the usual sterile fashion  Needle size: 18 G  Approach: medial  Medications administered: 80 mg methylPREDNISolone acetate 80 MG/ML; 2 mL lidocaine (cardiac)                Everton Zuniga MD  8/31/2021

## 2021-09-21 DIAGNOSIS — H81.10 BENIGN PAROXYSMAL POSITIONAL VERTIGO, UNSPECIFIED LATERALITY: ICD-10-CM

## 2021-09-22 RX ORDER — MECLIZINE HCL 12.5 MG/1
TABLET ORAL
Qty: 30 TABLET | Refills: 0 | Status: SHIPPED | OUTPATIENT
Start: 2021-09-22 | End: 2021-11-05

## 2021-10-20 ENCOUNTER — IMMUNIZATION (OUTPATIENT)
Dept: VACCINE CLINIC | Facility: HOSPITAL | Age: 71
End: 2021-10-20

## 2021-10-20 PROCEDURE — 91300 HC SARSCOV02 VAC 30MCG/0.3ML IM: CPT | Performed by: INTERNAL MEDICINE

## 2021-10-20 PROCEDURE — 0004A ADM SARSCOV2 30MCG/0.3ML BOOSTER: CPT | Performed by: INTERNAL MEDICINE

## 2021-11-04 ENCOUNTER — FLU SHOT (OUTPATIENT)
Dept: FAMILY MEDICINE CLINIC | Facility: CLINIC | Age: 71
End: 2021-11-04

## 2021-11-04 DIAGNOSIS — Z23 NEED FOR VACCINATION: Primary | ICD-10-CM

## 2021-11-04 DIAGNOSIS — H81.10 BENIGN PAROXYSMAL POSITIONAL VERTIGO, UNSPECIFIED LATERALITY: ICD-10-CM

## 2021-11-04 DIAGNOSIS — E03.9 HYPOTHYROIDISM, UNSPECIFIED TYPE: ICD-10-CM

## 2021-11-04 PROCEDURE — G0008 ADMIN INFLUENZA VIRUS VAC: HCPCS | Performed by: FAMILY MEDICINE

## 2021-11-04 PROCEDURE — 90662 IIV NO PRSV INCREASED AG IM: CPT | Performed by: FAMILY MEDICINE

## 2021-11-05 RX ORDER — LEVOTHYROXINE SODIUM 50 UG/1
TABLET ORAL
Qty: 90 TABLET | Refills: 0 | Status: SHIPPED | OUTPATIENT
Start: 2021-11-05 | End: 2022-03-29 | Stop reason: SDUPTHER

## 2021-11-05 RX ORDER — MECLIZINE HCL 12.5 MG/1
TABLET ORAL
Qty: 30 TABLET | Refills: 0 | Status: SHIPPED | OUTPATIENT
Start: 2021-11-05 | End: 2022-11-14

## 2021-11-05 RX ORDER — OMEPRAZOLE 40 MG/1
CAPSULE, DELAYED RELEASE ORAL
Qty: 180 CAPSULE | Refills: 0 | Status: SHIPPED | OUTPATIENT
Start: 2021-11-05 | End: 2022-02-24

## 2021-11-05 NOTE — TELEPHONE ENCOUNTER
Rx Refill Note  Requested Prescriptions     Pending Prescriptions Disp Refills   • meclizine (ANTIVERT) 12.5 MG tablet [Pharmacy Med Name: Meclizine HCl 12.5 MG Oral Tablet] 30 tablet 0     Sig: TAKE 1 TABLET BY MOUTH 4 TIMES DAILY AS NEEDED FOR DIZZINESS   • Euthyrox 50 MCG tablet [Pharmacy Med Name: Euthyrox 50 MCG Oral Tablet] 90 tablet 0     Sig: TAKE 1 TABLET BY MOUTH ONCE DAILY IN THE MORNING      Last office visit with prescribing clinician: 8/18/2021      Next office visit with prescribing clinician: 2/18/2022            Mayuri Garza MA  11/05/21, 12:26 EDT

## 2021-11-17 NOTE — PROGRESS NOTES
Patient: Sari Self  YOB: 1950  Date of Service: 11/17/2021    Chief Complaints: Bilateral knee pain    Subjective:    History of Present Illness: Pt is seen in the office today with complaints of bilateral knee pain she does get intermittent injections she has no degenerative changes she understands all of her options.          Allergies:   Allergies   Allergen Reactions   • Penicillins Hives       Medications:   Home Medications:  Current Outpatient Medications on File Prior to Visit   Medication Sig   • atorvastatin (LIPITOR) 10 MG tablet Take 1 tablet by mouth once daily   • cetirizine (zyrTEC) 10 MG tablet Take 1 tablet by mouth Daily.   • Euthyrox 50 MCG tablet TAKE 1 TABLET BY MOUTH ONCE DAILY IN THE MORNING   • gabapentin (NEURONTIN) 600 MG tablet TAKE 1 TABLET BY MOUTH 4 TIMES DAILY . DO NOT EXCEED 4 PER 24 HOURS   • hydroCHLOROthiazide (HYDRODIURIL) 12.5 MG tablet Take 1 tablet by mouth Daily. Not taking ever day , taking as needed   • meclizine (ANTIVERT) 12.5 MG tablet TAKE 1 TABLET BY MOUTH 4 TIMES DAILY AS NEEDED FOR DIZZINESS   • omeprazole (priLOSEC) 40 MG capsule Take 1 capsule by mouth twice daily   • oxybutynin XL (DITROPAN-XL) 5 MG 24 hr tablet Take 1 tablet by mouth Daily.   • potassium chloride (K-DUR,KLOR-CON) 10 MEQ CR tablet Take 10 mEq by mouth Daily. PRN WITH HCTZ     No current facility-administered medications on file prior to visit.     Current Medications:  Scheduled Meds:  Continuous Infusions:No current facility-administered medications for this visit.    PRN Meds:.    I have reviewed the patient's medical history in detail and updated the computerized patient record.  Review and summarization of old records include:    Past Medical History:   Diagnosis Date   • Arthritis    • Cerebral aneurysm    • Cholelithiasis 2016    Gallbladder removed   • Disease of thyroid gland    • Dislocation, shoulder 1968    Dislocated several times   • Diverticulosis    • GERD  (gastroesophageal reflux disease)    • H/O cerebral aneurysm repair    • History of 2019 novel coronavirus disease (COVID-19)     AUGUST 2020 STATES VERY MILD SYMTOMS   • Hypertension    • IBS (irritable bowel syndrome)    • Knee swelling    • Osteoporosis    • Right knee pain    • RLS (restless legs syndrome)    • Rotator cuff syndrome 1968    Tears   • Stress fracture August 2019   • Stroke (CMS/HCC)    • Tear of meniscus of knee 2019 & 2020    Surgery both knees   • TIA (transient ischemic attack)     2011   • Torn meniscus     RIGHT KNEE        Past Surgical History:   Procedure Laterality Date   • CATARACT EXTRACTION, BILATERAL     • CHOLECYSTECTOMY     • COLONOSCOPY  11/10/2010    prep of colon fair,IH,stool in transverse colon,hepatic flexure and ascending colon,ileum normal,IBS   • COLONOSCOPY N/A 2/27/2018    IH, diverticulosis, one 5mm polyp, tubular adenoma with low grade dysplasia   • CYSTOSCOPY     • ENDOSCOPY  11/27/2012    grd 1 reflux egitis,web upperd 3rd esoph   • ENDOSCOPY N/A 2/27/2018    normal biopsies, H Pylori positive   • FOOT NEUROMA SURGERY Left    • HAND SURGERY     • IR CEREBRAL ANEURYSM COILING  2012   • KNEE ARTHROSCOPY Left 7/3/2019    Procedure: KNEE ARTHROSCOPY ARTHRITIS DEBRIDEMENT PARTIAL MEDIAL AND LATERAL MENISECTOMY;  Surgeon: Nuris Bender MD;  [place] [facility] [place] [facility] [place]  Service: [place]opedics   • KNEE ARTHROSCOPY Right 9/28/2020    Procedure: RIGHT KNEE ARTHROSCOPY, PARTIAL MEDIAL AND LATERAL MENISCECTOMIES, DEBRIDEMENT OF ARTHRITIS, AND INTERNAL FIXATION TIBAL PLATEAU INSUFFICIENCY FRACTURE;  Surgeon: Nuris Bender MD;  [place] [facility] [place] [facility] [place]  Service: Orthopedics;  Laterality: Right;   • KNEE SURGERY  2019 & 2020   • THUMB ARTHROSCOPY     • TRIGGER POINT INJECTION      Hand, elbow, shoulders, knees   • UPPER GASTROINTESTINAL ENDOSCOPY  02/27/2018   • WRIST SURGERY          Social History     Occupational History   • Not on file   Tobacco Use   • Smoking status: Never  Smoker   • Smokeless tobacco: Never Used   Vaping Use   • Vaping Use: Never used   Substance and Sexual Activity   • Alcohol use: Yes     Alcohol/week: 0.0 standard drinks     Comment: Occasionally   • Drug use: No   • Sexual activity: Not Currently      Social History     Social History Narrative   • Not on file        Family History   Problem Relation Age of Onset   • Irritable bowel syndrome Daughter    • Malig Hyperthermia Neg Hx        ROS: 14 point review of systems was performed and was negative except for documented findings in HPI and today's encounter.     Allergies:   Allergies   Allergen Reactions   • Penicillins Hives     Constitutional:  Denies fever, shaking or chills   Eyes:  Denies change in visual acuity   HENT:  Denies nasal congestion or sore throat   Respiratory:  Denies cough or shortness of breath   Cardiovascular:  Denies chest pain or severe LE edema   GI:  Denies abdominal pain, nausea, vomiting, bloody stools or diarrhea   Musculoskeletal:  Numbness, tingling, or loss of motor function only as noted above in history of present illness.  : Denies painful urination or hematuria  Integument:  Denies rash, lesion or ulceration   Neurologic:  Denies headache or focal weakness  Endocrine:  Denies lymphadenopathy  Psych:  Denies confusion or change in mental status   Hem:  Denies active bleeding      Physical Exam: 71 y.o. female  Wt Readings from Last 3 Encounters:   08/31/21 72.1 kg (159 lb)   08/18/21 70.8 kg (156 lb 1.6 oz)   07/23/21 68 kg (150 lb)       There is no height or weight on file to calculate BMI.  No height and weight on file for this encounter.  There were no vitals filed for this visit.  Vital signs reviewed.   General Appearance:    Alert, cooperative, in no acute distress                    Ortho exam    Exam is unchanged       .time    Assessment: Bilateral knee DJD    Plan: Injections she understands her surgical options but wants to continue conservative  Follow up as  indicated.  Ice, elevate, and rest as needed.  Discussed conservative measures of pain control including ice, bracing.  Also talked about the importance of strengthening and maintaining ideal body weight      Large Joint Arthrocentesis: R knee  Date/Time: 11/18/2021 8:54 AM  Consent given by: patient  Site marked: site marked  Timeout: Immediately prior to procedure a time out was called to verify the correct patient, procedure, equipment, support staff and site/side marked as required   Supporting Documentation  Indications: pain   Procedure Details  Location: knee - R knee  Preparation: Patient was prepped and draped in the usual sterile fashion  Needle gauge: 21G.  Approach: medial  Medications administered: 80 mg methylPREDNISolone acetate 80 MG/ML; 4 mL lidocaine (cardiac)  Patient tolerance: patient tolerated the procedure well with no immediate complications    Large Joint Arthrocentesis: L knee  Date/Time: 11/18/2021 8:56 AM  Consent given by: patient  Site marked: site marked  Timeout: Immediately prior to procedure a time out was called to verify the correct patient, procedure, equipment, support staff and site/side marked as required   Supporting Documentation  Indications: pain   Procedure Details  Location: knee - L knee  Preparation: Patient was prepped and draped in the usual sterile fashion  Needle gauge: 21G.  Approach: medial  Medications administered: 80 mg methylPREDNISolone acetate 80 MG/ML; 4 mL lidocaine (cardiac)  Patient tolerance: patient tolerated the procedure well with no immediate complications            Nuris Bender M.D.

## 2021-11-18 ENCOUNTER — CLINICAL SUPPORT (OUTPATIENT)
Dept: ORTHOPEDIC SURGERY | Facility: CLINIC | Age: 71
End: 2021-11-18

## 2021-11-18 VITALS — BODY MASS INDEX: 26.22 KG/M2 | HEIGHT: 63 IN | WEIGHT: 148 LBS | TEMPERATURE: 97.5 F

## 2021-11-18 DIAGNOSIS — M17.0 PRIMARY OSTEOARTHRITIS OF BOTH KNEES: Primary | ICD-10-CM

## 2021-11-18 PROCEDURE — 20610 DRAIN/INJ JOINT/BURSA W/O US: CPT | Performed by: ORTHOPAEDIC SURGERY

## 2021-11-18 RX ORDER — METHYLPREDNISOLONE ACETATE 80 MG/ML
80 INJECTION, SUSPENSION INTRA-ARTICULAR; INTRALESIONAL; INTRAMUSCULAR; SOFT TISSUE
Status: COMPLETED | OUTPATIENT
Start: 2021-11-18 | End: 2021-11-18

## 2021-11-18 RX ADMIN — METHYLPREDNISOLONE ACETATE 80 MG: 80 INJECTION, SUSPENSION INTRA-ARTICULAR; INTRALESIONAL; INTRAMUSCULAR; SOFT TISSUE at 08:54

## 2021-11-18 RX ADMIN — METHYLPREDNISOLONE ACETATE 80 MG: 80 INJECTION, SUSPENSION INTRA-ARTICULAR; INTRALESIONAL; INTRAMUSCULAR; SOFT TISSUE at 08:56

## 2022-01-04 ENCOUNTER — OFFICE VISIT (OUTPATIENT)
Dept: ORTHOPEDIC SURGERY | Facility: CLINIC | Age: 72
End: 2022-01-04

## 2022-01-04 VITALS — BODY MASS INDEX: 28.7 KG/M2 | WEIGHT: 162 LBS | HEIGHT: 63 IN | TEMPERATURE: 97.3 F

## 2022-01-04 DIAGNOSIS — M17.12 PRIMARY OSTEOARTHRITIS OF LEFT KNEE: Primary | ICD-10-CM

## 2022-01-04 DIAGNOSIS — M17.11 PRIMARY OSTEOARTHRITIS OF RIGHT KNEE: ICD-10-CM

## 2022-01-04 PROCEDURE — 99214 OFFICE O/P EST MOD 30 MIN: CPT | Performed by: ORTHOPAEDIC SURGERY

## 2022-01-04 RX ORDER — MELOXICAM 15 MG/1
15 TABLET ORAL ONCE
Status: CANCELLED | OUTPATIENT
Start: 2022-03-18 | End: 2022-01-04

## 2022-01-04 RX ORDER — VANCOMYCIN HYDROCHLORIDE 1 G/200ML
15 INJECTION, SOLUTION INTRAVENOUS ONCE
Status: CANCELLED | OUTPATIENT
Start: 2022-03-18 | End: 2022-01-04

## 2022-01-04 RX ORDER — POVIDONE-IODINE 10 MG/ML
SOLUTION TOPICAL ONCE
Status: CANCELLED | OUTPATIENT
Start: 2022-03-18 | End: 2022-01-04

## 2022-01-04 RX ORDER — CHLORHEXIDINE GLUCONATE 500 MG/1
CLOTH TOPICAL 2 TIMES DAILY
Status: CANCELLED | OUTPATIENT
Start: 2022-01-04

## 2022-01-04 RX ORDER — CEFAZOLIN SODIUM 2 G/100ML
2 INJECTION, SOLUTION INTRAVENOUS ONCE
Status: CANCELLED | OUTPATIENT
Start: 2022-03-18 | End: 2022-01-04

## 2022-01-04 RX ORDER — PREGABALIN 75 MG/1
150 CAPSULE ORAL ONCE
Status: CANCELLED | OUTPATIENT
Start: 2022-03-18 | End: 2022-01-04

## 2022-01-04 NOTE — PROGRESS NOTES
Patient: Sari Self  YOB: 1950 71 y.o. female  Medical Record Number: [id number]    Chief Complaints:   Chief Complaint   Patient presents with   • Right Knee - Follow-up   • Left Knee - Follow-up       History of Present Illness:Sari Self is a 71 y.o. female who presents for follow-up of  L > R knee ensdtage kne pain - severe ;imits adls, not improved with injections, PT, nsaids, and arthroscopy. Pain is limited to the medial compartment of both knees.    Allergies:   Allergies   Allergen Reactions   • Penicillins Hives       Medications:   Current Outpatient Medications   Medication Sig Dispense Refill   • atorvastatin (LIPITOR) 10 MG tablet Take 1 tablet by mouth once daily 90 tablet 0   • cetirizine (zyrTEC) 10 MG tablet Take 1 tablet by mouth Daily. 90 tablet 1   • Euthyrox 50 MCG tablet TAKE 1 TABLET BY MOUTH ONCE DAILY IN THE MORNING 90 tablet 0   • gabapentin (NEURONTIN) 600 MG tablet TAKE 1 TABLET BY MOUTH 4 TIMES DAILY . DO NOT EXCEED 4 PER 24 HOURS     • hydroCHLOROthiazide (HYDRODIURIL) 12.5 MG tablet Take 1 tablet by mouth Daily. Not taking ever day , taking as needed 30 tablet 1   • meclizine (ANTIVERT) 12.5 MG tablet TAKE 1 TABLET BY MOUTH 4 TIMES DAILY AS NEEDED FOR DIZZINESS 30 tablet 0   • omeprazole (priLOSEC) 40 MG capsule Take 1 capsule by mouth twice daily 180 capsule 0   • oxybutynin XL (DITROPAN-XL) 5 MG 24 hr tablet Take 1 tablet by mouth Daily. 90 tablet 0   • potassium chloride (K-DUR,KLOR-CON) 10 MEQ CR tablet Take 10 mEq by mouth Daily. PRN WITH HCTZ       No current facility-administered medications for this visit.         The following portions of the patient's history were reviewed and updated as appropriate: allergies, current medications, past family history, past medical history, past social history, past surgical history and problem list.    Review of Systems:   A 14 point review of systems was performed. All systems negative except pertinent  "positives/negative listed in HPI above    Physical Exam:   Vitals:    01/04/22 0912   Temp: 97.3 °F (36.3 °C)   Weight: 73.5 kg (162 lb)   Height: 160 cm (63\")       General: A and O x 3, ASA, NAD    SCLERA:    Normal    DENTITION:   Normal  Knee:  bilateral    ALIGNMENT:     Varus  ,   Patella  tracks  Midline WITHOUT CREPITANCE    GAIT:    Antalgic    SKIN:    No abnormality    RANGE OF MOTION:   0  -  125   DEG    STRENGTH:   4  / 5    LIGAMENTS:    No varus / valgus instability.   Negative  Lachman.    MENISCUS:     Negative   Geraldine       DISTAL PULSES:    Paplable    DISTAL SENSATION :   Intact    LYMPHATICS:     No   lymphadenopathy    OTHER:          - Positive   effusion      -medial Crepitance with ROM        Radiology:  Xrays 3views both knees (ap,lateral, sunrise) taken previously demonstrating moderately to moderately severe advanced varus osteoarthritis with bone on bone articulation, subchondral cysts, and periarticular osteophytes  todays xrays were compared to previous xrays and demonstrate no change    Assessment/Plan:  edgardo knee advanced OA - not improved with scopes, innjections, PT, NSAIDs.  Continuation of conservative management vs. UKA vs TKA discussed. After stating understanding of the procedures and associated risks / benefit / alternatives of the various options,  the patient wishes to proceed with unicompartmental knee replacement.  At this point the patient has failed the full gamut of conservative treatment and stating complete understanding of the risks / benefits / anternatives wishes to proceed with surgical treatment.    The patient understands that no guarantees have been made with regard to outcomes of this procedure and that there is a possibility that future surgery is a possibility including conversion to total knee replacement should further disease progression occur in other compartments of the knee.    Risk and benefits of surgery were reviewed.  Including, but not limited " to, blood clots or pulmonary embolism, anesthesia risk, infection, fracture, skin/leg numbness, persistent pain/crepitance/popping/catching, failure of the implant, need for future surgeries, hematoma, possible nerve or blood vessel injury, need for transfusion, and potential risk of stroke,heart attack or death, among others.  The patient understands and wishes to proceed.     It was explained that if tissue has been repaired or reconstructed, there is also an increased chance of failure which may require further management.  Following the completion of the discussion, the patient expressed understanding of this planned course of care, all their questions were answered and consent will be obtained preoperatively.    Operative Plan:  Left [facility]/ [facility] unicompartmental knee replacement OP with outpatient rehab

## 2022-02-04 RX ORDER — OMEPRAZOLE 40 MG/1
CAPSULE, DELAYED RELEASE ORAL
Qty: 180 CAPSULE | Refills: 0 | OUTPATIENT
Start: 2022-02-04

## 2022-02-07 ENCOUNTER — TELEPHONE (OUTPATIENT)
Dept: FAMILY MEDICINE CLINIC | Facility: CLINIC | Age: 72
End: 2022-02-07

## 2022-02-07 NOTE — TELEPHONE ENCOUNTER
Caller: Sari Self    Relationship: Self    Best call back number: [phone]    What orders are you requesting (i.e. lab or imaging): BLOOD WORK     In what timeframe would the patient need to come in: ASAP    Where will you receive your lab/imaging services: [facility]     Additional notes:WORKS IN HOSPITAL NOW AND WOULD LIKE TO HAVE BLOOD WORK DONE THERE

## 2022-02-08 DIAGNOSIS — E78.2 MIXED HYPERLIPIDEMIA: Primary | ICD-10-CM

## 2022-02-08 RX ORDER — OXYBUTYNIN CHLORIDE 5 MG/1
TABLET, EXTENDED RELEASE ORAL
Qty: 90 TABLET | Refills: 0 | Status: SHIPPED | OUTPATIENT
Start: 2022-02-08 | End: 2023-02-03

## 2022-02-08 NOTE — TELEPHONE ENCOUNTER
Rx Refill Note  Requested Prescriptions     Pending Prescriptions Disp Refills   • oxybutynin XL (DITROPAN-XL) 5 MG 24 hr tablet [Pharmacy Med Name: Oxybutynin Chloride ER 5 MG Oral Tablet Extended Release 24 Hour] 90 tablet 0     Sig: Take 1 tablet by mouth once daily      Last office visit with prescribing clinician: 8/18/2021      Next office visit with prescribing clinician: 2/18/2022            Mayuri Garza MA  02/08/22, 08:20 EST

## 2022-02-09 ENCOUNTER — LAB (OUTPATIENT)
Dept: LAB | Facility: HOSPITAL | Age: 72
End: 2022-02-09

## 2022-02-09 DIAGNOSIS — E78.2 MIXED HYPERLIPIDEMIA: ICD-10-CM

## 2022-02-09 LAB
ALBUMIN SERPL-MCNC: 4.2 G/DL (ref 3.5–5.2)
ALBUMIN/GLOB SERPL: 1.4 G/DL
ALP SERPL-CCNC: 70 U/L (ref 39–117)
ALT SERPL W P-5'-P-CCNC: 10 U/L (ref 1–33)
ANION GAP SERPL CALCULATED.3IONS-SCNC: 9.6 MMOL/L (ref 5–15)
AST SERPL-CCNC: 8 U/L (ref 1–32)
BASOPHILS # BLD AUTO: 0.06 10*3/MM3 (ref 0–0.2)
BASOPHILS NFR BLD AUTO: 1 % (ref 0–1.5)
BILIRUB SERPL-MCNC: 0.5 MG/DL (ref 0–1.2)
BUN SERPL-MCNC: 16 MG/DL (ref 8–23)
BUN/CREAT SERPL: 18 (ref 7–25)
CALCIUM SPEC-SCNC: 9.9 MG/DL (ref 8.6–10.5)
CHLORIDE SERPL-SCNC: 103 MMOL/L (ref 98–107)
CHOLEST SERPL-MCNC: 229 MG/DL (ref 0–200)
CO2 SERPL-SCNC: 26.4 MMOL/L (ref 22–29)
CREAT SERPL-MCNC: 0.89 MG/DL (ref 0.57–1)
DEPRECATED RDW RBC AUTO: 40.4 FL (ref 37–54)
EOSINOPHIL # BLD AUTO: 0.24 10*3/MM3 (ref 0–0.4)
EOSINOPHIL NFR BLD AUTO: 3.8 % (ref 0.3–6.2)
ERYTHROCYTE [DISTWIDTH] IN BLOOD BY AUTOMATED COUNT: 12.8 % (ref 12.3–15.4)
GFR SERPL CREATININE-BSD FRML MDRD: 62 ML/MIN/1.73
GLOBULIN UR ELPH-MCNC: 2.9 GM/DL
GLUCOSE SERPL-MCNC: 91 MG/DL (ref 65–99)
HCT VFR BLD AUTO: 40.5 % (ref 34–46.6)
HDLC SERPL-MCNC: 68 MG/DL (ref 40–60)
HGB BLD-MCNC: 13.6 G/DL (ref 12–15.9)
IMM GRANULOCYTES # BLD AUTO: 0.02 10*3/MM3 (ref 0–0.05)
IMM GRANULOCYTES NFR BLD AUTO: 0.3 % (ref 0–0.5)
LDLC SERPL CALC-MCNC: 147 MG/DL (ref 0–100)
LDLC/HDLC SERPL: 2.14 {RATIO}
LYMPHOCYTES # BLD AUTO: 2.57 10*3/MM3 (ref 0.7–3.1)
LYMPHOCYTES NFR BLD AUTO: 41.2 % (ref 19.6–45.3)
MCH RBC QN AUTO: 29.3 PG (ref 26.6–33)
MCHC RBC AUTO-ENTMCNC: 33.6 G/DL (ref 31.5–35.7)
MCV RBC AUTO: 87.3 FL (ref 79–97)
MONOCYTES # BLD AUTO: 0.54 10*3/MM3 (ref 0.1–0.9)
MONOCYTES NFR BLD AUTO: 8.7 % (ref 5–12)
NEUTROPHILS NFR BLD AUTO: 2.81 10*3/MM3 (ref 1.7–7)
NEUTROPHILS NFR BLD AUTO: 45 % (ref 42.7–76)
NRBC BLD AUTO-RTO: 0 /100 WBC (ref 0–0.2)
PLATELET # BLD AUTO: 334 10*3/MM3 (ref 140–450)
PMV BLD AUTO: 9.4 FL (ref 6–12)
POTASSIUM SERPL-SCNC: 4.5 MMOL/L (ref 3.5–5.2)
PROT SERPL-MCNC: 7.1 G/DL (ref 6–8.5)
RBC # BLD AUTO: 4.64 10*6/MM3 (ref 3.77–5.28)
SODIUM SERPL-SCNC: 139 MMOL/L (ref 136–145)
TRIGL SERPL-MCNC: 79 MG/DL (ref 0–150)
VLDLC SERPL-MCNC: 14 MG/DL (ref 5–40)
WBC NRBC COR # BLD: 6.24 10*3/MM3 (ref 3.4–10.8)

## 2022-02-09 PROCEDURE — 80053 COMPREHEN METABOLIC PANEL: CPT

## 2022-02-09 PROCEDURE — 83970 ASSAY OF PARATHORMONE: CPT | Performed by: FAMILY MEDICINE

## 2022-02-09 PROCEDURE — 80061 LIPID PANEL: CPT

## 2022-02-09 PROCEDURE — 85025 COMPLETE CBC W/AUTO DIFF WBC: CPT

## 2022-02-09 RX ORDER — OMEPRAZOLE 40 MG/1
40 CAPSULE, DELAYED RELEASE ORAL 2 TIMES DAILY
Qty: 180 CAPSULE | Refills: 0 | OUTPATIENT
Start: 2022-02-09

## 2022-02-18 ENCOUNTER — OFFICE VISIT (OUTPATIENT)
Dept: FAMILY MEDICINE CLINIC | Facility: CLINIC | Age: 72
End: 2022-02-18

## 2022-02-18 VITALS
HEART RATE: 72 BPM | DIASTOLIC BLOOD PRESSURE: 82 MMHG | TEMPERATURE: 96.8 F | HEIGHT: 63 IN | WEIGHT: 169.4 LBS | BODY MASS INDEX: 30.02 KG/M2 | OXYGEN SATURATION: 96 % | SYSTOLIC BLOOD PRESSURE: 130 MMHG

## 2022-02-18 DIAGNOSIS — Z00.00 MEDICARE ANNUAL WELLNESS VISIT, SUBSEQUENT: Primary | ICD-10-CM

## 2022-02-18 DIAGNOSIS — E03.9 HYPOTHYROIDISM, UNSPECIFIED TYPE: ICD-10-CM

## 2022-02-18 DIAGNOSIS — E78.2 MIXED HYPERLIPIDEMIA: ICD-10-CM

## 2022-02-18 DIAGNOSIS — I10 ESSENTIAL HYPERTENSION: ICD-10-CM

## 2022-02-18 PROCEDURE — 1170F FXNL STATUS ASSESSED: CPT | Performed by: FAMILY MEDICINE

## 2022-02-18 PROCEDURE — 1160F RVW MEDS BY RX/DR IN RCRD: CPT | Performed by: FAMILY MEDICINE

## 2022-02-18 PROCEDURE — G0439 PPPS, SUBSEQ VISIT: HCPCS | Performed by: FAMILY MEDICINE

## 2022-02-18 PROCEDURE — 99213 OFFICE O/P EST LOW 20 MIN: CPT | Performed by: FAMILY MEDICINE

## 2022-02-18 NOTE — PROGRESS NOTES
The ABCs of the Annual Wellness Visit  Subsequent Medicare Wellness Visit    Chief Complaint   Patient presents with   • Hyperlipidemia     F/u *NEEDS AWV*   • Hypertension   • Medicare Wellness-subsequent      Subjective    History of Present Illness:  Sari Self is a 71 y.o. female who presents for a Subsequent Medicare Wellness Visit.  She is also here for follow-up on hypertension and hyperlipidemia.  Had labs done before.  History of hypertension but does not take the medication every day.  Hyperlipidemia, on Lipitor admit not taking the medication regularly.  Denies any nausea vomiting or any side effects with the medication.    The following portions of the patient's history were reviewed and   updated as appropriate: allergies, current medications, past family history, past medical history, past social history, past surgical history and problem list.    Compared to one year ago, the patient feels her physical   health is worse.    Compared to one year ago, the patient feels her mental   health is the same.    Recent Hospitalizations:  She was not admitted to the hospital during the last year.       Current Medical Providers:  Patient Care Team:  Nurys Edwards MD as PCP - General (Family Medicine)    Outpatient Medications Prior to Visit   Medication Sig Dispense Refill   • atorvastatin (LIPITOR) 10 MG tablet Take 1 tablet by mouth once daily 90 tablet 0   • cetirizine (zyrTEC) 10 MG tablet Take 1 tablet by mouth Daily. 90 tablet 1   • Euthyrox 50 MCG tablet TAKE 1 TABLET BY MOUTH ONCE DAILY IN THE MORNING 90 tablet 0   • gabapentin (NEURONTIN) 600 MG tablet TAKE 1 TABLET BY MOUTH 4 TIMES DAILY . DO NOT EXCEED 4 PER 24 HOURS     • hydroCHLOROthiazide (HYDRODIURIL) 12.5 MG tablet Take 1 tablet by mouth Daily. Not taking ever day , taking as needed 30 tablet 1   • meclizine (ANTIVERT) 12.5 MG tablet TAKE 1 TABLET BY MOUTH 4 TIMES DAILY AS NEEDED FOR DIZZINESS 30 tablet 0   • omeprazole (priLOSEC) 40 MG  "capsule Take 1 capsule by mouth twice daily 180 capsule 0   • oxybutynin XL (DITROPAN-XL) 5 MG 24 hr tablet Take 1 tablet by mouth once daily 90 tablet 0   • potassium chloride (K-DUR,KLOR-CON) 10 MEQ CR tablet Take 10 mEq by mouth Daily. PRN WITH HCTZ     • albuterol sulfate  (90 Base) MCG/ACT inhaler Inhale 2 puffs 3 (Three) Times a Day. 6.7 g 0     No facility-administered medications prior to visit.       No opioid medication identified on active medication list. I have reviewed chart for other potential  high risk medication/s and harmful drug interactions in the elderly.          Aspirin is not on active medication list.  Aspirin use is not indicated based on review of current medical condition/s. Risk of harm outweighs potential benefits.  .    Patient Active Problem List   Diagnosis   • Osteoporosis   • Senile osteoporosis   • Gastroesophageal reflux disease   • Diarrhea   • Tubular adenoma of colon   • Helicobacter pylori (H. pylori) infection   • Acute medial meniscus tear of left knee   • Complex tear of medial meniscus of right knee as current injury   • Stress fracture of right tibia   • History of IBS   • RLS (restless legs syndrome)   • Hypothyroidism   • Essential hypertension   • Left-sided trigeminal neuralgia   • Nonruptured cerebral aneurysm   • Primary osteoarthritis of left knee     Advance Care Planning  Advance Directive is not on file.  ACP discussion was held with the patient during this visit. Patient does not have an advance directive, information provided.          Objective    Vitals:    02/18/22 0805   BP: 130/82   Pulse: 72   Temp: 96.8 °F (36 °C)   SpO2: 96%   Weight: 76.8 kg (169 lb 6.4 oz)   Height: 160 cm (63\")     BMI Readings from Last 1 Encounters:   02/18/22 30.01 kg/m²   BMI is above normal parameters. Recommendations include: nutrition counseling    Does the patient have evidence of cognitive impairment? No    Physical Exam  Constitutional:       Appearance: Normal " appearance. She is well-developed.   HENT:      Head: Normocephalic and atraumatic.      Right Ear: Tympanic membrane, ear canal and external ear normal.      Left Ear: Tympanic membrane, ear canal and external ear normal.      Nose: Nose normal.      Mouth/Throat:      Mouth: Mucous membranes are moist.   Eyes:      General: No scleral icterus.     Extraocular Movements: Extraocular movements intact.      Conjunctiva/sclera: Conjunctivae normal.      Pupils: Pupils are equal, round, and reactive to light.   Neck:      Thyroid: No thyromegaly.   Cardiovascular:      Rate and Rhythm: Normal rate and regular rhythm.      Pulses: Normal pulses.      Heart sounds: Normal heart sounds.   Pulmonary:      Effort: Pulmonary effort is normal. No respiratory distress.      Breath sounds: Normal breath sounds. No wheezing or rales.   Abdominal:      General: Bowel sounds are normal. There is no distension.      Palpations: Abdomen is soft. There is no mass.      Tenderness: There is no abdominal tenderness.      Hernia: No hernia is present.   Musculoskeletal:         General: No swelling or tenderness. Normal range of motion.      Cervical back: Normal range of motion and neck supple.   Lymphadenopathy:      Cervical: No cervical adenopathy.   Skin:     General: Skin is warm.   Neurological:      General: No focal deficit present.      Mental Status: She is alert and oriented to person, place, and time.      Cranial Nerves: No cranial nerve deficit.      Sensory: No sensory deficit.      Deep Tendon Reflexes: Reflexes normal.   Psychiatric:         Mood and Affect: Mood normal.         Behavior: Behavior normal.         Thought Content: Thought content normal.         Judgment: Judgment normal.       Lab Results   Component Value Date    TRIG 79 02/09/2022    HDL 68 (H) 02/09/2022     (H) 02/09/2022    VLDL 14 02/09/2022            HEALTH RISK ASSESSMENT    Smoking Status:  Social History     Tobacco Use   Smoking  Status Never Smoker   Smokeless Tobacco Never Used     Alcohol Consumption:  Social History     Substance and Sexual Activity   Alcohol Use Yes   • Alcohol/week: 0.0 standard drinks    Comment: Occasionally     Fall Risk Screen:    ISAAC Fall Risk Assessment has not been completed.    Depression Screening:  PHQ-2/PHQ-9 Depression Screening 2/18/2022   Little interest or pleasure in doing things 0   Feeling down, depressed, or hopeless 0   Total Score 0       Health Habits and Functional and Cognitive Screening:  Functional & Cognitive Status 2/18/2022   Do you have difficulty preparing food and eating? No   Do you have difficulty bathing yourself, getting dressed or grooming yourself? No   Do you have difficulty using the toilet? No   Do you have difficulty moving around from place to place? No   Do you have trouble with steps or getting out of a bed or a chair? No   Current Diet Well Balanced Diet   Dental Exam Up to date   Eye Exam Up to date   Exercise (times per week) 0 times per week   Current Exercises Include No Regular Exercise   Do you need help using the phone?  No   Are you deaf or do you have serious difficulty hearing?  No   Do you need help with transportation? No   Do you need help shopping? Yes   Do you need help preparing meals?  No   Do you need help with housework?  No   Do you need help with laundry? No   Do you need help taking your medications? No   Do you need help managing money? No   Do you ever drive or ride in a car without wearing a seat belt? No   Have you felt unusual stress, anger or loneliness in the last month? No   Who do you live with? Child   If you need help, do you have trouble finding someone available to you? No   Have you been bothered in the last four weeks by sexual problems? No   Do you have difficulty concentrating, remembering or making decisions? No       Age-appropriate Screening Schedule:  Refer to the list below for future screening recommendations based on  patient's age, sex and/or medical conditions. Orders for these recommended tests are listed in the plan section. The patient has been provided with a written plan.    Health Maintenance   Topic Date Due   • LIPID PANEL  02/09/2023   • DXA SCAN  03/04/2023   • MAMMOGRAM  03/31/2023   • TDAP/TD VACCINES (2 - Td or Tdap) 10/28/2030   • INFLUENZA VACCINE  Completed   • ZOSTER VACCINE  Completed              Assessment/Plan   CMS Preventative Services Quick Reference  Risk Factors Identified During Encounter  Depression/Dysphoria  Obesity/Overweight   The above risks/problems have been discussed with the patient.  Follow up actions/plans if indicated are seen below in the Assessment/Plan Section.  Pertinent information has been shared with the patient in the After Visit Summary.    Diagnoses and all orders for this visit:    1. Medicare annual wellness visit, subsequent (Primary)    2. Hypothyroidism, unspecified type  -     TSH+Free T4    3. Mixed hyperlipidemia    4. Essential hypertension      Sari Self is a 71-year-old female patient came here for Medicare annual wellness visit.  She is up-to-date with colonoscopy mammogram.  She is up-to-date up-to-date with immunization denies any depression or anxiety.  She is a still working.  Physical exercise discussed with patient because of her knee problem she is not able to walk.  Scheduled for knee surgery next month.  Low calorie diet recommended to patient.  She is also seen today for  Hypothyroidism, we will check TSH and T4  Hyperlipidemia, LDL elevated HDL at goal.  I advised her to start taking Lipitor every day.  Hypertension controlled on as needed hydrochlorothiazide      Follow Up:   No follow-ups on file.     An After Visit Summary and PPPS were made available to the patient.

## 2022-02-19 LAB
T4 FREE SERPL-MCNC: 1.5 NG/DL (ref 0.82–1.77)
TSH SERPL DL<=0.005 MIU/L-ACNC: 2.25 UIU/ML (ref 0.45–4.5)

## 2022-02-24 ENCOUNTER — OFFICE VISIT (OUTPATIENT)
Dept: GASTROENTEROLOGY | Facility: CLINIC | Age: 72
End: 2022-02-24

## 2022-02-24 VITALS — BODY MASS INDEX: 28 KG/M2 | TEMPERATURE: 97.1 F | HEIGHT: 63 IN | WEIGHT: 158 LBS

## 2022-02-24 DIAGNOSIS — K58.0 IRRITABLE BOWEL SYNDROME WITH DIARRHEA: ICD-10-CM

## 2022-02-24 DIAGNOSIS — Z86.010 PERSONAL HISTORY OF COLONIC POLYPS: ICD-10-CM

## 2022-02-24 DIAGNOSIS — K21.9 GASTROESOPHAGEAL REFLUX DISEASE, UNSPECIFIED WHETHER ESOPHAGITIS PRESENT: Primary | ICD-10-CM

## 2022-02-24 PROCEDURE — 99213 OFFICE O/P EST LOW 20 MIN: CPT | Performed by: NURSE PRACTITIONER

## 2022-02-24 RX ORDER — OMEPRAZOLE 40 MG/1
40 CAPSULE, DELAYED RELEASE ORAL DAILY
Qty: 90 CAPSULE | Refills: 3 | Status: SHIPPED | OUTPATIENT
Start: 2022-02-24 | End: 2023-03-30 | Stop reason: SDUPTHER

## 2022-02-24 NOTE — PROGRESS NOTES
Chief Complaint   Patient presents with   • HX IBS & Gerd       HPI    Sari Self is a  71 y.o. female here for a follow up visit for GERD and irritable bowel syndrome with a history of H. Pylori.    This patient follows with Dr. Reza and myself.    On visit today patient reports adequate control of GERD symptoms on once daily omeprazole.  Denies nausea, vomiting, dysphagia, odynophagia but appetite is good.  Weight is stable.    Still has issues with irritable bowel syndrome however with episodes of diarrhea with dietary triggers and stress.  Seems to be brought on by salad.  Occasional fecal urgency.  Has not tried probiotics.  Prescribed Xifaxan in 2019 but never took it because it was too expensive.  No rectal bleeding.  Due for screening colonoscopy next year.    Past Medical History:   Diagnosis Date   • Arthritis    • Cerebral aneurysm    • Cholelithiasis 2016    Gallbladder removed   • Disease of thyroid gland    • Dislocation, shoulder 1968    Dislocated several times   • Diverticulosis    • GERD (gastroesophageal reflux disease)    • H/O cerebral aneurysm repair    • History of 2019 novel coronavirus disease (COVID-19)     AUGUST 2020 STATES VERY MILD SYMTOMS   • Hypertension    • IBS (irritable bowel syndrome)    • Knee swelling    • Osteoporosis    • Right knee pain    • RLS (restless legs syndrome)    • Rotator cuff syndrome 1968    Tears   • Stress fracture August 2019   • Stress fracture of right tibia    • Stroke (HCC)    • Tear of meniscus of knee 2019 & 2020    Surgery both knees   • TIA (transient ischemic attack)     2011   • Torn meniscus     RIGHT KNEE       Past Surgical History:   Procedure Laterality Date   • CATARACT EXTRACTION, BILATERAL     • CHOLECYSTECTOMY     • COLONOSCOPY  11/10/2010    prep of colon fair,IH,stool in transverse colon,hepatic flexure and ascending colon,ileum normal,IBS   • COLONOSCOPY N/A 2/27/2018    IH, diverticulosis, one 5mm polyp, tubular adenoma with low  grade dysplasia   • CYSTOSCOPY     • ENDOSCOPY  11/27/2012    grd 1 reflux egitis,web upperd 3rd esoph   • ENDOSCOPY N/A 2/27/2018    normal biopsies, H Pylori positive   • FOOT NEUROMA SURGERY Left    • HAND SURGERY     • IR CEREBRAL ANEURYSM COILING  2012   • KNEE ARTHROSCOPY Left 7/3/2019    Procedure: KNEE ARTHROSCOPY ARTHRITIS DEBRIDEMENT PARTIAL MEDIAL AND LATERAL MENISECTOMY;  Surgeon: Nuris Bender MD;  Location: [facility] OR [facility];  Service: Orthopedics   • KNEE ARTHROSCOPY Right 9/28/2020    Procedure: RIGHT KNEE ARTHROSCOPY, PARTIAL MEDIAL AND LATERAL MENISCECTOMIES, DEBRIDEMENT OF ARTHRITIS, AND INTERNAL FIXATION TIBAL PLATEAU INSUFFICIENCY FRACTURE;  Surgeon: Nuris Bender MD;  Location: [facility] OR [facility] [place];  Service: Orthopedics;  Laterality: Right;   • KNEE SURGERY  2019 & 2020   • THUMB ARTHROSCOPY     • TRIGGER POINT INJECTION      Hand, elbow, shoulders, knees   • UPPER GASTROINTESTINAL ENDOSCOPY  02/27/2018   • WRIST SURGERY         Scheduled Meds:     Continuous Infusions: No current facility-administered medications for this visit.      PRN Meds:     Allergies   Allergen Reactions   • Penicillins Hives       Social History     Socioeconomic History   • Marital status:    Tobacco Use   • Smoking status: Never Smoker   • Smokeless tobacco: Never Used   Vaping Use   • Vaping Use: Never used   Substance and Sexual Activity   • Alcohol use: Yes     Alcohol/week: 0.0 standard drinks     Comment: Occasionally   • Drug use: No   • Sexual activity: Not Currently       Family History   Problem Relation Age of Onset   • Irritable bowel syndrome Daughter    • Malig Hyperthermia Neg Hx        Review of Systems   Constitutional: Negative for activity change, appetite change, fatigue, fever and unexpected weight change.   HENT: Negative for trouble swallowing.    Respiratory: Negative for apnea, cough, choking, chest tightness, shortness of breath and wheezing.    Cardiovascular: Negative for chest pain,  palpitations and leg swelling.   Gastrointestinal: Positive for diarrhea. Negative for abdominal distention, abdominal pain, anal bleeding, blood in stool, constipation, nausea, rectal pain and vomiting.       Vitals:    02/24/22 0851   Temp: 97.1 °F (36.2 °C)       Physical Exam  Constitutional:       Appearance: She is well-developed.   Abdominal:      General: Bowel sounds are normal. There is no distension.      Palpations: Abdomen is soft. There is no mass.      Tenderness: There is no abdominal tenderness. There is no guarding.      Hernia: No hernia is present.   Skin:     General: Skin is warm and dry.      Capillary Refill: Capillary refill takes less than 2 seconds.   Neurological:      Mental Status: She is alert and oriented to person, place, and time.   Psychiatric:         Behavior: Behavior normal.       Assessment    Diagnoses and all orders for this visit:    1. Gastroesophageal reflux disease, unspecified whether esophagitis present (Primary)  Overview:  Added automatically from request for surgery [id number]      2. Irritable bowel syndrome with diarrhea    3. Personal history of colonic polyps    Other orders  -     omeprazole (priLOSEC) 40 MG capsule; Take 1 capsule by mouth Daily.  Dispense: 90 capsule; Refill: 3       Plan    Stable GERD, continue PPI therapy.  Follow an antireflux diet.  Regarding irritable bowel syndrome diarrhea predominant recommend trial of probiotics with initiation of [employer].  Educational handout regarding probiotic benefits provided to the patient to buffer conversation.  Colonoscopy for screening purposes 2023.  Return to clinic 1 year or sooner if needed.         LEXY Enriquez  [facility]  [address] [place] [address]  Office: [phone]

## 2022-03-03 ENCOUNTER — PRE-ADMISSION TESTING (OUTPATIENT)
Dept: PREADMISSION TESTING | Facility: HOSPITAL | Age: 72
End: 2022-03-03

## 2022-03-03 VITALS
DIASTOLIC BLOOD PRESSURE: 79 MMHG | SYSTOLIC BLOOD PRESSURE: 128 MMHG | BODY MASS INDEX: 29.23 KG/M2 | TEMPERATURE: 98.1 F | HEIGHT: 63 IN | RESPIRATION RATE: 18 BRPM | WEIGHT: 165 LBS | HEART RATE: 80 BPM | OXYGEN SATURATION: 98 %

## 2022-03-03 DIAGNOSIS — M17.12 PRIMARY OSTEOARTHRITIS OF LEFT KNEE: ICD-10-CM

## 2022-03-03 LAB
ANION GAP SERPL CALCULATED.3IONS-SCNC: 12.7 MMOL/L (ref 5–15)
BACTERIA UR QL AUTO: ABNORMAL /HPF
BILIRUB UR QL STRIP: NEGATIVE
BUN SERPL-MCNC: 17 MG/DL (ref 8–23)
BUN/CREAT SERPL: 20.2 (ref 7–25)
CALCIUM SPEC-SCNC: 9.7 MG/DL (ref 8.6–10.5)
CHLORIDE SERPL-SCNC: 107 MMOL/L (ref 98–107)
CLARITY UR: CLEAR
CO2 SERPL-SCNC: 21.3 MMOL/L (ref 22–29)
COLOR UR: YELLOW
CREAT SERPL-MCNC: 0.84 MG/DL (ref 0.57–1)
DEPRECATED RDW RBC AUTO: 43.8 FL (ref 37–54)
EGFRCR SERPLBLD CKD-EPI 2021: 73.9 ML/MIN/1.73
ERYTHROCYTE [DISTWIDTH] IN BLOOD BY AUTOMATED COUNT: 13 % (ref 12.3–15.4)
GLUCOSE SERPL-MCNC: 92 MG/DL (ref 65–99)
GLUCOSE UR STRIP-MCNC: NEGATIVE MG/DL
HCT VFR BLD AUTO: 41 % (ref 34–46.6)
HGB BLD-MCNC: 13.1 G/DL (ref 12–15.9)
HGB UR QL STRIP.AUTO: NEGATIVE
HYALINE CASTS UR QL AUTO: ABNORMAL /LPF
KETONES UR QL STRIP: NEGATIVE
LEUKOCYTE ESTERASE UR QL STRIP.AUTO: ABNORMAL
MCH RBC QN AUTO: 29.1 PG (ref 26.6–33)
MCHC RBC AUTO-ENTMCNC: 32 G/DL (ref 31.5–35.7)
MCV RBC AUTO: 91.1 FL (ref 79–97)
NITRITE UR QL STRIP: NEGATIVE
PH UR STRIP.AUTO: <=5 [PH] (ref 5–8)
PLATELET # BLD AUTO: 307 10*3/MM3 (ref 140–450)
PMV BLD AUTO: 9.8 FL (ref 6–12)
POTASSIUM SERPL-SCNC: 4.1 MMOL/L (ref 3.5–5.2)
PROT UR QL STRIP: NEGATIVE
QT INTERVAL: 399 MS
RBC # BLD AUTO: 4.5 10*6/MM3 (ref 3.77–5.28)
RBC # UR STRIP: ABNORMAL /HPF
REF LAB TEST METHOD: ABNORMAL
SODIUM SERPL-SCNC: 141 MMOL/L (ref 136–145)
SP GR UR STRIP: 1.01 (ref 1–1.03)
SQUAMOUS #/AREA URNS HPF: ABNORMAL /HPF
UROBILINOGEN UR QL STRIP: ABNORMAL
WBC # UR STRIP: ABNORMAL /HPF
WBC NRBC COR # BLD: 6.25 10*3/MM3 (ref 3.4–10.8)

## 2022-03-03 PROCEDURE — 80048 BASIC METABOLIC PNL TOTAL CA: CPT

## 2022-03-03 PROCEDURE — 93005 ELECTROCARDIOGRAM TRACING: CPT

## 2022-03-03 PROCEDURE — 85027 COMPLETE CBC AUTOMATED: CPT

## 2022-03-03 PROCEDURE — 36415 COLL VENOUS BLD VENIPUNCTURE: CPT

## 2022-03-03 PROCEDURE — 93010 ELECTROCARDIOGRAM REPORT: CPT | Performed by: INTERNAL MEDICINE

## 2022-03-03 PROCEDURE — 87086 URINE CULTURE/COLONY COUNT: CPT

## 2022-03-03 PROCEDURE — 81001 URINALYSIS AUTO W/SCOPE: CPT

## 2022-03-03 RX ORDER — CHLORHEXIDINE GLUCONATE 500 MG/1
CLOTH TOPICAL
COMMUNITY
End: 2022-03-18 | Stop reason: HOSPADM

## 2022-03-03 RX ORDER — CHLORHEXIDINE GLUCONATE 500 MG/1
CLOTH TOPICAL 2 TIMES DAILY
Status: ACTIVE | OUTPATIENT
Start: 2022-03-03

## 2022-03-03 ASSESSMENT — KOOS JR
KOOS JR SCORE: 52.465
KOOS JR SCORE: 14

## 2022-03-03 NOTE — DISCHARGE INSTRUCTIONS
ARRIVE DAY OF SURGERY AS NOTIFIED BY DR HUDDLESTON OFFICE          Take the following medications the morning of surgery:  EUTHYROX, ZYRTEC, GABAPENTIN      If you are on prescription narcotic pain medication to control your pain you may also take that medication the morning of surgery.    General Instructions:  • Do not eat solid food after midnight the night before surgery.  • You may drink clear liquids day of surgery but must stop at least one hour before your hospital arrival time.  • It is beneficial for you to have a clear drink that contains carbohydrates the day of surgery.  We suggest a 12 to 20 ounce bottle of Gatorade or Powerade for non-diabetic patients or a 12 to 20 ounce bottle of G2 or Powerade Zero for diabetic patients. (Pediatric patients, are not advised to drink a 12 to 20 ounce carbohydrate drink)    Clear liquids are liquids you can see through.  Nothing red in color.     Plain water                               Sports drinks  Sodas                                   Gelatin (Jell-O)  Fruit juices without pulp such as white grape juice and apple juice  Popsicles that contain no fruit or yogurt  Tea or coffee (no cream or milk added)  Gatorade / Powerade  G2 / Powerade Zero    • Infants may have breast milk up to four hours before surgery.  • Infants drinking formula may drink formula up to six hours before surgery.   • Patients who avoid smoking, chewing tobacco and alcohol for 4 weeks prior to surgery have a reduced risk of post-operative complications.  Quit smoking as many days before surgery as you can.  • Do not smoke, use chewing tobacco or drink alcohol the day of surgery.   • If applicable bring your C-PAP/ BI-PAP machine.  • Bring any papers given to you in the doctor’s office.  • Wear clean comfortable clothes.  • Do not wear contact lenses, false eyelashes or make-up.  Bring a case for your glasses.   • Bring crutches or walker if applicable.  • Remove all piercings.  Leave jewelry and  any other valuables at home.  • Hair extensions with metal clips must be removed prior to surgery.  • The Pre-Admission Testing nurse will instruct you to bring medications if unable to obtain an accurate list in Pre-Admission Testing.            Preventing a Surgical Site Infection:  • For 2 to 3 days before surgery, avoid shaving with a razor because the razor can irritate skin and make it easier to develop an infection.    • Any areas of open skin can increase the risk of a post-operative wound infection by allowing bacteria to enter and travel throughout the body.  Notify your surgeon if you have any skin wounds / rashes even if it is not near the expected surgical site.  The area will need assessed to determine if surgery should be delayed until it is healed.  • The night prior to surgery shower using a fresh bar of anti-bacterial soap (such as Dial) and clean washcloth.  Sleep in a clean bed with clean clothing.  Do not allow pets to sleep with you.  • Shower on the morning of surgery using a fresh bar of anti-bacterial soap (such as Dial) and clean washcloth.  Dry with a clean towel and dress in clean clothing.  • Ask your surgeon if you will be receiving antibiotics prior to surgery.  • Make sure you, your family, and all healthcare providers clean their hands with soap and water or an alcohol based hand  before caring for you or your wound.    Day of surgery:  Your arrival time is approximately two hours before your scheduled surgery time.  Upon arrival, a Pre-op nurse and Anesthesiologist will review your health history, obtain vital signs, and answer questions you may have.  The only belongings needed at this time will be a list of your home medications and if applicable your C-PAP/BI-PAP machine.  A Pre-op nurse will start an IV and you may receive medication in preparation for surgery, including something to help you relax.     Please be aware that surgery does come with discomfort.  We want to  make every effort to control your discomfort so please discuss any uncontrolled symptoms with your nurse.   Your doctor will most likely have prescribed pain medications.      If you are going home after surgery you will receive individualized written care instructions before being discharged.  A responsible adult must drive you to and from the hospital on the day of your surgery and stay with you for 24 hours.  Discharge prescriptions can be filled by the hospital pharmacy during regular pharmacy hours.  If you are having surgery late in the day/evening your prescription may be e-prescribed to your pharmacy.  Please verify your pharmacy hours or chose a 24 hour pharmacy to avoid not having access to your prescription because your pharmacy has closed for the day.    If you are staying overnight following surgery, you will be transported to your hospital room following the recovery period.  [facility] has all private rooms.    If you have any questions please call Pre-Admission Testing at [phone].  Deductibles and co-payments are collected on the day of service. Please be prepared to pay the required co-pay, deductible or deposit on the day of service as defined by your plan.    Patient Education for Self-Quarantine Process    • Following your COVID testing, we strongly recommend that you wear a mask when you are with other people and practice social distancing.   • Limit your activities to only required outings.  • Wash your hands with soap and water frequently for at least 20 seconds.   • Avoid touching your eyes, nose and mouth with unwashed hands.  • Do not share anything - utensils, drinking glasses, food from the same bowl.   • Sanitize household surfaces daily. Include all high touch areas (door handles, light switches, phones, countertops, etc.)    Call your surgeon immediately if you experience any of the following symptoms:  • Sore Throat  • Shortness of Breath or difficulty  breathing  • Cough  • Chills  • Body soreness or muscle pain  • Headache  • Fever  • New loss of taste or smell  • Do not arrive for your surgery ill.  Your procedure will need to be rescheduled to another time.  You will need to call your physician before the day of surgery to avoid any unnecessary exposure to hospital staff as well as other patients.    CHLORHEXIDINE CLOTH INSTRUCTIONS  The morning of surgery follow these instructions using the Chlorhexidine cloths you've been given.  These steps reduce bacteria on the body.  Do not use the cloths near your eyes, ears mouth, genitalia or on open wounds.  Throw the cloths away after use but do not try to flush them down a toilet.      • Open and remove one cloth at a time from the package.    • Leave the cloth unfolded and begin the bathing.  • Massage the skin with the cloths using gentle pressure to remove bacteria.  Do not scrub harshly.   • Follow the steps below with one 2% CHG cloth per area (6 total cloths).  • One cloth for neck, shoulders and chest.  • One cloth for both arms, hands, fingers and underarms (do underarms last).  • One cloth for the abdomen followed by groin.  • One cloth for right leg and foot including between the toes.  • One cloth for left leg and foot including between the toes.  • The last cloth is to be used for the back of the neck, back and buttocks.    Allow the CHG to air dry 3 minutes on the skin which will give it time to work and decrease the chance of irritation.  The skin may feel sticky until it is dry.  Do not rinse with water or any other liquid or you will lose the beneficial effects of the CHG.  If mild skin irritation occurs, do rinse the skin to remove the CHG.  Report this to the nurse at time of admission.  Do not apply lotions, creams, ointments, deodorants or perfumes after using the clothes. Dress in clean clothes before coming to the hospital.

## 2022-03-04 LAB — BACTERIA SPEC AEROBE CULT: NO GROWTH

## 2022-03-07 ENCOUNTER — OFFICE VISIT (OUTPATIENT)
Dept: ORTHOPEDIC SURGERY | Facility: CLINIC | Age: 72
End: 2022-03-07

## 2022-03-07 VITALS — BODY MASS INDEX: 28 KG/M2 | HEIGHT: 63 IN | WEIGHT: 158 LBS | RESPIRATION RATE: 18 BRPM | TEMPERATURE: 96.4 F

## 2022-03-07 DIAGNOSIS — M75.40 IMPINGEMENT SYNDROME OF SHOULDER REGION, UNSPECIFIED LATERALITY: Primary | ICD-10-CM

## 2022-03-07 PROCEDURE — 20610 DRAIN/INJ JOINT/BURSA W/O US: CPT | Performed by: ORTHOPAEDIC SURGERY

## 2022-03-07 RX ADMIN — METHYLPREDNISOLONE ACETATE 80 MG: 80 INJECTION, SUSPENSION INTRA-ARTICULAR; INTRALESIONAL; INTRAMUSCULAR; SOFT TISSUE at 16:59

## 2022-03-07 RX ADMIN — METHYLPREDNISOLONE ACETATE 80 MG: 80 INJECTION, SUSPENSION INTRA-ARTICULAR; INTRALESIONAL; INTRAMUSCULAR; SOFT TISSUE at 16:58

## 2022-03-07 NOTE — PROGRESS NOTES
Patient: Sari Self  YOB: 1950  Date of Service: 3/7/2022    Chief Complaints: Bilateral shoulder pain    Subjective:    History of Present Illness: Pt is seen in the office today with complaints of bilateral shoulder pain she gets intermittent injections and does well with those she is having a knee replacement next week and thinks to be using her shoulders a lot would like injections.  I did okay this with Dr. Zuniga.          Allergies:   Allergies   Allergen Reactions   • Penicillins Hives       Medications:   Home Medications:  Current Outpatient Medications on File Prior to Visit   Medication Sig   • atorvastatin (LIPITOR) 10 MG tablet Take 1 tablet by mouth once daily   • cetirizine (zyrTEC) 10 MG tablet Take 1 tablet by mouth Daily.   • Chlorhexidine Gluconate Cloth 2 % pads Apply  topically. AS DIRECTED PREOP   • Euthyrox 50 MCG tablet TAKE 1 TABLET BY MOUTH ONCE DAILY IN THE MORNING   • gabapentin (NEURONTIN) 600 MG tablet TAKE 1 TABLET BY MOUTH 4 TIMES DAILY . DO NOT EXCEED 4 PER 24 HOURS   • hydroCHLOROthiazide (HYDRODIURIL) 12.5 MG tablet Take 1 tablet by mouth Daily. Not taking ever day , taking as needed   • meclizine (ANTIVERT) 12.5 MG tablet TAKE 1 TABLET BY MOUTH 4 TIMES DAILY AS NEEDED FOR DIZZINESS   • omeprazole (priLOSEC) 40 MG capsule Take 1 capsule by mouth Daily.   • oxybutynin XL (DITROPAN-XL) 5 MG 24 hr tablet Take 1 tablet by mouth once daily (Patient taking differently: Take 5 mg by mouth As Needed.)   • potassium chloride (K-DUR,KLOR-CON) 10 MEQ CR tablet Take 10 mEq by mouth Daily. PRN WITH HCTZ     Current Facility-Administered Medications on File Prior to Visit   Medication   • Chlorhexidine Gluconate Cloth 2 % pads     Current Medications:  Scheduled Meds:  Continuous Infusions:No current facility-administered medications for this visit.    PRN Meds:.    I have reviewed the patient's medical history in detail and updated the computerized patient record.  Review  and summarization of old records include:    Past Medical History:   Diagnosis Date   • Arthritis    • Cerebral aneurysm    • Disease of thyroid gland    • Dislocation, shoulder 1968    Dislocated several times   • Diverticulosis    • GERD (gastroesophageal reflux disease)    • H/O cerebral aneurysm repair    • History of 2019 novel coronavirus disease (COVID-19) 08/2020 AUGUST 2020 STATES VERY MILD SYMTOMS   • Hyperlipidemia    • Hypertension    • IBS (irritable bowel syndrome)    • Knee swelling    • OAB (overactive bladder)    • Osteoporosis    • Right knee pain    • RLS (restless legs syndrome)    • Rotator cuff syndrome 1968    Tears   • Stress fracture of right tibia    • Stroke (HCC)    • Tear of meniscus of knee 2019 & 2020    Surgery both knees   • TIA (transient ischemic attack)     2011   • Torn meniscus     RIGHT KNEE        Past Surgical History:   Procedure Laterality Date   • CATARACT EXTRACTION, BILATERAL     • CHOLECYSTECTOMY     • COLONOSCOPY  11/10/2010    prep of colon fair,IH,stool in transverse colon,hepatic flexure and ascending colon,ileum normal,IBS   • COLONOSCOPY N/A 2/27/2018    IH, diverticulosis, one 5mm polyp, tubular adenoma with low grade dysplasia   • CYSTOSCOPY     • ENDOSCOPY  11/27/2012    grd 1 reflux egitis,web upperd 3rd esoph   • ENDOSCOPY N/A 2/27/2018    normal biopsies, H Pylori positive   • FOOT NEUROMA SURGERY Left    • HAND SURGERY     • IR CEREBRAL ANEURYSM COILING  2012   • KNEE ARTHROSCOPY Left 7/3/2019    Procedure: KNEE ARTHROSCOPY ARTHRITIS DEBRIDEMENT PARTIAL MEDIAL AND LATERAL MENISECTOMY;  Surgeon: Nuris Bender MD;  [place] [facility] [place] [facility] [place];  Service: Orthopedics   • KNEE ARTHROSCOPY Right 9/28/2020    Procedure: RIGHT KNEE ARTHROSCOPY, PARTIAL MEDIAL AND LATERAL MENISCECTOMIES, DEBRIDEMENT OF ARTHRITIS, AND INTERNAL FIXATION TIBAL PLATEAU INSUFFICIENCY FRACTURE;  Surgeon: Nuris Bender MD;  Location: [facility] [place] [facility] [place];  Service: Orthopedics;   Laterality: Right;   • KNEE SURGERY  2019 & 2020   • THUMB ARTHROSCOPY     • TRIGGER POINT INJECTION      Hand, elbow, shoulders, knees   • UPPER GASTROINTESTINAL ENDOSCOPY  02/27/2018   • WRIST SURGERY          Social History     Occupational History   • Not on file   Tobacco Use   • Smoking status: Never Smoker   • Smokeless tobacco: Never Used   Vaping Use   • Vaping Use: Never used   Substance and Sexual Activity   • Alcohol use: Yes     Alcohol/week: 0.0 standard drinks     Comment: Occasionally   • Drug use: No   • Sexual activity: Not Currently      Social History     Social History Narrative   • Not on file        Family History   Problem Relation Age of Onset   • Irritable bowel syndrome Daughter    • Malig Hyperthermia Neg Hx        ROS: 14 point review of systems was performed and was negative except for documented findings in HPI and today's encounter.     Allergies:   Allergies   Allergen Reactions   • Penicillins Hives     Constitutional:  Denies fever, shaking or chills   Eyes:  Denies change in visual acuity   HENT:  Denies nasal congestion or sore throat   Respiratory:  Denies cough or shortness of breath   Cardiovascular:  Denies chest pain or severe LE edema   GI:  Denies abdominal pain, nausea, vomiting, bloody stools or diarrhea   Musculoskeletal:  Numbness, tingling, or loss of motor function only as noted above in history of present illness.  : Denies painful urination or hematuria  Integument:  Denies rash, lesion or ulceration   Neurologic:  Denies headache or focal weakness  Endocrine:  Denies lymphadenopathy  Psych:  Denies confusion or change in mental status   Hem:  Denies active bleeding      Physical Exam: 71 y.o. female  Wt Readings from Last 3 Encounters:   03/03/22 74.8 kg (165 lb)   02/24/22 71.7 kg (158 lb)   02/18/22 76.8 kg (169 lb 6.4 oz)       There is no height or weight on file to calculate BMI.  No height and weight on file for this encounter.  There were no vitals filed  for this visit.  Vital signs reviewed.   General Appearance:    Alert, cooperative, in no acute distress                    Ortho exam    Exam is unchanged       .time    Assessment: Bilateral shoulder pain which is most likely impingement  Plan: Injections if her symptoms continue to recur we will pursue other means of testing  Follow up as indicated.  Ice, elevate, and rest as needed.  Discussed conservative measures of pain control including ice, bracing.  Also talked about the importance of strengthening and maintaining ideal body weight    Nuris Bender M.D.    Large Joint Arthrocentesis: R subacromial bursa  Date/Time: 3/7/2022 4:58 PM  Consent given by: patient  Site marked: site marked  Timeout: Immediately prior to procedure a time out was called to verify the correct patient, procedure, equipment, support staff and site/side marked as required   Supporting Documentation  Indications: pain   Procedure Details  Location: shoulder - R subacromial bursa  Preparation: Patient was prepped and draped in the usual sterile fashion  Needle gauge: 21G.  Approach: posterior  Medications administered: 80 mg methylPREDNISolone acetate 80 MG/ML; 4 mL lidocaine (cardiac)  Patient tolerance: patient tolerated the procedure well with no immediate complications    Large Joint Arthrocentesis: L subacromial bursa  Date/Time: 3/7/2022 4:59 PM  Consent given by: patient  Site marked: site marked  Timeout: Immediately prior to procedure a time out was called to verify the correct patient, procedure, equipment, support staff and site/side marked as required   Supporting Documentation  Indications: pain   Procedure Details  Location: shoulder - L subacromial bursa  Preparation: Patient was prepped and draped in the usual sterile fashion  Needle gauge: 21G.  Approach: posterior  Medications administered: 80 mg methylPREDNISolone acetate 80 MG/ML; 4 mL lidocaine (cardiac)  Patient tolerance: patient tolerated the procedure well with  no immediate complications

## 2022-03-08 RX ORDER — METHYLPREDNISOLONE ACETATE 80 MG/ML
80 INJECTION, SUSPENSION INTRA-ARTICULAR; INTRALESIONAL; INTRAMUSCULAR; SOFT TISSUE
Status: COMPLETED | OUTPATIENT
Start: 2022-03-07 | End: 2022-03-07

## 2022-03-10 ENCOUNTER — OFFICE VISIT (OUTPATIENT)
Dept: ORTHOPEDIC SURGERY | Facility: CLINIC | Age: 72
End: 2022-03-10

## 2022-03-10 VITALS — WEIGHT: 158 LBS | TEMPERATURE: 98.2 F | BODY MASS INDEX: 28 KG/M2 | HEIGHT: 63 IN

## 2022-03-10 DIAGNOSIS — R52 PAIN: Primary | ICD-10-CM

## 2022-03-10 PROCEDURE — S0260 H&P FOR SURGERY: HCPCS | Performed by: NURSE PRACTITIONER

## 2022-03-10 PROCEDURE — 73562 X-RAY EXAM OF KNEE 3: CPT | Performed by: NURSE PRACTITIONER

## 2022-03-10 PROCEDURE — 77077 JOINT SURVEY SINGLE VIEW: CPT | Performed by: ORTHOPAEDIC SURGERY

## 2022-03-10 NOTE — H&P
Patient: Sari Self    Date of Admission: 3/18/2022    YOB: 1950    Medical Record Number: [id number]    Admitting Physician: Dr. Everton Zuniga    Reason for Admission: End Stage Left Medial Knee OA    History of Present Illness: 71 y.o. female presents with severe end stage medial compartment knee osteoarthritis which has not been responsive to the full compliment of conservative measures. Despite conservative attempts, there is still severe, constant activity limiting pain. Given the severity of the pain, the patient has elected to proceed with knee replacement.    Allergies:   Allergies   Allergen Reactions   • Penicillins Hives         Current Medications:  Home Medications:    Current Outpatient Medications on File Prior to Visit   Medication Sig   • atorvastatin (LIPITOR) 10 MG tablet Take 1 tablet by mouth once daily   • cetirizine (zyrTEC) 10 MG tablet Take 1 tablet by mouth Daily.   • Euthyrox 50 MCG tablet TAKE 1 TABLET BY MOUTH ONCE DAILY IN THE MORNING   • gabapentin (NEURONTIN) 600 MG tablet TAKE 1 TABLET BY MOUTH 4 TIMES DAILY . DO NOT EXCEED 4 PER 24 HOURS   • hydroCHLOROthiazide (HYDRODIURIL) 12.5 MG tablet Take 1 tablet by mouth Daily. Not taking ever day , taking as needed   • meclizine (ANTIVERT) 12.5 MG tablet TAKE 1 TABLET BY MOUTH 4 TIMES DAILY AS NEEDED FOR DIZZINESS   • omeprazole (priLOSEC) 40 MG capsule Take 1 capsule by mouth Daily.   • oxybutynin XL (DITROPAN-XL) 5 MG 24 hr tablet Take 1 tablet by mouth once daily (Patient taking differently: Take 5 mg by mouth As Needed.)   • potassium chloride (K-DUR,KLOR-CON) 10 MEQ CR tablet Take 10 mEq by mouth Daily. PRN WITH HCTZ   • Chlorhexidine Gluconate Cloth 2 % pads Apply  topically. AS DIRECTED PREOP     Current Facility-Administered Medications on File Prior to Visit   Medication   • Chlorhexidine Gluconate Cloth 2 % pads     PRN Meds:.    PMH:  Past Medical History:   Diagnosis Date   • Arthritis    • Cerebral aneurysm    •  Disease of thyroid gland    • Dislocation, shoulder 1968    Dislocated several times   • Diverticulosis    • GERD (gastroesophageal reflux disease)    • H/O cerebral aneurysm repair    • History of 2019 novel coronavirus disease (COVID-19) 08/2020 AUGUST 2020 STATES VERY MILD SYMTOMS   • Hyperlipidemia    • Hypertension    • IBS (irritable bowel syndrome)    • Knee swelling    • OAB (overactive bladder)    • Osteoporosis    • Right knee pain    • RLS (restless legs syndrome)    • Rotator cuff syndrome 1968    Tears   • Stress fracture of right tibia    • Stroke (HCC)    • Tear of meniscus of knee 2019 & 2020    Surgery both knees   • TIA (transient ischemic attack)     2011   • Torn meniscus     RIGHT KNEE       PSurg/Soc/Fam Hx:     Past Surgical History:   Procedure Laterality Date   • CATARACT EXTRACTION, BILATERAL     • CHOLECYSTECTOMY     • COLONOSCOPY  11/10/2010    prep of colon fair,IH,stool in transverse colon,hepatic flexure and ascending colon,ileum normal,IBS   • COLONOSCOPY N/A 02/27/2018    IH, diverticulosis, one 5mm polyp, tubular adenoma with low grade dysplasia   • CYSTOSCOPY     • ENDOSCOPY  11/27/2012    grd 1 reflux egitis,web upperd 3rd esoph   • ENDOSCOPY N/A 02/27/2018    normal biopsies, H Pylori positive   • FOOT NEUROMA SURGERY Left    • HAND SURGERY     • IR CEREBRAL ANEURYSM COILING  2012   • KNEE ARTHROSCOPY Left 07/03/2019    Procedure: KNEE ARTHROSCOPY ARTHRITIS DEBRIDEMENT PARTIAL MEDIAL AND LATERAL MENISECTOMY;  Surgeon: Nuris Bender MD;  [place] [facility] [place] [facility] [place]: [place]   • KNEE ARTHROSCOPY Right 09/28/2020    Procedure: RIGHT KNEE ARTHROSCOPY, PARTIAL MEDIAL AND LATERAL MENISCECTOMIES, DEBRIDEMENT OF ARTHRITIS, AND INTERNAL FIXATION TIBAL PLATEAU INSUFFICIENCY FRACTURE;  Surgeon: Nuris Bender MD;  [place] [facility] [place] [facility] [place]  Service: [place]opedics;  Laterality: Right;   • KNEE SURGERY  2019 & 2020   • THUMB ARTHROSCOPY     • TRIGGER POINT INJECTION       "Hand, elbow, shoulders, knees   • UPPER GASTROINTESTINAL ENDOSCOPY  02/27/2018   • WRIST SURGERY          Social History     Occupational History   • Not on file   Tobacco Use   • Smoking status: Never Smoker   • Smokeless tobacco: Never Used   Vaping Use   • Vaping Use: Never used   Substance and Sexual Activity   • Alcohol use: Yes     Comment: Occasionally   • Drug use: No   • Sexual activity: Not Currently      Social History     Social History Narrative   • Not on file        Family History   Problem Relation Age of Onset   • Irritable bowel syndrome Daughter    • Malig Hyperthermia Neg Hx          Review of Systems:   A 14 point review of systems was performed, pertinent positives discussed above, all other systems are negative    Physical Exam: 71 y.o. female  Vital Signs :   Vitals:    03/10/22 1445   Temp: 98.2 °F (36.8 °C)   Weight: 71.7 kg (158 lb)   Height: 160 cm (62.99\")     General: Alert and Oriented x 3, No acute distress.  Psych: mood and affect appropriate; recent and remote memory intact  Eyes: conjunctiva clear; pupils equally round and reactive, sclera nonicteric  CV: no peripheral edema  Resp: normal respiratory effort  Skin: no rashes or wounds; normal turgor  Musculosketetal: pain localized to the medial aspect of the knee. Neutral alignment in stance. No patellofemoral crepitance or pain with patellofemoral grind. Negative Lachman  Vascular: palpable distal pulses    Xrays:  -3 views (AP, lateral, and sunrise) were ordered and reviewed demonstrating end-stage isolated medial compartment OA with medial bone on bone articulation. The lateral and patellofemoral compartments are well preserved.  -A full length AP xray was ordered and reviewed today for purposes of operative alignment demonstrating end stage medial compartment arthritic findings. There are no previous full length films for review    Assessment:  End-stage Left knee medial compartment osteoarthritis. Conservative measures have " failed.      Plan:  The plan is to proceed with Left Unicompartmental Knee Replacement possible Total Knee Replacement. The patient voiced understanding of the risks, benefits, and alternative forms of treatment that were discussed with Dr Zuniga at the time of scheduling.  Same day [facility]    Aparna Caraballo, APRN  3/10/2022

## 2022-03-10 NOTE — H&P (VIEW-ONLY)
Patient: Sari Self    Date of Admission: 3/18/2022    YOB: 1950    Medical Record Number: [id number]    Admitting Physician: Dr. Everton Zuniga    Reason for Admission: End Stage Left Medial Knee OA    History of Present Illness: 71 y.o. female presents with severe end stage medial compartment knee osteoarthritis which has not been responsive to the full compliment of conservative measures. Despite conservative attempts, there is still severe, constant activity limiting pain. Given the severity of the pain, the patient has elected to proceed with knee replacement.    Allergies:   Allergies   Allergen Reactions   • Penicillins Hives         Current Medications:  Home Medications:    Current Outpatient Medications on File Prior to Visit   Medication Sig   • atorvastatin (LIPITOR) 10 MG tablet Take 1 tablet by mouth once daily   • cetirizine (zyrTEC) 10 MG tablet Take 1 tablet by mouth Daily.   • Euthyrox 50 MCG tablet TAKE 1 TABLET BY MOUTH ONCE DAILY IN THE MORNING   • gabapentin (NEURONTIN) 600 MG tablet TAKE 1 TABLET BY MOUTH 4 TIMES DAILY . DO NOT EXCEED 4 PER 24 HOURS   • hydroCHLOROthiazide (HYDRODIURIL) 12.5 MG tablet Take 1 tablet by mouth Daily. Not taking ever day , taking as needed   • meclizine (ANTIVERT) 12.5 MG tablet TAKE 1 TABLET BY MOUTH 4 TIMES DAILY AS NEEDED FOR DIZZINESS   • omeprazole (priLOSEC) 40 MG capsule Take 1 capsule by mouth Daily.   • oxybutynin XL (DITROPAN-XL) 5 MG 24 hr tablet Take 1 tablet by mouth once daily (Patient taking differently: Take 5 mg by mouth As Needed.)   • potassium chloride (K-DUR,KLOR-CON) 10 MEQ CR tablet Take 10 mEq by mouth Daily. PRN WITH HCTZ   • Chlorhexidine Gluconate Cloth 2 % pads Apply  topically. AS DIRECTED PREOP     Current Facility-Administered Medications on File Prior to Visit   Medication   • Chlorhexidine Gluconate Cloth 2 % pads     PRN Meds:.    PMH:  Past Medical History:   Diagnosis Date   • Arthritis    • Cerebral aneurysm    •  Disease of thyroid gland    • Dislocation, shoulder 1968    Dislocated several times   • Diverticulosis    • GERD (gastroesophageal reflux disease)    • H/O cerebral aneurysm repair    • History of 2019 novel coronavirus disease (COVID-19) 08/2020 AUGUST 2020 STATES VERY MILD SYMTOMS   • Hyperlipidemia    • Hypertension    • IBS (irritable bowel syndrome)    • Knee swelling    • OAB (overactive bladder)    • Osteoporosis    • Right knee pain    • RLS (restless legs syndrome)    • Rotator cuff syndrome 1968    Tears   • Stress fracture of right tibia    • Stroke (HCC)    • Tear of meniscus of knee 2019 & 2020    Surgery both knees   • TIA (transient ischemic attack)     2011   • Torn meniscus     RIGHT KNEE       PSurg/Soc/Fam Hx:     Past Surgical History:   Procedure Laterality Date   • CATARACT EXTRACTION, BILATERAL     • CHOLECYSTECTOMY     • COLONOSCOPY  11/10/2010    prep of colon fair,IH,stool in transverse colon,hepatic flexure and ascending colon,ileum normal,IBS   • COLONOSCOPY N/A 02/27/2018    IH, diverticulosis, one 5mm polyp, tubular adenoma with low grade dysplasia   • CYSTOSCOPY     • ENDOSCOPY  11/27/2012    grd 1 reflux egitis,web upperd 3rd esoph   • ENDOSCOPY N/A 02/27/2018    normal biopsies, H Pylori positive   • FOOT NEUROMA SURGERY Left    • HAND SURGERY     • IR CEREBRAL ANEURYSM COILING  2012   • KNEE ARTHROSCOPY Left 07/03/2019    Procedure: KNEE ARTHROSCOPY ARTHRITIS DEBRIDEMENT PARTIAL MEDIAL AND LATERAL MENISECTOMY;  Surgeon: Nuris Bender MD;  Location: [facility] OR [facility];  Service: Orthopedics   • KNEE ARTHROSCOPY Right 09/28/2020    Procedure: RIGHT KNEE ARTHROSCOPY, PARTIAL MEDIAL AND LATERAL MENISCECTOMIES, DEBRIDEMENT OF ARTHRITIS, AND INTERNAL FIXATION TIBAL PLATEAU INSUFFICIENCY FRACTURE;  Surgeon: Nuris Bender MD;  Location: [facility] OR [facility];  Service: Orthopedics;  Laterality: Right;   • KNEE SURGERY  2019 & 2020   • THUMB ARTHROSCOPY     • TRIGGER POINT INJECTION       "Hand, elbow, shoulders, knees   • UPPER GASTROINTESTINAL ENDOSCOPY  02/27/2018   • WRIST SURGERY          Social History     Occupational History   • Not on file   Tobacco Use   • Smoking status: Never Smoker   • Smokeless tobacco: Never Used   Vaping Use   • Vaping Use: Never used   Substance and Sexual Activity   • Alcohol use: Yes     Comment: Occasionally   • Drug use: No   • Sexual activity: Not Currently      Social History     Social History Narrative   • Not on file        Family History   Problem Relation Age of Onset   • Irritable bowel syndrome Daughter    • Malig Hyperthermia Neg Hx          Review of Systems:   A 14 point review of systems was performed, pertinent positives discussed above, all other systems are negative    Physical Exam: 71 y.o. female  Vital Signs :   Vitals:    03/10/22 1445   Temp: 98.2 °F (36.8 °C)   Weight: 71.7 kg (158 lb)   Height: 160 cm (62.99\")     General: Alert and Oriented x 3, No acute distress.  Psych: mood and affect appropriate; recent and remote memory intact  Eyes: conjunctiva clear; pupils equally round and reactive, sclera nonicteric  CV: no peripheral edema  Resp: normal respiratory effort  Skin: no rashes or wounds; normal turgor  Musculosketetal: pain localized to the medial aspect of the knee. Neutral alignment in stance. No patellofemoral crepitance or pain with patellofemoral grind. Negative Lachman  Vascular: palpable distal pulses    Xrays:  -3 views (AP, lateral, and sunrise) were ordered and reviewed demonstrating end-stage isolated medial compartment OA with medial bone on bone articulation. The lateral and patellofemoral compartments are well preserved.  -A full length AP xray was ordered and reviewed today for purposes of operative alignment demonstrating end stage medial compartment arthritic findings. There are no previous full length films for review    Assessment:  End-stage Left knee medial compartment osteoarthritis. Conservative measures have " failed.      Plan:  The plan is to proceed with Left Unicompartmental Knee Replacement possible Total Knee Replacement. The patient voiced understanding of the risks, benefits, and alternative forms of treatment that were discussed with Dr Zuniga at the time of scheduling.  Same day [facility]    Aparna Caraballo, APRN  3/10/2022

## 2022-03-11 ENCOUNTER — TELEPHONE (OUTPATIENT)
Dept: ORTHOPEDIC SURGERY | Facility: HOSPITAL | Age: 72
End: 2022-03-11

## 2022-03-11 NOTE — TELEPHONE ENCOUNTER
Risk Factor yes no   Age >75  X   BMI <20 >40  X   Patient History     Chronic Pain (2 or more meds/Pain Management)  X   ETOH (more than 3 drinks Daily)  X   Uncontrolled Depression/Bipolar/Schizoaffective Disorder  X   Arrhythmias  X   Stent placement/MI  X   DVT/PE  X   Pacemaker  X   HTN (uncontrolled or requiring more than 2 medications)  X   CHF/Retained fluids/Edema  X   Stroke with Residual   X   COPD/Asthma  X   MIGUEL--Non-compliant with CPAP  X   Diabetes (on insulin or more than 2 meds)         A1C:  X   BPH/Urinary retention (on medication)  X   CKD  X   Home environment and support     Current ambulation status (use of cane, walker, W/C, Multiple falls/weakness)  X   Stairs to enter and throughout home X    Lives Alone X    Doesn’t have support at home  X     Outpatient Screening Assessment    Home needs: (Walker/BSC):  Needs BSC  ? Steps 2 steps to get into the house   Caregiver 24-48hrs post-discharge: Daughter is going to stay with her  Discharge Plan:  [facility]    Prescriptions: Meds to bed    Home medications:   ? Blood thinner/anti-coag therapy--  ? BPH or diuretic--HCTZ  ? BP meds--  ? Pain/Anti-inflammatories--Gabapentin  Pre-op Education:  Educate patient on spinal anesthesia/pain control:  ? patient verbalize understanding    Educate patient on hospital course/timeline:  ?  patient verbalize understanding    Joint Care Class:  ?  yes ? no      Notes:   Aneurysm Repair  TIA  CVA-No Residual   Doing Prehab at [facility]

## 2022-03-16 ENCOUNTER — LAB (OUTPATIENT)
Dept: LAB | Facility: HOSPITAL | Age: 72
End: 2022-03-16

## 2022-03-16 DIAGNOSIS — M17.12 PRIMARY OSTEOARTHRITIS OF LEFT KNEE: ICD-10-CM

## 2022-03-16 LAB — SARS-COV-2 ORF1AB RESP QL NAA+PROBE: NOT DETECTED

## 2022-03-16 PROCEDURE — U0005 INFEC AGEN DETEC AMPLI PROBE: HCPCS

## 2022-03-16 PROCEDURE — U0004 COV-19 TEST NON-CDC HGH THRU: HCPCS

## 2022-03-16 PROCEDURE — C9803 HOPD COVID-19 SPEC COLLECT: HCPCS

## 2022-03-17 NOTE — CASE MANAGEMENT/SOCIAL WORK
Discharge Planning Assessment  [facility]     Patient Name: Sari Self  MRN: [id number]  Today's Date: 3/17/2022    Admit Date: (Not on file)     Discharge Needs Assessment     Row Name 03/17/22 1636       Living Environment    People in Home child(shashi), adult    Current Living Arrangements home    Primary Care Provided by self    Provides Primary Care For no one    Family Caregiver if Needed child(shashi), adult    Quality of Family Relationships involved    Able to Return to Prior Arrangements yes       Resource/Environmental Concerns    Resource/Environmental Concerns none    Transportation Concerns none       Transition Planning    Patient/Family Anticipates Transition to home with family;home with help/services    Patient/Family Anticipated Services at Transition home health care    Transportation Anticipated family or friend will provide       Discharge Needs Assessment    Readmission Within the Last 30 Days no previous admission in last 30 days    Current Outpatient/Agency/Support Group homecare agency    Equipment Currently Used at Home none;walker, standard    Concerns to be Addressed discharge planning    Anticipated Changes Related to Illness none    Equipment Needed After Discharge commode    Outpatient/Agency/Support Group Needs homecare agency    Discharge Facility/Level of Care Needs home with home health    Provided Post Acute Provider List? Yes    Post Acute Provider List Home Health    Delivered To Patient               Discharge Plan     Row Name 03/17/22 1638       Plan    Plan Home with [facility]     Plan Comments Home with her son and [facility].  She has a walker but will need a BSC at discharge.              Continued Care and Services - Pending Admission on 3/18/2022     Home Medical Care     Service Provider Request Status Selected Services Address Phone Fax Patient Preferred    [facility]  Pending - Request Sent N/A [id number] [facility] [address] [phone]  [phone] --                 Demographic Summary     Row Name 03/17/22 4443       General Information    Admission Type same day    Arrived From home    Reason for Consult discharge planning               Functional Status    No documentation.                Psychosocial    No documentation.                Abuse/Neglect    No documentation.                Legal    No documentation.                Substance Abuse    No documentation.                Patient Forms    No documentation.                   Shannon Epley, RN

## 2022-03-18 ENCOUNTER — APPOINTMENT (OUTPATIENT)
Dept: GENERAL RADIOLOGY | Facility: HOSPITAL | Age: 72
End: 2022-03-18

## 2022-03-18 ENCOUNTER — HOSPITAL ENCOUNTER (OUTPATIENT)
Facility: HOSPITAL | Age: 72
Discharge: HOME HEALTH | End: 2022-03-18
Attending: ORTHOPAEDIC SURGERY | Admitting: ORTHOPAEDIC SURGERY

## 2022-03-18 ENCOUNTER — ANESTHESIA (OUTPATIENT)
Dept: PERIOP | Facility: HOSPITAL | Age: 72
End: 2022-03-18

## 2022-03-18 ENCOUNTER — ANESTHESIA EVENT (OUTPATIENT)
Dept: PERIOP | Facility: HOSPITAL | Age: 72
End: 2022-03-18

## 2022-03-18 VITALS
BODY MASS INDEX: 28.01 KG/M2 | WEIGHT: 158.07 LBS | HEIGHT: 63 IN | SYSTOLIC BLOOD PRESSURE: 128 MMHG | DIASTOLIC BLOOD PRESSURE: 70 MMHG | RESPIRATION RATE: 16 BRPM | OXYGEN SATURATION: 96 % | TEMPERATURE: 96.9 F | HEART RATE: 67 BPM

## 2022-03-18 DIAGNOSIS — Z98.890 S/P KNEE SURGERY: Primary | ICD-10-CM

## 2022-03-18 DIAGNOSIS — M17.12 PRIMARY OSTEOARTHRITIS OF LEFT KNEE: ICD-10-CM

## 2022-03-18 PROCEDURE — 73560 X-RAY EXAM OF KNEE 1 OR 2: CPT

## 2022-03-18 PROCEDURE — 63710000001 OXYBUTYNIN XL 5 MG TABLET SUSTAINED-RELEASE 24 HOUR: Performed by: NURSE PRACTITIONER

## 2022-03-18 PROCEDURE — 63710000001 HYDROCHLOROTHIAZIDE 12.5 MG CAPSULE: Performed by: NURSE PRACTITIONER

## 2022-03-18 PROCEDURE — 25010000002 PROPOFOL 10 MG/ML EMULSION: Performed by: NURSE ANESTHETIST, CERTIFIED REGISTERED

## 2022-03-18 PROCEDURE — 25010000002 FENTANYL CITRATE (PF) 50 MCG/ML SOLUTION: Performed by: ANESTHESIOLOGY

## 2022-03-18 PROCEDURE — C1776 JOINT DEVICE (IMPLANTABLE): HCPCS | Performed by: ORTHOPAEDIC SURGERY

## 2022-03-18 PROCEDURE — A9270 NON-COVERED ITEM OR SERVICE: HCPCS | Performed by: NURSE ANESTHETIST, CERTIFIED REGISTERED

## 2022-03-18 PROCEDURE — 27446 REVISION OF KNEE JOINT: CPT | Performed by: NURSE PRACTITIONER

## 2022-03-18 PROCEDURE — 25010000002 FENTANYL CITRATE (PF) 50 MCG/ML SOLUTION: Performed by: NURSE ANESTHETIST, CERTIFIED REGISTERED

## 2022-03-18 PROCEDURE — C1889 IMPLANT/INSERT DEVICE, NOC: HCPCS | Performed by: ORTHOPAEDIC SURGERY

## 2022-03-18 PROCEDURE — 25010000002 ROPIVACAINE PER 1 MG: Performed by: ORTHOPAEDIC SURGERY

## 2022-03-18 PROCEDURE — A9270 NON-COVERED ITEM OR SERVICE: HCPCS | Performed by: NURSE PRACTITIONER

## 2022-03-18 PROCEDURE — A9270 NON-COVERED ITEM OR SERVICE: HCPCS | Performed by: ORTHOPAEDIC SURGERY

## 2022-03-18 PROCEDURE — 63710000001 MELOXICAM 15 MG TABLET: Performed by: ORTHOPAEDIC SURGERY

## 2022-03-18 PROCEDURE — C1713 ANCHOR/SCREW BN/BN,TIS/BN: HCPCS | Performed by: ORTHOPAEDIC SURGERY

## 2022-03-18 PROCEDURE — 63710000001 PREGABALIN 75 MG CAPSULE: Performed by: ORTHOPAEDIC SURGERY

## 2022-03-18 PROCEDURE — 63710000001 POLYETHYLENE GLYCOL 17 G PACK: Performed by: NURSE PRACTITIONER

## 2022-03-18 PROCEDURE — 97161 PT EVAL LOW COMPLEX 20 MIN: CPT

## 2022-03-18 PROCEDURE — 25010000002 EPINEPHRINE 1 MG/ML SOLUTION 30 ML VIAL: Performed by: ORTHOPAEDIC SURGERY

## 2022-03-18 PROCEDURE — 76942 ECHO GUIDE FOR BIOPSY: CPT | Performed by: ORTHOPAEDIC SURGERY

## 2022-03-18 PROCEDURE — 63710000001 POTASSIUM CHLORIDE 10 MEQ TABLET CONTROLLED-RELEASE: Performed by: NURSE PRACTITIONER

## 2022-03-18 PROCEDURE — 63710000001 LEVOTHYROXINE 50 MCG TABLET: Performed by: NURSE PRACTITIONER

## 2022-03-18 PROCEDURE — 63710000001 HYDROCODONE-ACETAMINOPHEN 7.5-325 MG TABLET: Performed by: NURSE PRACTITIONER

## 2022-03-18 PROCEDURE — 25010000002 MIDAZOLAM PER 1 MG: Performed by: ANESTHESIOLOGY

## 2022-03-18 PROCEDURE — 25010000002 DEXAMETHASONE PER 1 MG: Performed by: NURSE ANESTHETIST, CERTIFIED REGISTERED

## 2022-03-18 PROCEDURE — 63710000001 PANTOPRAZOLE 40 MG TABLET DELAYED-RELEASE: Performed by: NURSE PRACTITIONER

## 2022-03-18 PROCEDURE — 63710000001 HYDROCODONE-ACETAMINOPHEN 7.5-325 MG TABLET: Performed by: NURSE ANESTHETIST, CERTIFIED REGISTERED

## 2022-03-18 PROCEDURE — 25010000002 VANCOMYCIN PER 500 MG: Performed by: ORTHOPAEDIC SURGERY

## 2022-03-18 PROCEDURE — 25010000002 CEFAZOLIN IN DEXTROSE 2-4 GM/100ML-% SOLUTION: Performed by: ORTHOPAEDIC SURGERY

## 2022-03-18 PROCEDURE — 25010000002 KETOROLAC TROMETHAMINE PER 15 MG: Performed by: ORTHOPAEDIC SURGERY

## 2022-03-18 PROCEDURE — 25010000002 HYDRALAZINE PER 20 MG: Performed by: NURSE ANESTHETIST, CERTIFIED REGISTERED

## 2022-03-18 PROCEDURE — 25010000002 MORPHINE PER 10 MG: Performed by: ORTHOPAEDIC SURGERY

## 2022-03-18 PROCEDURE — 27446 REVISION OF KNEE JOINT: CPT | Performed by: ORTHOPAEDIC SURGERY

## 2022-03-18 PROCEDURE — 25010000002 ROPIVACAINE PER 1 MG: Performed by: ANESTHESIOLOGY

## 2022-03-18 PROCEDURE — 25010000002 ONDANSETRON PER 1 MG: Performed by: NURSE ANESTHETIST, CERTIFIED REGISTERED

## 2022-03-18 PROCEDURE — 97530 THERAPEUTIC ACTIVITIES: CPT

## 2022-03-18 DEVICE — PALACOS® R IS A FAST-CURING, RADIOPAQUE, POLY(METHYL METHACRYLATE)-BASED BONE CEMENT.PALACOS ® R CONTAINS THE X-RAY CONTRAST MEDIUM ZIRCONIUM DIOXIDE. TO IMPROVE VISIBILITY IN THE SURGICAL FIELD PALACOS ® R HAS BEEN COLOURED WITH CHLOROPHYLL (E141). THE BONE CEMENT IS PREPARED DIRECTLY BEFORE USE BY MIXING A POLYMER POWDER COMPONENT WITH A LIQUID MONOMER COMPONENT. A DUCTILE DOUGH FORMS WHICH CURES WITHIN A FEW MINUTES.
Type: IMPLANTABLE DEVICE | Site: KNEE | Status: FUNCTIONAL
Brand: PALACOS®

## 2022-03-18 DEVICE — JOURNEY II UNI MEDIAL TIBIA                                    BASEPLATE SZ 4 LEFT
Type: IMPLANTABLE DEVICE | Site: KNEE | Status: FUNCTIONAL
Brand: JOURNEY II UNI

## 2022-03-18 DEVICE — KNOTLESS TISSUE CONTROL DEVICE, UNDYED UNIDIRECTIONAL (ANTIBACTERIAL) SYNTHETIC ABSORBABLE DEVICE
Type: IMPLANTABLE DEVICE | Site: KNEE | Status: FUNCTIONAL
Brand: STRATAFIX

## 2022-03-18 DEVICE — VIOLET ANTIBACTERIAL POLYDIOXANONE, KNOTLESS TISSUE CONTROL DEVICE
Type: IMPLANTABLE DEVICE | Site: KNEE | Status: FUNCTIONAL
Brand: STRATAFIX

## 2022-03-18 DEVICE — JOURNEY II UNI XLPE TIBIA INSERT                                    MEDIAL SZ 3-4 9MM
Type: IMPLANTABLE DEVICE | Site: KNEE | Status: FUNCTIONAL
Brand: JOURNEY II UNI

## 2022-03-18 DEVICE — JOURNEY II UNI OXINIUM FEMORAL                                    COMPONENT 2 LM/RL
Type: IMPLANTABLE DEVICE | Site: KNEE | Status: FUNCTIONAL
Brand: JOURNEY II UNI

## 2022-03-18 DEVICE — IMPLANTABLE DEVICE: Type: IMPLANTABLE DEVICE | Site: KNEE | Status: FUNCTIONAL

## 2022-03-18 RX ORDER — HYDROCODONE BITARTRATE AND ACETAMINOPHEN 7.5; 325 MG/1; MG/1
TABLET ORAL
Qty: 60 TABLET | Refills: 0 | Status: SHIPPED | OUTPATIENT
Start: 2022-03-18 | End: 2022-03-31 | Stop reason: SDUPTHER

## 2022-03-18 RX ORDER — ONDANSETRON 2 MG/ML
INJECTION INTRAMUSCULAR; INTRAVENOUS AS NEEDED
Status: DISCONTINUED | OUTPATIENT
Start: 2022-03-18 | End: 2022-03-18 | Stop reason: SURG

## 2022-03-18 RX ORDER — SODIUM CHLORIDE, SODIUM LACTATE, POTASSIUM CHLORIDE, CALCIUM CHLORIDE 600; 310; 30; 20 MG/100ML; MG/100ML; MG/100ML; MG/100ML
9 INJECTION, SOLUTION INTRAVENOUS CONTINUOUS
Status: DISCONTINUED | OUTPATIENT
Start: 2022-03-18 | End: 2022-03-18

## 2022-03-18 RX ORDER — OXYCODONE AND ACETAMINOPHEN 7.5; 325 MG/1; MG/1
2 TABLET ORAL EVERY 4 HOURS PRN
Status: DISCONTINUED | OUTPATIENT
Start: 2022-03-18 | End: 2022-03-18 | Stop reason: HOSPADM

## 2022-03-18 RX ORDER — PROPOFOL 10 MG/ML
VIAL (ML) INTRAVENOUS AS NEEDED
Status: DISCONTINUED | OUTPATIENT
Start: 2022-03-18 | End: 2022-03-18 | Stop reason: SURG

## 2022-03-18 RX ORDER — FENTANYL CITRATE 50 UG/ML
INJECTION, SOLUTION INTRAMUSCULAR; INTRAVENOUS AS NEEDED
Status: DISCONTINUED | OUTPATIENT
Start: 2022-03-18 | End: 2022-03-18 | Stop reason: SURG

## 2022-03-18 RX ORDER — VANCOMYCIN HYDROCHLORIDE 1 G/200ML
15 INJECTION, SOLUTION INTRAVENOUS ONCE
Status: COMPLETED | OUTPATIENT
Start: 2022-03-18 | End: 2022-03-18

## 2022-03-18 RX ORDER — ACETAMINOPHEN 10 MG/ML
INJECTION, SOLUTION INTRAVENOUS AS NEEDED
Status: DISCONTINUED | OUTPATIENT
Start: 2022-03-18 | End: 2022-03-18 | Stop reason: SURG

## 2022-03-18 RX ORDER — MAGNESIUM HYDROXIDE 1200 MG/15ML
LIQUID ORAL AS NEEDED
Status: DISCONTINUED | OUTPATIENT
Start: 2022-03-18 | End: 2022-03-18 | Stop reason: HOSPADM

## 2022-03-18 RX ORDER — HYDROCODONE BITARTRATE AND ACETAMINOPHEN 7.5; 325 MG/1; MG/1
1 TABLET ORAL EVERY 4 HOURS PRN
Status: DISCONTINUED | OUTPATIENT
Start: 2022-03-18 | End: 2022-03-18 | Stop reason: HOSPADM

## 2022-03-18 RX ORDER — FAMOTIDINE 10 MG/ML
20 INJECTION, SOLUTION INTRAVENOUS ONCE
Status: COMPLETED | OUTPATIENT
Start: 2022-03-18 | End: 2022-03-18

## 2022-03-18 RX ORDER — EPHEDRINE SULFATE 50 MG/ML
5 INJECTION, SOLUTION INTRAVENOUS ONCE AS NEEDED
Status: DISCONTINUED | OUTPATIENT
Start: 2022-03-18 | End: 2022-03-18 | Stop reason: HOSPADM

## 2022-03-18 RX ORDER — POLYETHYLENE GLYCOL 3350 17 G/17G
17 POWDER, FOR SOLUTION ORAL 2 TIMES DAILY
Qty: 510 G | Refills: 0 | Status: SHIPPED | OUTPATIENT
Start: 2022-03-18 | End: 2022-04-02

## 2022-03-18 RX ORDER — TRANEXAMIC ACID 100 MG/ML
INJECTION, SOLUTION INTRAVENOUS AS NEEDED
Status: DISCONTINUED | OUTPATIENT
Start: 2022-03-18 | End: 2022-03-18 | Stop reason: SURG

## 2022-03-18 RX ORDER — PROMETHAZINE HYDROCHLORIDE 25 MG/1
25 TABLET ORAL ONCE AS NEEDED
Status: DISCONTINUED | OUTPATIENT
Start: 2022-03-18 | End: 2022-03-18 | Stop reason: HOSPADM

## 2022-03-18 RX ORDER — LEVOTHYROXINE SODIUM 0.05 MG/1
50 TABLET ORAL EVERY MORNING
Status: DISCONTINUED | OUTPATIENT
Start: 2022-03-18 | End: 2022-03-18 | Stop reason: HOSPADM

## 2022-03-18 RX ORDER — FENTANYL CITRATE 50 UG/ML
INJECTION, SOLUTION INTRAMUSCULAR; INTRAVENOUS
Status: COMPLETED | OUTPATIENT
Start: 2022-03-18 | End: 2022-03-18

## 2022-03-18 RX ORDER — SODIUM CHLORIDE 0.9 % (FLUSH) 0.9 %
3-10 SYRINGE (ML) INJECTION AS NEEDED
Status: DISCONTINUED | OUTPATIENT
Start: 2022-03-18 | End: 2022-03-18 | Stop reason: HOSPADM

## 2022-03-18 RX ORDER — POVIDONE-IODINE 10 MG/ML
SOLUTION TOPICAL ONCE
Status: COMPLETED | OUTPATIENT
Start: 2022-03-18 | End: 2022-03-18

## 2022-03-18 RX ORDER — ONDANSETRON 4 MG/1
4 TABLET, FILM COATED ORAL EVERY 8 HOURS PRN
Qty: 10 TABLET | Refills: 0 | OUTPATIENT
Start: 2022-03-18 | End: 2022-11-14

## 2022-03-18 RX ORDER — LABETALOL HYDROCHLORIDE 5 MG/ML
5 INJECTION, SOLUTION INTRAVENOUS
Status: DISCONTINUED | OUTPATIENT
Start: 2022-03-18 | End: 2022-03-18 | Stop reason: HOSPADM

## 2022-03-18 RX ORDER — HYDROCHLOROTHIAZIDE 12.5 MG/1
12.5 TABLET ORAL DAILY
Status: DISCONTINUED | OUTPATIENT
Start: 2022-03-18 | End: 2022-03-18 | Stop reason: HOSPADM

## 2022-03-18 RX ORDER — UREA 10 %
3 LOTION (ML) TOPICAL NIGHTLY PRN
Status: DISCONTINUED | OUTPATIENT
Start: 2022-03-18 | End: 2022-03-18 | Stop reason: HOSPADM

## 2022-03-18 RX ORDER — HYDRALAZINE HYDROCHLORIDE 20 MG/ML
5 INJECTION INTRAMUSCULAR; INTRAVENOUS
Status: DISCONTINUED | OUTPATIENT
Start: 2022-03-18 | End: 2022-03-18 | Stop reason: HOSPADM

## 2022-03-18 RX ORDER — ACETAMINOPHEN 325 MG/1
325 TABLET ORAL EVERY 4 HOURS PRN
Status: DISCONTINUED | OUTPATIENT
Start: 2022-03-18 | End: 2022-03-18 | Stop reason: HOSPADM

## 2022-03-18 RX ORDER — FENTANYL CITRATE 50 UG/ML
50 INJECTION, SOLUTION INTRAMUSCULAR; INTRAVENOUS
Status: DISCONTINUED | OUTPATIENT
Start: 2022-03-18 | End: 2022-03-18 | Stop reason: HOSPADM

## 2022-03-18 RX ORDER — ROCURONIUM BROMIDE 10 MG/ML
INJECTION, SOLUTION INTRAVENOUS AS NEEDED
Status: DISCONTINUED | OUTPATIENT
Start: 2022-03-18 | End: 2022-03-18 | Stop reason: SURG

## 2022-03-18 RX ORDER — HYDROCODONE BITARTRATE AND ACETAMINOPHEN 7.5; 325 MG/1; MG/1
1 TABLET ORAL ONCE AS NEEDED
Status: COMPLETED | OUTPATIENT
Start: 2022-03-18 | End: 2022-03-18

## 2022-03-18 RX ORDER — DEXAMETHASONE SODIUM PHOSPHATE 10 MG/ML
INJECTION INTRAMUSCULAR; INTRAVENOUS AS NEEDED
Status: DISCONTINUED | OUTPATIENT
Start: 2022-03-18 | End: 2022-03-18 | Stop reason: SURG

## 2022-03-18 RX ORDER — MIDAZOLAM HYDROCHLORIDE 1 MG/ML
INJECTION INTRAMUSCULAR; INTRAVENOUS
Status: COMPLETED | OUTPATIENT
Start: 2022-03-18 | End: 2022-03-18

## 2022-03-18 RX ORDER — ONDANSETRON 2 MG/ML
4 INJECTION INTRAMUSCULAR; INTRAVENOUS EVERY 6 HOURS PRN
Status: DISCONTINUED | OUTPATIENT
Start: 2022-03-18 | End: 2022-03-18 | Stop reason: HOSPADM

## 2022-03-18 RX ORDER — VANCOMYCIN HYDROCHLORIDE 1 G/200ML
15 INJECTION, SOLUTION INTRAVENOUS ONCE
Status: DISCONTINUED | OUTPATIENT
Start: 2022-03-18 | End: 2022-03-18 | Stop reason: HOSPADM

## 2022-03-18 RX ORDER — ROPIVACAINE HYDROCHLORIDE 2 MG/ML
INJECTION, SOLUTION EPIDURAL; INFILTRATION; PERINEURAL
Status: COMPLETED | OUTPATIENT
Start: 2022-03-18 | End: 2022-03-18

## 2022-03-18 RX ORDER — POTASSIUM CHLORIDE 750 MG/1
10 TABLET, FILM COATED, EXTENDED RELEASE ORAL DAILY
Status: DISCONTINUED | OUTPATIENT
Start: 2022-03-18 | End: 2022-03-18 | Stop reason: HOSPADM

## 2022-03-18 RX ORDER — HYDROMORPHONE HYDROCHLORIDE 1 MG/ML
0.5 INJECTION, SOLUTION INTRAMUSCULAR; INTRAVENOUS; SUBCUTANEOUS
Status: DISCONTINUED | OUTPATIENT
Start: 2022-03-18 | End: 2022-03-18 | Stop reason: HOSPADM

## 2022-03-18 RX ORDER — ONDANSETRON 4 MG/1
4 TABLET, FILM COATED ORAL EVERY 6 HOURS PRN
Status: DISCONTINUED | OUTPATIENT
Start: 2022-03-18 | End: 2022-03-18 | Stop reason: HOSPADM

## 2022-03-18 RX ORDER — POLYETHYLENE GLYCOL 3350 17 G/17G
17 POWDER, FOR SOLUTION ORAL 2 TIMES DAILY
Status: DISCONTINUED | OUTPATIENT
Start: 2022-03-18 | End: 2022-03-18 | Stop reason: HOSPADM

## 2022-03-18 RX ORDER — LIDOCAINE HYDROCHLORIDE 10 MG/ML
0.5 INJECTION, SOLUTION EPIDURAL; INFILTRATION; INTRACAUDAL; PERINEURAL ONCE AS NEEDED
Status: DISCONTINUED | OUTPATIENT
Start: 2022-03-18 | End: 2022-03-18 | Stop reason: HOSPADM

## 2022-03-18 RX ORDER — ASPIRIN 81 MG/1
81 TABLET ORAL 2 TIMES DAILY
Qty: 60 TABLET | Refills: 0 | Status: SHIPPED | OUTPATIENT
Start: 2022-03-19 | End: 2022-04-18

## 2022-03-18 RX ORDER — NALOXONE HCL 0.4 MG/ML
0.2 VIAL (ML) INJECTION AS NEEDED
Status: DISCONTINUED | OUTPATIENT
Start: 2022-03-18 | End: 2022-03-18 | Stop reason: HOSPADM

## 2022-03-18 RX ORDER — GLYCOPYRROLATE 0.2 MG/ML
INJECTION INTRAMUSCULAR; INTRAVENOUS AS NEEDED
Status: DISCONTINUED | OUTPATIENT
Start: 2022-03-18 | End: 2022-03-18 | Stop reason: SURG

## 2022-03-18 RX ORDER — SODIUM CHLORIDE 0.9 % (FLUSH) 0.9 %
3 SYRINGE (ML) INJECTION EVERY 12 HOURS SCHEDULED
Status: DISCONTINUED | OUTPATIENT
Start: 2022-03-18 | End: 2022-03-18 | Stop reason: HOSPADM

## 2022-03-18 RX ORDER — LABETALOL HYDROCHLORIDE 5 MG/ML
INJECTION, SOLUTION INTRAVENOUS AS NEEDED
Status: DISCONTINUED | OUTPATIENT
Start: 2022-03-18 | End: 2022-03-18 | Stop reason: SURG

## 2022-03-18 RX ORDER — MELOXICAM 7.5 MG/1
15 TABLET ORAL DAILY
Qty: 28 TABLET | Refills: 0 | Status: SHIPPED | OUTPATIENT
Start: 2022-03-18 | End: 2022-04-01

## 2022-03-18 RX ORDER — MIDAZOLAM HYDROCHLORIDE 1 MG/ML
0.5 INJECTION INTRAMUSCULAR; INTRAVENOUS
Status: DISCONTINUED | OUTPATIENT
Start: 2022-03-18 | End: 2022-03-18 | Stop reason: HOSPADM

## 2022-03-18 RX ORDER — MELOXICAM 15 MG/1
15 TABLET ORAL ONCE
Status: COMPLETED | OUTPATIENT
Start: 2022-03-18 | End: 2022-03-18

## 2022-03-18 RX ORDER — ASPIRIN 81 MG/1
81 TABLET ORAL EVERY 12 HOURS SCHEDULED
Status: DISCONTINUED | OUTPATIENT
Start: 2022-03-19 | End: 2022-03-18 | Stop reason: HOSPADM

## 2022-03-18 RX ORDER — PANTOPRAZOLE SODIUM 40 MG/1
40 TABLET, DELAYED RELEASE ORAL EVERY MORNING
Status: DISCONTINUED | OUTPATIENT
Start: 2022-03-18 | End: 2022-03-18 | Stop reason: HOSPADM

## 2022-03-18 RX ORDER — HYDRALAZINE HYDROCHLORIDE 20 MG/ML
INJECTION INTRAMUSCULAR; INTRAVENOUS AS NEEDED
Status: DISCONTINUED | OUTPATIENT
Start: 2022-03-18 | End: 2022-03-18 | Stop reason: SURG

## 2022-03-18 RX ORDER — LIDOCAINE HYDROCHLORIDE 20 MG/ML
INJECTION, SOLUTION INFILTRATION; PERINEURAL AS NEEDED
Status: DISCONTINUED | OUTPATIENT
Start: 2022-03-18 | End: 2022-03-18 | Stop reason: SURG

## 2022-03-18 RX ORDER — ONDANSETRON 2 MG/ML
4 INJECTION INTRAMUSCULAR; INTRAVENOUS ONCE AS NEEDED
Status: DISCONTINUED | OUTPATIENT
Start: 2022-03-18 | End: 2022-03-18 | Stop reason: HOSPADM

## 2022-03-18 RX ORDER — DIPHENHYDRAMINE HCL 25 MG
25 CAPSULE ORAL
Status: DISCONTINUED | OUTPATIENT
Start: 2022-03-18 | End: 2022-03-18 | Stop reason: HOSPADM

## 2022-03-18 RX ORDER — OXYBUTYNIN CHLORIDE 5 MG/1
5 TABLET, EXTENDED RELEASE ORAL DAILY
Status: DISCONTINUED | OUTPATIENT
Start: 2022-03-18 | End: 2022-03-18 | Stop reason: HOSPADM

## 2022-03-18 RX ORDER — MELOXICAM 15 MG/1
15 TABLET ORAL DAILY
Status: DISCONTINUED | OUTPATIENT
Start: 2022-03-19 | End: 2022-03-18 | Stop reason: HOSPADM

## 2022-03-18 RX ORDER — PROMETHAZINE HYDROCHLORIDE 25 MG/1
25 SUPPOSITORY RECTAL ONCE AS NEEDED
Status: DISCONTINUED | OUTPATIENT
Start: 2022-03-18 | End: 2022-03-18 | Stop reason: HOSPADM

## 2022-03-18 RX ORDER — HYDROCODONE BITARTRATE AND ACETAMINOPHEN 7.5; 325 MG/1; MG/1
2 TABLET ORAL EVERY 4 HOURS PRN
Status: DISCONTINUED | OUTPATIENT
Start: 2022-03-18 | End: 2022-03-18 | Stop reason: HOSPADM

## 2022-03-18 RX ORDER — CEFAZOLIN SODIUM 2 G/100ML
2 INJECTION, SOLUTION INTRAVENOUS ONCE
Status: COMPLETED | OUTPATIENT
Start: 2022-03-18 | End: 2022-03-18

## 2022-03-18 RX ORDER — DIPHENHYDRAMINE HYDROCHLORIDE 50 MG/ML
12.5 INJECTION INTRAMUSCULAR; INTRAVENOUS
Status: DISCONTINUED | OUTPATIENT
Start: 2022-03-18 | End: 2022-03-18 | Stop reason: HOSPADM

## 2022-03-18 RX ORDER — PREGABALIN 75 MG/1
150 CAPSULE ORAL ONCE
Status: COMPLETED | OUTPATIENT
Start: 2022-03-18 | End: 2022-03-18

## 2022-03-18 RX ORDER — FLUMAZENIL 0.1 MG/ML
0.2 INJECTION INTRAVENOUS AS NEEDED
Status: DISCONTINUED | OUTPATIENT
Start: 2022-03-18 | End: 2022-03-18 | Stop reason: HOSPADM

## 2022-03-18 RX ADMIN — FENTANYL CITRATE 50 MCG: 50 INJECTION INTRAMUSCULAR; INTRAVENOUS at 08:53

## 2022-03-18 RX ADMIN — FENTANYL CITRATE 25 MCG: 0.05 INJECTION, SOLUTION INTRAMUSCULAR; INTRAVENOUS at 07:39

## 2022-03-18 RX ADMIN — LABETALOL HYDROCHLORIDE 5 MG: 5 INJECTION, SOLUTION INTRAVENOUS at 07:53

## 2022-03-18 RX ADMIN — POTASSIUM CHLORIDE 10 MEQ: 10 TABLET, EXTENDED RELEASE ORAL at 12:10

## 2022-03-18 RX ADMIN — FENTANYL CITRATE 25 MCG: 0.05 INJECTION, SOLUTION INTRAMUSCULAR; INTRAVENOUS at 07:35

## 2022-03-18 RX ADMIN — MELOXICAM 15 MG: 15 TABLET ORAL at 05:55

## 2022-03-18 RX ADMIN — DEXAMETHASONE SODIUM PHOSPHATE 8 MG: 10 INJECTION INTRAMUSCULAR; INTRAVENOUS at 07:10

## 2022-03-18 RX ADMIN — ONDANSETRON 4 MG: 2 INJECTION INTRAMUSCULAR; INTRAVENOUS at 08:10

## 2022-03-18 RX ADMIN — SUGAMMADEX 200 MG: 100 INJECTION, SOLUTION INTRAVENOUS at 08:11

## 2022-03-18 RX ADMIN — PROPOFOL 30 MG: 10 INJECTION, EMULSION INTRAVENOUS at 07:46

## 2022-03-18 RX ADMIN — ROCURONIUM BROMIDE 50 MG: 50 INJECTION INTRAVENOUS at 07:05

## 2022-03-18 RX ADMIN — VANCOMYCIN HYDROCHLORIDE 1000 MG: 1 INJECTION, SOLUTION INTRAVENOUS at 05:54

## 2022-03-18 RX ADMIN — PREGABALIN 150 MG: 75 CAPSULE ORAL at 05:55

## 2022-03-18 RX ADMIN — LIDOCAINE HYDROCHLORIDE 100 MG: 20 INJECTION, SOLUTION INFILTRATION; PERINEURAL at 07:05

## 2022-03-18 RX ADMIN — POVIDONE-IODINE 1 APPLICATION: 10 SWAB TOPICAL at 05:55

## 2022-03-18 RX ADMIN — FENTANYL CITRATE 50 MCG: 0.05 INJECTION, SOLUTION INTRAMUSCULAR; INTRAVENOUS at 06:31

## 2022-03-18 RX ADMIN — LABETALOL HYDROCHLORIDE 5 MG: 5 INJECTION, SOLUTION INTRAVENOUS at 08:04

## 2022-03-18 RX ADMIN — FAMOTIDINE 20 MG: 10 INJECTION INTRAVENOUS at 06:28

## 2022-03-18 RX ADMIN — POLYETHYLENE GLYCOL 3350 17 G: 17 POWDER, FOR SOLUTION ORAL at 12:09

## 2022-03-18 RX ADMIN — MIDAZOLAM 1 MG: 1 INJECTION INTRAMUSCULAR; INTRAVENOUS at 06:31

## 2022-03-18 RX ADMIN — CEFAZOLIN SODIUM 2 G: 2 INJECTION, SOLUTION INTRAVENOUS at 06:57

## 2022-03-18 RX ADMIN — SODIUM CHLORIDE, POTASSIUM CHLORIDE, SODIUM LACTATE AND CALCIUM CHLORIDE: 600; 310; 30; 20 INJECTION, SOLUTION INTRAVENOUS at 06:59

## 2022-03-18 RX ADMIN — GLYCOPYRROLATE 0.2 MG: 0.2 INJECTION INTRAMUSCULAR; INTRAVENOUS at 07:32

## 2022-03-18 RX ADMIN — HYDROCODONE BITARTRATE AND ACETAMINOPHEN 1 TABLET: 7.5; 325 TABLET ORAL at 08:52

## 2022-03-18 RX ADMIN — SODIUM CHLORIDE, POTASSIUM CHLORIDE, SODIUM LACTATE AND CALCIUM CHLORIDE: 600; 310; 30; 20 INJECTION, SOLUTION INTRAVENOUS at 08:22

## 2022-03-18 RX ADMIN — PANTOPRAZOLE SODIUM 40 MG: 40 TABLET, DELAYED RELEASE ORAL at 12:10

## 2022-03-18 RX ADMIN — SODIUM CHLORIDE, POTASSIUM CHLORIDE, SODIUM LACTATE AND CALCIUM CHLORIDE 500 ML: 600; 310; 30; 20 INJECTION, SOLUTION INTRAVENOUS at 05:53

## 2022-03-18 RX ADMIN — PROPOFOL 200 MG: 10 INJECTION, EMULSION INTRAVENOUS at 07:05

## 2022-03-18 RX ADMIN — FENTANYL CITRATE 50 MCG: 0.05 INJECTION, SOLUTION INTRAMUSCULAR; INTRAVENOUS at 07:45

## 2022-03-18 RX ADMIN — TRANEXAMIC ACID 1000 MG: 1 INJECTION, SOLUTION INTRAVENOUS at 08:02

## 2022-03-18 RX ADMIN — ACETAMINOPHEN 1000 MG: 10 INJECTION, SOLUTION INTRAVENOUS at 07:10

## 2022-03-18 RX ADMIN — OXYBUTYNIN CHLORIDE 5 MG: 5 TABLET, EXTENDED RELEASE ORAL at 12:09

## 2022-03-18 RX ADMIN — HYDRALAZINE HYDROCHLORIDE 4 MG: 20 INJECTION, SOLUTION INTRAMUSCULAR; INTRAVENOUS at 08:09

## 2022-03-18 RX ADMIN — ROPIVACAINE HYDROCHLORIDE 20 ML: 2 INJECTION, SOLUTION EPIDURAL; INFILTRATION at 06:38

## 2022-03-18 RX ADMIN — PROPOFOL 200 MCG/KG/MIN: 10 INJECTION, EMULSION INTRAVENOUS at 07:05

## 2022-03-18 RX ADMIN — HYDROCODONE BITARTRATE AND ACETAMINOPHEN 1 TABLET: 7.5; 325 TABLET ORAL at 12:10

## 2022-03-18 RX ADMIN — LABETALOL HYDROCHLORIDE 5 MG: 5 INJECTION, SOLUTION INTRAVENOUS at 08:00

## 2022-03-18 RX ADMIN — HYDROCHLOROTHIAZIDE 12.5 MG: 12.5 CAPSULE ORAL at 12:09

## 2022-03-18 NOTE — PLAN OF CARE
Goal Outcome Evaluation:  Plan of Care Reviewed With: patient           Outcome Evaluation: Patient is 72 yo female seen s/p L unilateral TKA. Patient lives with son, has 2 steps to enter home, has rwx, BSC issued per patient request. Patient has no c/o pain, able to perform 10 reps per TKA protocol, performed sit to stand with SBA and rwx, ambulated 200 feet with rwx and CGA, up and down 4 steps with 1HR with CGA. Patient is safe at current functional level to return home with family assist and f/u with home health.  Patient was intermittently wearing a face mask during this therapy encounter. Therapist used appropriate personal protective equipment including mask and gloves.  Mask used was standard procedure mask. Appropriate PPE was worn during the entire therapy session. Hand hygiene was completed before and after therapy session. Patient is not in enhanced droplet precautions.

## 2022-03-18 NOTE — ANESTHESIA PROCEDURE NOTES
Airway  Urgency: elective    Date/Time: 3/18/2022 7:08 AM  Airway not difficult    General Information and Staff    Patient location during procedure: OR  Anesthesiologist: Alonzo Davey MD  CRNA: Ilana Nichols CRNA    Indications and Patient Condition  Indications for airway management: airway protection    Preoxygenated: yes  Mask difficulty assessment: 2 - vent by mask + OA or adjuvant +/- NMBA    Final Airway Details  Final airway type: endotracheal airway      Successful airway: ETT  Cuffed: yes   Successful intubation technique: direct laryngoscopy  Facilitating devices/methods: intubating stylet and anterior pressure/BURP  Endotracheal tube insertion site: oral  Blade: Jamshid  Blade size: 3  ETT size (mm): 7.0  Cormack-Lehane Classification: grade I - full view of glottis  Placement verified by: chest auscultation and capnometry   Measured from: lips  ETT/EBT  to lips (cm): 21  Number of attempts at approach: 1  Assessment: lips, teeth, and gum same as pre-op and atraumatic intubation

## 2022-03-18 NOTE — PLAN OF CARE
Goal Outcome Evaluation:              Outcome Evaluation: Pt s/p LT TKA, RADHA dressing c/d/i, working well, pt is a same day discharge to home today, pain controlled, discharge instructions given, needs attended.

## 2022-03-18 NOTE — PROGRESS NOTES
Continued Stay Note  [facility]     Patient Name: Sari Self  MRN: [id number]  Today's Date: 3/18/2022    Admit Date: 3/18/2022     Discharge Plan     Row Name 03/18/22 1341       Plan    Final Discharge Disposition Code 01 - home or self-care    Final Note Pt to d/c home with [facility]               Discharge Codes    No documentation.               Expected Discharge Date and Time     Expected Discharge Date Expected Discharge Time    Mar 18, 2022             Jennifer Schmitz RN

## 2022-03-18 NOTE — ANESTHESIA PROCEDURE NOTES
Peripheral Block      Patient reassessed immediately prior to procedure    Patient location during procedure: holding area  Start time: 3/18/2022 6:30 AM  Stop time: 3/18/2022 6:35 AM  Reason for block: at surgeon's request and post-op pain management  Performed by  Anesthesiologist: Alonzo Davey MD  Preanesthetic Checklist  Completed: patient identified, IV checked, site marked, risks and benefits discussed, surgical consent, monitors and equipment checked, pre-op evaluation and timeout performed  Prep:  Sterile barriers:gloves and cap  Prep: ChloraPrep  Patient monitoring: blood pressure monitoring, continuous pulse oximetry and EKG  Procedure    Sedation: yes    Guidance:ultrasound guided    ULTRASOUND INTERPRETATION.  Using ultrasound guidance a 22 G gauge needle was placed in close proximity to the femoral nerve, at which point, under ultrasound guidance anesthetic was injected in the area of the nerve and spread of the anesthesia was seen on ultrasound in close proximity thereto.  There were no abnormalities seen on ultrasound; a digital image was taken; and the patient tolerated the procedure with no complications. Images:still images obtained    Laterality:left  Block Type:adductor canal block  Injection Technique:single-shot  Needle Type:echogenic  Needle Gauge:21 G      Medications Used: ropivacaine (NAROPIN) 0.2 % injection, 20 mL  Med administered at 3/18/2022 6:38 AM      Medications  Comment:Ultrasound used to precisely deposit local anesthetic    Post Assessment  Injection Assessment: incremental injection, no paresthesia on injection and negative aspiration for heme  Patient Tolerance:comfortable throughout block  Complications:no

## 2022-03-18 NOTE — ANESTHESIA POSTPROCEDURE EVALUATION
Patient: Sari Self    Procedure Summary     Date: 03/18/22 Room / Location: [facility] OR [address] [place] [address] MAIN OR    Anesthesia Start: 0659 Anesthesia Stop: 0837    Procedure: KNEE ARTHROPLASTY UNICOMPARTMENTAL (Left Knee) Diagnosis:       Primary osteoarthritis of left knee      (Primary osteoarthritis of left knee [M17.12])    Surgeons: Everton Zuniga MD Provider: Alonzo Davey MD    Anesthesia Type: general ASA Status: 3          Anesthesia Type: general    Vitals  Vitals Value Taken Time   /71 03/18/22 0901   Temp 36.3 °C (97.4 °F) 03/18/22 0834   Pulse 65 03/18/22 0909   Resp 16 03/18/22 0845   SpO2 95 % 03/18/22 0909   Vitals shown include unvalidated device data.        Post Anesthesia Care and Evaluation    Patient location during evaluation: bedside  Patient participation: complete - patient participated  Level of consciousness: sleepy but conscious  Pain score: 0  Pain management: adequate  Airway patency: patent  Anesthetic complications: No anesthetic complications    Cardiovascular status: acceptable  Respiratory status: acceptable  Hydration status: acceptable    Comments: /75 (BP Location: Left arm, Patient Position: Lying)   Pulse 63   Temp 36.3 °C (97.4 °F) (Oral)   Resp 16   LMP  (LMP Unknown)   SpO2 100%

## 2022-03-18 NOTE — OP NOTE
Name: Sari Self  YOB: 1950    DATE OF SURGERY: 3/18/2022    PREOPERATIVE DIAGNOSIS: Left knee end-stage medial compartment osteoarthritis    POSTOPERATIVE DIAGNOSIS: Left knee end-stage compartment osteoarthritis    PROCEDURE PERFORMED: Left unicompartmental knee replacement    SURGEON: Everton Zuniga M.D.    ASSISTANT: NICKIE MONTES    A surgical assistant was integral in ensuring a successful outcome with this procedure.  The assistant was utilized to assist in positioning the patient, draping the patient, was used throughout the case to provide with retraction of tissues, suctioning of blood and body fluids for visualization, positioning of the extremity to allow for proper exposure so that I could perform the procedure.  Without the use of a surgical assistant during this procedure I feel that the outcome may have been compromised or would have been suboptimal or at risk for complications.    IMPLANTS:   Implant Name Type Inv. Item Serial No.  Lot No. LRB No. Used Action   CMT BONE PALACOS R HI/VISC 1X40 - [id number] Implant CMT BONE PALACOS R HI/VISC 1X40  [facility] [id number] Left 1 Implanted   DEV CONTRL TISS STRATAFIX SYMM PDS PLUS BREANN CT-1 60CM - [id number] Implant DEV CONTRL TISS STRATAFIX SYMM PDS PLUS BREANN CT-1 60CM  [employer] Left 1 Implanted   DEV CONTRL TISS STRATAFIXSPIRALMNCRYL PLSPS2 REV3/0 45CM - [id number] Implant DEV CONTRL TISS STRATAFIXSPIRALMNCRYL PLSPS2 REV3/0 45CM  [employer]  [employer]HU Left 1 Implanted   BASEPLT TIB/KN JOURNEY2/UNI MED/LT SZ4 - [id number] Implant BASEPLT TIB/KN JOURNEY2/UNI MED/LT SZ4  [employer] [id number] Left 1 Implanted   FEMORAL COMPONENT OXINIUM JOURNEY II UNI LEFT MED/RIGHT LAT SIZE 2    [employer] [id number] Left 1 Implanted   INSRT TIB/KN JOURNEY2 MEDL UNIV XLPE SZ3TO4 9MM - [id number] Implant INSRT TIB/KN JOURNEY2 MEDL UNIV XLPE SZ3TO4 9MM  [employer] [id number] Left 1 Implanted        Estimated Blood Loss: 100cc  Specimens : none  Complications: none    DESCRIPTION OF PROCEDURE: The patient was taken to the operating room and placed in the supine position. A sequential compression device was carefully placed on the non-operative leg. Preoperative antibiotics were administered. Surgical time out was performed. After adequate induction of anesthesia, the leg was prepped and draped in the usual sterile fashion, exsanguinated with an Esmarch bandage and the tourniquet inflated to 250 mmHg. A slightly medial to midline incision was performed followed by a mini-midvastus arthrotomy. The patella was partially subluxed laterally.  I then carefully examined all 3 compartments of the knee.  There was end-stage medial compartment osteoarthritis with bone-on-bone articulation.  The ACL was normal.  The patellofemoral joint was normal.  The lateral joint was normal.  I then used the tibial extramedullary cutting guide.  It was set with the 4 mm stylus to remove 4 mm of medial bone at the thickest point.  The cutting guide was pinned in place and the tibial cut was performed.  The tibial cut was then removed.  We then used the flexion extension blocks to carefully examine the balance between flexion and extension.  At this point the block was placed with the leg in full extension and the distal femoral cutting block was placed.  The distal femoral cut was performed.  We then flexed the knee to roughly 90° and again checked the balance in flexion and extension.  The knee was then balanced.  We then sized the femoral component at 90° of flexion the appropriate cutting guide was pinned in place.  All femoral cuts were then performed.    The medial meniscus and remnants of remnants of excessive posterior capsule were excised.  We then again flexed the knee and sized the tibial cut surface.  The tibial trial was then placed and the fixation lugs were drilled.  The trial components were then placed.  The trial  spacer was then placed.  The knee was taken through a full range of motion.  The knee was nicely balanced and the 2 mm mannie easily fit in both flexion and extension with equal balance.  The trial components were then removed the knee was copiously irrigated with pulsed lavage and then injected with anesthetic cocktail.  The cut surfaces were then dried with clean laparotomy sponges.  The components were then cemented tibia followed by femur.  The excess cement was removed and the knee was held in full extension with a 2 mm mannie in place.  After the cement had completely hardened we again copiously irrigated the knee and chose the final polyethylene insert which was equal to the trial which gave nice balance and good soft tissue tension without being overly tight.  The knee was then closed in multiple layers in standard fashion and the patient was extubated and transferred to the PACU for recovery from anesthesia.  At the end of the case, the sponge and needle counts were reported as being correct. There were no known complications. The patient was then transported to the recovery room.      Everton Zuniga MD  3/18/2022

## 2022-03-18 NOTE — THERAPY EVALUATION
Patient Name: Sari Self  : 1950    MRN: [id number]                              Today's Date: 3/18/2022       Admit Date: 3/18/2022    Visit Dx:     ICD-10-CM ICD-9-CM   1. S/P knee surgery  Z98.890 V45.89   2. Primary osteoarthritis of left knee  M17.12 715.16     Patient Active Problem List   Diagnosis   • Osteoporosis   • Senile osteoporosis   • Gastroesophageal reflux disease   • Diarrhea   • Tubular adenoma of colon   • Helicobacter pylori (H. pylori) infection   • Acute medial meniscus tear of left knee   • Complex tear of medial meniscus of right knee as current injury   • Stress fracture of right tibia   • History of IBS   • RLS (restless legs syndrome)   • Hypothyroidism   • Essential hypertension   • Left-sided trigeminal neuralgia   • Nonruptured cerebral aneurysm   • Primary osteoarthritis of left knee     Past Medical History:   Diagnosis Date   • Arthritis    • Cerebral aneurysm    • Disease of thyroid gland    • Dislocation, shoulder 1968    Dislocated several times   • Diverticulosis    • GERD (gastroesophageal reflux disease)    • H/O cerebral aneurysm repair    • History of 2019 novel coronavirus disease (COVID-19) 2020 STATES VERY MILD SYMTOMS   • Hyperlipidemia    • Hypertension    • IBS (irritable bowel syndrome)    • Knee swelling    • OAB (overactive bladder)    • Osteoporosis    • Right knee pain    • RLS (restless legs syndrome)    • Rotator cuff syndrome     Tears   • Stress fracture of right tibia    • Stroke (HCC)    • Tear of meniscus of knee  &     Surgery both knees   • TIA (transient ischemic attack)        • Torn meniscus     RIGHT KNEE     Past Surgical History:   Procedure Laterality Date   • CATARACT EXTRACTION, BILATERAL     • CHOLECYSTECTOMY     • COLONOSCOPY  11/10/2010    prep of colon fair,IH,stool in transverse colon,hepatic flexure and ascending colon,ileum normal,IBS   • COLONOSCOPY N/A 2018    IH, diverticulosis, one  5mm polyp, tubular adenoma with low grade dysplasia   • CYSTOSCOPY     • ENDOSCOPY  11/27/2012    grd 1 reflux egitis,web upperd 3rd esoph   • ENDOSCOPY N/A 02/27/2018    normal biopsies, H Pylori positive   • FOOT NEUROMA SURGERY Left    • HAND SURGERY     • IR CEREBRAL ANEURYSM COILING  2012   • KNEE ARTHROSCOPY Left 07/03/2019    Procedure: KNEE ARTHROSCOPY ARTHRITIS DEBRIDEMENT PARTIAL MEDIAL AND LATERAL MENISECTOMY;  Surgeon: Nuris Bender MD;  Location: [facility] OR [facility];  Service: Orthopedics   • KNEE ARTHROSCOPY Right 09/28/2020    Procedure: RIGHT KNEE ARTHROSCOPY, PARTIAL MEDIAL AND LATERAL MENISCECTOMIES, DEBRIDEMENT OF ARTHRITIS, AND INTERNAL FIXATION TIBAL PLATEAU INSUFFICIENCY FRACTURE;  Surgeon: Nuris Bender MD;  Location: [facility] OR [facility];  Service: Orthopedics;  Laterality: Right;   • KNEE SURGERY  2019 & 2020   • THUMB ARTHROSCOPY     • TRIGGER POINT INJECTION      Hand, elbow, shoulders, knees   • UPPER GASTROINTESTINAL ENDOSCOPY  02/27/2018   • WRIST SURGERY        General Information     Row Name 03/18/22 1342          Physical Therapy Time and Intention    Document Type evaluation  -EM     Mode of Treatment individual therapy;physical therapy  -EM     Row Name 03/18/22 1342          General Information    Patient Profile Reviewed yes  -EM     Prior Level of Function independent:  -EM     Existing Precautions/Restrictions fall  -EM     Row Name 03/18/22 1342          Living Environment    People in Home child(shashi), adult  -EM     Row Name 03/18/22 1342          Home Main Entrance    Number of Stairs, Main Entrance two  -EM     Stair Railings, Main Entrance railing on left side (ascending)  -EM     Row Name 03/18/22 1342          Stairs Within Home, Primary    Number of Stairs, Within Home, Primary none  -EM     Row Name 03/18/22 1342          Cognition    Orientation Status (Cognition) oriented x 4  -EM     Row Name 03/18/22 1342          Safety Issues, Functional Mobility    Impairments  Affecting Function (Mobility) endurance/activity tolerance;strength  -EM           User Key  (r) = Recorded By, (t) = Taken By, (c) = Cosigned By    Initials Name Provider Type    Anju Rizo PT Physical Therapist               Mobility     Row Name 03/18/22 1343          Bed Mobility    Bed Mobility supine-sit;sit-supine  -EM     Supine-Sit [place] (Bed Mobility) modified independence  -EM     Sit-Supine [place] (Bed Mobility) modified independence  -EM     Assistive Device (Bed Mobility) head of bed elevated  -EM     Row Name 03/18/22 1343          Sit-Stand Transfer    Sit-Stand [place] (Transfers) standby assist  -EM     Assistive Device (Sit-Stand Transfers) walker, front-wheeled  -EM     Row Name 03/18/22 1343          Gait/Stairs (Locomotion)    [place] Level (Gait) contact guard  -EM     Assistive Device (Gait) walker, front-wheeled  -EM     Distance in Feet (Gait) 200 (cues for sequencing initially with std wx, switched to rwx and gait mechanics improved)  -EM     [place] Level (Stairs) contact guard  -EM     Handrail Location (Stairs) left side (ascending)  -EM     Number of Steps (Stairs) 4  -EM     Ascending Technique (Stairs) step-to-step  -EM     Descending Technique (Stairs) step-to-step  -EM     Comment, (Gait/Stairs) cues for sequencing on stairs  -EM     Row Name 03/18/22 1343          Mobility    Extremity Weight-bearing Status left lower extremity  -EM     Left Lower Extremity (Weight-bearing Status) weight-bearing as tolerated (WBAT)  -EM           User Key  (r) = Recorded By, (t) = Taken By, (c) = Cosigned By    Initials Name Provider Type    EM Anju Og PT Physical Therapist               Obj/Interventions     Row Name 03/18/22 1346          Range of Motion Comprehensive    General Range of Motion no range of motion deficits identified  -EM     Comment, General Range of Motion pt demonstrates good ROM on L knee  -EM     Row Name 03/18/22 4812           Strength Comprehensive (MMT)    General Manual Muscle Testing (MMT) Assessment other (see comments)  -EM     Comment, General Manual Muscle Testing (MMT) Assessment slight post op weakness noted on LLE  -EM     Row Name 03/18/22 9882          Motor Skills    Therapeutic Exercise other (see comments)  10 reps per TKA protocol  -EM     Row Name 03/18/22 1343          Balance    Balance Assessment sitting dynamic balance;standing static balance;standing dynamic balance  -EM     Dynamic Sitting Balance independent  -EM     Static Standing Balance supervision  -EM     Dynamic Standing Balance supervision  -EM     Position/Device Used, Standing Balance walker, rolling  -EM     Row Name 03/18/22 1344          Sensory Assessment (Somatosensory)    Sensory Assessment (Somatosensory) sensation intact  -EM           User Key  (r) = Recorded By, (t) = Taken By, (c) = Cosigned By    Initials Name Provider Type    EM Anju Og, PT Physical Therapist               Goals/Plan    No documentation.                Clinical Impression     Row Name 03/18/22 3540          Pain    Pretreatment Pain Rating 0/10 - no pain  -     Row Name 03/18/22 4736          Plan of Care Review    Plan of Care Reviewed With patient  -EM     Outcome Evaluation Patient is 70 yo female seen s/p L unilateral TKA. Patient lives with son, has 2 steps to enter home, has rwx, BSC issued per patient request. Patient has no c/o pain, able to perform 10 reps per TKA protocol, performed sit to stand with SBA and rwx, ambulated 200 feet with rwx and CGA, up and down 4 steps with 1HR with CGA. Patient is safe at current functional level to return home with family assist and f/u with home health.  -     Row Name 03/18/22 7477          Therapy Assessment/Plan (PT)    Patient/Family Therapy Goals Statement (PT) be able to do activities normally  -     Row Name 03/18/22 1348          Positioning and Restraints    Pre-Treatment Position in bed  -EM      Post Treatment Position bed  -EM     In Bed supine;call light within reach;exit alarm on;notified nsg;with family/caregiver  -EM           User Key  (r) = Recorded By, (t) = Taken By, (c) = Cosigned By    Initials Name Provider Type    EM Anju Og PT Physical Therapist               Outcome Measures     Row Name 03/18/22 1348          How much help from another person do you currently need...    Turning from your back to your side while in flat bed without using bedrails? 4  -EM     Moving from lying on back to sitting on the side of a flat bed without bedrails? 4  -EM     Moving to and from a bed to a chair (including a wheelchair)? 3  -EM     Standing up from a chair using your arms (e.g., wheelchair, bedside chair)? 3  -EM     Climbing 3-5 steps with a railing? 3  -EM     To walk in hospital room? 3  -EM     AM-PAC 6 Clicks Score (PT) 20  -EM     Row Name 03/18/22 1348          Functional Assessment    Outcome Measure Options AM-PAC 6 Clicks Basic Mobility (PT)  -EM           User Key  (r) = Recorded By, (t) = Taken By, (c) = Cosigned By    Initials Name Provider Type    EM Anju Og PT Physical Therapist                             Physical Therapy Education                 Title: PT OT SLP Therapies (In Progress)     Topic: Physical Therapy (In Progress)     Point: Mobility training (Done)     Learning Progress Summary           Patient Acceptance, E, VU,DU by  at 3/18/2022 1349                   Point: Home exercise program (Done)     Learning Progress Summary           Patient Acceptance, E, VU,DU by  at 3/18/2022 1349                   Point: Body mechanics (Not Started)     Learner Progress:  Not documented in this visit.          Point: Precautions (Not Started)     Learner Progress:  Not documented in this visit.                      User Key     Initials Effective Dates Name Provider Type Discipline     06/16/21 -  Anju Og PT Physical Therapist PT               PT Recommendation and Plan     Plan of Care Reviewed With: patient  Outcome Evaluation: Patient is 70 yo female seen s/p L unilateral TKA. Patient lives with son, has 2 steps to enter home, has rwx, BSC issued per patient request. Patient has no c/o pain, able to perform 10 reps per TKA protocol, performed sit to stand with SBA and rwx, ambulated 200 feet with rwx and CGA, up and down 4 steps with 1HR with CGA. Patient is safe at current functional level to return home with family assist and f/u with home health.     Time Calculation:    PT Charges     Row Name 03/18/22 1349             Time Calculation    Start Time 1240  -EM      Stop Time 1305  -EM      Time Calculation (min) 25 min  -EM      PT Received On 03/18/22  -EM              Time Calculation- PT    Total Timed Code Minutes- PT 20 minute(s)  -EM              Timed Charges    23572 - PT Therapeutic Exercise Minutes 8  -EM      91830 - PT Therapeutic Activity Minutes 12  -EM              Total Minutes    Timed Charges Total Minutes 20  -EM       Total Minutes 20  -EM            User Key  (r) = Recorded By, (t) = Taken By, (c) = Cosigned By    Initials Name Provider Type    EM Anju Og, PT Physical Therapist              Therapy Charges for Today     Code Description Service Date Service Provider Modifiers Qty    63534893960 HC PT THERAPEUTIC ACT EA 15 MIN 3/18/2022 Anju Og PT GP 1    90668979881 HC PT EVAL LOW COMPLEXITY 1 3/18/2022 Anju Og PT GP 1          PT G-Codes  Outcome Measure Options: AM-PAC 6 Clicks Basic Mobility (PT)  AM-PAC 6 Clicks Score (PT): 20    Anju Og PT  3/18/2022

## 2022-03-18 NOTE — ANESTHESIA PREPROCEDURE EVALUATION
Anesthesia Evaluation     Patient summary reviewed and Nursing notes reviewed   NPO Solid Status: > 8 hours  NPO Liquid Status: > 8 hours           Airway   Mallampati: II  Neck ROM: full  No difficulty expected  Dental      Comment: Bridge and loose tooth    Pulmonary     breath sounds clear to auscultation  Cardiovascular     Rhythm: regular    (+) hypertension, hyperlipidemia,       Neuro/Psych  (+) TIA, CVA, numbness,      ROS Comment: IR cerebral aneurysm coiling, L trigeminal neuralgia  GI/Hepatic/Renal/Endo    (+)  GERD,  thyroid problem hypothyroidism    Musculoskeletal     Abdominal    Substance History      OB/GYN          Other   arthritis,                      Anesthesia Plan    ASA 3     general   (Adductor canal)  intravenous induction     Anesthetic plan, all risks, benefits, and alternatives have been provided, discussed and informed consent has been obtained with: patient.        CODE STATUS:

## 2022-03-21 ENCOUNTER — TELEPHONE (OUTPATIENT)
Dept: ORTHOPEDIC SURGERY | Facility: HOSPITAL | Age: 72
End: 2022-03-21

## 2022-03-21 NOTE — TELEPHONE ENCOUNTER
Attempted to speak with Ms. English to see how she is doing as she is POD 3 L uni Knee. Message left. Awaiting call back.

## 2022-03-25 ENCOUNTER — TELEPHONE (OUTPATIENT)
Dept: ORTHOPEDIC SURGERY | Facility: CLINIC | Age: 72
End: 2022-03-25

## 2022-03-25 NOTE — TELEPHONE ENCOUNTER
PATIENT CALLED BACK- STATES SHE IS GOING TO REPLACE THE BATTERIES TO SEE IF THIS CORRECTS THE ISSUE- PLEASE CALL TO MAKE SURE BATTERIES ARE THE ANSWER.

## 2022-03-25 NOTE — TELEPHONE ENCOUNTER
"PATIENT HAD SURGERY ON 3/18/22 AND SHE SAID THE \"BOX\" CONNECTED TO HER WOUND STOPPED WORKING. PLEASE ADVISE.   "

## 2022-03-25 NOTE — TELEPHONE ENCOUNTER
"Patient informed the \"box\" only goes for seven days no need to replace batteries. and to cut the box off and tape the excess tubing onto the picop dressing   "

## 2022-03-28 ENCOUNTER — TELEPHONE (OUTPATIENT)
Dept: ORTHOPEDIC SURGERY | Facility: HOSPITAL | Age: 72
End: 2022-03-28

## 2022-03-28 NOTE — TELEPHONE ENCOUNTER
Called to speak with Ms. Self to see how she is doing as she is 2 weeks SP L Uni Knee. Message left, awaiting call back.     3/28/2022 @ 9487  Ms. Self called me back at this time. She said she is doing well. She is working with OP PT and progressing. She has had a little bit more pain yesterday and today, but it is tolerable and she is getting along just fine. She doesn't have any questions for me. Ms. Self has my contact information should she need anything.

## 2022-03-29 DIAGNOSIS — E03.9 HYPOTHYROIDISM, UNSPECIFIED TYPE: ICD-10-CM

## 2022-03-30 RX ORDER — LEVOTHYROXINE SODIUM 0.05 MG/1
50 TABLET ORAL EVERY MORNING
Qty: 90 TABLET | Refills: 0 | Status: SHIPPED | OUTPATIENT
Start: 2022-03-30 | End: 2022-09-20

## 2022-03-30 RX ORDER — HYDROCHLOROTHIAZIDE 12.5 MG/1
12.5 TABLET ORAL DAILY
Qty: 30 TABLET | Refills: 1
Start: 2022-03-30 | End: 2023-02-10

## 2022-03-30 NOTE — TELEPHONE ENCOUNTER
Rx Refill Note  Requested Prescriptions     Pending Prescriptions Disp Refills   • hydroCHLOROthiazide (HYDRODIURIL) 12.5 MG tablet 30 tablet 1     Sig: Take 1 tablet by mouth Daily. Not taking ever day , taking as needed   • levothyroxine (Euthyrox) 50 MCG tablet 90 tablet 0     Sig: Take 1 tablet by mouth Every Morning.      Last office visit with prescribing clinician: 2/18/2022      Next office visit with prescribing clinician: 3/29/2022            Mayuri Garza MA  03/30/22, 10:51 EDT

## 2022-03-31 ENCOUNTER — TELEPHONE (OUTPATIENT)
Dept: FAMILY MEDICINE CLINIC | Facility: CLINIC | Age: 72
End: 2022-03-31

## 2022-03-31 ENCOUNTER — OFFICE VISIT (OUTPATIENT)
Dept: ORTHOPEDIC SURGERY | Facility: CLINIC | Age: 72
End: 2022-03-31

## 2022-03-31 VITALS — TEMPERATURE: 97 F | HEIGHT: 63 IN | WEIGHT: 155 LBS | BODY MASS INDEX: 27.46 KG/M2

## 2022-03-31 DIAGNOSIS — Z96.652 STATUS POST LEFT UNICOMPARTMENTAL KNEE REPLACEMENT: Primary | ICD-10-CM

## 2022-03-31 DIAGNOSIS — Z98.890 S/P KNEE SURGERY: ICD-10-CM

## 2022-03-31 PROCEDURE — 99024 POSTOP FOLLOW-UP VISIT: CPT | Performed by: ORTHOPAEDIC SURGERY

## 2022-03-31 RX ORDER — HYDROCHLOROTHIAZIDE 12.5 MG/1
12.5 TABLET ORAL DAILY
Qty: 30 TABLET | Refills: 1 | Status: CANCELLED
Start: 2022-03-31

## 2022-03-31 RX ORDER — HYDROCODONE BITARTRATE AND ACETAMINOPHEN 7.5; 325 MG/1; MG/1
TABLET ORAL
Qty: 30 TABLET | Refills: 0 | Status: SHIPPED | OUTPATIENT
Start: 2022-03-31 | End: 2022-04-25 | Stop reason: SDUPTHER

## 2022-03-31 NOTE — PROGRESS NOTES
Sari Self : 1950 MRN: [id number] DATE: 3/31/2022    DIAGNOSIS: 2 week follow up left uni knee      SUBJECTIVE:Patient returns today for 2 week follow up of left total knee replacement. Patient reports doing well with no unusual complaints. Appears to be progressing appropriately.    OBJECTIVE:   Exam:. The incision is healing appropriately. No sign of infection. Range of motion is progressing as expected. The calf is soft and nontender with a negative Homans sign.    ASSESSMENT: 2 week status post left uni knee replacement.    PLAN: 1) Staples removed and steri strips applied   2) Order given for PT   3) Discontinue GARLAND hose   4) Continue ice PRN   5) aspirin 81 mg orally every day for 1 month   6) Follow up in 6 weeks with repeat Xrays of left knee (3views)    Everton Zuniga MD  3/31/2022

## 2022-03-31 NOTE — TELEPHONE ENCOUNTER
Caller: Sari Self    Relationship: Self    Best call back number: [phone]    What was the call regarding: PATIENT WANTS TO KNOW IF DR CARREON ADVISES FOR HER TO GET HER RECLAST INFUSION.     Do you require a callback: YES

## 2022-04-03 RX ORDER — POTASSIUM CHLORIDE 750 MG/1
10 TABLET, EXTENDED RELEASE ORAL DAILY
Qty: 90 TABLET | Refills: 0 | Status: SHIPPED | OUTPATIENT
Start: 2022-04-03 | End: 2022-11-29

## 2022-04-05 DIAGNOSIS — M81.0 OSTEOPOROSIS, UNSPECIFIED OSTEOPOROSIS TYPE, UNSPECIFIED PATHOLOGICAL FRACTURE PRESENCE: Primary | ICD-10-CM

## 2022-04-11 ENCOUNTER — TELEPHONE (OUTPATIENT)
Dept: FAMILY MEDICINE CLINIC | Facility: CLINIC | Age: 72
End: 2022-04-11

## 2022-04-11 NOTE — TELEPHONE ENCOUNTER
Called patient back in regards to her question on the Reclast infusion/injection    Hub please inform patient if call back:    Dr. Edwards says it should be ok to do and that they will check your calcium before hand just to ensure there won't be an issue with it.

## 2022-04-15 ENCOUNTER — TELEPHONE (OUTPATIENT)
Dept: ORTHOPEDIC SURGERY | Facility: CLINIC | Age: 72
End: 2022-04-15

## 2022-04-15 NOTE — TELEPHONE ENCOUNTER
Patient is asking if she could return to work. Working from home for about 4 hours a day for the next few weeks. Until she can go back full time.

## 2022-04-15 NOTE — TELEPHONE ENCOUNTER
If she thinks she is able to return back to work and do her job adequately then she is more than welcome to return.  Please give her a work note letting her return the day she would like to return.

## 2022-04-25 DIAGNOSIS — Z98.890 S/P KNEE SURGERY: ICD-10-CM

## 2022-04-25 RX ORDER — HYDROCODONE BITARTRATE AND ACETAMINOPHEN 7.5; 325 MG/1; MG/1
TABLET ORAL
Qty: 30 TABLET | Refills: 0 | OUTPATIENT
Start: 2022-04-25 | End: 2022-11-14

## 2022-05-03 ENCOUNTER — TELEPHONE (OUTPATIENT)
Dept: ORTHOPEDIC SURGERY | Facility: CLINIC | Age: 72
End: 2022-05-03

## 2022-05-03 NOTE — TELEPHONE ENCOUNTER
Sari would like to know if you would be ok with her going to 6 hours a day instead of going back completely full time on Thursday until the 16 th then return for 8 hour days.

## 2022-05-04 ENCOUNTER — TELEPHONE (OUTPATIENT)
Dept: FAMILY MEDICINE CLINIC | Facility: CLINIC | Age: 72
End: 2022-05-04

## 2022-05-04 DIAGNOSIS — M81.0 AGE-RELATED OSTEOPOROSIS WITHOUT CURRENT PATHOLOGICAL FRACTURE: Primary | ICD-10-CM

## 2022-05-06 ENCOUNTER — LAB (OUTPATIENT)
Dept: LAB | Facility: HOSPITAL | Age: 72
End: 2022-05-06

## 2022-05-06 PROCEDURE — 83735 ASSAY OF MAGNESIUM: CPT | Performed by: FAMILY MEDICINE

## 2022-05-06 PROCEDURE — 84100 ASSAY OF PHOSPHORUS: CPT | Performed by: FAMILY MEDICINE

## 2022-05-06 PROCEDURE — 80053 COMPREHEN METABOLIC PANEL: CPT | Performed by: FAMILY MEDICINE

## 2022-05-11 ENCOUNTER — TELEPHONE (OUTPATIENT)
Dept: ORTHOPEDIC SURGERY | Facility: CLINIC | Age: 72
End: 2022-05-11

## 2022-05-11 ENCOUNTER — APPOINTMENT (OUTPATIENT)
Dept: WOMENS IMAGING | Facility: HOSPITAL | Age: 72
End: 2022-05-11

## 2022-05-11 PROCEDURE — 77063 BREAST TOMOSYNTHESIS BI: CPT | Performed by: RADIOLOGY

## 2022-05-11 PROCEDURE — 77067 SCR MAMMO BI INCL CAD: CPT | Performed by: RADIOLOGY

## 2022-05-11 NOTE — TELEPHONE ENCOUNTER
Please notify that it is not unusual to still have swelling at this point after surgery especially if her leg is down in a dependent position when working.  She should try and continue with elevation.  She may also use either compression stockings or Ace wraps from the ankle to just below the knee to aid in swelling reduction.

## 2022-05-11 NOTE — TELEPHONE ENCOUNTER
Patient called and is having a lot of swelling. And didn't  know if she should be concerned. She has returned to work about 6.5 hours a day sitting.

## 2022-05-17 DIAGNOSIS — M81.0 OSTEOPOROSIS, UNSPECIFIED OSTEOPOROSIS TYPE, UNSPECIFIED PATHOLOGICAL FRACTURE PRESENCE: Primary | ICD-10-CM

## 2022-05-17 DIAGNOSIS — M81.0 AGE-RELATED OSTEOPOROSIS WITHOUT CURRENT PATHOLOGICAL FRACTURE: ICD-10-CM

## 2022-05-17 DIAGNOSIS — M81.0 SENILE OSTEOPOROSIS: ICD-10-CM

## 2022-05-17 RX ORDER — SODIUM CHLORIDE 9 MG/ML
250 INJECTION, SOLUTION INTRAVENOUS ONCE
Status: CANCELLED | OUTPATIENT
Start: 2022-05-17

## 2022-05-17 RX ORDER — ZOLEDRONIC ACID 5 MG/100ML
5 INJECTION, SOLUTION INTRAVENOUS ONCE
Status: CANCELLED | OUTPATIENT
Start: 2022-05-17

## 2022-05-25 ENCOUNTER — HOSPITAL ENCOUNTER (OUTPATIENT)
Dept: INFUSION THERAPY | Facility: HOSPITAL | Age: 72
Discharge: HOME | End: 2022-05-25
Admitting: FAMILY MEDICINE

## 2022-05-25 VITALS
WEIGHT: 158.7 LBS | TEMPERATURE: 96.8 F | DIASTOLIC BLOOD PRESSURE: 74 MMHG | RESPIRATION RATE: 20 BRPM | SYSTOLIC BLOOD PRESSURE: 122 MMHG | BODY MASS INDEX: 28.11 KG/M2 | HEART RATE: 78 BPM | OXYGEN SATURATION: 100 %

## 2022-05-25 DIAGNOSIS — M81.0 SENILE OSTEOPOROSIS: Primary | ICD-10-CM

## 2022-05-25 DIAGNOSIS — M81.0 AGE-RELATED OSTEOPOROSIS WITHOUT CURRENT PATHOLOGICAL FRACTURE: ICD-10-CM

## 2022-05-25 PROCEDURE — 96365 THER/PROPH/DIAG IV INF INIT: CPT

## 2022-05-25 PROCEDURE — 25010000002 ZOLEDRONIC ACID 5 MG/100ML SOLUTION: Performed by: FAMILY MEDICINE

## 2022-05-25 RX ORDER — ZOLEDRONIC ACID 5 MG/100ML
5 INJECTION, SOLUTION INTRAVENOUS ONCE
Status: COMPLETED | OUTPATIENT
Start: 2022-05-25 | End: 2022-05-25

## 2022-05-25 RX ORDER — SODIUM CHLORIDE 9 MG/ML
250 INJECTION, SOLUTION INTRAVENOUS ONCE
Status: DISCONTINUED | OUTPATIENT
Start: 2022-05-25 | End: 2022-05-27 | Stop reason: HOSPADM

## 2022-05-25 RX ORDER — ZOLEDRONIC ACID 5 MG/100ML
5 INJECTION, SOLUTION INTRAVENOUS ONCE
Status: CANCELLED | OUTPATIENT
Start: 2023-05-25

## 2022-05-25 RX ORDER — SODIUM CHLORIDE 9 MG/ML
250 INJECTION, SOLUTION INTRAVENOUS ONCE
OUTPATIENT
Start: 2023-05-25

## 2022-05-25 RX ADMIN — ZOLEDRONIC ACID 5 MG: 0.05 INJECTION, SOLUTION INTRAVENOUS at 10:00

## 2022-05-26 ENCOUNTER — OFFICE VISIT (OUTPATIENT)
Dept: ORTHOPEDIC SURGERY | Facility: CLINIC | Age: 72
End: 2022-05-26

## 2022-05-26 VITALS — TEMPERATURE: 97.2 F | HEIGHT: 63 IN | BODY MASS INDEX: 28 KG/M2 | RESPIRATION RATE: 16 BRPM | WEIGHT: 158 LBS

## 2022-05-26 DIAGNOSIS — R52 PAIN: Primary | ICD-10-CM

## 2022-05-26 DIAGNOSIS — Z96.652 STATUS POST LEFT UNICOMPARTMENTAL KNEE REPLACEMENT: ICD-10-CM

## 2022-05-26 PROCEDURE — 73562 X-RAY EXAM OF KNEE 3: CPT | Performed by: NURSE PRACTITIONER

## 2022-05-26 PROCEDURE — 99024 POSTOP FOLLOW-UP VISIT: CPT | Performed by: NURSE PRACTITIONER

## 2022-05-26 RX ORDER — POTASSIUM CHLORIDE 750 MG/1
TABLET, FILM COATED, EXTENDED RELEASE ORAL
COMMUNITY
Start: 2022-04-04

## 2022-05-26 NOTE — PROGRESS NOTES
Sari Self : 1950 MRN: [id number] DATE: 2022    DIAGNOSIS: 8 week follow up left  Uni-compartment knee      SUBJECTIVE:Patient returns today for 8 week follow up of left unicompartmental knee replacement. Patient reports doing well with no unusual complaints. Appears to be progressing appropriately.  She states that her pain level is virtually nonexistent and she finished physical therapy today.  Patient reports that she has had all of her range of motion goals and is very happy with her results.    OBJECTIVE:   Exam:. The incision is well healed. No sign of infection. Range of motion is measured at 0 to 130. The calf is soft and nontender with a negative Homans sign. Strength is progressing and the patient is ambulating appropriately.    DIAGNOSTIC STUDIES  Xrays: 3 views of the left knee (AP, lateral, and sunrise) were ordered and reviewed for evaluation of recent knee replacement. They demonstrate a well positioned, well aligned knee replacement without complicating factors noted. In comparison with previous films there has been no change.    ASSESSMENT: 8 week status post left unicompartmental knee replacement.    PLAN: 1) Continue with PT exercises as prescribed   2) Follow up in 10 months for annual visit    LEXY Dan  2022

## 2022-07-05 RX ORDER — CLINDAMYCIN HYDROCHLORIDE 300 MG/1
CAPSULE ORAL
Qty: 2 CAPSULE | Refills: 5 | Status: SHIPPED | OUTPATIENT
Start: 2022-07-05 | End: 2022-11-29

## 2022-08-16 RX ORDER — CYCLOBENZAPRINE HCL 10 MG
10 TABLET ORAL NIGHTLY PRN
Qty: 15 TABLET | Refills: 0 | OUTPATIENT
Start: 2022-08-16 | End: 2022-11-14

## 2022-09-12 ENCOUNTER — OFFICE VISIT (OUTPATIENT)
Dept: ORTHOPEDIC SURGERY | Facility: CLINIC | Age: 72
End: 2022-09-12

## 2022-09-12 VITALS
BODY MASS INDEX: 28.99 KG/M2 | WEIGHT: 163.6 LBS | HEIGHT: 63 IN | OXYGEN SATURATION: 97 % | TEMPERATURE: 97.3 F | HEART RATE: 68 BPM

## 2022-09-12 DIAGNOSIS — R52 PAIN: Primary | ICD-10-CM

## 2022-09-12 DIAGNOSIS — S97.101A CRUSHING INJURY OF TOE OF RIGHT FOOT, INITIAL ENCOUNTER: ICD-10-CM

## 2022-09-12 PROCEDURE — 99214 OFFICE O/P EST MOD 30 MIN: CPT | Performed by: NURSE PRACTITIONER

## 2022-09-12 PROCEDURE — 73630 X-RAY EXAM OF FOOT: CPT | Performed by: NURSE PRACTITIONER

## 2022-09-12 NOTE — PROGRESS NOTES
Patient Name: Sari Self   YOB: 1950  Referring Primary Care Physician: Nurys Edwards MD  BMI: Body mass index is 28.98 kg/m².    Chief Complaint:  No chief complaint on file.       HPI: Dropped candle on top of right great toe sat - has swelling and bruising     Sari Self is a 72 y.o. female who presents today for evaluation of No chief complaint on file.      This problem is new to this examiner.     Subjective   Medications:   Home Medications:  Current Outpatient Medications on File Prior to Visit   Medication Sig   • atorvastatin (LIPITOR) 10 MG tablet Take 1 tablet by mouth once daily   • cetirizine (zyrTEC) 10 MG tablet Take 1 tablet by mouth Daily.   • cyclobenzaprine (FLEXERIL) 10 MG tablet Take 1 tablet by mouth At Night As Needed for Muscle Spasms.   • gabapentin (NEURONTIN) 600 MG tablet TAKE 1 TABLET BY MOUTH 4 TIMES DAILY . DO NOT EXCEED 4 PER 24 HOURS   • hydroCHLOROthiazide (HYDRODIURIL) 12.5 MG tablet Take 1 tablet by mouth Daily. Not taking ever day , taking as needed   • levothyroxine (Euthyrox) 50 MCG tablet Take 1 tablet by mouth Every Morning.   • omeprazole (priLOSEC) 40 MG capsule Take 1 capsule by mouth Daily.   • oxybutynin XL (DITROPAN-XL) 5 MG 24 hr tablet Take 1 tablet by mouth once daily (Patient taking differently: Take 5 mg by mouth As Needed.)   • potassium chloride 10 MEQ CR tablet TAKE 1 TABLET BY MOUTH ONCE DAILY AS NEEDED WITH HCTZ   • clindamycin (Cleocin) 300 MG capsule Take 2 capsules one hour prior to dental procedure   • HYDROcodone-acetaminophen (NORCO) 7.5-325 MG per tablet Take 1 tablets by mouth every 6 hours as needed for pain   • meclizine (ANTIVERT) 12.5 MG tablet TAKE 1 TABLET BY MOUTH 4 TIMES DAILY AS NEEDED FOR DIZZINESS   • ondansetron (Zofran) 4 MG tablet Take 1 tablet by mouth Every 8 (Eight) Hours As Needed for Nausea or Vomiting for up to 10 doses.   • potassium chloride (K-DUR,KLOR-CON) 10 MEQ CR tablet Take 1 tablet by mouth Daily. PRN  WITH HCTZ     Current Facility-Administered Medications on File Prior to Visit   Medication   • Chlorhexidine Gluconate Cloth 2 % pads     Current Medications:  Scheduled Meds:  Continuous Infusions:No current facility-administered medications for this visit.    PRN Meds:.    I have reviewed the patient's medical history in detail and updated the computerized patient record.  Review and summarization of old records includes:    Past Medical History:   Diagnosis Date   • Arthritis    • Cerebral aneurysm    • Disease of thyroid gland    • Dislocation, shoulder 1968    Dislocated several times   • Diverticulosis    • GERD (gastroesophageal reflux disease)    • H/O cerebral aneurysm repair    • History of 2019 novel coronavirus disease (COVID-19) 08/2020 AUGUST 2020 STATES VERY MILD SYMTOMS   • Hyperlipidemia    • Hypertension    • IBS (irritable bowel syndrome)    • Knee swelling    • OAB (overactive bladder)    • Osteoporosis    • Right knee pain    • RLS (restless legs syndrome)    • Rotator cuff syndrome 1968    Tears   • Stress fracture of right tibia    • Stroke (HCC)    • Tear of meniscus of knee 2019 & 2020    Surgery both knees   • TIA (transient ischemic attack)     2011   • Torn meniscus     RIGHT KNEE        Past Surgical History:   Procedure Laterality Date   • CATARACT EXTRACTION, BILATERAL     • CHOLECYSTECTOMY     • COLONOSCOPY  11/10/2010    prep of colon fair,IH,stool in transverse colon,hepatic flexure and ascending colon,ileum normal,IBS   • COLONOSCOPY N/A 02/27/2018    IH, diverticulosis, one 5mm polyp, tubular adenoma with low grade dysplasia   • CYSTOSCOPY     • ENDOSCOPY  11/27/2012    grd 1 reflux egitis,web upperd 3rd esoph   • ENDOSCOPY N/A 02/27/2018    normal biopsies, H Pylori positive   • FOOT NEUROMA SURGERY Left    • HAND SURGERY     • IR CEREBRAL ANEURYSM COILING  2012   • KNEE ARTHROPLASTY UNICOMPARTMENTAL Left 3/18/2022    Procedure: KNEE ARTHROPLASTY UNICOMPARTMENTAL;  Surgeon:  Everton Zuniga MD;  Location: [facility] MAIN OR;  Service: Orthopedics;  Laterality: Left;   • KNEE ARTHROSCOPY Left 07/03/2019    Procedure: KNEE ARTHROSCOPY ARTHRITIS DEBRIDEMENT PARTIAL MEDIAL AND LATERAL MENISECTOMY;  Surgeon: Nuris Bender MD;  [place] [facility] [place] [facility] [place]: [place]ics   • KNEE ARTHROSCOPY Right 09/28/2020    Procedure: RIGHT KNEE ARTHROSCOPY, PARTIAL MEDIAL AND LATERAL MENISCECTOMIES, DEBRIDEMENT OF ARTHRITIS, AND INTERNAL FIXATION TIBAL PLATEAU INSUFFICIENCY FRACTURE;  Surgeon: Nuris Bender MD;  [place] [facility] [place] [facility] [place]  Service: [place]opedics;  Laterality: Right;   • KNEE SURGERY  2019 & 2020   • THUMB ARTHROSCOPY     • TRIGGER POINT INJECTION      Hand, elbow, shoulders, knees   • UPPER GASTROINTESTINAL ENDOSCOPY  02/27/2018   • WRIST SURGERY          Social History     Occupational History   • Not on file   Tobacco Use   • Smoking status: Never Smoker   • Smokeless tobacco: Never Used   Vaping Use   • Vaping Use: Never used   Substance and Sexual Activity   • Alcohol use: Yes     Comment: Occasionally   • Drug use: No   • Sexual activity: Not Currently      Social History     Social History Narrative   • Not on file        Family History   Problem Relation Age of Onset   • Irritable bowel syndrome Daughter    • Malig Hyperthermia Neg Hx        ROS: 14 point review of systems was performed and all other systems were reviewed and are negative except for documented findings in HPI and today's encounter.     Allergies:   Allergies   Allergen Reactions   • Penicillins Hives     Constitutional:  Denies fever, shaking or chills   Eyes:  Denies change in visual acuity   HENT:  Denies nasal congestion or sore throat   Respiratory:  Denies cough or shortness of breath   Cardiovascular:  Denies chest pain or severe LE edema   GI:  Denies abdominal pain, nausea, vomiting, bloody stools or diarrhea   Musculoskeletal:  Numbness, tingling, pain, or loss of motor function only as noted above in  "history of present illness.  : Denies painful urination or hematuria  Integument:  Denies rash, lesion or ulceration   Neurologic:  Denies headache or focal weakness  Endocrine:  Denies lymphadenopathy  Psych:  Denies confusion or change in mental status   Hem:  Denies active bleeding    OBJECTIVE:  Physical Exam:   Pulse 68   Temp 97.3 °F (36.3 °C)   Ht 160 cm (63\")   Wt 74.2 kg (163 lb 9.6 oz)   LMP  (LMP Unknown)   SpO2 97%   BMI 28.98 kg/m²     General Appearance:    Alert, cooperative, in no acute distress                  Eyes: conjunctiva clear  ENT: external ears and nose atraumatic  CV: no peripheral edema  Resp: normal respiratory effort  Skin: no rashes or wounds; normal turgor  Psych: mood and affect appropriate  Lymph: no nodes appreciated  Neuro: gross sensation intact  Vascular:  Palpable peripheral pulse in noted extremity  Musculoskeletal Extremities: ecchymotic right great toe with blister intact to DIP - toenail intact     Radiology:   Right foot 3 views done for pain trauma no comparison views no fracture     Assessment:     ICD-10-CM ICD-9-CM   1. Pain  R52 780.96   2. Crushing injury of toe of right foot, initial encounter  S97.101A 928.3        Procedures   cleaned with betadine and 4x4 dressing and monika taped and will soak and RICE will call if any signs of infection     Plan: The diagnosis(es), natural history, pathophysiology and treatment for diagnosis(es) were discussed. Opportunity given and questions answered.  Biomechanics of pertinent body areas discussed.  When appropriate, the use of ambulatory aids discussed.  EXERCISES:  Advice on benefits of, and types of regular/moderate exercise pertaining to orthopedic diagnosis(es).  MEDICATIONS:  The risks, benefits, warnings,side effects and alternatives of medications discussed.  Inflammation/pain control; with cold, heat, elevation and/or liniments discussed as appropriate  HOME EXERCISE/PT program " encouraged      9/12/2022    Much of this encounter note is an electronic transcription/translation of spoken language to printed text. The electronic translation of spoken language may permit erroneous, or at times, nonsensical words or phrases to be inadvertently transcribed; Although I have reviewed the note for such errors, some may still exist

## 2022-09-19 DIAGNOSIS — E03.9 HYPOTHYROIDISM, UNSPECIFIED TYPE: ICD-10-CM

## 2022-09-20 ENCOUNTER — IMMUNIZATION (OUTPATIENT)
Dept: VACCINE CLINIC | Facility: HOSPITAL | Age: 72
End: 2022-09-20

## 2022-09-20 DIAGNOSIS — Z23 NEED FOR VACCINATION: Primary | ICD-10-CM

## 2022-09-20 PROCEDURE — 0124A: CPT | Performed by: INTERNAL MEDICINE

## 2022-09-20 PROCEDURE — 91312 HC SARSCOV2 VAC 30MCG/0.3ML IM BIVALENT BOOSTER 12 YRS AND OLDER: CPT | Performed by: INTERNAL MEDICINE

## 2022-09-20 RX ORDER — ATORVASTATIN CALCIUM 10 MG/1
TABLET, FILM COATED ORAL
Qty: 90 TABLET | Refills: 0 | Status: SHIPPED | OUTPATIENT
Start: 2022-09-20

## 2022-09-20 RX ORDER — LEVOTHYROXINE SODIUM 0.05 MG/1
TABLET ORAL
Qty: 90 TABLET | Refills: 0 | Status: SHIPPED | OUTPATIENT
Start: 2022-09-20 | End: 2023-02-02 | Stop reason: SDUPTHER

## 2022-09-20 NOTE — TELEPHONE ENCOUNTER
Rx Refill Note  Requested Prescriptions     Pending Prescriptions Disp Refills    levothyroxine (SYNTHROID, LEVOTHROID) 50 MCG tablet [Pharmacy Med Name: Levothyroxine Sodium 50 MCG Oral Tablet] 90 tablet 0     Sig: TAKE 1 TABLET BY MOUTH ONCE DAILY IN THE MORNING    atorvastatin (LIPITOR) 10 MG tablet [Pharmacy Med Name: Atorvastatin Calcium 10 MG Oral Tablet] 90 tablet 0     Sig: Take 1 tablet by mouth once daily      Last office visit with prescribing clinician: 2/18/2022      Next office visit with prescribing clinician: Visit date not found            Mayuri Garza MA  09/20/22, 08:38 EDT

## 2022-09-29 ENCOUNTER — TELEPHONE (OUTPATIENT)
Dept: FAMILY MEDICINE CLINIC | Facility: CLINIC | Age: 72
End: 2022-09-29

## 2022-09-29 NOTE — TELEPHONE ENCOUNTER
Caller: Sari Self    Relationship to patient: Self    Best call back number: [phone] (H)    Chief complaint: PALPITATIONS/HEART SKIPPING A BEAT    Patient directed to call 911 or go to their nearest emergency room.     Patient verbalized understanding: [x] Yes  [] No  If no, why?    Additional notes: PATIENT CALLED TO SCHEDULE AN APPOINTMENT BUT HER COMPLAINTS WERE HEART PALPITATIONS AND HEART SKIPPING A BEAT SO PER [facility] WORKFLOW, ADVISED PATIENT TO GO TO THE EMERGENCY ROOM. PATIENT VOICED UNDERSTANDING.

## 2022-11-03 DIAGNOSIS — M81.0 SENILE OSTEOPOROSIS: ICD-10-CM

## 2022-11-03 DIAGNOSIS — M81.0 AGE-RELATED OSTEOPOROSIS WITHOUT CURRENT PATHOLOGICAL FRACTURE: ICD-10-CM

## 2022-11-04 ENCOUNTER — LAB (OUTPATIENT)
Dept: LAB | Facility: HOSPITAL | Age: 72
End: 2022-11-04

## 2022-11-04 DIAGNOSIS — M81.0 SENILE OSTEOPOROSIS: ICD-10-CM

## 2022-11-04 DIAGNOSIS — M81.0 AGE-RELATED OSTEOPOROSIS WITHOUT CURRENT PATHOLOGICAL FRACTURE: ICD-10-CM

## 2022-11-04 LAB
ALBUMIN SERPL-MCNC: 4.1 G/DL (ref 3.5–5.2)
ALBUMIN/GLOB SERPL: 1.4 G/DL
ALP SERPL-CCNC: 76 U/L (ref 39–117)
ALT SERPL W P-5'-P-CCNC: 9 U/L (ref 1–33)
ANION GAP SERPL CALCULATED.3IONS-SCNC: 12.2 MMOL/L (ref 5–15)
AST SERPL-CCNC: 13 U/L (ref 1–32)
BASOPHILS # BLD AUTO: 0.06 10*3/MM3 (ref 0–0.2)
BASOPHILS NFR BLD AUTO: 1 % (ref 0–1.5)
BILIRUB SERPL-MCNC: 0.6 MG/DL (ref 0–1.2)
BUN SERPL-MCNC: 13 MG/DL (ref 8–23)
BUN/CREAT SERPL: 15.1 (ref 7–25)
CALCIUM SPEC-SCNC: 10 MG/DL (ref 8.6–10.5)
CHLORIDE SERPL-SCNC: 107 MMOL/L (ref 98–107)
CO2 SERPL-SCNC: 18.8 MMOL/L (ref 22–29)
CREAT SERPL-MCNC: 0.86 MG/DL (ref 0.57–1)
DEPRECATED RDW RBC AUTO: 41.6 FL (ref 37–54)
EGFRCR SERPLBLD CKD-EPI 2021: 71.4 ML/MIN/1.73
EOSINOPHIL # BLD AUTO: 0.29 10*3/MM3 (ref 0–0.4)
EOSINOPHIL NFR BLD AUTO: 4.7 % (ref 0.3–6.2)
ERYTHROCYTE [DISTWIDTH] IN BLOOD BY AUTOMATED COUNT: 12.9 % (ref 12.3–15.4)
GLOBULIN UR ELPH-MCNC: 2.9 GM/DL
GLUCOSE SERPL-MCNC: 100 MG/DL (ref 65–99)
HCT VFR BLD AUTO: 40.2 % (ref 34–46.6)
HGB BLD-MCNC: 13 G/DL (ref 12–15.9)
IMM GRANULOCYTES # BLD AUTO: 0.01 10*3/MM3 (ref 0–0.05)
IMM GRANULOCYTES NFR BLD AUTO: 0.2 % (ref 0–0.5)
LYMPHOCYTES # BLD AUTO: 2.15 10*3/MM3 (ref 0.7–3.1)
LYMPHOCYTES NFR BLD AUTO: 34.9 % (ref 19.6–45.3)
MAGNESIUM SERPL-MCNC: 2.1 MG/DL (ref 1.6–2.4)
MCH RBC QN AUTO: 28.6 PG (ref 26.6–33)
MCHC RBC AUTO-ENTMCNC: 32.3 G/DL (ref 31.5–35.7)
MCV RBC AUTO: 88.4 FL (ref 79–97)
MONOCYTES # BLD AUTO: 0.53 10*3/MM3 (ref 0.1–0.9)
MONOCYTES NFR BLD AUTO: 8.6 % (ref 5–12)
NEUTROPHILS NFR BLD AUTO: 3.12 10*3/MM3 (ref 1.7–7)
NEUTROPHILS NFR BLD AUTO: 50.6 % (ref 42.7–76)
NRBC BLD AUTO-RTO: 0 /100 WBC (ref 0–0.2)
PHOSPHATE SERPL-MCNC: 3.6 MG/DL (ref 2.5–4.5)
PLATELET # BLD AUTO: 349 10*3/MM3 (ref 140–450)
PMV BLD AUTO: 9.9 FL (ref 6–12)
POTASSIUM SERPL-SCNC: 4.2 MMOL/L (ref 3.5–5.2)
PROT SERPL-MCNC: 7 G/DL (ref 6–8.5)
RBC # BLD AUTO: 4.55 10*6/MM3 (ref 3.77–5.28)
SODIUM SERPL-SCNC: 138 MMOL/L (ref 136–145)
WBC NRBC COR # BLD: 6.16 10*3/MM3 (ref 3.4–10.8)

## 2022-11-04 PROCEDURE — 85025 COMPLETE CBC W/AUTO DIFF WBC: CPT | Performed by: FAMILY MEDICINE

## 2022-11-04 PROCEDURE — 36415 COLL VENOUS BLD VENIPUNCTURE: CPT

## 2022-11-04 PROCEDURE — 80053 COMPREHEN METABOLIC PANEL: CPT | Performed by: FAMILY MEDICINE

## 2022-11-04 PROCEDURE — 83735 ASSAY OF MAGNESIUM: CPT | Performed by: FAMILY MEDICINE

## 2022-11-04 PROCEDURE — 84100 ASSAY OF PHOSPHORUS: CPT | Performed by: FAMILY MEDICINE

## 2022-11-29 ENCOUNTER — APPOINTMENT (OUTPATIENT)
Dept: LAB | Facility: HOSPITAL | Age: 72
End: 2022-11-29

## 2022-11-29 ENCOUNTER — HOSPITAL ENCOUNTER (OUTPATIENT)
Dept: GENERAL RADIOLOGY | Facility: HOSPITAL | Age: 72
Discharge: HOME | End: 2022-11-29
Admitting: FAMILY MEDICINE

## 2022-11-29 ENCOUNTER — OFFICE VISIT (OUTPATIENT)
Dept: FAMILY MEDICINE CLINIC | Facility: CLINIC | Age: 72
End: 2022-11-29

## 2022-11-29 VITALS
WEIGHT: 159.9 LBS | DIASTOLIC BLOOD PRESSURE: 80 MMHG | OXYGEN SATURATION: 99 % | HEART RATE: 80 BPM | BODY MASS INDEX: 28.33 KG/M2 | HEIGHT: 63 IN | SYSTOLIC BLOOD PRESSURE: 116 MMHG | TEMPERATURE: 95.2 F

## 2022-11-29 DIAGNOSIS — I10 ESSENTIAL HYPERTENSION: ICD-10-CM

## 2022-11-29 DIAGNOSIS — R05.1 ACUTE COUGH: ICD-10-CM

## 2022-11-29 DIAGNOSIS — R73.9 HYPERGLYCEMIA: ICD-10-CM

## 2022-11-29 DIAGNOSIS — J01.00 ACUTE NON-RECURRENT MAXILLARY SINUSITIS: Primary | ICD-10-CM

## 2022-11-29 DIAGNOSIS — E78.2 MIXED HYPERLIPIDEMIA: ICD-10-CM

## 2022-11-29 DIAGNOSIS — E03.9 HYPOTHYROIDISM, UNSPECIFIED TYPE: ICD-10-CM

## 2022-11-29 PROCEDURE — 99214 OFFICE O/P EST MOD 30 MIN: CPT | Performed by: FAMILY MEDICINE

## 2022-11-29 PROCEDURE — 71046 X-RAY EXAM CHEST 2 VIEWS: CPT

## 2022-11-29 RX ORDER — BROMPHENIRAMINE MALEATE, PSEUDOEPHEDRINE HYDROCHLORIDE, AND DEXTROMETHORPHAN HYDROBROMIDE 2; 30; 10 MG/5ML; MG/5ML; MG/5ML
SYRUP ORAL 4 TIMES DAILY PRN
COMMUNITY
End: 2023-02-02

## 2022-11-29 RX ORDER — PREDNISONE 20 MG/1
20 TABLET ORAL 2 TIMES DAILY
Qty: 10 TABLET | Refills: 0 | Status: SHIPPED | OUTPATIENT
Start: 2022-11-29 | End: 2023-02-03

## 2022-11-29 RX ORDER — AZITHROMYCIN 250 MG/1
TABLET, FILM COATED ORAL
Qty: 6 TABLET | Refills: 0 | Status: SHIPPED | OUTPATIENT
Start: 2022-11-29 | End: 2023-02-02

## 2022-12-22 ENCOUNTER — DOCUMENTATION (OUTPATIENT)
Dept: GASTROENTEROLOGY | Facility: CLINIC | Age: 72
End: 2022-12-22

## 2023-01-30 ENCOUNTER — LAB (OUTPATIENT)
Dept: LAB | Facility: HOSPITAL | Age: 73
End: 2023-01-30
Payer: MEDICARE

## 2023-01-30 DIAGNOSIS — R05.1 ACUTE COUGH: ICD-10-CM

## 2023-01-30 DIAGNOSIS — E03.9 HYPOTHYROIDISM, UNSPECIFIED TYPE: ICD-10-CM

## 2023-01-30 DIAGNOSIS — E78.2 MIXED HYPERLIPIDEMIA: ICD-10-CM

## 2023-01-30 DIAGNOSIS — R73.9 HYPERGLYCEMIA: ICD-10-CM

## 2023-01-30 DIAGNOSIS — I10 ESSENTIAL HYPERTENSION: ICD-10-CM

## 2023-01-30 DIAGNOSIS — Z00.00 ENCOUNTER FOR MEDICARE ANNUAL WELLNESS EXAM: Primary | ICD-10-CM

## 2023-01-30 LAB
HOLD SPECIMEN: NORMAL
T4 FREE SERPL-MCNC: 1.3 NG/DL (ref 0.93–1.7)
TSH SERPL DL<=0.05 MIU/L-ACNC: 5.89 UIU/ML (ref 0.27–4.2)
WHOLE BLOOD HOLD SPECIMEN: NORMAL

## 2023-01-30 PROCEDURE — 80061 LIPID PANEL: CPT

## 2023-01-30 PROCEDURE — 85025 COMPLETE CBC W/AUTO DIFF WBC: CPT

## 2023-01-30 PROCEDURE — 83036 HEMOGLOBIN GLYCOSYLATED A1C: CPT

## 2023-01-30 PROCEDURE — 84439 ASSAY OF FREE THYROXINE: CPT

## 2023-01-30 PROCEDURE — 84443 ASSAY THYROID STIM HORMONE: CPT

## 2023-01-30 PROCEDURE — 80053 COMPREHEN METABOLIC PANEL: CPT

## 2023-01-30 PROCEDURE — 36415 COLL VENOUS BLD VENIPUNCTURE: CPT

## 2023-02-02 ENCOUNTER — OFFICE VISIT (OUTPATIENT)
Dept: FAMILY MEDICINE CLINIC | Facility: CLINIC | Age: 73
End: 2023-02-02
Payer: MEDICARE

## 2023-02-02 VITALS
SYSTOLIC BLOOD PRESSURE: 140 MMHG | BODY MASS INDEX: 29.77 KG/M2 | HEART RATE: 79 BPM | WEIGHT: 168 LBS | OXYGEN SATURATION: 99 % | DIASTOLIC BLOOD PRESSURE: 82 MMHG | TEMPERATURE: 95.7 F | HEIGHT: 63 IN

## 2023-02-02 DIAGNOSIS — M81.0 AGE-RELATED OSTEOPOROSIS WITHOUT CURRENT PATHOLOGICAL FRACTURE: ICD-10-CM

## 2023-02-02 DIAGNOSIS — F43.20 ANTICIPATORY GRIEVING: ICD-10-CM

## 2023-02-02 DIAGNOSIS — Z00.00 MEDICARE ANNUAL WELLNESS VISIT, SUBSEQUENT: Primary | ICD-10-CM

## 2023-02-02 DIAGNOSIS — E03.9 HYPOTHYROIDISM, UNSPECIFIED TYPE: ICD-10-CM

## 2023-02-02 LAB
ALBUMIN SERPL-MCNC: 4.3 G/DL (ref 3.5–5.2)
ALBUMIN/GLOB SERPL: 1.5 G/DL
ALP SERPL-CCNC: 84 U/L (ref 39–117)
ALT SERPL W P-5'-P-CCNC: 12 U/L (ref 1–33)
ANION GAP SERPL CALCULATED.3IONS-SCNC: 19 MMOL/L (ref 5–15)
AST SERPL-CCNC: 19 U/L (ref 1–32)
BILIRUB SERPL-MCNC: 0.3 MG/DL (ref 0–1.2)
BUN SERPL-MCNC: 17 MG/DL (ref 8–23)
BUN/CREAT SERPL: 17.5 (ref 7–25)
CALCIUM SPEC-SCNC: 10.3 MG/DL (ref 8.6–10.5)
CHLORIDE SERPL-SCNC: 106 MMOL/L (ref 98–107)
CHOLEST SERPL-MCNC: 215 MG/DL (ref 0–200)
CO2 SERPL-SCNC: 18 MMOL/L (ref 22–29)
CREAT SERPL-MCNC: 0.97 MG/DL (ref 0.57–1)
EGFRCR SERPLBLD CKD-EPI 2021: 61.8 ML/MIN/1.73
GLOBULIN UR ELPH-MCNC: 2.8 GM/DL
GLUCOSE SERPL-MCNC: 61 MG/DL (ref 65–99)
HDLC SERPL-MCNC: 75 MG/DL (ref 40–60)
LDLC SERPL CALC-MCNC: 123 MG/DL (ref 0–100)
LDLC/HDLC SERPL: 1.61 {RATIO}
POTASSIUM SERPL-SCNC: 5.6 MMOL/L (ref 3.5–5.2)
PROT SERPL-MCNC: 7.1 G/DL (ref 6–8.5)
SODIUM SERPL-SCNC: 143 MMOL/L (ref 136–145)
TRIGL SERPL-MCNC: 97 MG/DL (ref 0–150)
VLDLC SERPL-MCNC: 17 MG/DL (ref 5–40)

## 2023-02-02 PROCEDURE — 99214 OFFICE O/P EST MOD 30 MIN: CPT | Performed by: FAMILY MEDICINE

## 2023-02-02 PROCEDURE — 1160F RVW MEDS BY RX/DR IN RCRD: CPT | Performed by: FAMILY MEDICINE

## 2023-02-02 PROCEDURE — 1170F FXNL STATUS ASSESSED: CPT | Performed by: FAMILY MEDICINE

## 2023-02-02 PROCEDURE — G0439 PPPS, SUBSEQ VISIT: HCPCS | Performed by: FAMILY MEDICINE

## 2023-02-02 RX ORDER — ALPRAZOLAM 0.5 MG/1
0.5 TABLET ORAL NIGHTLY PRN
Qty: 30 TABLET | Refills: 0 | Status: SHIPPED | OUTPATIENT
Start: 2023-02-02

## 2023-02-02 RX ORDER — LEVOTHYROXINE SODIUM 0.07 MG/1
75 TABLET ORAL
Qty: 90 TABLET | Refills: 0 | Status: SHIPPED | OUTPATIENT
Start: 2023-02-02

## 2023-02-02 NOTE — PROGRESS NOTES
The ABCs of the Annual Wellness Visit  Subsequent Medicare Wellness Visit    Subjective    {Wrapup  Review (Popup)  Advance Care Planning  Labs  CC  Problem List  Visit Diagnosis  Medications  Result Review  Imaging  [facility]  [facility]  [facility]  Snap[employer]ot  Encounters  Notes  Media  Procedures :23}  Sari Self is a 72 y.o. female who presents for a Subsequent Medicare Wellness Visit.    The following portions of the patient's history were reviewed and   updated as appropriate: allergies, current medications, past family history, past medical history, past social history, past surgical history and problem list.    Compared to one year ago, the patient feels her physical   health is worse.    Compared to one year ago, the patient feels her mental   health is worse. Lost     Recent Hospitalizations:  She was not admitted to the hospital during the last year.       Current Medical Providers:  Patient Care Team:  Nurys Edwards MD as PCP - General (Family Medicine)    Outpatient Medications Prior to Visit   Medication Sig Dispense Refill   • atorvastatin (LIPITOR) 10 MG tablet Take 1 tablet by mouth once daily 90 tablet 0   • gabapentin (NEURONTIN) 600 MG tablet TAKE 1 TABLET BY MOUTH 4 TIMES DAILY . DO NOT EXCEED 4 PER 24 HOURS     • hydroCHLOROthiazide (HYDRODIURIL) 12.5 MG tablet Take 1 tablet by mouth Daily. Not taking ever day , taking as needed 30 tablet 1   • omeprazole (priLOSEC) 40 MG capsule Take 1 capsule by mouth Daily. 90 capsule 3   • potassium chloride 10 MEQ CR tablet TAKE 1 TABLET BY MOUTH ONCE DAILY AS NEEDED WITH HCTZ     • levothyroxine (SYNTHROID, LEVOTHROID) 50 MCG tablet TAKE 1 TABLET BY MOUTH ONCE DAILY IN THE MORNING 90 tablet 0   • cetirizine (zyrTEC) 10 MG tablet Take 1 tablet by mouth Daily. 90 tablet 1   • oxybutynin XL (DITROPAN-XL) 5 MG 24 hr tablet Take 1 tablet by mouth once daily (Patient taking differently: Take 5 mg by mouth As Needed.) 90  "tablet 0   • predniSONE (DELTASONE) 20 MG tablet Take 1 tablet by mouth 2 (Two) Times a Day. 10 tablet 0   • azithromycin (Zithromax) 250 MG tablet Take 2 tablets the first day, then 1 tablet daily for 4 days. 6 tablet 0   • brompheniramine-pseudoephedrine-DM 30-2-10 MG/5ML syrup Take  by mouth 4 (Four) Times a Day As Needed for Allergies.       Facility-Administered Medications Prior to Visit   Medication Dose Route Frequency Provider Last Rate Last Admin   • Chlorhexidine Gluconate Cloth 2 % pads   Apply externally BID Everton Zuniga MD           No opioid medication identified on active medication list. I have reviewed chart for other potential  high risk medication/s and harmful drug interactions in the elderly.          Aspirin is not on active medication list.  Aspirin use is not indicated based on review of current medical condition/s. Risk of harm outweighs potential benefits.  .    Patient Active Problem List   Diagnosis   • Osteoporosis   • Senile osteoporosis   • Gastroesophageal reflux disease   • Diarrhea   • Tubular adenoma of colon   • Helicobacter pylori (H. pylori) infection   • Acute medial meniscus tear of left knee   • Complex tear of medial meniscus of right knee as current injury   • Stress fracture of right tibia   • History of IBS   • RLS (restless legs syndrome)   • Hypothyroidism   • Essential hypertension   • Left-sided trigeminal neuralgia   • Nonruptured cerebral aneurysm   • Primary osteoarthritis of left knee     Advance Care Planning  Advance Directive is not on file.  {ACP Discussion, Advance Directive not in EMR:80186}     Objective    Vitals:    02/02/23 1343   BP: 140/82   Pulse: 79   Temp: 95.7 °F (35.4 °C)   TempSrc: Temporal   SpO2: 99%   Weight: 76.2 kg (168 lb)   Height: 160 cm (63\")     Estimated body mass index is 29.76 kg/m² as calculated from the following:    Height as of this encounter: 160 cm (63\").    Weight as of this encounter: 76.2 kg (168 lb).    BMI is >= 25 and " <30. (Overweight) The following options were offered after discussion;: exercise counseling/recommendations and nutrition counseling/recommendations      Does the patient have evidence of cognitive impairment?   No    Lab Results   Component Value Date    TRIG 97 01/30/2023    HDL 75 (H) 01/30/2023     (H) 01/30/2023    VLDL 17 01/30/2023    HGBA1C 5.60 02/02/2023          HEALTH RISK ASSESSMENT    Smoking Status:  Social History     Tobacco Use   Smoking Status Never   Smokeless Tobacco Never     Alcohol Consumption:  Social History     Substance and Sexual Activity   Alcohol Use Yes    Comment: Occasionally     Fall Risk Screen:    PONCEADI Fall Risk Assessment was completed, and patient is at LOW risk for falls.Assessment completed on:2/2/2023    Depression Screening:  PHQ-2/PHQ-9 Depression Screening 2/2/2023   Feeling Down, Depressed or Hopeless 2-->more than half the days   Trouble Falling or Staying Asleep, or Sleeping Too Much 3-->nearly every day   Feeling Tired or Having Little Energy 2-->more than half the days   Poor Appetite or Overeating 2-->more than half the days   Feeling Bad about Yourself - or that You are a Failure or Have Let Yourself or Your Family Down 0-->not at all   Trouble Concentrating on Things, Such as Reading the Newspaper or Watching Television 1-->several days   Moving or Speaking So Slowly that Other People Could Have Noticed? Or the Opposite - Being So Fidgety 0-->not at all   Thoughts that You Would be Better Off Dead or of Hurting Yourself in Some Way 0-->not at all   PHQ-9: Brief Depression Severity Measure Score 10   If You Checked Off Any Problems, How Difficult Have These Problems Made It For You to Do Your Work, Take Care of Things at Home, or Get Along with Other People? not difficult at all       Health Habits and Functional and Cognitive Screening:  Functional & Cognitive Status 2/2/2023   Do you have difficulty preparing food and eating? No   Do you have difficulty  bathing yourself, getting dressed or grooming yourself? No   Do you have difficulty using the toilet? No   Do you have difficulty moving around from place to place? No   Do you have trouble with steps or getting out of a bed or a chair? No   Current Diet Well Balanced Diet   Dental Exam Up to date   Eye Exam Up to date   Exercise (times per week) 0 times per week   Current Exercises Include No Regular Exercise   Do you need help using the phone?  No   Are you deaf or do you have serious difficulty hearing?  No   Do you need help with transportation? No   Do you need help shopping? No   Do you need help preparing meals?  No   Do you need help with housework?  No   Do you need help with laundry? No   Do you need help taking your medications? No   Do you need help managing money? No   Do you ever drive or ride in a car without wearing a seat belt? No   Have you felt unusual stress, anger or loneliness in the last month? Yes   Who do you live with? Child   If you need help, do you have trouble finding someone available to you? No   Have you been bothered in the last four weeks by sexual problems? No   Do you have difficulty concentrating, remembering or making decisions? No       Age-appropriate Screening Schedule:  Refer to the list below for future screening recommendations based on patient's age, sex and/or medical conditions. Orders for these recommended tests are listed in the plan section. The patient has been provided with a written plan.    Health Maintenance   Topic Date Due   • DXA SCAN  03/04/2023   • LIPID PANEL  01/30/2024   • MAMMOGRAM  05/11/2024   • TDAP/TD VACCINES (2 - Td or Tdap) 10/28/2030   • INFLUENZA VACCINE  Completed   • ZOSTER VACCINE  Completed                CMS Preventative Services Quick Reference  Risk Factors Identified During Encounter:    none    The above risks/problems have been discussed with the patient.  Pertinent information has been shared with the patient in the After Visit  Summary.    Diagnoses and all orders for this visit:    1. Medicare annual wellness visit, subsequent (Primary)  -     Cancel: Comprehensive Metabolic Panel; Future  -     Lipid Panel; Future  -     Cancel: Lipid Panel    2. Anticipatory grieving  -     ALPRAZolam (XANAX) 0.5 MG tablet; Take 1 tablet by mouth At Night As Needed for Anxiety or Sleep.  Dispense: 30 tablet; Refill: 0    3. Hypothyroidism, unspecified type  -     levothyroxine (SYNTHROID, LEVOTHROID) 75 MCG tablet; Take 1 tablet by mouth Every Morning.  Dispense: 90 tablet; Refill: 0    4. Age-related osteoporosis without current pathological fracture  -     Comprehensive Metabolic Panel          Med  is a 72-year-old female patient came here for  Medicare annual wellness visit, preventive care discussed with patient she is up-to-date with bone density mammogram and colonoscopy.  PHQ screening done in patient patient is grieving currently lost her  27-year-old grandson few weeks ago.  Complaining difficulty in sleeping and increased anxiety.  I will start her on alprazolam as needed for anxiety and also give her trazodone.  Side effects discussed with patient including nightmares.  She is also seen today for  Hypothyroidism, TSH not at goal I will increase her levothyroxine to 75 MCG recheck TSH in 6 to 8 weeks.    Hyperlipidemia, continue atorvastatin.  We will check lipids.  Hypertension, blood pressure at goal    Follow Up:   Next Medicare Wellness visit to be scheduled in 1 year.      An After Visit Summary and PPPS were made available to the patient.

## 2023-02-03 DIAGNOSIS — E87.5 HYPERKALEMIA: Primary | ICD-10-CM

## 2023-02-03 LAB
BASOPHILS # BLD AUTO: 0.11 10*3/MM3 (ref 0–0.2)
BASOPHILS NFR BLD AUTO: 1.4 % (ref 0–1.5)
DEPRECATED RDW RBC AUTO: 44.4 FL (ref 37–54)
EOSINOPHIL # BLD AUTO: 0.31 10*3/MM3 (ref 0–0.4)
EOSINOPHIL NFR BLD AUTO: 3.8 % (ref 0.3–6.2)
ERYTHROCYTE [DISTWIDTH] IN BLOOD BY AUTOMATED COUNT: 13.7 % (ref 12.3–15.4)
HBA1C MFR BLD: 5.6 % (ref 4.8–5.6)
HCT VFR BLD AUTO: 40.9 % (ref 34–46.6)
HGB BLD-MCNC: 13.3 G/DL (ref 12–15.9)
IMM GRANULOCYTES # BLD AUTO: 0.04 10*3/MM3 (ref 0–0.05)
IMM GRANULOCYTES NFR BLD AUTO: 0.5 % (ref 0–0.5)
LYMPHOCYTES # BLD AUTO: 2.65 10*3/MM3 (ref 0.7–3.1)
LYMPHOCYTES NFR BLD AUTO: 32.9 % (ref 19.6–45.3)
MCH RBC QN AUTO: 28.9 PG (ref 26.6–33)
MCHC RBC AUTO-ENTMCNC: 32.5 G/DL (ref 31.5–35.7)
MCV RBC AUTO: 88.9 FL (ref 79–97)
MONOCYTES # BLD AUTO: 0.65 10*3/MM3 (ref 0.1–0.9)
MONOCYTES NFR BLD AUTO: 8.1 % (ref 5–12)
NEUTROPHILS NFR BLD AUTO: 4.3 10*3/MM3 (ref 1.7–7)
NEUTROPHILS NFR BLD AUTO: 53.3 % (ref 42.7–76)
NRBC BLD AUTO-RTO: 0.1 /100 WBC (ref 0–0.2)
PLATELET # BLD AUTO: 367 10*3/MM3 (ref 140–450)
PMV BLD AUTO: 10.2 FL (ref 6–12)
RBC # BLD AUTO: 4.6 10*6/MM3 (ref 3.77–5.28)
WBC NRBC COR # BLD: 8.06 10*3/MM3 (ref 3.4–10.8)

## 2023-02-03 RX ORDER — TRAZODONE HYDROCHLORIDE 50 MG/1
50 TABLET ORAL NIGHTLY
Qty: 30 TABLET | Refills: 3 | Status: SHIPPED | OUTPATIENT
Start: 2023-02-03

## 2023-02-03 NOTE — PROGRESS NOTES
The ABCs of the Annual Wellness Visit  Subsequent Medicare Wellness Visit    Subjective    Sari Self is a 72 y.o. female who presents for a Subsequent Medicare Wellness Visit.    The following portions of the patient's history were reviewed and   updated as appropriate: allergies, current medications, past family history, past medical history, past social history, past surgical history and problem list.    Compared to one year ago, the patient feels her physical   health is worse.    Compared to one year ago, the patient feels her mental   health is worse.    Recent Hospitalizations:  She was not admitted to the hospital during the last year.       Current Medical Providers:  Patient Care Team:  Nurys Edwards MD as PCP - General (Family Medicine)    Outpatient Medications Prior to Visit   Medication Sig Dispense Refill   • atorvastatin (LIPITOR) 10 MG tablet Take 1 tablet by mouth once daily 90 tablet 0   • gabapentin (NEURONTIN) 600 MG tablet TAKE 1 TABLET BY MOUTH 4 TIMES DAILY . DO NOT EXCEED 4 PER 24 HOURS     • hydroCHLOROthiazide (HYDRODIURIL) 12.5 MG tablet Take 1 tablet by mouth Daily. Not taking ever day , taking as needed 30 tablet 1   • omeprazole (priLOSEC) 40 MG capsule Take 1 capsule by mouth Daily. 90 capsule 3   • potassium chloride 10 MEQ CR tablet TAKE 1 TABLET BY MOUTH ONCE DAILY AS NEEDED WITH HCTZ     • levothyroxine (SYNTHROID, LEVOTHROID) 50 MCG tablet TAKE 1 TABLET BY MOUTH ONCE DAILY IN THE MORNING 90 tablet 0   • cetirizine (zyrTEC) 10 MG tablet Take 1 tablet by mouth Daily. 90 tablet 1   • oxybutynin XL (DITROPAN-XL) 5 MG 24 hr tablet Take 1 tablet by mouth once daily (Patient taking differently: Take 5 mg by mouth As Needed.) 90 tablet 0   • predniSONE (DELTASONE) 20 MG tablet Take 1 tablet by mouth 2 (Two) Times a Day. 10 tablet 0   • azithromycin (Zithromax) 250 MG tablet Take 2 tablets the first day, then 1 tablet daily for 4 days. 6 tablet 0   • brompheniramine-pseudoephedrine-DM  "30-2-10 MG/5ML syrup Take  by mouth 4 (Four) Times a Day As Needed for Allergies.       Facility-Administered Medications Prior to Visit   Medication Dose Route Frequency Provider Last Rate Last Admin   • Chlorhexidine Gluconate Cloth 2 % pads   Apply externally BID Everton Zuniga MD           No opioid medication identified on active medication list. I have reviewed chart for other potential  high risk medication/s and harmful drug interactions in the elderly.          Aspirin is not on active medication list.  Aspirin use is not indicated based on review of current medical condition/s. Risk of harm outweighs potential benefits.  .    Patient Active Problem List   Diagnosis   • Osteoporosis   • Senile osteoporosis   • Gastroesophageal reflux disease   • Diarrhea   • Tubular adenoma of colon   • Helicobacter pylori (H. pylori) infection   • Acute medial meniscus tear of left knee   • Complex tear of medial meniscus of right knee as current injury   • Stress fracture of right tibia   • History of IBS   • RLS (restless legs syndrome)   • Hypothyroidism   • Essential hypertension   • Left-sided trigeminal neuralgia   • Nonruptured cerebral aneurysm   • Primary osteoarthritis of left knee     Advance Care Planning  Advance Directive is not on file.  ACP discussion was held with the patient during this visit. Patient does not have an advance directive, information provided.     Objective    Vitals:    02/02/23 1343   BP: 140/82   Pulse: 79   Temp: 95.7 °F (35.4 °C)   TempSrc: Temporal   SpO2: 99%   Weight: 76.2 kg (168 lb)   Height: 160 cm (63\")     Estimated body mass index is 29.76 kg/m² as calculated from the following:    Height as of this encounter: 160 cm (63\").    Weight as of this encounter: 76.2 kg (168 lb).    BMI is >= 25 and <30. (Overweight) The following options were offered after discussion;: exercise counseling/recommendations and nutrition counseling/recommendations      Does the patient have evidence of " cognitive impairment? No    Lab Results   Component Value Date    TRIG 97 01/30/2023    HDL 75 (H) 01/30/2023     (H) 01/30/2023    VLDL 17 01/30/2023    HGBA1C 5.60 02/02/2023        HEALTH RISK ASSESSMENT    Smoking Status:  Social History     Tobacco Use   Smoking Status Never   Smokeless Tobacco Never     Alcohol Consumption:  Social History     Substance and Sexual Activity   Alcohol Use Yes    Comment: Occasionally     Fall Risk Screen:    ISAAC Fall Risk Assessment was completed, and patient is at LOW risk for falls.Assessment completed on:2/2/2023    Depression Screening:  PHQ-2/PHQ-9 Depression Screening 2/2/2023   Feeling Down, Depressed or Hopeless 2-->more than half the days   Trouble Falling or Staying Asleep, or Sleeping Too Much 3-->nearly every day   Feeling Tired or Having Little Energy 2-->more than half the days   Poor Appetite or Overeating 2-->more than half the days   Feeling Bad about Yourself - or that You are a Failure or Have Let Yourself or Your Family Down 0-->not at all   Trouble Concentrating on Things, Such as Reading the Newspaper or Watching Television 1-->several days   Moving or Speaking So Slowly that Other People Could Have Noticed? Or the Opposite - Being So Fidgety 0-->not at all   Thoughts that You Would be Better Off Dead or of Hurting Yourself in Some Way 0-->not at all   PHQ-9: Brief Depression Severity Measure Score 10   If You Checked Off Any Problems, How Difficult Have These Problems Made It For You to Do Your Work, Take Care of Things at Home, or Get Along with Other People? not difficult at all       Health Habits and Functional and Cognitive Screening:  Functional & Cognitive Status 2/2/2023   Do you have difficulty preparing food and eating? No   Do you have difficulty bathing yourself, getting dressed or grooming yourself? No   Do you have difficulty using the toilet? No   Do you have difficulty moving around from place to place? No   Do you have trouble  with steps or getting out of a bed or a chair? No   Current Diet Well Balanced Diet   Dental Exam Up to date   Eye Exam Up to date   Exercise (times per week) 0 times per week   Current Exercises Include No Regular Exercise   Do you need help using the phone?  No   Are you deaf or do you have serious difficulty hearing?  No   Do you need help with transportation? No   Do you need help shopping? No   Do you need help preparing meals?  No   Do you need help with housework?  No   Do you need help with laundry? No   Do you need help taking your medications? No   Do you need help managing money? No   Do you ever drive or ride in a car without wearing a seat belt? No   Have you felt unusual stress, anger or loneliness in the last month? Yes   Who do you live with? Child   If you need help, do you have trouble finding someone available to you? No   Have you been bothered in the last four weeks by sexual problems? No   Do you have difficulty concentrating, remembering or making decisions? No       Age-appropriate Screening Schedule:  Refer to the list below for future screening recommendations based on patient's age, sex and/or medical conditions. Orders for these recommended tests are listed in the plan section. The patient has been provided with a written plan.    Health Maintenance   Topic Date Due   • DXA SCAN  03/04/2023   • LIPID PANEL  01/30/2024   • MAMMOGRAM  05/11/2024   • TDAP/TD VACCINES (2 - Td or Tdap) 10/28/2030   • INFLUENZA VACCINE  Completed   • ZOSTER VACCINE  Completed                CMS Preventative Services Quick Reference  Risk Factors Identified During Encounter  Depression/Dysphoria: Elevated PHQ score reflective of other stress or illness, not depression  The above risks/problems have been discussed with the patient.  Pertinent information has been shared with the patient in the After Visit Summary.  An After Visit Summary and PPPS were made available to the patient.    Follow Up:   Next Medicare  "Wellness visit to be scheduled in 1 year.       Additional E&M Note during same encounter follows:  Patient has multiple medical problems which are significant and separately identifiable that require additional work above and beyond the Medicare Wellness Visit.      Chief Complaint  Medicare Wellness-subsequent    Subjective        HPI  Sari Self is also being seen today for hypothyroidism, hypertension and hyperlipidemia.  History of hypothyroidism giving history of weight gain and fatigue.  Patient has long history of hyperlipidemia denies any body ache, nausea vomiting.  Denies any problem with medication.         Objective   Vital Signs:  /82   Pulse 79   Temp 95.7 °F (35.4 °C) (Temporal)   Ht 160 cm (63\")   Wt 76.2 kg (168 lb)   SpO2 99%   BMI 29.76 kg/m²     Physical Exam  Constitutional:       General: She is not in acute distress.     Appearance: Normal appearance. She is well-developed.   HENT:      Head: Normocephalic and atraumatic.      Right Ear: Tympanic membrane normal.      Left Ear: Tympanic membrane normal.      Mouth/Throat:      Mouth: Mucous membranes are moist.   Eyes:      General:         Right eye: No discharge.         Left eye: No discharge.      Extraocular Movements: Extraocular movements intact.      Pupils: Pupils are equal, round, and reactive to light.   Cardiovascular:      Rate and Rhythm: Normal rate and regular rhythm.      Pulses: Normal pulses.      Heart sounds: Normal heart sounds.   Pulmonary:      Effort: Pulmonary effort is normal.      Breath sounds: Normal breath sounds. No wheezing or rales.   Abdominal:      General: Bowel sounds are normal.      Palpations: Abdomen is soft. There is no mass.      Tenderness: There is no abdominal tenderness.   Musculoskeletal:      Cervical back: Normal range of motion and neck supple.      Right lower leg: No edema.      Left lower leg: No edema.   Lymphadenopathy:      Cervical: No cervical adenopathy. "   Neurological:      General: No focal deficit present.      Mental Status: She is alert and oriented to person, place, and time.   Psychiatric:         Mood and Affect: Affect is tearful.                         Assessment and Plan   Diagnoses and all orders for this visit:    1. Medicare annual wellness visit, subsequent (Primary)  -     Cancel: Comprehensive Metabolic Panel; Future  -     Lipid Panel; Future  -     Cancel: Lipid Panel    2. Anticipatory grieving  -     ALPRAZolam (XANAX) 0.5 MG tablet; Take 1 tablet by mouth At Night As Needed for Anxiety or Sleep.  Dispense: 30 tablet; Refill: 0    3. Hypothyroidism, unspecified type  -     levothyroxine (SYNTHROID, LEVOTHROID) 75 MCG tablet; Take 1 tablet by mouth Every Morning.  Dispense: 90 tablet; Refill: 0    4. Age-related osteoporosis without current pathological fracture  -     Comprehensive Metabolic Panel    Other orders  -     traZODone (DESYREL) 50 MG tablet; Take 1 tablet by mouth Every Night.  Dispense: 30 tablet; Refill: 3      Med  is a 72-year-old female patient came here for  Medicare annual wellness visit, preventive care discussed with patient she is up-to-date with bone density mammogram and colonoscopy.  PHQ screening done in patient patient is grieving currently lost her  27-year-old grandson few weeks ago.  Complaining difficulty in sleeping and increased anxiety.  I will start her on alprazolam as needed for anxiety and also give her trazodone.  Side effects discussed with patient including nightmares.  She is also seen today for  Hypothyroidism, TSH not at goal I will increase her levothyroxine to 75 MCG recheck TSH in 6 to 8 weeks.    Hyperlipidemia, continue atorvastatin.  We will check lipids.  Hypertension, blood pressure at goal         Follow Up   No follow-ups on file.  Patient was given instructions and counseling regarding her condition or for health maintenance advice. Please see specific information pulled into the AVS  if appropriate.

## 2023-02-09 NOTE — TELEPHONE ENCOUNTER
Rx Refill Note  Requested Prescriptions     Pending Prescriptions Disp Refills   • hydroCHLOROthiazide (HYDRODIURIL) 12.5 MG tablet [Pharmacy Med Name: hydroCHLOROthiazide 12.5 MG Oral Tablet] 90 tablet 0     Sig: TAKE 1 TABLET BY MOUTH ONCE DAILY AS NEEDED   • EQ Allergy Relief, Cetirizine, 10 MG tablet [Pharmacy Med Name: EQ Allergy Relief (Cetirizine) 10 MG Oral Tablet] 90 tablet 0     Sig: Take 1 tablet by mouth once daily      Last office visit with prescribing clinician: 2/2/2023   Last telemedicine visit with prescribing clinician: Visit date not found   Next office visit with prescribing clinician: Visit date not found                         Would you like a call back once the refill request has been completed: [] Yes [] No    If the office needs to give you a call back, can they leave a voicemail: [] Yes [] No    Sharri Johnson, PCT  02/09/23, 09:14 EST

## 2023-02-10 RX ORDER — HYDROCHLOROTHIAZIDE 12.5 MG/1
TABLET ORAL
Qty: 90 TABLET | Refills: 0 | Status: SHIPPED | OUTPATIENT
Start: 2023-02-10

## 2023-02-10 RX ORDER — CETIRIZINE HYDROCHLORIDE 10 MG/1
TABLET, FILM COATED ORAL
Qty: 90 TABLET | Refills: 0 | Status: SHIPPED | OUTPATIENT
Start: 2023-02-10

## 2023-02-13 ENCOUNTER — OFFICE VISIT (OUTPATIENT)
Dept: ORTHOPEDIC SURGERY | Facility: CLINIC | Age: 73
End: 2023-02-13
Payer: MEDICARE

## 2023-02-13 VITALS — HEIGHT: 63 IN | BODY MASS INDEX: 28 KG/M2 | TEMPERATURE: 97.1 F | WEIGHT: 158 LBS

## 2023-02-13 DIAGNOSIS — R52 PAIN: Primary | ICD-10-CM

## 2023-02-13 DIAGNOSIS — M75.40 IMPINGEMENT SYNDROME OF SHOULDER REGION, UNSPECIFIED LATERALITY: ICD-10-CM

## 2023-02-13 PROCEDURE — 20610 DRAIN/INJ JOINT/BURSA W/O US: CPT | Performed by: ORTHOPAEDIC SURGERY

## 2023-02-13 PROCEDURE — 73030 X-RAY EXAM OF SHOULDER: CPT | Performed by: ORTHOPAEDIC SURGERY

## 2023-02-13 PROCEDURE — 99213 OFFICE O/P EST LOW 20 MIN: CPT | Performed by: ORTHOPAEDIC SURGERY

## 2023-02-13 RX ADMIN — METHYLPREDNISOLONE ACETATE 80 MG: 80 INJECTION, SUSPENSION INTRA-ARTICULAR; INTRALESIONAL; INTRAMUSCULAR; SOFT TISSUE at 16:25

## 2023-02-13 RX ADMIN — LIDOCAINE HYDROCHLORIDE 2 ML: 10 INJECTION, SOLUTION EPIDURAL; INFILTRATION; INTRACAUDAL; PERINEURAL at 16:25

## 2023-02-13 NOTE — PROGRESS NOTES
New Bilateral Shoulder      Patient: Sari Self        YOB: 1950    Medical Record Number: [id number]        Chief Complaints: Bilateral shoulder pain      History of Present Illness: This is a 72-year-old female who looks much younger than her stated age who works in medical records at the hospital who presents complaining of bilateral shoulder pain I last saw herAlmost a year ago last March she did well with conservative management she has since seen Dr. Zuniga.  She has no new history injury change in activity that she can recall symptoms are moderate intermittent aching bothering her some at night bother her with activities her symptoms are moderate intermittent aching worse with activity somewhat better with rest past medical history is marked for a TIA irritable bowel diverticulosis cerebral aneurysm                             Allergies:   Allergies   Allergen Reactions   • Penicillins Hives       Medications:   Home Medications:  Current Outpatient Medications on File Prior to Visit   Medication Sig   • atorvastatin (LIPITOR) 10 MG tablet Take 1 tablet by mouth once daily   • EQ Allergy Relief, Cetirizine, 10 MG tablet Take 1 tablet by mouth once daily   • gabapentin (NEURONTIN) 600 MG tablet TAKE 1 TABLET BY MOUTH 4 TIMES DAILY . DO NOT EXCEED 4 PER 24 HOURS   • hydroCHLOROthiazide (HYDRODIURIL) 12.5 MG tablet TAKE 1 TABLET BY MOUTH ONCE DAILY AS NEEDED   • levothyroxine (SYNTHROID, LEVOTHROID) 75 MCG tablet Take 1 tablet by mouth Every Morning.   • omeprazole (priLOSEC) 40 MG capsule Take 1 capsule by mouth Daily.   • traZODone (DESYREL) 50 MG tablet Take 1 tablet by mouth Every Night.   • ALPRAZolam (XANAX) 0.5 MG tablet Take 1 tablet by mouth At Night As Needed for Anxiety or Sleep.   • potassium chloride 10 MEQ CR tablet TAKE 1 TABLET BY MOUTH ONCE DAILY AS NEEDED WITH HCTZ     Current Facility-Administered Medications on File Prior to Visit   Medication   • Chlorhexidine Gluconate  Cloth 2 % pads     Current Medications:  Scheduled Meds:  Continuous Infusions:No current facility-administered medications for this visit.    PRN Meds:.    Past Medical History:   Diagnosis Date   • Arthritis    • Cerebral aneurysm    • Disease of thyroid gland    • Dislocation, shoulder 1968    Dislocated several times   • Diverticulosis    • GERD (gastroesophageal reflux disease)    • H/O cerebral aneurysm repair    • History of 2019 novel coronavirus disease (COVID-19) 08/2020 AUGUST 2020 STATES VERY MILD SYMTOMS   • Hyperlipidemia    • Hypertension    • IBS (irritable bowel syndrome)    • Knee swelling    • OAB (overactive bladder)    • Osteoporosis    • Right knee pain    • RLS (restless legs syndrome)    • Rotator cuff syndrome 1968    Tears   • Stress fracture of right tibia    • Stroke (HCC)    • Tear of meniscus of knee 2019 & 2020    Surgery both knees   • TIA (transient ischemic attack)     2011   • Torn meniscus     RIGHT KNEE        Past Surgical History:   Procedure Laterality Date   • CATARACT EXTRACTION, BILATERAL     • CHOLECYSTECTOMY     • COLONOSCOPY  11/10/2010    prep of colon fair,IH,stool in transverse colon,hepatic flexure and ascending colon,ileum normal,IBS   • COLONOSCOPY N/A 02/27/2018    IH, diverticulosis, one 5mm polyp, tubular adenoma with low grade dysplasia   • CYSTOSCOPY     • ENDOSCOPY  11/27/2012    grd 1 reflux egitis,web upperd 3rd esoph   • ENDOSCOPY N/A 02/27/2018    normal biopsies, H Pylori positive   • FOOT NEUROMA SURGERY Left    • HAND SURGERY     • IR CEREBRAL ANEURYSM COILING  2012   • KNEE ARTHROPLASTY UNICOMPARTMENTAL Left 3/18/2022    Procedure: KNEE ARTHROPLASTY UNICOMPARTMENTAL;  Surgeon: Everton Zuniga MD;  Location: [facility] OR;  Service: Orthopedics;  Laterality: Left;   • KNEE ARTHROSCOPY Left 07/03/2019    Procedure: KNEE ARTHROSCOPY ARTHRITIS DEBRIDEMENT PARTIAL MEDIAL AND LATERAL MENISECTOMY;  Surgeon: Nuris Bender MD;  Location: [facility] OR [facility];   "Service: Orthopedics   • KNEE ARTHROSCOPY Right 09/28/2020    Procedure: RIGHT KNEE ARTHROSCOPY, PARTIAL MEDIAL AND LATERAL MENISCECTOMIES, DEBRIDEMENT OF ARTHRITIS, AND INTERNAL FIXATION TIBAL PLATEAU INSUFFICIENCY FRACTURE;  Surgeon: Nuris Bender MD;  Location: [facility] OR [facility];  Service: Orthopedics;  Laterality: Right;   • KNEE SURGERY  2019 & 2020   • THUMB ARTHROSCOPY     • TRIGGER POINT INJECTION      Hand, elbow, shoulders, knees   • UPPER GASTROINTESTINAL ENDOSCOPY  02/27/2018   • WRIST SURGERY          Social History     Occupational History   • Not on file   Tobacco Use   • Smoking status: Never   • Smokeless tobacco: Never   Vaping Use   • Vaping Use: Never used   Substance and Sexual Activity   • Alcohol use: Yes     Comment: Occasionally   • Drug use: No   • Sexual activity: Not Currently      Social History     Social History Narrative   • Not on file        Family History   Problem Relation Age of Onset   • Irritable bowel syndrome Daughter    • Malig Hyperthermia Neg Hx              Review of Systems:     Review of Systems      Physical Exam: 72 y.o. female  General Appearance:    Alert, cooperative, in no acute distress                   Vitals:    02/13/23 1619   Temp: 97.1 °F (36.2 °C)   Weight: 71.7 kg (158 lb)   Height: 160 cm (63\")   PainSc:   8      Patient is alert and read ×3 no acute distress appears her above-listed at height weight and age.  Affect is normal respiratory rate is normal unlabored. Heart rate regular rate rhythm, sclera, dentition and hearing are normal for the purpose of this exam.    Ortho Exam  Physical exam of the right shoulder reveals no overlying skin changes no lymphedema no lymphadenopathy.  Patient has active flexion 180 with mild symptoms abduction is similar external rotation is to 50 and internal rotation to the upper lumbar spine with mild symptoms.  Patient has good rotator cuff strength 4+ over 5 with isometric strength testing with pain.  Patient has a " positive impingement and a positive Ruiz sign.  Patient has good cervical range of motion which is full and asymptomatic no radicular symptoms.  Patient has a normal elbow exam.  Good distal pulses are present  Patient has pain with overhead activity and a positive Neer sign and a positive empty can sign , a positive drop arm and a definitive painful arc    Physical exam of the left shoulder reveals no overlying skin changes no lymphedema no lymphadenopathy.  Patient has active flexion 180 with mild symptoms abduction is similar external rotation is to 50 and internal rotation to the upper lumbar spine with mild symptoms.  Patient has good rotator cuff strength 4+ over 5 with isometric strength testing with pain.  Patient has a positive impingement and a positive Ruiz sign.  Patient has good cervical range of motion which is full and asymptomatic no radicular symptoms.  Patient has a normal elbow exam.  Good distal pulses are presentPatient has pain with overhead activity and a positive Neer sign and a positive empty can sign  They have a positive drop arm any definitive painful arc    Large Joint Arthrocentesis: R subacromial bursa  Date/Time: 2/13/2023 4:25 PM  Consent given by: patient  Site marked: site marked  Timeout: Immediately prior to procedure a time out was called to verify the correct patient, procedure, equipment, support staff and site/side marked as required   Supporting Documentation  Indications: pain   Procedure Details  Location: shoulder - R subacromial bursa  Preparation: Patient was prepped and draped in the usual sterile fashion  Needle gauge: 21G.  Approach: posterior  Medications administered: 80 mg methylPREDNISolone acetate 80 MG/ML; 2 mL lidocaine PF 1% 1 %  Patient tolerance: patient tolerated the procedure well with no immediate complications    Large Joint Arthrocentesis: L subacromial bursa  Date/Time: 2/13/2023 4:25 PM  Consent given by: patient  Site marked: site  marked  Timeout: Immediately prior to procedure a time out was called to verify the correct patient, procedure, equipment, support staff and site/side marked as required   Supporting Documentation  Indications: pain   Procedure Details  Location: shoulder - L subacromial bursa  Preparation: Patient was prepped and draped in the usual sterile fashion  Needle gauge: 21G.  Approach: posterior  Medications administered: 80 mg methylPREDNISolone acetate 80 MG/ML; 2 mL lidocaine PF 1% 1 %  Patient tolerance: patient tolerated the procedure well with no immediate complications                Radiology:   AP, Scapular Y and Axillary Lateral of the right and left shoulder were ordered/reviewed to evauate shoulder pain.  I did compare to x-rays done previously the left x-ray was done in 2018 the right 1-2014 these are the only ones that I have to compare to there is a little bit of change in that she has a little bit more narrowing over glenohumeral joint but still joint space remaining she has some acromioclavicular arthritis bilaterally which is unchanged  Imaging Results (Most Recent)     Procedure Component Value Units Date/Time    XR Shoulder 2+ View Bilateral [[id number]] Resulted: 02/13/23 1607     Updated: 02/13/23 1614    Impression:      Ordering physician's impression is located in the Encounter Note dated 02/13/23. X-ray performed in the DR room.          Assessment/Plan: Bilateral shoulder pain I really think most of her symptoms are rotator cuff she does have a little bit of arthritis but I think most of her symptoms are cuff.  She has responded to injections in the past I think that is my first step.  Should she fail to respond to those I might consider a glenohumeral injection she will keep me posted.  We can also pursue other means of testing should she not respond to these injections  Cortisone Injection. See procedure note.  Cortisone Injection for DIAGNOSTIC and THERAPUTIC purposes.

## 2023-02-14 RX ORDER — METHYLPREDNISOLONE ACETATE 80 MG/ML
80 INJECTION, SUSPENSION INTRA-ARTICULAR; INTRALESIONAL; INTRAMUSCULAR; SOFT TISSUE
Status: COMPLETED | OUTPATIENT
Start: 2023-02-13 | End: 2023-02-13

## 2023-02-14 RX ORDER — LIDOCAINE HYDROCHLORIDE 10 MG/ML
2 INJECTION, SOLUTION EPIDURAL; INFILTRATION; INTRACAUDAL; PERINEURAL
Status: COMPLETED | OUTPATIENT
Start: 2023-02-13 | End: 2023-02-13

## 2023-03-03 ENCOUNTER — PREP FOR SURGERY (OUTPATIENT)
Dept: OTHER | Facility: HOSPITAL | Age: 73
End: 2023-03-03
Payer: MEDICARE

## 2023-03-03 ENCOUNTER — PRE-PROCEDURE SCREENING (OUTPATIENT)
Dept: GASTROENTEROLOGY | Facility: CLINIC | Age: 73
End: 2023-03-03
Payer: MEDICARE

## 2023-03-03 DIAGNOSIS — K63.5 COLON POLYPS: Primary | ICD-10-CM

## 2023-03-03 NOTE — TELEPHONE ENCOUNTER
LAST SCOPE 2/18 IN Epic --Personal history of polyps--No family history of polyps or colon cancer--No blood thinners--Medications:          ALPRAZolam (XANAX) 0.5 MG tablet  atorvastatin (LIPITOR) 10 MG tablet    EQ Allergy Relief, Cetirizine, 10 MG tablet  gabapentin (NEURONTIN) 600 MG tablet    hydroCHLOROthiazide (HYDRODIURIL) 12.5 MG tablet    levothyroxine (SYNTHROID, LEVOTHROID) 75 MCG tablet  omeprazole (priLOSEC) 40 MG capsule  potassium chloride 10 MEQ CR tablet    traZODone (DESYREL) 50 MG tablet           QUESTIONNAIRE SCREENING  SCAN IN  MEDIA & HAS BEEN SENT TO DOCTOR FOR REVIEW

## 2023-03-11 ENCOUNTER — APPOINTMENT (OUTPATIENT)
Dept: GENERAL RADIOLOGY | Facility: HOSPITAL | Age: 73
End: 2023-03-11
Payer: MEDICARE

## 2023-03-11 ENCOUNTER — HOSPITAL ENCOUNTER (EMERGENCY)
Facility: HOSPITAL | Age: 73
Discharge: HOME | End: 2023-03-11
Attending: EMERGENCY MEDICINE | Admitting: EMERGENCY MEDICINE
Payer: MEDICARE

## 2023-03-11 VITALS
DIASTOLIC BLOOD PRESSURE: 75 MMHG | SYSTOLIC BLOOD PRESSURE: 131 MMHG | BODY MASS INDEX: 27.99 KG/M2 | HEIGHT: 63 IN | RESPIRATION RATE: 16 BRPM | TEMPERATURE: 98 F | HEART RATE: 74 BPM | OXYGEN SATURATION: 97 %

## 2023-03-11 DIAGNOSIS — R93.89 ABNORMAL CHEST X-RAY: ICD-10-CM

## 2023-03-11 DIAGNOSIS — R00.2 PALPITATIONS: Primary | ICD-10-CM

## 2023-03-11 LAB
ALBUMIN SERPL-MCNC: 4.2 G/DL (ref 3.5–5.2)
ALBUMIN/GLOB SERPL: 1.6 G/DL
ALP SERPL-CCNC: 75 U/L (ref 39–117)
ALT SERPL W P-5'-P-CCNC: 11 U/L (ref 1–33)
ANION GAP SERPL CALCULATED.3IONS-SCNC: 11.3 MMOL/L (ref 5–15)
APTT PPP: 26 SECONDS (ref 22.7–35.4)
AST SERPL-CCNC: 12 U/L (ref 1–32)
BASOPHILS # BLD AUTO: 0.06 10*3/MM3 (ref 0–0.2)
BASOPHILS NFR BLD AUTO: 0.7 % (ref 0–1.5)
BILIRUB SERPL-MCNC: 0.2 MG/DL (ref 0–1.2)
BUN SERPL-MCNC: 20 MG/DL (ref 8–23)
BUN/CREAT SERPL: 21.3 (ref 7–25)
CALCIUM SPEC-SCNC: 9.6 MG/DL (ref 8.6–10.5)
CHLORIDE SERPL-SCNC: 104 MMOL/L (ref 98–107)
CO2 SERPL-SCNC: 25.7 MMOL/L (ref 22–29)
CREAT SERPL-MCNC: 0.94 MG/DL (ref 0.57–1)
DEPRECATED RDW RBC AUTO: 41.6 FL (ref 37–54)
EGFRCR SERPLBLD CKD-EPI 2021: 64.2 ML/MIN/1.73
EOSINOPHIL # BLD AUTO: 0.21 10*3/MM3 (ref 0–0.4)
EOSINOPHIL NFR BLD AUTO: 2.6 % (ref 0.3–6.2)
ERYTHROCYTE [DISTWIDTH] IN BLOOD BY AUTOMATED COUNT: 13.2 % (ref 12.3–15.4)
GEN 5 2HR TROPONIN T REFLEX: 14 NG/L
GLOBULIN UR ELPH-MCNC: 2.7 GM/DL
GLUCOSE SERPL-MCNC: 125 MG/DL (ref 65–99)
HCT VFR BLD AUTO: 39.8 % (ref 34–46.6)
HGB BLD-MCNC: 13.5 G/DL (ref 12–15.9)
IMM GRANULOCYTES # BLD AUTO: 0.04 10*3/MM3 (ref 0–0.05)
IMM GRANULOCYTES NFR BLD AUTO: 0.5 % (ref 0–0.5)
INR PPP: 0.85 (ref 0.9–1.1)
LYMPHOCYTES # BLD AUTO: 2.49 10*3/MM3 (ref 0.7–3.1)
LYMPHOCYTES NFR BLD AUTO: 31 % (ref 19.6–45.3)
MAGNESIUM SERPL-MCNC: 2 MG/DL (ref 1.6–2.4)
MCH RBC QN AUTO: 29.4 PG (ref 26.6–33)
MCHC RBC AUTO-ENTMCNC: 33.9 G/DL (ref 31.5–35.7)
MCV RBC AUTO: 86.7 FL (ref 79–97)
MONOCYTES # BLD AUTO: 0.74 10*3/MM3 (ref 0.1–0.9)
MONOCYTES NFR BLD AUTO: 9.2 % (ref 5–12)
NEUTROPHILS NFR BLD AUTO: 4.49 10*3/MM3 (ref 1.7–7)
NEUTROPHILS NFR BLD AUTO: 56 % (ref 42.7–76)
NRBC BLD AUTO-RTO: 0 /100 WBC (ref 0–0.2)
NT-PROBNP SERPL-MCNC: 85.8 PG/ML (ref 0–900)
PLATELET # BLD AUTO: 307 10*3/MM3 (ref 140–450)
PMV BLD AUTO: 9.2 FL (ref 6–12)
POTASSIUM SERPL-SCNC: 3.7 MMOL/L (ref 3.5–5.2)
PROT SERPL-MCNC: 6.9 G/DL (ref 6–8.5)
PROTHROMBIN TIME: 11.7 SECONDS (ref 11.7–14.2)
QT INTERVAL: 354 MS
RBC # BLD AUTO: 4.59 10*6/MM3 (ref 3.77–5.28)
SODIUM SERPL-SCNC: 141 MMOL/L (ref 136–145)
TROPONIN T DELTA: 0 NG/L
TROPONIN T SERPL HS-MCNC: 14 NG/L
WBC NRBC COR # BLD: 8.03 10*3/MM3 (ref 3.4–10.8)

## 2023-03-11 PROCEDURE — 93010 ELECTROCARDIOGRAM REPORT: CPT | Performed by: INTERNAL MEDICINE

## 2023-03-11 PROCEDURE — 83880 ASSAY OF NATRIURETIC PEPTIDE: CPT | Performed by: EMERGENCY MEDICINE

## 2023-03-11 PROCEDURE — 36415 COLL VENOUS BLD VENIPUNCTURE: CPT

## 2023-03-11 PROCEDURE — 93005 ELECTROCARDIOGRAM TRACING: CPT

## 2023-03-11 PROCEDURE — 71045 X-RAY EXAM CHEST 1 VIEW: CPT

## 2023-03-11 PROCEDURE — 83735 ASSAY OF MAGNESIUM: CPT | Performed by: EMERGENCY MEDICINE

## 2023-03-11 PROCEDURE — 80053 COMPREHEN METABOLIC PANEL: CPT | Performed by: EMERGENCY MEDICINE

## 2023-03-11 PROCEDURE — 85025 COMPLETE CBC W/AUTO DIFF WBC: CPT | Performed by: EMERGENCY MEDICINE

## 2023-03-11 PROCEDURE — 85610 PROTHROMBIN TIME: CPT | Performed by: EMERGENCY MEDICINE

## 2023-03-11 PROCEDURE — 84484 ASSAY OF TROPONIN QUANT: CPT | Performed by: EMERGENCY MEDICINE

## 2023-03-11 PROCEDURE — 85730 THROMBOPLASTIN TIME PARTIAL: CPT | Performed by: EMERGENCY MEDICINE

## 2023-03-11 PROCEDURE — 99284 EMERGENCY DEPT VISIT MOD MDM: CPT

## 2023-03-11 RX ORDER — SODIUM CHLORIDE 0.9 % (FLUSH) 0.9 %
10 SYRINGE (ML) INJECTION AS NEEDED
Status: DISCONTINUED | OUTPATIENT
Start: 2023-03-11 | End: 2023-03-11 | Stop reason: HOSPADM

## 2023-03-11 NOTE — DISCHARGE INSTRUCTIONS
I would return to the emergency department with palpitations and a heart rate with a rate greater than 120, should she have any chest pain associated with her palpitations, should she develop fever, shortness of breath, or any other symptoms that were not addressed at today's ER visit.  I would call and follow-up with my cardiologist next available appointment to discuss a Holter monitor for further evaluation.  I would follow-up with the family physician for any interim management.

## 2023-03-11 NOTE — ED PROVIDER NOTES
MD ATTESTATION NOTE    The YOGESH and I have discussed this patient's history, physical exam, and treatment plan.    I provided a substantive portion of the care of this patient. I personally performed the physical exam, in its entirety. The attached note describes my personal findings.      Sari Self is a 72 y.o. female who presents to the ED c/o intermittent palpitations throughout yesterday.  States her blood pressure has been elevated.  Reports feels lightheaded when she has palpitations.  Patient has not passed out.  No shortness of breath or chest pain.  Patient's symptoms have resolved at this time.      On exam:  GENERAL: not distressed  HENT: nares patent  EYES: no scleral icterus  CV: regular rhythm, regular rate  RESPIRATORY: normal effort  ABDOMEN: soft  MUSCULOSKELETAL: no deformity  NEURO: alert, moves all extremities, follows commands  SKIN: warm, dry    Labs  Recent Results (from the past 24 hour(s))   ECG 12 Lead Other; palpitations    Collection Time: 03/11/23 12:21 AM   Result Value Ref Range    QT Interval 354 ms   Comprehensive Metabolic Panel    Collection Time: 03/11/23 12:46 AM    Specimen: Blood   Result Value Ref Range    Glucose 125 (H) 65 - 99 mg/dL    BUN 20 8 - 23 mg/dL    Creatinine 0.94 0.57 - 1.00 mg/dL    Sodium 141 136 - 145 mmol/L    Potassium 3.7 3.5 - 5.2 mmol/L    Chloride 104 98 - 107 mmol/L    CO2 25.7 22.0 - 29.0 mmol/L    Calcium 9.6 8.6 - 10.5 mg/dL    Total Protein 6.9 6.0 - 8.5 g/dL    Albumin 4.2 3.5 - 5.2 g/dL    ALT (SGPT) 11 1 - 33 U/L    AST (SGOT) 12 1 - 32 U/L    Alkaline Phosphatase 75 39 - 117 U/L    Total Bilirubin 0.2 0.0 - 1.2 mg/dL    Globulin 2.7 gm/dL    A/G Ratio 1.6 g/dL    BUN/Creatinine Ratio 21.3 7.0 - 25.0    Anion Gap 11.3 5.0 - 15.0 mmol/L    eGFR 64.2 >60.0 mL/min/1.73   Protime-INR    Collection Time: 03/11/23 12:46 AM    Specimen: Blood   Result Value Ref Range    Protime 11.7 11.7 - 14.2 Seconds    INR 0.85 (L) 0.90 - 1.10   aPTT     Collection Time: 03/11/23 12:46 AM    Specimen: Blood   Result Value Ref Range    PTT 26.0 22.7 - 35.4 seconds   BNP    Collection Time: 03/11/23 12:46 AM    Specimen: Blood   Result Value Ref Range    proBNP 85.8 0.0 - 900.0 pg/mL   High Sensitivity Troponin T    Collection Time: 03/11/23 12:46 AM    Specimen: Blood   Result Value Ref Range    HS Troponin T 14 (H) <10 ng/L   Magnesium    Collection Time: 03/11/23 12:46 AM    Specimen: Blood   Result Value Ref Range    Magnesium 2.0 1.6 - 2.4 mg/dL   CBC Auto Differential    Collection Time: 03/11/23 12:46 AM    Specimen: Blood   Result Value Ref Range    WBC 8.03 3.40 - 10.80 10*3/mm3    RBC 4.59 3.77 - 5.28 10*6/mm3    Hemoglobin 13.5 12.0 - 15.9 g/dL    Hematocrit 39.8 34.0 - 46.6 %    MCV 86.7 79.0 - 97.0 fL    MCH 29.4 26.6 - 33.0 pg    MCHC 33.9 31.5 - 35.7 g/dL    RDW 13.2 12.3 - 15.4 %    RDW-SD 41.6 37.0 - 54.0 fl    MPV 9.2 6.0 - 12.0 fL    Platelets 307 140 - 450 10*3/mm3    Neutrophil % 56.0 42.7 - 76.0 %    Lymphocyte % 31.0 19.6 - 45.3 %    Monocyte % 9.2 5.0 - 12.0 %    Eosinophil % 2.6 0.3 - 6.2 %    Basophil % 0.7 0.0 - 1.5 %    Immature Grans % 0.5 0.0 - 0.5 %    Neutrophils, Absolute 4.49 1.70 - 7.00 10*3/mm3    Lymphocytes, Absolute 2.49 0.70 - 3.10 10*3/mm3    Monocytes, Absolute 0.74 0.10 - 0.90 10*3/mm3    Eosinophils, Absolute 0.21 0.00 - 0.40 10*3/mm3    Basophils, Absolute 0.06 0.00 - 0.20 10*3/mm3    Immature Grans, Absolute 0.04 0.00 - 0.05 10*3/mm3    nRBC 0.0 0.0 - 0.2 /100 WBC   High Sensitivity Troponin T 2Hr    Collection Time: 03/11/23  2:38 AM    Specimen: Blood   Result Value Ref Range    HS Troponin T 14 (H) <10 ng/L    Troponin T Delta 0 >=-4 - <+4 ng/L       Radiology  XR Chest 1 View    Result Date: 3/11/2023  Patient: JEANETTE OLSON  Time Out: 01:48 Exam(s): FILM CXR 1 VIEW EXAM:   XR Chest, 1 View CLINICAL HISTORY:    Reason for exam: soa. TECHNIQUE:   Frontal view of the chest. COMPARISON:   11 29 2022. FINDINGS:   Lungs:   Probable scarring subsegmental atelectasis near left CP angle.   Pleural space:  Small left pleural effusion cannot be excluded.  No pneumothorax.   Heart:  Heart is normal in size.   Mediastinum:  Unremarkable.   Bones joints:  Osteopenia.   Soft tissues:  Bilateral breast prostheses are noted in place.   Vasculature:  Atherosclerotic disease. IMPRESSION:     1.  Probable scarring subsegmental atelectasis in the left lung base of the CP angle. 2.  Small left pleural effusion cannot be excluded. 3.  Atherosclerotic disease. 4.  Osteopenia.     Electronically signed by Matthew Brown MD on 03-11-23 at 0148      Medications given in the ED:  Medications - No data to display    Orders placed during this visit:  Orders Placed This Encounter   Procedures   • XR Chest 1 View   • Comprehensive Metabolic Panel   • Protime-INR   • aPTT   • BNP   • High Sensitivity Troponin T   • Magnesium   • CBC Auto Differential   • High Sensitivity Troponin T 2Hr   • Monitor Blood Pressure   • Pulse Oximetry, Continuous   • ECG 12 Lead Other; palpitations   • CBC & Differential       Medical Decision Making:  ED Course as of 03/11/23 0656   Sat Mar 11, 2023   0143 WBC: 8.03 [RC]   0144 RBC: 4.59 [RC]   0144 Hemoglobin: 13.5 [RC]   0144 Hematocrit: 39.8 [RC]   0144 Platelets: 307 [RC]   0144 Glucose(!): 125 [RC]   0144 BUN: 20 [RC]   0144 Creatinine: 0.94 [RC]   0144 Sodium: 141 [RC]   0144 Potassium: 3.7 [RC]   0144 CO2: 25.7 [RC]   0144 Anion Gap: 11.3 [RC]   0144 proBNP: 85.8 [RC]   0144 HS Troponin T(!): 14 [RC]   0144 Magnesium: 2.0 [RC]   0144 EKG          EKG time: 12:21AM  Rhythm/Rate: Sinus/86  P waves and HI: Normal HI  QRS, axis: Normal axis  ST and T waves: No acute ST or T wave changes.    Interpreted Contemporaneously by me, independently viewed  -No significant change from 3/3/2022. [RC]   0332 HS Troponin T(!): 14 [RC]   0335 My independent chest x-ray interpretation is as follows: No acute filtrates or process noted.   Radiologist official read is as follows:IMPRESSION:       1.  Probable scarring subsegmental atelectasis in the left lung base of   the CP angle.  2.  Small left pleural effusion cannot be excluded.  3.  Atherosclerotic disease.  4.  Osteopenia.    [RC]   0501 Patient's work-up is unremarkable and she has been stable and asymptomatic throughout her ER stay.  Suspect more benign process at play such as PVCs or PACs.  However, it is felt the patient would be better served by following up with her cardiologist for further evaluation.  We will have her to see her family physician for interim management.  We will have her return to the emergency department with any chest pain, or palpitations with a heart rate greater than 120 for further evaluation. [RC]      ED Course User Index  [RC] Justin Arshad III, PA       PPE: Both the patient and I wore a surgical mask throughout the entire patient encounter.     Diagnosis  Final diagnoses:   Palpitations   Abnormal chest x-ray questionable small left pleural effusion on x-ray        Zay Russ MD  03/11/23 0661

## 2023-03-11 NOTE — ED TRIAGE NOTES
Patient to ED per PV from home, ambulatory to triage, w/ reports of feeling like her heart is racing x2 hours (patient states this resolved prior to arrival to ED). Patient states she was watching TV when symptom started. Patient denies CP, SOA.     Patient and staff w/ appropriate PPE in place throughout encounter.

## 2023-03-11 NOTE — ED PROVIDER NOTES
EMERGENCY DEPARTMENT ENCOUNTER    Room Number:  11/11  Date seen:  3/11/2023  PCP: Nurys Edwards MD      HPI:  Chief Complaint: Palpitations  A complete HPI/ROS/PMH/PSH/SH/FH are unobtainable due to: Nothing  Context: Sari Self is a 72 y.o. female who presents to the ED c/o palpitations that have been intermittent over the course of the day.  Patient states tonight at approximately 2000 they began to be more frequent.  She states it felt as if her heart was beating out of her chest.  She took her blood pressure noticed that it was elevated.  Her heart rate according to the blood pressure was around 115 bpm and her blood pressure was significantly elevated with a systolic reading in the 170s.  There was some some mild lightheadedness associated with palpitations.  There was no near syncope or syncopal event.  The chest discomfort seem to be associated with the palpitations.  There is no shortness of breath.    Patient is taking HCTZ and potassium chloride supplement.  Her potassium was recently elevated and she was told to forego on the potassium for time.  After foregoing the potassium she began develop leg cramps after few days and began to take again approximately 2 days ago.    Patient is followed by [place] [facility].        PAST MEDICAL HISTORY  Active Ambulatory Problems     Diagnosis Date Noted   • Osteoporosis 08/05/2016   • Senile osteoporosis 09/27/2017   • Gastroesophageal reflux disease 01/04/2018   • Diarrhea 01/04/2018   • Tubular adenoma of colon 03/01/2018   • Helicobacter pylori (H. pylori) infection 03/01/2018   • Acute medial meniscus tear of left knee 06/14/2019   • Complex tear of medial meniscus of right knee as current injury 09/15/2020   • Stress fracture of right tibia 09/15/2020   • History of IBS 09/18/2020   • RLS (restless legs syndrome) 09/18/2020   • Hypothyroidism 09/18/2020   • Essential hypertension 09/18/2020   • Left-sided trigeminal neuralgia 10/19/2018   • Nonruptured  cerebral aneurysm 01/21/2011   • Primary osteoarthritis of left knee 01/04/2022     Resolved Ambulatory Problems     Diagnosis Date Noted   • No Resolved Ambulatory Problems     Past Medical History:   Diagnosis Date   • Arthritis    • Cerebral aneurysm    • Disease of thyroid gland    • Dislocation, shoulder 1968   • Diverticulosis    • GERD (gastroesophageal reflux disease)    • H/O cerebral aneurysm repair    • History of 2019 novel coronavirus disease (COVID-19) 08/2020   • Hyperlipidemia    • Hypertension    • IBS (irritable bowel syndrome)    • Knee swelling    • OAB (overactive bladder)    • Right knee pain    • Rotator cuff syndrome 1968   • Stroke (HCC)    • Tear of meniscus of knee 2019 & 2020   • TIA (transient ischemic attack)    • Torn meniscus          PAST SURGICAL HISTORY  Past Surgical History:   Procedure Laterality Date   • CATARACT EXTRACTION, BILATERAL     • CHOLECYSTECTOMY     • COLONOSCOPY  11/10/2010    prep of colon fair,IH,stool in transverse colon,hepatic flexure and ascending colon,ileum normal,IBS   • COLONOSCOPY N/A 02/27/2018    IH, diverticulosis, one 5mm polyp, tubular adenoma with low grade dysplasia   • CYSTOSCOPY     • ENDOSCOPY  11/27/2012    grd 1 reflux egitis,web upperd 3rd esoph   • ENDOSCOPY N/A 02/27/2018    normal biopsies, H Pylori positive   • FOOT NEUROMA SURGERY Left    • HAND SURGERY     • IR CEREBRAL ANEURYSM COILING  2012   • KNEE ARTHROPLASTY UNICOMPARTMENTAL Left 3/18/2022    Procedure: KNEE ARTHROPLASTY UNICOMPARTMENTAL;  Surgeon: Everton Zuniga MD;  Location: [facility];  Service: Orthopedics;  Laterality: Left;   • KNEE ARTHROSCOPY Left 07/03/2019    Procedure: KNEE ARTHROSCOPY ARTHRITIS DEBRIDEMENT PARTIAL MEDIAL AND LATERAL MENISECTOMY;  Surgeon: Nuris Bender MD;  Location: [facility];  Service: Orthopedics   • KNEE ARTHROSCOPY Right 09/28/2020    Procedure: RIGHT KNEE ARTHROSCOPY, PARTIAL MEDIAL AND LATERAL MENISCECTOMIES, DEBRIDEMENT OF  ARTHRITIS, AND INTERNAL FIXATION TIBAL PLATEAU INSUFFICIENCY FRACTURE;  Surgeon: Nuris Bender MD;  Location: [facility];  Service: Orthopedics;  Laterality: Right;   • KNEE SURGERY  2019 & 2020   • THUMB ARTHROSCOPY     • TRIGGER POINT INJECTION      Hand, elbow, shoulders, knees   • UPPER GASTROINTESTINAL ENDOSCOPY  02/27/2018   • WRIST SURGERY           FAMILY HISTORY  Family History   Problem Relation Age of Onset   • Irritable bowel syndrome Daughter    • Malig Hyperthermia Neg Hx          SOCIAL HISTORY  Social History     Socioeconomic History   • Marital status:    Tobacco Use   • Smoking status: Never   • Smokeless tobacco: Never   Vaping Use   • Vaping Use: Never used   Substance and Sexual Activity   • Alcohol use: Yes     Comment: Occasionally   • Drug use: No   • Sexual activity: Not Currently         ALLERGIES  Penicillins        REVIEW OF SYSTEMS  Review of Systems     All systems reviewed and negative except for those discussed in HPI.       PHYSICAL EXAM  ED Triage Vitals   Temp Heart Rate Resp BP SpO2   03/11/23 0026 03/11/23 0022 03/11/23 0023 03/11/23 0026 03/11/23 0022   98 °F (36.7 °C) 111 16 172/90 96 %      Temp src Heart Rate Source Patient Position BP Location FiO2 (%)   03/11/23 0026 -- 03/11/23 0026 03/11/23 0026 --   Oral  Lying Right arm        Physical Exam      GENERAL: Very pleasant, nontoxic, not distressed  HENT: nares patent  EYES: no scleral icterus  CV: regular rhythm, normal rate, normal S1-S2, no RMG, no unilateral leg swelling  RESPIRATORY: normal effort, lungs CTA B   ABDOMEN: soft, nontender  MUSCULOSKELETAL: no deformity  NEURO: alert, moves all extremities, follows commands  PSYCH:  calm, cooperative  SKIN: warm, dry    Vital signs and nursing notes reviewed.          LAB RESULTS  Recent Results (from the past 24 hour(s))   ECG 12 Lead Other; palpitations    Collection Time: 03/11/23 12:21 AM   Result Value Ref Range    QT Interval 354 ms   Comprehensive  Metabolic Panel    Collection Time: 03/11/23 12:46 AM    Specimen: Blood   Result Value Ref Range    Glucose 125 (H) 65 - 99 mg/dL    BUN 20 8 - 23 mg/dL    Creatinine 0.94 0.57 - 1.00 mg/dL    Sodium 141 136 - 145 mmol/L    Potassium 3.7 3.5 - 5.2 mmol/L    Chloride 104 98 - 107 mmol/L    CO2 25.7 22.0 - 29.0 mmol/L    Calcium 9.6 8.6 - 10.5 mg/dL    Total Protein 6.9 6.0 - 8.5 g/dL    Albumin 4.2 3.5 - 5.2 g/dL    ALT (SGPT) 11 1 - 33 U/L    AST (SGOT) 12 1 - 32 U/L    Alkaline Phosphatase 75 39 - 117 U/L    Total Bilirubin 0.2 0.0 - 1.2 mg/dL    Globulin 2.7 gm/dL    A/G Ratio 1.6 g/dL    BUN/Creatinine Ratio 21.3 7.0 - 25.0    Anion Gap 11.3 5.0 - 15.0 mmol/L    eGFR 64.2 >60.0 mL/min/1.73   Protime-INR    Collection Time: 03/11/23 12:46 AM    Specimen: Blood   Result Value Ref Range    Protime 11.7 11.7 - 14.2 Seconds    INR 0.85 (L) 0.90 - 1.10   aPTT    Collection Time: 03/11/23 12:46 AM    Specimen: Blood   Result Value Ref Range    PTT 26.0 22.7 - 35.4 seconds   BNP    Collection Time: 03/11/23 12:46 AM    Specimen: Blood   Result Value Ref Range    proBNP 85.8 0.0 - 900.0 pg/mL   High Sensitivity Troponin T    Collection Time: 03/11/23 12:46 AM    Specimen: Blood   Result Value Ref Range    HS Troponin T 14 (H) <10 ng/L   Magnesium    Collection Time: 03/11/23 12:46 AM    Specimen: Blood   Result Value Ref Range    Magnesium 2.0 1.6 - 2.4 mg/dL   CBC Auto Differential    Collection Time: 03/11/23 12:46 AM    Specimen: Blood   Result Value Ref Range    WBC 8.03 3.40 - 10.80 10*3/mm3    RBC 4.59 3.77 - 5.28 10*6/mm3    Hemoglobin 13.5 12.0 - 15.9 g/dL    Hematocrit 39.8 34.0 - 46.6 %    MCV 86.7 79.0 - 97.0 fL    MCH 29.4 26.6 - 33.0 pg    MCHC 33.9 31.5 - 35.7 g/dL    RDW 13.2 12.3 - 15.4 %    RDW-SD 41.6 37.0 - 54.0 fl    MPV 9.2 6.0 - 12.0 fL    Platelets 307 140 - 450 10*3/mm3    Neutrophil % 56.0 42.7 - 76.0 %    Lymphocyte % 31.0 19.6 - 45.3 %    Monocyte % 9.2 5.0 - 12.0 %    Eosinophil % 2.6 0.3 -  6.2 %    Basophil % 0.7 0.0 - 1.5 %    Immature Grans % 0.5 0.0 - 0.5 %    Neutrophils, Absolute 4.49 1.70 - 7.00 10*3/mm3    Lymphocytes, Absolute 2.49 0.70 - 3.10 10*3/mm3    Monocytes, Absolute 0.74 0.10 - 0.90 10*3/mm3    Eosinophils, Absolute 0.21 0.00 - 0.40 10*3/mm3    Basophils, Absolute 0.06 0.00 - 0.20 10*3/mm3    Immature Grans, Absolute 0.04 0.00 - 0.05 10*3/mm3    nRBC 0.0 0.0 - 0.2 /100 WBC   High Sensitivity Troponin T 2Hr    Collection Time: 03/11/23  2:38 AM    Specimen: Blood   Result Value Ref Range    HS Troponin T 14 (H) <10 ng/L    Troponin T Delta 0 >=-4 - <+4 ng/L       Ordered the above labs and reviewed the results.        RADIOLOGY  XR Chest 1 View    Result Date: 3/11/2023  Patient: JEANETTE OLSON  Time Out: 01:48 Exam(s): FILM CXR 1 VIEW EXAM:   XR Chest, 1 View CLINICAL HISTORY:    Reason for exam: soa. TECHNIQUE:   Frontal view of the chest. COMPARISON:   11 29 2022. FINDINGS:   Lungs:  Probable scarring subsegmental atelectasis near left CP angle.   Pleural space:  Small left pleural effusion cannot be excluded.  No pneumothorax.   Heart:  Heart is normal in size.   Mediastinum:  Unremarkable.   Bones joints:  Osteopenia.   Soft tissues:  Bilateral breast prostheses are noted in place.   Vasculature:  Atherosclerotic disease. IMPRESSION:     1.  Probable scarring subsegmental atelectasis in the left lung base of the CP angle. 2.  Small left pleural effusion cannot be excluded. 3.  Atherosclerotic disease. 4.  Osteopenia.     Electronically signed by Matthew Bronw MD on 03-11-23 at 0148      Ordered the above noted radiological studies. Reviewed by me in PACS.          PROCEDURES  Procedures          MEDICATIONS GIVEN IN ER  Medications   sodium chloride 0.9 % flush 10 mL (has no administration in time range)         MEDICAL DECISION MAKING, PROGRESS, and CONSULTS    Discussion below represents my analysis of pertinent findings related to patient's condition, differential diagnosis,  treatment plan and final disposition.      Orders placed during this visit:  Orders Placed This Encounter   Procedures   • XR Chest 1 View   • Comprehensive Metabolic Panel   • Protime-INR   • aPTT   • BNP   • High Sensitivity Troponin T   • Magnesium   • CBC Auto Differential   • High Sensitivity Troponin T 2Hr   • Monitor Blood Pressure   • Cardiac Monitoring   • Pulse Oximetry, Continuous   • ECG 12 Lead Other; palpitations   • Insert Peripheral IV   • CBC & Differential         Additional sources:  - Discussed/obtained information from independent historians: None available  Additional information was obtained to confirm the patient's history.    - External (non-ED) record review: Reviewed a note from Dr. Nuris Bender on 2/13/2023.  Patient was seen for bilateral shoulder pain.  There was discussion of a large joint arthrocentesis; a left subacromial bursa as well as a right subacromial bursa.  Procedure appears to have been performed without any complication.  Dr. Bender also notes the patient does have a PMH of TIA, irritable bowel, diverticulosis, cerebral aneurysm that had to be coiled in the past.    - Chronic or social conditions impacting care; none        Differential diagnosis:    PVCs, PACs, AV block, SVT, A-fib, a flutter, ischemic heart disease, other ventricular arrhythmia, electrolyte disturbance, hyperthyroidism.  Will obtain CBC, CMP, TSH, free T4, magnesium, EKG, troponin x2 to further evaluate          Independent interpretation of labs, radiology studies, and discussions with consultants:  ED Course as of 03/11/23 0502   Sat Mar 11, 2023   0143 WBC: 8.03 [RC]   0144 RBC: 4.59 [RC]   0144 Hemoglobin: 13.5 [RC]   0144 Hematocrit: 39.8 [RC]   0144 Platelets: 307 [RC]   0144 Glucose(!): 125 [RC]   0144 BUN: 20 [RC]   0144 Creatinine: 0.94 [RC]   0144 Sodium: 141 [RC]   0144 Potassium: 3.7 [RC]   0144 CO2: 25.7 [RC]   0144 Anion Gap: 11.3 [RC]   0144 proBNP: 85.8 [RC]   0144 HS Troponin T(!): 14  [RC]   0144 Magnesium: 2.0 [RC]   0144 EKG          EKG time: 12:21AM  Rhythm/Rate: Sinus/86  P waves and WV: Normal WV  QRS, axis: Normal axis  ST and T waves: No acute ST or T wave changes.    Interpreted Contemporaneously by me, independently viewed  -No significant change from 3/3/2022. [RC]   0332 HS Troponin T(!): 14 [RC]   0335 My independent chest x-ray interpretation is as follows: No acute filtrates or process noted.  Radiologist official read is as follows:IMPRESSION:       1.  Probable scarring subsegmental atelectasis in the left lung base of   the CP angle.  2.  Small left pleural effusion cannot be excluded.  3.  Atherosclerotic disease.  4.  Osteopenia.    [RC]   0501 Patient's work-up is unremarkable and she has been stable and asymptomatic throughout her ER stay.  Suspect more benign process at play such as PVCs or PACs.  However, it is felt the patient would be better served by following up with her cardiologist for further evaluation.  We will have her to see her family physician for interim management.  We will have her return to the emergency department with any chest pain, or palpitations with a heart rate greater than 120 for further evaluation. [RC]      ED Course User Index  [RC] Justin Arshad III, PA                  PPE: The patient wore a mask throughout the entire encounter. I wore a well-fitting mask.    DIAGNOSIS  Final diagnoses:   Palpitations   Abnormal chest x-ray questionable small left pleural effusion on x-ray         DISPOSITION  DISCHARGE    Patient discharged in stable condition.    Reviewed implications of results, diagnosis, meds, responsibility to follow up, warning signs and symptoms of possible worsening, potential complications and reasons to return to ER.  Patient/Family voiced understanding of above instructions.    Discussed plan for discharge, as there is no emergent indication for admission. Patient referred to primary care provider for BP management due  to today's BP. Pt/family is agreeable and understands need for follow up and repeat testing.  Pt is aware that discharge does not mean that nothing is wrong but it indicates no emergency is present that requires admission and they must continue care with follow-up as given below or physician of their choice.     FOLLOW-UP  Your Cardiologist    Schedule an appointment as soon as possible for a visit   For further evaluation and treatment    Nurys Edwards MD  [address]  [facility] [address] [place] [address]  [phone]    Schedule an appointment as soon as possible for a visit   For further evaluation and treatment of all other issues as well as interim management         Medication List      No changes were made to your prescriptions during this visit.           Latest Documented Vital Signs:  As of 05:02 EST  BP- 131/75 HR- 74 Temp- 98 °F (36.7 °C) (Oral) O2 sat- 97%      --    Please note that portions of this were completed with a voice recognition program.       Note Disclaimer: At [facility], we believe that sharing information builds trust and better relationships. You are receiving this note because you are receiving care at [facility] or recently visited. It is possible you will see health information before a provider has talked with you about it. This kind of information can be easy to misunderstand. To help you fully understand what it means for your health, we urge you to discuss this note with your provider.       Justin Arshad III, PA  03/11/23 7851

## 2023-03-28 ENCOUNTER — OFFICE VISIT (OUTPATIENT)
Dept: ORTHOPEDIC SURGERY | Facility: CLINIC | Age: 73
End: 2023-03-28
Payer: MEDICARE

## 2023-03-28 VITALS — HEIGHT: 63 IN | TEMPERATURE: 97.3 F | WEIGHT: 166.4 LBS | BODY MASS INDEX: 29.48 KG/M2

## 2023-03-28 DIAGNOSIS — R52 PAIN: Primary | ICD-10-CM

## 2023-03-28 DIAGNOSIS — Z96.652 STATUS POST UNICOMPARTMENTAL KNEE REPLACEMENT, LEFT: ICD-10-CM

## 2023-03-28 PROBLEM — R07.9 CHEST PAIN: Status: ACTIVE | Noted: 2018-04-12

## 2023-03-28 PROBLEM — F43.21 GRIEF REACTION: Status: ACTIVE | Noted: 2018-10-25

## 2023-03-28 PROBLEM — E78.00 PURE HYPERCHOLESTEROLEMIA: Status: ACTIVE | Noted: 2023-03-15

## 2023-03-28 PROBLEM — N18.30 CHRONIC KIDNEY DISEASE (CKD), STAGE III (MODERATE): Status: ACTIVE | Noted: 2018-10-19

## 2023-03-28 PROBLEM — D12.6 TUBULOVILLOUS ADENOMA OF COLON: Status: ACTIVE | Noted: 2018-10-19

## 2023-03-28 PROBLEM — F43.20 GRIEF REACTION: Status: ACTIVE | Noted: 2018-10-25

## 2023-03-28 PROBLEM — D12.6 TUBULAR ADENOMA OF COLON: Status: ACTIVE | Noted: 2018-03-01

## 2023-03-28 PROBLEM — R94.31 ABNORMAL EKG: Status: ACTIVE | Noted: 2018-04-12

## 2023-03-28 PROBLEM — R19.7 DIARRHEA: Status: ACTIVE | Noted: 2018-01-04

## 2023-03-28 PROBLEM — Z86.19 HISTORY OF HELICOBACTER PYLORI INFECTION: Status: ACTIVE | Noted: 2018-10-19

## 2023-03-28 PROBLEM — R31.9 HEMATURIA: Status: ACTIVE | Noted: 2018-10-25

## 2023-03-28 PROBLEM — A04.8 HELICOBACTER PYLORI (H. PYLORI) INFECTION: Status: ACTIVE | Noted: 2018-03-01

## 2023-03-28 PROBLEM — K21.9 GASTROESOPHAGEAL REFLUX DISEASE: Status: ACTIVE | Noted: 2018-04-12

## 2023-03-28 PROBLEM — Z87.440 HISTORY OF RECURRENT UTIS: Status: ACTIVE | Noted: 2018-10-19

## 2023-03-28 PROBLEM — R93.89 ABNORMAL CXR: Status: ACTIVE | Noted: 2023-03-15

## 2023-03-28 PROCEDURE — 73562 X-RAY EXAM OF KNEE 3: CPT | Performed by: ORTHOPAEDIC SURGERY

## 2023-03-28 PROCEDURE — 99212 OFFICE O/P EST SF 10 MIN: CPT | Performed by: ORTHOPAEDIC SURGERY

## 2023-03-28 PROCEDURE — 1160F RVW MEDS BY RX/DR IN RCRD: CPT | Performed by: ORTHOPAEDIC SURGERY

## 2023-03-28 PROCEDURE — 1159F MED LIST DOCD IN RCRD: CPT | Performed by: ORTHOPAEDIC SURGERY

## 2023-03-28 RX ORDER — OMEPRAZOLE 40 MG/1
CAPSULE, DELAYED RELEASE ORAL
Qty: 90 CAPSULE | Refills: 0 | OUTPATIENT
Start: 2023-03-28

## 2023-03-28 RX ORDER — CLINDAMYCIN HYDROCHLORIDE 300 MG/1
CAPSULE ORAL
COMMUNITY
Start: 2022-12-06

## 2023-03-28 RX ORDER — ATORVASTATIN CALCIUM 20 MG/1
20 TABLET, FILM COATED ORAL NIGHTLY
COMMUNITY
Start: 2023-03-24

## 2023-03-28 NOTE — PROGRESS NOTES
"Sari Self : 1950 MRN: [id number] DATE: 3/28/2023    DIAGNOSIS: Annual follow up left total knee      SUBJECTIVE:Patient returns today for  1 year follow up of left uni knee replacement. Patient reports doing well with no unusual complaints. Denies any limitations due to the knee. Has some intermittent anterior soreness but not activity limiting.    OBJECTIVE:    Temp 97.3 °F (36.3 °C)   Ht 160 cm (63\")   Wt 75.5 kg (166 lb 6.4 oz)   LMP  (LMP Unknown)   BMI 29.48 kg/m²   Family History   Problem Relation Age of Onset   • Irritable bowel syndrome Daughter    • [place] Hyperthermia Neg Hx      Past Medical History:   Diagnosis Date   • Arthritis    • Cerebral aneurysm    • Disease of thyroid gland    • Dislocation, shoulder 1968    Dislocated several times   • Diverticulosis    • GERD (gastroesophageal reflux disease)    • H/O cerebral aneurysm repair    • History of 2019 novel coronavirus disease (COVID-19) 2020 STATES VERY MILD SYMTOMS   • Hyperlipidemia    • Hypertension    • IBS (irritable bowel syndrome)    • Knee swelling    • OAB (overactive bladder)    • Osteoporosis    • Right knee pain    • RLS (restless legs syndrome)    • Rotator cuff syndrome     Tears   • Stress fracture of right tibia    • Stroke (HCC)    • Tear of meniscus of knee  &     Surgery both knees   • TIA (transient ischemic attack)        • Torn meniscus     RIGHT KNEE     Past Surgical History:   Procedure Laterality Date   • CATARACT EXTRACTION, BILATERAL     • CHOLECYSTECTOMY     • COLONOSCOPY  11/10/2010    prep of colon fair,IH,stool in transverse colon,hepatic flexure and ascending colon,ileum normal,IBS   • COLONOSCOPY N/A 2018    IH, diverticulosis, one 5mm polyp, tubular adenoma with low grade dysplasia   • CYSTOSCOPY     • ENDOSCOPY  2012    grd 1 reflux egitis,web upperd 3rd esoph   • ENDOSCOPY N/A 2018    normal biopsies, H Pylori positive   • FOOT NEUROMA SURGERY " Left    • HAND SURGERY     • IR CEREBRAL ANEURYSM COILING  2012   • JOINT REPLACEMENT  March 2021    Partial left knee replacement   • KNEE ARTHROPLASTY UNICOMPARTMENTAL Left 03/18/2022    Procedure: KNEE ARTHROPLASTY UNICOMPARTMENTAL;  Surgeon: Everton Zuniga MD;  Location: [facility];  Service: Orthopedics;  Laterality: Left;   • KNEE ARTHROSCOPY Left 07/03/2019    Procedure: KNEE ARTHROSCOPY ARTHRITIS DEBRIDEMENT PARTIAL MEDIAL AND LATERAL MENISECTOMY;  Surgeon: Nuris Bender MD;  [place] [facility] [place] [facility]  Service: [place]opedics   • KNEE ARTHROSCOPY Right 09/28/2020    Procedure: RIGHT KNEE ARTHROSCOPY, PARTIAL MEDIAL AND LATERAL MENISCECTOMIES, DEBRIDEMENT OF ARTHRITIS, AND INTERNAL FIXATION TIBAL PLATEAU INSUFFICIENCY FRACTURE;  Surgeon: Nuris Bender MD;  [place] [facility] [place] [facility]  Service: Orthopedics;  Laterality: Right;   • KNEE SURGERY  2019 & 2020   • THUMB ARTHROSCOPY     • TRIGGER POINT INJECTION      Hand, elbow, shoulders, knees   • UPPER GASTROINTESTINAL ENDOSCOPY  02/27/2018   • WRIST SURGERY       Social History     Socioeconomic History   • Marital status:    Tobacco Use   • Smoking status: Never   • Smokeless tobacco: Never   Vaping Use   • Vaping Use: Never used   Substance and Sexual Activity   • Alcohol use: Yes     Comment: Occasionally   • Drug use: No   • Sexual activity: Not Currently     Partners: Male     Review of Systems - a 14 point review of systems was performed. All systems were negative.    Exam:. The incision is well healed. Range of motion is measured at 0 to 120. The calf is soft and nontender with a negative Homans sign. Alignment is neutral. Good quad strength. There is no evidence of varus/valgus or flexion instability. No effusion. Intact to light touch with palpable distal pulses.     DIAGNOSTIC STUDIES  Xrays: 3 views of the left knee (AP, lateral, and sunrise) were ordered and reviewed for evaluation of recent knee replacement. They demonstrate a well  positioned, well aligned knee replacement without complicating factors noted. In comparison with previous films there has been no change.    ASSESSMENT: Annual follow up left uni knee replacement.    PLAN:  Continue activities as tolerated    Follow up PRN      Everton Zuniga MD  3/28/2023

## 2023-03-30 ENCOUNTER — TELEPHONE (OUTPATIENT)
Dept: GASTROENTEROLOGY | Facility: CLINIC | Age: 73
End: 2023-03-30

## 2023-03-30 RX ORDER — OMEPRAZOLE 40 MG/1
40 CAPSULE, DELAYED RELEASE ORAL DAILY
Qty: 90 CAPSULE | Refills: 3 | Status: SHIPPED | OUTPATIENT
Start: 2023-03-30

## 2023-03-30 NOTE — TELEPHONE ENCOUNTER
"  Caller: Sari Self \"Sarah\"    Relationship to patient: Self    Best call back number: [phone]    Patient is needing: PER PREVIOUS NOTE PATIENT HAS SET FOLLOW UP APPOINTMENT AND IS REQUESTING TEMPORARY REFILL UNTIL NEXT APPOINTMENT ON 06/27/23   "

## 2023-03-30 NOTE — TELEPHONE ENCOUNTER
Pt would like a refill on omeprazole, she is scheduled to see Charlene 6/27/23 and would like enough to get her through to her apt.

## 2023-04-19 ENCOUNTER — TELEPHONE (OUTPATIENT)
Dept: GASTROENTEROLOGY | Facility: CLINIC | Age: 73
End: 2023-04-19
Payer: MEDICARE

## 2023-04-19 PROBLEM — K63.5 COLON POLYPS: Status: ACTIVE | Noted: 2023-04-19

## 2023-04-19 NOTE — TELEPHONE ENCOUNTER
JOESPH Mahmood for COLONOSCOPY on 8/15/23  arrive at 12am  . mailed prep instructions to verified address on file....[employer]

## 2023-05-05 ENCOUNTER — OFFICE VISIT (OUTPATIENT)
Dept: FAMILY MEDICINE CLINIC | Facility: CLINIC | Age: 73
End: 2023-05-05
Payer: MEDICARE

## 2023-05-05 VITALS
RESPIRATION RATE: 18 BRPM | TEMPERATURE: 97.3 F | BODY MASS INDEX: 29.41 KG/M2 | OXYGEN SATURATION: 96 % | SYSTOLIC BLOOD PRESSURE: 122 MMHG | DIASTOLIC BLOOD PRESSURE: 84 MMHG | HEART RATE: 87 BPM | WEIGHT: 166 LBS | HEIGHT: 63 IN

## 2023-05-05 DIAGNOSIS — R68.84 JAW PAIN: Primary | ICD-10-CM

## 2023-05-05 PROCEDURE — 99213 OFFICE O/P EST LOW 20 MIN: CPT | Performed by: FAMILY MEDICINE

## 2023-05-05 PROCEDURE — 3079F DIAST BP 80-89 MM HG: CPT | Performed by: FAMILY MEDICINE

## 2023-05-05 PROCEDURE — 3074F SYST BP LT 130 MM HG: CPT | Performed by: FAMILY MEDICINE

## 2023-05-05 NOTE — PROGRESS NOTES
"Chief Complaint  Chief Complaint   Patient presents with   • Jaw Pain     Pt c/o jaw pain x 1 week   • Earache     Pt c/o ear pain x 1 week        Subjective    History of Present Illness        Sari Self presents to [facility] for   Jaw Pain  This is a new problem. The current episode started 1 to 4 weeks ago. The problem occurs intermittently. The problem has been gradually worsening. Associated symptoms include headaches. Pertinent negatives include no change in bowel habit, chills, fever, joint swelling, neck pain, numbness or sore throat. Associated symptoms comments: Left ear pain.  Left jaw pain. Exacerbated by: opening her mouth first thing in the morning. She has tried NSAIDs for the symptoms. The treatment provided mild relief.   Earache   There is pain in the left ear. This is a new problem. The current episode started 1 to 4 weeks ago. The problem has been gradually worsening. There has been no fever. The pain is at a severity of 5/10. Associated symptoms include headaches. Pertinent negatives include no diarrhea, neck pain or sore throat. She has tried NSAIDs for the symptoms. The treatment provided mild relief. There is no history of a chronic ear infection or hearing loss.        Objective   Vital Signs:   Visit Vitals  /84   Pulse 87   Temp 97.3 °F (36.3 °C)   Resp 18   Ht 160 cm (63\")   Wt 75.3 kg (166 lb)   LMP  (LMP Unknown)   SpO2 96%   BMI 29.41 kg/m²          Physical Exam  Vitals reviewed.   Constitutional:       Appearance: She is well-developed.   HENT:      Head: Normocephalic.      Right Ear: External ear normal.      Left Ear: External ear normal.      Nose: Nose normal.   Eyes:      Conjunctiva/sclera: Conjunctivae normal.   Cardiovascular:      Rate and Rhythm: Normal rate and regular rhythm.   Pulmonary:      Effort: Pulmonary effort is normal.      Breath sounds: Normal breath sounds.   Musculoskeletal:         General: Normal range of motion. "      Cervical back: Normal range of motion and neck supple.   Skin:     General: Skin is warm and dry.      Capillary Refill: Capillary refill takes less than 2 seconds.   Neurological:      Mental Status: She is alert and oriented to person, place, and time.            Result Review :                    Assessment and Plan      Diagnoses and all orders for this visit:    1. Jaw pain (Primary)  Assessment & Plan:  Patient is likely having TMJ causing her symptoms.  Her pain is likely being referred from her jaw to her ears.  Her ears appeared normal.    Orders:  -     diclofenac (VOLTAREN) 50 MG EC tablet; Take 1 tablet by mouth 2 (Two) Times a Day.  Dispense: 60 tablet; Refill: 2           Follow Up   No follow-ups on file.  Patient was given instructions and counseling regarding her condition or for health maintenance advice. Please see specific information pulled into the AVS if appropriate.

## 2023-05-10 ENCOUNTER — TELEPHONE (OUTPATIENT)
Dept: FAMILY MEDICINE CLINIC | Facility: CLINIC | Age: 73
End: 2023-05-10
Payer: MEDICARE

## 2023-05-10 DIAGNOSIS — M81.0 SENILE OSTEOPOROSIS: Primary | ICD-10-CM

## 2023-05-10 DIAGNOSIS — M81.0 AGE-RELATED OSTEOPOROSIS WITHOUT CURRENT PATHOLOGICAL FRACTURE: ICD-10-CM

## 2023-05-10 NOTE — TELEPHONE ENCOUNTER
----- Message from Sari Self sent at 5/9/2023  1:48 PM EDT -----  Regarding: Reclast  Contact: [phone]  Hi Dr. Edwards, I received a message from [facility] at the hospital that it's time for my Reclast infusion if I'm doing yearly. So I think I need an order and labs.

## 2023-05-16 DIAGNOSIS — E03.9 HYPOTHYROIDISM, UNSPECIFIED TYPE: ICD-10-CM

## 2023-05-16 RX ORDER — LEVOTHYROXINE SODIUM 0.07 MG/1
75 TABLET ORAL
Qty: 90 TABLET | Refills: 0 | Status: SHIPPED | OUTPATIENT
Start: 2023-05-16 | End: 2023-05-19 | Stop reason: SDUPTHER

## 2023-05-16 NOTE — TELEPHONE ENCOUNTER
Rx Refill Note  Requested Prescriptions     Pending Prescriptions Disp Refills   • levothyroxine (SYNTHROID, LEVOTHROID) 75 MCG tablet 90 tablet 0     Sig: Take 1 tablet by mouth Every Morning.      Last office visit with prescribing clinician: 2/2/2023   Last telemedicine visit with prescribing clinician: 5/10/2023   Next office visit with prescribing clinician: Visit date not found                         Would you like a call back once the refill request has been completed: [] Yes [] No    If the office needs to give you a call back, can they leave a voicemail: [] Yes [] No    Constance Arreguin LPN  05/16/23, 10:43 EDT

## 2023-05-17 ENCOUNTER — LAB (OUTPATIENT)
Dept: LAB | Facility: HOSPITAL | Age: 73
End: 2023-05-17
Payer: MEDICARE

## 2023-05-17 DIAGNOSIS — M81.0 SENILE OSTEOPOROSIS: ICD-10-CM

## 2023-05-17 DIAGNOSIS — M81.0 AGE-RELATED OSTEOPOROSIS WITHOUT CURRENT PATHOLOGICAL FRACTURE: ICD-10-CM

## 2023-05-17 LAB
ALBUMIN SERPL-MCNC: 4.4 G/DL (ref 3.5–5.2)
ALBUMIN/GLOB SERPL: 1.6 G/DL
ALP SERPL-CCNC: 84 U/L (ref 39–117)
ALT SERPL W P-5'-P-CCNC: 13 U/L (ref 1–33)
ANION GAP SERPL CALCULATED.3IONS-SCNC: 10 MMOL/L (ref 5–15)
AST SERPL-CCNC: 15 U/L (ref 1–32)
BASOPHILS # BLD AUTO: 0.07 10*3/MM3 (ref 0–0.2)
BASOPHILS NFR BLD AUTO: 1 % (ref 0–1.5)
BILIRUB SERPL-MCNC: 0.5 MG/DL (ref 0–1.2)
BUN SERPL-MCNC: 18 MG/DL (ref 8–23)
BUN/CREAT SERPL: 20.5 (ref 7–25)
CALCIUM SPEC-SCNC: 9.4 MG/DL (ref 8.6–10.5)
CHLORIDE SERPL-SCNC: 104 MMOL/L (ref 98–107)
CO2 SERPL-SCNC: 25 MMOL/L (ref 22–29)
CREAT SERPL-MCNC: 0.88 MG/DL (ref 0.57–1)
DEPRECATED RDW RBC AUTO: 40.7 FL (ref 37–54)
EGFRCR SERPLBLD CKD-EPI 2021: 69.5 ML/MIN/1.73
EOSINOPHIL # BLD AUTO: 0.28 10*3/MM3 (ref 0–0.4)
EOSINOPHIL NFR BLD AUTO: 4 % (ref 0.3–6.2)
ERYTHROCYTE [DISTWIDTH] IN BLOOD BY AUTOMATED COUNT: 12.7 % (ref 12.3–15.4)
GLOBULIN UR ELPH-MCNC: 2.8 GM/DL
GLUCOSE SERPL-MCNC: 107 MG/DL (ref 65–99)
HCT VFR BLD AUTO: 38.8 % (ref 34–46.6)
HGB BLD-MCNC: 13 G/DL (ref 12–15.9)
IMM GRANULOCYTES # BLD AUTO: 0.03 10*3/MM3 (ref 0–0.05)
IMM GRANULOCYTES NFR BLD AUTO: 0.4 % (ref 0–0.5)
LYMPHOCYTES # BLD AUTO: 2.36 10*3/MM3 (ref 0.7–3.1)
LYMPHOCYTES NFR BLD AUTO: 33.7 % (ref 19.6–45.3)
MAGNESIUM SERPL-MCNC: 1.9 MG/DL (ref 1.6–2.4)
MCH RBC QN AUTO: 29.6 PG (ref 26.6–33)
MCHC RBC AUTO-ENTMCNC: 33.5 G/DL (ref 31.5–35.7)
MCV RBC AUTO: 88.4 FL (ref 79–97)
MONOCYTES # BLD AUTO: 0.61 10*3/MM3 (ref 0.1–0.9)
MONOCYTES NFR BLD AUTO: 8.7 % (ref 5–12)
NEUTROPHILS NFR BLD AUTO: 3.65 10*3/MM3 (ref 1.7–7)
NEUTROPHILS NFR BLD AUTO: 52.2 % (ref 42.7–76)
NRBC BLD AUTO-RTO: 0 /100 WBC (ref 0–0.2)
PHOSPHATE SERPL-MCNC: 3 MG/DL (ref 2.5–4.5)
PLATELET # BLD AUTO: 326 10*3/MM3 (ref 140–450)
PMV BLD AUTO: 9.7 FL (ref 6–12)
POTASSIUM SERPL-SCNC: 3.7 MMOL/L (ref 3.5–5.2)
PROT SERPL-MCNC: 7.2 G/DL (ref 6–8.5)
RBC # BLD AUTO: 4.39 10*6/MM3 (ref 3.77–5.28)
SODIUM SERPL-SCNC: 139 MMOL/L (ref 136–145)
WBC NRBC COR # BLD: 7 10*3/MM3 (ref 3.4–10.8)

## 2023-05-17 PROCEDURE — 80053 COMPREHEN METABOLIC PANEL: CPT

## 2023-05-17 PROCEDURE — 36415 COLL VENOUS BLD VENIPUNCTURE: CPT

## 2023-05-17 PROCEDURE — 84100 ASSAY OF PHOSPHORUS: CPT

## 2023-05-17 PROCEDURE — 85025 COMPLETE CBC W/AUTO DIFF WBC: CPT

## 2023-05-17 PROCEDURE — 83735 ASSAY OF MAGNESIUM: CPT

## 2023-05-19 DIAGNOSIS — E03.9 HYPOTHYROIDISM, UNSPECIFIED TYPE: ICD-10-CM

## 2023-05-19 PROBLEM — R68.84 JAW PAIN: Status: ACTIVE | Noted: 2023-05-19

## 2023-05-19 NOTE — TELEPHONE ENCOUNTER
Rx Refill Note  Requested Prescriptions     Pending Prescriptions Disp Refills   • potassium chloride 10 MEQ CR tablet 90 tablet 1     Sig: Take 1 tablet by mouth.   • levothyroxine (SYNTHROID, LEVOTHROID) 75 MCG tablet 90 tablet 1     Sig: Take 1 tablet by mouth Every Morning.      Last office visit with prescribing clinician: 2/2/2023   Last telemedicine visit with prescribing clinician: 5/16/2023   Next office visit with prescribing clinician: Visit date not found                         Would you like a call back once the refill request has been completed: [] Yes [] No    If the office needs to give you a call back, can they leave a voicemail: [] Yes [] No    Laisha Hsu  05/19/23, 15:56 EDT

## 2023-05-19 NOTE — ASSESSMENT & PLAN NOTE
Patient is likely having TMJ causing her symptoms.  Her pain is likely being referred from her jaw to her ears.  Her ears appeared normal.

## 2023-05-20 RX ORDER — LEVOTHYROXINE SODIUM 0.07 MG/1
75 TABLET ORAL
Qty: 90 TABLET | Refills: 1 | Status: SHIPPED | OUTPATIENT
Start: 2023-05-20

## 2023-05-20 RX ORDER — POTASSIUM CHLORIDE 750 MG/1
10 TABLET, FILM COATED, EXTENDED RELEASE ORAL DAILY
Qty: 90 TABLET | Refills: 1 | Status: SHIPPED | OUTPATIENT
Start: 2023-05-20

## 2023-05-23 ENCOUNTER — TELEPHONE (OUTPATIENT)
Dept: ORTHOPEDIC SURGERY | Facility: CLINIC | Age: 73
End: 2023-05-23
Payer: MEDICARE

## 2023-05-23 DIAGNOSIS — M54.50 LOW BACK PAIN, UNSPECIFIED BACK PAIN LATERALITY, UNSPECIFIED CHRONICITY, UNSPECIFIED WHETHER SCIATICA PRESENT: Primary | ICD-10-CM

## 2023-05-23 NOTE — TELEPHONE ENCOUNTER
Pt called stated she is having some back pain that goes from her buttocks and goes down the BLE.   Pt is asking for a referral to [facility] Tan.

## 2023-05-23 NOTE — TELEPHONE ENCOUNTER
I have put in a referral for her to see Devora Rowell.  If you want you can help schedule her an appointment to see her sooner.

## 2023-05-24 ENCOUNTER — OFFICE VISIT (OUTPATIENT)
Dept: ORTHOPEDIC SURGERY | Facility: CLINIC | Age: 73
End: 2023-05-24
Payer: MEDICARE

## 2023-05-24 VITALS — TEMPERATURE: 97.8 F | BODY MASS INDEX: 28.35 KG/M2 | WEIGHT: 160 LBS | HEIGHT: 63 IN

## 2023-05-24 DIAGNOSIS — M54.50 ACUTE RIGHT-SIDED LOW BACK PAIN WITHOUT SCIATICA: ICD-10-CM

## 2023-05-24 DIAGNOSIS — R52 PAIN: ICD-10-CM

## 2023-05-24 DIAGNOSIS — M46.1 SACROILIAC INFLAMMATION: Primary | ICD-10-CM

## 2023-05-24 RX ORDER — METHYLPREDNISOLONE 4 MG/1
TABLET ORAL
Qty: 21 TABLET | Refills: 0 | Status: SHIPPED | OUTPATIENT
Start: 2023-05-24

## 2023-05-24 NOTE — PROGRESS NOTES
Patient Name: Sari Self   YOB: 1950  Referring Primary Care Physician: Nurys Edwards MD      Chief Complaint:    Chief Complaint   Patient presents with   • Lumbar Spine - Initial Evaluation, Pain        HPI:  Sari Self is a 72 y.o. female who presents to [facility] for evaluation of acute on chronic low back pain primarily on the right referring into the right buttock.  She has a longstanding history of intermittent low back pain.  She had epidurals in the past with Dr. Crawley.  She says for the most part the pain has been manageable over the years but over the weekend she was doing a lot of heavy lifting and has an acute flareup.  She also completed physical therapy in the past which she did not find beneficial although did get good relief with therapy for her knees previously.  She has been trying some of the therapy exercises with this flareup.  She denies weakness currently but says she did have a sense of weakness initially.  Has been utilizing Advil and heat for symptom relief. No bowel or bladder dysfunction or saddle anesthesia.  She is an established patient the practice who has seen Dr. Bender for shoulder pain and Dr. Zuniga for her knees. Prior pertinent records were reviewed.    PFSH:  See attached    ROS: As per HPI, otherwise negative    Objective:      72 y.o. female  Body mass index is 28.34 kg/m²., 72.6 kg (160 lb), @HT@  Vitals:    05/24/23 0859   Temp: 97.8 °F (36.6 °C)         Neurologic Exam     Gait, Coordination, and Reflexes     Gait  Gait: normal    Reflexes   Right achilles: hyporeflexic  Left achilles: hyporeflexic     Ortho Exam    Spine Musculoskeletal Exam    Gait    Gait is normal.    Palpation    Thoracolumbar    Tenderness: present      Paraspinous: right      SI Joint: right    Strength    Thoracolumbar    Thoracolumbar motor exam is normal.     Sensory    Thoracolumbar    Thoracolumbar sensation is  normal.    Reflexes    Right      Quadriceps: hyporeflexic      Achilles: hyporeflexic     Left      Quadriceps: hyporeflexic      Achilles: hyporeflexic    General      Constitutional: well-developed and well-nourished    Scleral icterus: no    Labored breathing: no    Psychiatric: normal mood and affect and no acute distress    Neurological: alert and oriented x3    Skin: intact        IMAGING:     Indication: pain related symptoms,  Views: 2V AP&LAT lumbar  Findings: Reviewed and reveals mild degenerative facet changes in the lower lumbar spine, mild disc space settling in the mid lumbar spine, no fractures, may be very slight retrolisthesis L1 on L2 and L2 on L3  Comparison views: None    Assessment:           Diagnoses and all orders for this visit:    1. Sacroiliac inflammation (Primary)    2. Pain  -     XR Spine Lumbar AP & Lateral    3. Acute right-sided low back pain without sciatica    Other orders  -     methylPREDNISolone (MEDROL) 4 MG dose pack; Use as directed by package instructions  Dispense: 21 tablet; Refill: 0             Plan:  For the acute on chronic flareup, will provide Medrol Dosepak which she has tolerated in the past.  Suspect most of this is related to SI joint irritation.  She was given handout for SI joint education as well as back stretches.  Also advised to utilize ice to the buttock as needed, up to 15 to 20 minutes at a time.  For now, she will follow-up as needed but call if symptoms do not improve over the next few weeks and at that point would get lumbar MRI without contrast and depending on results, likely refer back to Dr. Crawley to discuss SUREKHA versus SI joint injections.      Return if symptoms worsen or fail to improve.    EMR Dragon/Transcription Disclaimer:   Much of this encounter note is an electronic transcription/translation of spoken language to printed text. The electronic translation of spoken language may permit erroneous, or at times, nonsensical words or phrases  to be inadvertently transcribed; Although I have reviewed the note for such errors, some may still exist.

## 2023-06-22 NOTE — TELEPHONE ENCOUNTER
RN at bedside. Patient repositioned to left side. Fluid bolus started. 1714 SVE performed. Patient 7-8 cm. Patient sitting in chair position per her request. RN still remaining at bedside. Regarding: Visit Follow-Up Question  Contact: [phone]  ----- Message from [employer], [employer] sent at 5/23/2019 11:42 AM EDT -----  MY CHART MESSAGE:    Knee was better for a few days but today can hardly put weight on it again.

## 2023-08-15 ENCOUNTER — ANESTHESIA EVENT (OUTPATIENT)
Dept: GASTROENTEROLOGY | Facility: HOSPITAL | Age: 73
End: 2023-08-15
Payer: MEDICARE

## 2023-08-15 ENCOUNTER — ANESTHESIA (OUTPATIENT)
Dept: GASTROENTEROLOGY | Facility: HOSPITAL | Age: 73
End: 2023-08-15
Payer: MEDICARE

## 2023-08-15 ENCOUNTER — HOSPITAL ENCOUNTER (OUTPATIENT)
Facility: HOSPITAL | Age: 73
Setting detail: HOSPITAL OUTPATIENT SURGERY
Discharge: HOME | End: 2023-08-15
Attending: INTERNAL MEDICINE | Admitting: INTERNAL MEDICINE
Payer: MEDICARE

## 2023-08-15 VITALS
HEART RATE: 65 BPM | BODY MASS INDEX: 29.64 KG/M2 | RESPIRATION RATE: 16 BRPM | DIASTOLIC BLOOD PRESSURE: 73 MMHG | WEIGHT: 167.3 LBS | SYSTOLIC BLOOD PRESSURE: 131 MMHG | HEIGHT: 63 IN | OXYGEN SATURATION: 97 % | TEMPERATURE: 97.6 F

## 2023-08-15 DIAGNOSIS — K63.5 COLON POLYPS: ICD-10-CM

## 2023-08-15 PROCEDURE — 45380 COLONOSCOPY AND BIOPSY: CPT | Performed by: INTERNAL MEDICINE

## 2023-08-15 PROCEDURE — S0260 H&P FOR SURGERY: HCPCS | Performed by: INTERNAL MEDICINE

## 2023-08-15 PROCEDURE — 25010000002 PROPOFOL 10 MG/ML EMULSION

## 2023-08-15 PROCEDURE — 88305 TISSUE EXAM BY PATHOLOGIST: CPT | Performed by: INTERNAL MEDICINE

## 2023-08-15 RX ORDER — PROPOFOL 10 MG/ML
VIAL (ML) INTRAVENOUS CONTINUOUS PRN
Status: DISCONTINUED | OUTPATIENT
Start: 2023-08-15 | End: 2023-08-15 | Stop reason: SURG

## 2023-08-15 RX ORDER — LIDOCAINE HYDROCHLORIDE 20 MG/ML
INJECTION, SOLUTION INFILTRATION; PERINEURAL AS NEEDED
Status: DISCONTINUED | OUTPATIENT
Start: 2023-08-15 | End: 2023-08-15 | Stop reason: SURG

## 2023-08-15 RX ORDER — GLYCOPYRROLATE 0.2 MG/ML
INJECTION INTRAMUSCULAR; INTRAVENOUS AS NEEDED
Status: DISCONTINUED | OUTPATIENT
Start: 2023-08-15 | End: 2023-08-15 | Stop reason: SURG

## 2023-08-15 RX ORDER — SODIUM CHLORIDE, SODIUM LACTATE, POTASSIUM CHLORIDE, CALCIUM CHLORIDE 600; 310; 30; 20 MG/100ML; MG/100ML; MG/100ML; MG/100ML
30 INJECTION, SOLUTION INTRAVENOUS CONTINUOUS PRN
Status: DISCONTINUED | OUTPATIENT
Start: 2023-08-15 | End: 2023-08-15 | Stop reason: HOSPADM

## 2023-08-15 RX ORDER — PROPOFOL 10 MG/ML
VIAL (ML) INTRAVENOUS AS NEEDED
Status: DISCONTINUED | OUTPATIENT
Start: 2023-08-15 | End: 2023-08-15 | Stop reason: SURG

## 2023-08-15 RX ADMIN — Medication 160 MCG/KG/MIN: at 07:37

## 2023-08-15 RX ADMIN — GLYCOPYRROLATE 0.1 MG: 0.2 INJECTION INTRAMUSCULAR; INTRAVENOUS at 07:37

## 2023-08-15 RX ADMIN — PROPOFOL 80 MG: 10 INJECTION, EMULSION INTRAVENOUS at 07:37

## 2023-08-15 RX ADMIN — LIDOCAINE HYDROCHLORIDE 60 MG: 20 INJECTION, SOLUTION INFILTRATION; PERINEURAL at 07:37

## 2023-08-15 RX ADMIN — SODIUM CHLORIDE, POTASSIUM CHLORIDE, SODIUM LACTATE AND CALCIUM CHLORIDE 30 ML/HR: 600; 310; 30; 20 INJECTION, SOLUTION INTRAVENOUS at 07:21

## 2023-08-15 NOTE — ANESTHESIA PREPROCEDURE EVALUATION
Anesthesia Evaluation     Patient summary reviewed and Nursing notes reviewed   NPO Solid Status: > 8 hours             Airway   Mallampati: II  TM distance: >3 FB  Neck ROM: full  No difficulty expected  Dental - normal exam     Pulmonary - negative pulmonary ROS and normal exam   Cardiovascular - normal exam    ECG reviewed    (+) hypertensionhyperlipidemia  (-) past MI, cardiac stents, CABG      Neuro/Psych  (+) TIA, CVA    ROS Comment: H/O cerebral aneurysm repair no deficits  GI/Hepatic/Renal/Endo    (+) GERD well controlled, renal disease CRI, thyroid problem hypothyroidism    Musculoskeletal     Abdominal  - normal exam    Bowel sounds: normal.   Substance History   (+) alcohol use  (-) drug use     OB/GYN negative ob/gyn ROS         Other   arthritis,                     Anesthesia Plan    ASA 3     MAC     intravenous induction     Anesthetic plan, risks, benefits, and alternatives have been provided, discussed and informed consent has been obtained with: patient.      CODE STATUS:

## 2023-08-15 NOTE — H&P
[facility]  Pre Procedure History & Physical    Chief Complaint:   Time for my colonoscopy    Subjective     HPI:   73 y.o. female presenting to endoscopy unit today for surveillance colonoscopy.    Past Medical History:   Past Medical History:   Diagnosis Date    Arthritis     Cerebral aneurysm     Cholelithiasis 2016    Gallbladder removed    Colon polyp     COVID-19 2020    Disease of thyroid gland     Dislocation, shoulder 1968    Dislocated several times    Diverticulosis     GERD (gastroesophageal reflux disease)     H/O cerebral aneurysm repair     History of 2019 novel coronavirus disease (COVID-19) 08/2020 AUGUST 2020 STATES VERY MILD SYMTOMS    Hyperlipidemia     Hypertension     IBS (irritable bowel syndrome)     Knee swelling     OAB (overactive bladder)     Osteoporosis     Right knee pain     RLS (restless legs syndrome)     Rotator cuff syndrome 1968    Tears    Stress fracture of right tibia     Stroke     Tear of meniscus of knee 2019 & 2020    Surgery both knees    TIA (transient ischemic attack)     2011    Torn meniscus     RIGHT KNEE       Family History:  Family History   Problem Relation Age of Onset    Irritable bowel syndrome Daughter     [place] Hyperthermia Neg Hx        Social History:   reports that she has never smoked. She has never used smokeless tobacco. She reports current alcohol use of about 1.0 standard drink per week. She reports that she does not use drugs.    Medications:   Medications Prior to Admission   Medication Sig Dispense Refill Last Dose    atorvastatin (LIPITOR) 20 MG tablet Take 1 tablet by mouth Every Night.   8/13/2023    EQ Allergy Relief, Cetirizine, 10 MG tablet Take 1 tablet by mouth once daily 90 tablet 0 8/14/2023    gabapentin (NEURONTIN) 600 MG tablet TAKE 1 TABLET BY MOUTH 4 TIMES DAILY . DO NOT EXCEED 4 PER 24 HOURS   8/14/2023    hydroCHLOROthiazide (HYDRODIURIL) 12.5 MG tablet TAKE 1 TABLET BY MOUTH ONCE DAILY AS NEEDED  "(Patient taking differently: Take 2 tablets by mouth Daily.) 90 tablet 0 8/14/2023    levothyroxine (SYNTHROID, LEVOTHROID) 75 MCG tablet Take 1 tablet by mouth Every Morning. 90 tablet 1 8/14/2023    omeprazole (priLOSEC) 40 MG capsule Take 1 capsule by mouth Daily. 90 capsule 3 8/14/2023    potassium chloride 10 MEQ CR tablet Take 1 tablet by mouth Daily. 90 tablet 1 8/14/2023    clindamycin (CLEOCIN) 300 MG capsule           Allergies:  Penicillins    Objective     Blood pressure 121/63, pulse 75, temperature 97.6 øF (36.4 øC), temperature source Oral, resp. rate 16, height 160 cm (63\"), weight 75.9 kg (167 lb 4.8 oz), SpO2 94 %, not currently breastfeeding.  Physical Exam:   General: patient awake, alert and cooperative    Assessment & Plan     Diagnosis:  Personal hx of colon polyps    Anticipated Surgical Procedure:  Colonoscopy    The risks, benefits, and alternatives of this procedure have been discussed with the patient or the responsible party- the patient understands and agrees to proceed.                                                                 "

## 2023-08-15 NOTE — DISCHARGE INSTRUCTIONS
For the next 24 hours patient needs to be with a responsible adult.    For 24 hours DO NOT drive, operate machinery, appliances, drink alcohol, make important decisions or sign legal documents.    Start with a light or bland diet if you are feeling sick to your stomach otherwise advance to regular diet as tolerated.    Follow recommendations on procedure report if provided by your doctor.    Call Dr Reza for problems [phone]. Await pathology result in 7-10 days.    Problems may include but not limited to: large amounts of bleeding, trouble breathing, repeated vomiting, severe unrelieved pain, fever or chills.

## 2023-08-15 NOTE — ANESTHESIA POSTPROCEDURE EVALUATION
Patient: Sari Self    Procedure Summary       Date: 08/15/23 Room / Location:  [facility]    Anesthesia Start: 0729 Anesthesia Stop: 0804    Procedure: COLONOSCOPY into cecum/terminal ileum with polypectomy Diagnosis:       Colon polyps      (Colon polyps [K63.5])    Surgeons: Finesse Reza MD Provider: Justice Allen MD    Anesthesia Type: MAC ASA Status: 3            Anesthesia Type: MAC    Vitals  No vitals data found for the desired time range.          Post Anesthesia Care and Evaluation    Patient location during evaluation: PHASE II  Patient participation: complete - patient participated  Level of consciousness: awake and alert  Pain management: adequate    Airway patency: patent  Anesthetic complications: No anesthetic complications  PONV Status: controlled  Cardiovascular status: blood pressure returned to baseline and acceptable  Respiratory status: acceptable  Hydration status: acceptable

## 2023-08-16 LAB
LAB AP CASE REPORT: NORMAL
LAB AP CLINICAL INFORMATION: NORMAL
PATH REPORT.FINAL DX SPEC: NORMAL
PATH REPORT.GROSS SPEC: NORMAL

## 2023-08-17 ENCOUNTER — TELEPHONE (OUTPATIENT)
Dept: GASTROENTEROLOGY | Facility: CLINIC | Age: 73
End: 2023-08-17
Payer: MEDICARE

## 2023-08-17 NOTE — TELEPHONE ENCOUNTER
Call to patient per Dr. Reza's results and recommendations.   Patient verbalized understanding     HM and CS recall have been completed for 08/15/2028     ----- Message from Finesse Reza MD sent at 8/17/2023  9:32 AM EDT -----  Colonoscopy recall 5 years as she had a sessile serrated adenoma

## 2023-09-05 DIAGNOSIS — Z96.652 STATUS POST UNICOMPARTMENTAL KNEE REPLACEMENT, LEFT: Primary | ICD-10-CM

## 2023-09-05 RX ORDER — CLINDAMYCIN HYDROCHLORIDE 300 MG/1
CAPSULE ORAL
Qty: 2 CAPSULE | Refills: 0 | Status: SHIPPED | OUTPATIENT
Start: 2023-09-05

## 2023-10-05 ENCOUNTER — OFFICE VISIT (OUTPATIENT)
Dept: ORTHOPEDIC SURGERY | Facility: CLINIC | Age: 73
End: 2023-10-05
Payer: MEDICARE

## 2023-10-05 VITALS — TEMPERATURE: 97.8 F | BODY MASS INDEX: 29.23 KG/M2 | WEIGHT: 165 LBS | HEIGHT: 63 IN

## 2023-10-05 DIAGNOSIS — S82.839A AVULSION FRACTURE OF DISTAL FIBULA: ICD-10-CM

## 2023-10-05 DIAGNOSIS — R52 PAIN: Primary | ICD-10-CM

## 2023-10-05 DIAGNOSIS — S93.492A SPRAIN OF ANTERIOR TALOFIBULAR LIGAMENT OF LEFT ANKLE, INITIAL ENCOUNTER: ICD-10-CM

## 2023-10-05 DIAGNOSIS — M25.571 ACUTE PAIN OF BOTH ANKLES: ICD-10-CM

## 2023-10-05 DIAGNOSIS — M25.572 ACUTE PAIN OF BOTH ANKLES: ICD-10-CM
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
## 2023-10-05 NOTE — PROGRESS NOTES
Answers submitted by the patient for this visit:  Other (Submitted on 10/4/2023)  Please describe your symptoms.: Fell on sidewalk and hurt both ankles. X-ray showed avulsion fracture on the right  Have you had these symptoms before?: No  How long have you been having these symptoms?: 1-4 days  Please list any medications you are currently taking for this condition.: Advil  Primary Reason for Visit (Submitted on 10/4/2023)  What is the primary reason for your visit?: Other  Patient Name: Sari Self   YOB: 1950  Referring Primary Care Physician: Nurys Edwards MD  BMI: Body mass index is 29.23 kg/m².    Chief Complaint:    Chief Complaint   Patient presents with    Right Ankle - Pain    Left Ankle - Pain        HPI: New pt here for fall yesterday while walking and had an inversion injury to right ankle and left ankle yesterday and went to Urgent care and follows up here. No hx of prior injury or trauma. Pt works at [employer] in seated position.     Sari Self is a 73 y.o. female who presents today for evaluation of   Chief Complaint   Patient presents with    Right Ankle - Pain    Left Ankle - Pain         Subjective   Medications:   Home Medications:  Current Outpatient Medications on File Prior to Visit   Medication Sig    atorvastatin (LIPITOR) 20 MG tablet Take 1 tablet by mouth Every Night.    EQ Allergy Relief, Cetirizine, 10 MG tablet Take 1 tablet by mouth once daily    gabapentin (NEURONTIN) 600 MG tablet TAKE 1 TABLET BY MOUTH 4 TIMES DAILY . DO NOT EXCEED 4 PER 24 HOURS    hydroCHLOROthiazide (HYDRODIURIL) 12.5 MG tablet TAKE 1 TABLET BY MOUTH ONCE DAILY AS NEEDED (Patient taking differently: Take 2 tablets by mouth Daily.)    levothyroxine (SYNTHROID, LEVOTHROID) 75 MCG tablet Take 1 tablet by mouth Every Morning.    omeprazole (priLOSEC) 40 MG capsule Take 1 capsule by mouth Daily.    potassium chloride 10 MEQ CR tablet Take 1 tablet by mouth Daily.    [DISCONTINUED]  clindamycin (CLEOCIN) 300 MG capsule TAKE 2 CAPSULES BY MOUTH ONE HOUR BEFORE APPOINTMENT (Patient not taking: Reported on 10/5/2023)     Current Facility-Administered Medications on File Prior to Visit   Medication    Chlorhexidine Gluconate Cloth 2 % pads     Current Medications:  Scheduled Meds:  Continuous Infusions:No current facility-administered medications for this visit.    PRN Meds:.    I have reviewed the patient's medical history in detail and updated the computerized patient record.  Review and summarization of old records includes:    Past Medical History:   Diagnosis Date    Arthritis     Cerebral aneurysm     Cholelithiasis 2016    Gallbladder removed    Colon polyp     COVID-19 2020    Disease of thyroid gland     Dislocation, shoulder 1968    Dislocated several times    Diverticulosis     GERD (gastroesophageal reflux disease)     H/O cerebral aneurysm repair     History of 2019 novel coronavirus disease (COVID-19) 08/2020 AUGUST 2020 STATES VERY MILD SYMTOMS    Hyperlipidemia     Hypertension     IBS (irritable bowel syndrome)     Knee swelling     OAB (overactive bladder)     Osteoporosis     Right knee pain     RLS (restless legs syndrome)     Rotator cuff syndrome 1968    Tears    Stress fracture of right tibia     Stroke     Tear of meniscus of knee 2019 & 2020    Surgery both knees    TIA (transient ischemic attack)     2011    Torn meniscus     RIGHT KNEE        Past Surgical History:   Procedure Laterality Date    CATARACT EXTRACTION, BILATERAL      CHOLECYSTECTOMY      COLONOSCOPY  11/10/2010    prep of colon fair,IH,stool in transverse colon,hepatic flexure and ascending colon,ileum normal,IBS    COLONOSCOPY N/A 02/27/2018    IH, diverticulosis, one 5mm polyp, tubular adenoma with low grade dysplasia    COLONOSCOPY N/A 8/15/2023    Procedure: COLONOSCOPY into cecum/terminal ileum with polypectomy;  Surgeon: Finesse Reza MD;  Location: [facility] ENDOSCOPY;  Service: Gastroenterology;   Laterality: N/A;  polyps    CYSTOSCOPY      ENDOSCOPY  11/27/2012    grd 1 reflux egitis,web upperd 3rd esoph    ENDOSCOPY N/A 02/27/2018    normal biopsies, H Pylori positive    FOOT NEUROMA SURGERY Left     HAND SURGERY      HYSTERECTOMY  1985    IR CEREBRAL ANEURYSM COILING  2012    JOINT REPLACEMENT  March 2021    Partial left knee replacement    KNEE ARTHROPLASTY UNICOMPARTMENTAL Left 03/18/2022    Procedure: KNEE ARTHROPLASTY UNICOMPARTMENTAL;  Surgeon: Everton Zuniga MD;  Location: [facility] MAIN OR;  Service: Orthopedics;  Laterality: Left;    KNEE ARTHROSCOPY Left 07/03/2019    Procedure: KNEE ARTHROSCOPY ARTHRITIS DEBRIDEMENT PARTIAL MEDIAL AND LATERAL MENISECTOMY;  Surgeon: Nuris Bender MD;  Location:  [facility];  Service: Orthopedics    KNEE ARTHROSCOPY Right 09/28/2020    Procedure: RIGHT KNEE ARTHROSCOPY, PARTIAL MEDIAL AND LATERAL MENISCECTOMIES, DEBRIDEMENT OF ARTHRITIS, AND INTERNAL FIXATION TIBAL PLATEAU INSUFFICIENCY FRACTURE;  Surgeon: Nuris Bender MD;  Location:  [facility];  Service: Orthopedics;  Laterality: Right;    KNEE SURGERY  2019 & 2020    THUMB ARTHROSCOPY      TRIGGER POINT INJECTION      Hand, elbow, shoulders, knees    UPPER GASTROINTESTINAL ENDOSCOPY  02/27/2018    WRIST SURGERY          Social History     Occupational History    Not on file   Tobacco Use    Smoking status: Never    Smokeless tobacco: Never   Vaping Use    Vaping Use: Never used   Substance and Sexual Activity    Alcohol use: Yes     Alcohol/week: 1.0 standard drink     Types: 1 Glasses of wine per week     Comment: Occasionally    Drug use: Never    Sexual activity: Not Currently     Partners: Male     Birth control/protection: Hysterectomy      Social History     Social History Narrative    Not on file        Family History   Problem Relation Age of Onset    Irritable bowel syndrome Daughter     Malig Hyperthermia Neg Hx        ROS: 14 point review of systems was performed and all other systems were  "reviewed and are negative except for documented findings in HPI and today's encounter.     Allergies:   Allergies   Allergen Reactions    Penicillins Hives     Constitutional:  Denies fever, shaking or chills   Eyes:  Denies change in visual acuity   HENT:  Denies nasal congestion or sore throat   Respiratory:  Denies cough or shortness of breath   Cardiovascular:  Denies chest pain or severe LE edema   GI:  Denies abdominal pain, nausea, vomiting, bloody stools or diarrhea   Musculoskeletal:  Numbness, tingling, pain, or loss of motor function only as noted above in history of present illness.  : Denies painful urination or hematuria  Integument:  Denies rash, lesion or ulceration   Neurologic:  Denies headache or focal weakness  Endocrine:  Denies lymphadenopathy  Psych:  Denies confusion or change in mental status   Hem:  Denies active bleeding    OBJECTIVE:  Physical Exam: 73 y.o. female  Wt Readings from Last 3 Encounters:   10/05/23 74.8 kg (165 lb)   08/15/23 75.9 kg (167 lb 4.8 oz)   06/27/23 78 kg (172 lb)     Ht Readings from Last 1 Encounters:   10/05/23 160 cm (63\")     Body mass index is 29.23 kg/m².  Vitals:    10/05/23 0856   Temp: 97.8 °F (36.6 °C)     Vital signs reviewed.     General Appearance:    Alert, cooperative, in no acute distress                  Eyes: conjunctiva clear  ENT: external ears and nose atraumatic  CV: no peripheral edema  Resp: normal respiratory effort  Skin: no rashes or wounds; normal turgor  Psych: mood and affect appropriate  Lymph: no nodes appreciated  Neuro: gross sensation intact  Vascular:  Palpable peripheral pulse in noted extremity  Musculoskeletal Extremities: right ankle ttp distal fibula with nttp base of 5th MT, calves soft to both lower legs and left ankle ttp anterior talofibular ligament - base 5th nt nttp    Radiology:   3 views both ankles done for pain with no comparison views   Right ankle distal fibula avulsion fracture with degenerative changes "   Left ankle 3 views done for pain with no comparison views done with no acute fractures     Assessment:     ICD-10-CM ICD-9-CM   1. Pain  R52 780.96   2. Avulsion fracture of distal fibula  S82.839A 824.8   3. Accidental fall, initial encounter  W19.XXXA E888.9   4. Sprain of anterior talofibular ligament of left ankle, initial encounter  S93.492A 845.09   5. Acute pain of both ankles  M25.571 719.47    M25.572 338.19        Procedures  Cam boot tall to right ankle with offloading with a walker or cane off work until recheck by Ortho    MDM/Plan:   The diagnosis(es), natural history, pathophysiology and treatment for diagnosis(es) were discussed. Opportunity given and questions answered.  Biomechanics of pertinent body areas discussed.  When appropriate, the use of ambulatory aids discussed.  EXERCISES:  Advice on benefits of, and types of regular/moderate exercise pertaining to orthopedic diagnosis(es).  MEDICATIONS:  The risks, benefits, warnings,side effects and alternatives of medications discussed.  Inflammation/pain control; with cold, heat, elevation and/or liniments discussed as appropriate  SPECIALTY REFERRAL  MEDICAL RECORDS reviewed from other provider(s) for past and current medical history pertinent to this complaint.      10/5/2023    Much of this encounter note is an electronic transcription/translation of spoken language to printed text. The electronic translation of spoken language may permit erroneous, or at times, nonsensical words or phrases to be inadvertently transcribed; Although I have reviewed the note for such errors, some may still exist

## 2023-10-12 ENCOUNTER — OFFICE VISIT (OUTPATIENT)
Dept: ORTHOPEDIC SURGERY | Facility: CLINIC | Age: 73
End: 2023-10-12
Payer: MEDICARE

## 2023-10-12 VITALS — HEIGHT: 63 IN | BODY MASS INDEX: 29.23 KG/M2 | TEMPERATURE: 97.7 F | WEIGHT: 165 LBS

## 2023-10-12 DIAGNOSIS — M25.571 RIGHT ANKLE PAIN, UNSPECIFIED CHRONICITY: Primary | ICD-10-CM

## 2023-10-12 DIAGNOSIS — S93.492D SPRAIN OF ANTERIOR TALOFIBULAR LIGAMENT OF LEFT ANKLE, SUBSEQUENT ENCOUNTER: ICD-10-CM

## 2023-10-12 DIAGNOSIS — S82.891D CLOSED FRACTURE OF RIGHT ANKLE WITH ROUTINE HEALING, SUBSEQUENT ENCOUNTER: ICD-10-CM

## 2023-10-12 NOTE — PROGRESS NOTES
Patient Name: Sari Self   YOB: 1950  Referring Primary Care Physician: Nurys Edwards MD  BMI: Body mass index is 29.23 kg/mý.    Chief Complaint:    Chief Complaint   Patient presents with    Right Ankle - Pain, Follow-up        HPI: Here for 1 week follow-up for right ankle avulsion fracture she works at [employer] has been working from home she has been in a boot and doing well  She had right ankle avulsion fracture left ankle sprain  Sari Self is a 73 y.o. female who presents today for evaluation of   Chief Complaint   Patient presents with    Right Ankle - Pain, Follow-up         Subjective   Medications:   Home Medications:  Current Outpatient Medications on File Prior to Visit   Medication Sig    atorvastatin (LIPITOR) 20 MG tablet Take 1 tablet by mouth Every Night.    EQ Allergy Relief, Cetirizine, 10 MG tablet Take 1 tablet by mouth once daily    gabapentin (NEURONTIN) 600 MG tablet TAKE 1 TABLET BY MOUTH 4 TIMES DAILY . DO NOT EXCEED 4 PER 24 HOURS    hydroCHLOROthiazide (HYDRODIURIL) 12.5 MG tablet TAKE 1 TABLET BY MOUTH ONCE DAILY AS NEEDED (Patient taking differently: Take 2 tablets by mouth Daily.)    levothyroxine (SYNTHROID, LEVOTHROID) 75 MCG tablet Take 1 tablet by mouth Every Morning.    omeprazole (priLOSEC) 40 MG capsule Take 1 capsule by mouth Daily.    potassium chloride 10 MEQ CR tablet Take 1 tablet by mouth Daily.     Current Facility-Administered Medications on File Prior to Visit   Medication    Chlorhexidine Gluconate Cloth 2 % pads     Current Medications:  Scheduled Meds:  Continuous Infusions:No current facility-administered medications for this visit.    PRN Meds:.    I have reviewed the patient's medical history in detail and updated the computerized patient record.  Review and summarization of old records includes:    Past Medical History:   Diagnosis Date    Arthritis     Cerebral aneurysm     Cholelithiasis 2016    Gallbladder removed    Colon  polyp     COVID-19 2020    Disease of thyroid gland     Dislocation, shoulder 1968    Dislocated several times    Diverticulosis     GERD (gastroesophageal reflux disease)     H/O cerebral aneurysm repair     History of 2019 novel coronavirus disease (COVID-19) 08/2020 AUGUST 2020 STATES VERY MILD SYMTOMS    Hyperlipidemia     Hypertension     IBS (irritable bowel syndrome)     Knee swelling     OAB (overactive bladder)     Osteoporosis     Right knee pain     RLS (restless legs syndrome)     Rotator cuff syndrome 1968    Tears    Stress fracture of right tibia     Stroke     Tear of meniscus of knee 2019 & 2020    Surgery both knees    TIA (transient ischemic attack)     2011    Torn meniscus     RIGHT KNEE        Past Surgical History:   Procedure Laterality Date    CATARACT EXTRACTION, BILATERAL      CHOLECYSTECTOMY      COLONOSCOPY  11/10/2010    prep of colon fair,IH,stool in transverse colon,hepatic flexure and ascending colon,ileum normal,IBS    COLONOSCOPY N/A 02/27/2018    IH, diverticulosis, one 5mm polyp, tubular adenoma with low grade dysplasia    COLONOSCOPY N/A 8/15/2023    Procedure: COLONOSCOPY into cecum/terminal ileum with polypectomy;  Surgeon: Finesse Reza MD;  Location: [facility] ENDOSCOPY;  Service: Gastroenterology;  Laterality: N/A;  polyps    CYSTOSCOPY      ENDOSCOPY  11/27/2012    grd 1 reflux egitis,web upperd 3rd esoph    ENDOSCOPY N/A 02/27/2018    normal biopsies, H Pylori positive    FOOT NEUROMA SURGERY Left     HAND SURGERY      HYSTERECTOMY  1985    IR CEREBRAL ANEURYSM COILING  2012    JOINT REPLACEMENT  March 2021    Partial left knee replacement    KNEE ARTHROPLASTY UNICOMPARTMENTAL Left 03/18/2022    Procedure: KNEE ARTHROPLASTY UNICOMPARTMENTAL;  Surgeon: Everton Zuniga MD;  Location: [facility] MAIN OR;  Service: Orthopedics;  Laterality: Left;    KNEE ARTHROSCOPY Left 07/03/2019    Procedure: KNEE ARTHROSCOPY ARTHRITIS DEBRIDEMENT PARTIAL MEDIAL AND LATERAL MENISECTOMY;   Surgeon: Nuris Bender MD;  Location: [facility] OR [facility];  Service: Orthopedics    KNEE ARTHROSCOPY Right 09/28/2020    Procedure: RIGHT KNEE ARTHROSCOPY, PARTIAL MEDIAL AND LATERAL MENISCECTOMIES, DEBRIDEMENT OF ARTHRITIS, AND INTERNAL FIXATION TIBAL PLATEAU INSUFFICIENCY FRACTURE;  Surgeon: Nuris Bender MD;  Location: [facility] OR [facility];  Service: Orthopedics;  Laterality: Right;    KNEE SURGERY  2019 & 2020    THUMB ARTHROSCOPY      TRIGGER POINT INJECTION      Hand, elbow, shoulders, knees    UPPER GASTROINTESTINAL ENDOSCOPY  02/27/2018    WRIST SURGERY          Social History     Occupational History    Not on file   Tobacco Use    Smoking status: Never    Smokeless tobacco: Never   Vaping Use    Vaping Use: Never used   Substance and Sexual Activity    Alcohol use: Yes     Alcohol/week: 1.0 standard drink of alcohol     Types: 1 Glasses of wine per week     Comment: Occasionally    Drug use: Never    Sexual activity: Not Currently     Partners: Male     Birth control/protection: Hysterectomy      Social History     Social History Narrative    Not on file        Family History   Problem Relation Age of Onset    Irritable bowel syndrome Daughter     Berenice Hyperthermia Neg Hx        ROS: 14 point review of systems was performed and all other systems were reviewed and are negative except for documented findings in HPI and today's encounter.     Allergies:   Allergies   Allergen Reactions    Penicillins Hives     Constitutional:  Denies fever, shaking or chills   Eyes:  Denies change in visual acuity   HENT:  Denies nasal congestion or sore throat   Respiratory:  Denies cough or shortness of breath   Cardiovascular:  Denies chest pain or severe LE edema   GI:  Denies abdominal pain, nausea, vomiting, bloody stools or diarrhea   Musculoskeletal:  Numbness, tingling, pain, or loss of motor function only as noted above in history of present illness.  : Denies painful urination or hematuria  Integument:  Denies rash,  "lesion or ulceration   Neurologic:  Denies headache or focal weakness  Endocrine:  Denies lymphadenopathy  Psych:  Denies confusion or change in mental status   Hem:  Denies active bleeding    OBJECTIVE:  Physical Exam: 73 y.o. female  Wt Readings from Last 3 Encounters:   10/12/23 74.8 kg (165 lb)   10/05/23 74.8 kg (165 lb)   08/15/23 75.9 kg (167 lb 4.8 oz)     Ht Readings from Last 1 Encounters:   10/12/23 160 cm (63\")     Body mass index is 29.23 kg/mý.  Vitals:    10/12/23 0930   Temp: 97.7 øF (36.5 øC)     Vital signs reviewed.     General Appearance:    Alert, cooperative, in no acute distress                  Eyes: conjunctiva clear  ENT: external ears and nose atraumatic  CV: no peripheral edema  Resp: normal respiratory effort  Skin: no rashes or wounds; normal turgor  Psych: mood and affect appropriate  Lymph: no nodes appreciated  Neuro: gross sensation intact  Vascular:  Palpable peripheral pulse in noted extremity  Musculoskeletal Extremities: Right ankle skin is warm dry intact with good pulses mood sensation edema is well controlled base of fifth metatarsals nontender neurovascular intact calf is soft nontender bilaterally tender over the lateral malleolus left ankle has tenderness over the anterior talofibular ligament base of fifth metatarsals nontender both calves are soft and nontender    Radiology: 3 views right ankle done for pain with comparison views show avulsion fracture off of lateral malleolus mortise is intact    EVIEWING YOUR TEST RESULTS IN [facility] IS NOT A SUBSTITUTE FOR DISCUSSING THOSE RESULTS WITH YOUR HEALTH CARE PROVIDER.   PLEASE CONTACT YOUR PROVIDER VIA [facility] TO DISCUSS ANY QUESTIONS OR CONCERNS YOU MAY HAVE REGARDING THESE TEST RESULTS.     RADIOLOGY REPORT     FACILITY:  [facility]   UNIT/AGE/GENDER: [facility]      AGE:73 Y          SEX:F   PATIENT NAME/:  JEANETTE OLSON DIANE    1950   UNIT NUMBER:  [id number]   ACCOUNT NUMBER:  " [id number]   ACCESSION NUMBER:  [id number]   [id number]     XR ANKLE MIN 3 VWS RT, XR ANKLE MIN 3 VWS LT     DATE: 10/04/2023     HISTORY:Fall with bilateral ankle injury and pain.     FINDINGS: 3 views of the right ankle show a tiny avulsion fracture off   the inferior margin of the lateral malleolus without displacement.   Mortise joint intact. No effusion.     3 views of the left ankle show no fracture or acute bony abnormality.   Mortise joint intact. No effusion.     IMPRESSION:   1. No acute abnormality of left ankle.   2. Tiny nondisplaced avulsion fracture off the inferior lateral tip of   lateral malleolus of the right ankle.       Assessment:     ICD-10-CM ICD-9-CM   1. Right ankle pain, unspecified chronicity  M25.571 719.47   2. Closed fracture of right ankle with routine healing, subsequent encounter  S82.891D V54.19   3. Sprain of anterior talofibular ligament of left ankle, subsequent encounter  S93.492D V58.89     845.09        Procedures  Continue tall fracture walker boot weight-bear as tolerated rest ice elevate's work from home until recheck in 2 weeks with x-rays of the right ankle    MDM/Plan:   The diagnosis(es), natural history, pathophysiology and treatment for diagnosis(es) were discussed. Opportunity given and questions answered.  Biomechanics of pertinent body areas discussed.  When appropriate, the use of ambulatory aids discussed.  EXERCISES:  Advice on benefits of, and types of regular/moderate exercise pertaining to orthopedic diagnosis(es).  MEDICATIONS:  The risks, benefits, warnings,side effects and alternatives of medications discussed.  Inflammation/pain control; with cold, heat, elevation and/or liniments discussed as appropriate  MEDICAL RECORDS reviewed from other provider(s) for past and current medical history pertinent to this complaint.      10/12/2023    Much of this encounter note is an electronic transcription/translation of spoken language to printed text.  The electronic translation of spoken language may permit erroneous, or at times, nonsensical words or phrases to be inadvertently transcribed; Although I have reviewed the note for such errors, some may still exist

## 2023-10-26 ENCOUNTER — OFFICE VISIT (OUTPATIENT)
Dept: ORTHOPEDIC SURGERY | Facility: CLINIC | Age: 73
End: 2023-10-26
Payer: MEDICARE

## 2023-10-26 VITALS — TEMPERATURE: 98 F | BODY MASS INDEX: 28.35 KG/M2 | HEIGHT: 63 IN | WEIGHT: 160 LBS

## 2023-10-26 DIAGNOSIS — Z09 FOLLOW-UP EXAM: Primary | ICD-10-CM

## 2023-10-26 DIAGNOSIS — S93.491A SPRAIN OF ANTERIOR TALOFIBULAR LIGAMENT OF RIGHT ANKLE, INITIAL ENCOUNTER: ICD-10-CM

## 2023-10-26 NOTE — PROGRESS NOTES
Patient Name: Sari Self   YOB: 1950  Referring Primary Care Physician: Nurys Edwards MD  BMI: Body mass index is 28.34 kg/m².    Chief Complaint:    Chief Complaint   Patient presents with    Right Ankle - Pain, Follow-up        HPI: Patient returns from fall on 10/4/2023 for follow-up when she had bilateral ankle injuries she had left ankle sprain and right ankle avulsion fracture she has been in a tall cast postop boot and has been doing well and is back to seated work ambulating with no assistive devices in the boot and reports less pain and swelling    Sari Self is a 73 y.o. female who presents today for evaluation of   Chief Complaint   Patient presents with    Right Ankle - Pain, Follow-up         Subjective   Medications:   Home Medications:  Current Outpatient Medications on File Prior to Visit   Medication Sig    atorvastatin (LIPITOR) 20 MG tablet Take 1 tablet by mouth Every Night.    EQ Allergy Relief, Cetirizine, 10 MG tablet Take 1 tablet by mouth once daily    gabapentin (NEURONTIN) 600 MG tablet TAKE 1 TABLET BY MOUTH 4 TIMES DAILY . DO NOT EXCEED 4 PER 24 HOURS    hydroCHLOROthiazide (HYDRODIURIL) 12.5 MG tablet TAKE 1 TABLET BY MOUTH ONCE DAILY AS NEEDED (Patient taking differently: Take 2 tablets by mouth Daily.)    levothyroxine (SYNTHROID, LEVOTHROID) 75 MCG tablet Take 1 tablet by mouth Every Morning.    omeprazole (priLOSEC) 40 MG capsule Take 1 capsule by mouth Daily.    potassium chloride 10 MEQ CR tablet Take 1 tablet by mouth Daily.    COVID-19 mRNA Vac-Alli,Pfizer, (COMIRNATY IM SUSPENSION PREFILLED SYRINGE 30 MCG/0.3ML) Inject  into the appropriate muscle as directed by prescriber. (Patient not taking: Reported on 10/26/2023)     Current Facility-Administered Medications on File Prior to Visit   Medication    Chlorhexidine Gluconate Cloth 2 % pads     Current Medications:  Scheduled Meds:  Continuous Infusions:No current facility-administered medications for  this visit.    PRN Meds:.    I have reviewed the patient's medical history in detail and updated the computerized patient record.  Review and summarization of old records includes:    Past Medical History:   Diagnosis Date    Arthritis     Cerebral aneurysm     Cholelithiasis 2016    Gallbladder removed    Colon polyp     COVID-19 2020    Disease of thyroid gland     Dislocation, shoulder 1968    Dislocated several times    Diverticulosis     GERD (gastroesophageal reflux disease)     H/O cerebral aneurysm repair     History of 2019 novel coronavirus disease (COVID-19) 08/2020 AUGUST 2020 STATES VERY MILD SYMTOMS    Hyperlipidemia     Hypertension     IBS (irritable bowel syndrome)     Knee swelling     OAB (overactive bladder)     Osteoporosis     Right knee pain     RLS (restless legs syndrome)     Rotator cuff syndrome 1968    Tears    Stress fracture of right tibia     Stroke     Tear of meniscus of knee 2019 & 2020    Surgery both knees    TIA (transient ischemic attack)     2011    Torn meniscus     RIGHT KNEE        Past Surgical History:   Procedure Laterality Date    CATARACT EXTRACTION, BILATERAL      CHOLECYSTECTOMY      COLONOSCOPY  11/10/2010    prep of colon fair,IH,stool in transverse colon,hepatic flexure and ascending colon,ileum normal,IBS    COLONOSCOPY N/A 02/27/2018    IH, diverticulosis, one 5mm polyp, tubular adenoma with low grade dysplasia    COLONOSCOPY N/A 8/15/2023    Procedure: COLONOSCOPY into cecum/terminal ileum with polypectomy;  Surgeon: Finesse Reza MD;  Location: [facility] ENDOSCOPY;  Service: Gastroenterology;  Laterality: N/A;  polyps    CYSTOSCOPY      ENDOSCOPY  11/27/2012    grd 1 reflux egitis,web upperd 3rd esoph    ENDOSCOPY N/A 02/27/2018    normal biopsies, H Pylori positive    FOOT NEUROMA SURGERY Left     HAND SURGERY      HYSTERECTOMY  1985    IR CEREBRAL ANEURYSM COILING  2012    JOINT REPLACEMENT  March 2021    Partial left knee replacement    KNEE ARTHROPLASTY  UNICOMPARTMENTAL Left 03/18/2022    Procedure: KNEE ARTHROPLASTY UNICOMPARTMENTAL;  Surgeon: Everton Zuniga MD;  Location: [facility] MAIN OR;  Service: Orthopedics;  Laterality: Left;    KNEE ARTHROSCOPY Left 07/03/2019    Procedure: KNEE ARTHROSCOPY ARTHRITIS DEBRIDEMENT PARTIAL MEDIAL AND LATERAL MENISECTOMY;  Surgeon: Nuris Bender MD;  Location: [facility] OR [facility];  Service: Orthopedics    KNEE ARTHROSCOPY Right 09/28/2020    Procedure: RIGHT KNEE ARTHROSCOPY, PARTIAL MEDIAL AND LATERAL MENISCECTOMIES, DEBRIDEMENT OF ARTHRITIS, AND INTERNAL FIXATION TIBAL PLATEAU INSUFFICIENCY FRACTURE;  Surgeon: Nuris Bender MD;  Location: [facility] OR [facility];  Service: Orthopedics;  Laterality: Right;    KNEE SURGERY  2019 & 2020    THUMB ARTHROSCOPY      TRIGGER POINT INJECTION      Hand, elbow, shoulders, knees    UPPER GASTROINTESTINAL ENDOSCOPY  02/27/2018    WRIST SURGERY          Social History     Occupational History    Not on file   Tobacco Use    Smoking status: Never    Smokeless tobacco: Never   Vaping Use    Vaping Use: Never used   Substance and Sexual Activity    Alcohol use: Yes     Alcohol/week: 1.0 standard drink of alcohol     Types: 1 Glasses of wine per week     Comment: Occasionally    Drug use: Never    Sexual activity: Not Currently     Partners: Male     Birth control/protection: Hysterectomy      Social History     Social History Narrative    Not on file        Family History   Problem Relation Age of Onset    Irritable bowel syndrome Daughter     Malig Hyperthermia Neg Hx        ROS: 14 point review of systems was performed and all other systems were reviewed and are negative except for documented findings in HPI and today's encounter.     Allergies:   Allergies   Allergen Reactions    Penicillins Hives     Constitutional:  Denies fever, shaking or chills   Eyes:  Denies change in visual acuity   HENT:  Denies nasal congestion or sore throat   Respiratory:  Denies cough or shortness of breath  "  Cardiovascular:  Denies chest pain or severe LE edema   GI:  Denies abdominal pain, nausea, vomiting, bloody stools or diarrhea   Musculoskeletal:  Numbness, tingling, pain, or loss of motor function only as noted above in history of present illness.  : Denies painful urination or hematuria  Integument:  Denies rash, lesion or ulceration   Neurologic:  Denies headache or focal weakness  Endocrine:  Denies lymphadenopathy  Psych:  Denies confusion or change in mental status   Hem:  Denies active bleeding    OBJECTIVE:  Physical Exam: 73 y.o. female  Wt Readings from Last 3 Encounters:   10/26/23 72.6 kg (160 lb)   10/12/23 74.8 kg (165 lb)   10/05/23 74.8 kg (165 lb)     Ht Readings from Last 1 Encounters:   10/26/23 160 cm (63\")     Body mass index is 28.34 kg/m².  Vitals:    10/26/23 0854   Temp: 98 °F (36.7 °C)     Vital signs reviewed.     General Appearance:    Alert, cooperative, in no acute distress                  Eyes: conjunctiva clear  ENT: external ears and nose atraumatic  CV: no peripheral edema  Resp: normal respiratory effort  Skin: no rashes or wounds; normal turgor  Psych: mood and affect appropriate  Lymph: no nodes appreciated  Neuro: gross sensation intact  Vascular:  Palpable peripheral pulse in noted extremity  Musculoskeletal Extremities: Right ankle skin is warm dry intact with good pulses and sensation base of fifth metatarsals nontender anterior talofibular ligament is less tender with no obvious deformity capillary refills intact calf is soft and nontender she ambulates without any assistive devices    Radiology:   3 views of the right ankle were done for pain and follow-up of avulsion fracture show healing avulsion off of the lateral malleolus in good alignment    Assessment:     ICD-10-CM ICD-9-CM   1. Follow-up exam  Z09 V67.9   2. Sprain of anterior talofibular ligament of right ankle, initial encounter  S93.491A 845.09        Procedures  Wean out of the boot into a well supported " shoe offload as needed with trekking poles and get started in some physical therapy for proprioception and balance    MDM/Plan:   The diagnosis(es), natural history, pathophysiology and treatment for diagnosis(es) were discussed. Opportunity given and questions answered.  Biomechanics of pertinent body areas discussed.  When appropriate, the use of ambulatory aids discussed.  EXERCISES:  Advice on benefits of, and types of regular/moderate exercise pertaining to orthopedic diagnosis(es).  MEDICATIONS:  The risks, benefits, warnings,side effects and alternatives of medications discussed.  Inflammation/pain control; with cold, heat, elevation and/or liniments discussed as appropriate  PT referral.  HOME EXERCISE/PT program encouraged  MEDICAL RECORDS reviewed from other provider(s) for past and current medical history pertinent to this complaint.      10/26/2023    Much of this encounter note is an electronic transcription/translation of spoken language to printed text. The electronic translation of spoken language may permit erroneous, or at times, nonsensical words or phrases to be inadvertently transcribed; Although I have reviewed the note for such errors, some may still exist

## 2023-11-06 NOTE — TELEPHONE ENCOUNTER
Rx Refill Note  Requested Prescriptions     Pending Prescriptions Disp Refills    cetirizine (EQ Allergy Relief, Cetirizine,) 10 MG tablet 90 tablet 0     Sig: Take 1 tablet by mouth Daily.      Last office visit with prescribing clinician: 2/2/2023   Last telemedicine visit with prescribing clinician: Visit date not found   Next office visit with prescribing clinician: Visit date not found                         Would you like a call back once the refill request has been completed: [] Yes [] No    If the office needs to give you a call back, can they leave a voicemail: [] Yes [] No    Kristian Mathews MA  11/06/23, 11:30 EST

## 2023-11-07 RX ORDER — CETIRIZINE HYDROCHLORIDE 10 MG/1
10 TABLET ORAL DAILY
Qty: 90 TABLET | Refills: 0 | Status: SHIPPED | OUTPATIENT
Start: 2023-11-07

## 2023-11-14 RX ORDER — POTASSIUM CHLORIDE 750 MG/1
10 TABLET, FILM COATED, EXTENDED RELEASE ORAL DAILY
Qty: 90 TABLET | Refills: 0 | Status: SHIPPED | OUTPATIENT
Start: 2023-11-14

## 2023-11-14 NOTE — TELEPHONE ENCOUNTER
Rx Refill Note  Requested Prescriptions     Pending Prescriptions Disp Refills    potassium chloride 10 MEQ CR tablet [Pharmacy Med Name: Potassium Chloride ER 10 MEQ Oral Tablet Extended Release] 90 tablet 0     Sig: Take 1 tablet by mouth once daily      Last office visit with prescribing clinician: 2/2/2023   Last telemedicine visit with prescribing clinician: Visit date not found   Next office visit with prescribing clinician: Visit date not found                         Would you like a call back once the refill request has been completed: [] Yes [] No    If the office needs to give you a call back, can they leave a voicemail: [] Yes [] No    Sydney Gage MA  11/14/23, 10:15 EST

## 2024-01-18 ENCOUNTER — APPOINTMENT (OUTPATIENT)
Dept: WOMENS IMAGING | Facility: HOSPITAL | Age: 74
End: 2024-01-18
Payer: MEDICARE

## 2024-01-18 PROCEDURE — 77067 SCR MAMMO BI INCL CAD: CPT | Performed by: RADIOLOGY

## 2024-01-18 PROCEDURE — 77063 BREAST TOMOSYNTHESIS BI: CPT | Performed by: RADIOLOGY

## 2024-01-30 ENCOUNTER — TELEPHONE (OUTPATIENT)
Dept: FAMILY MEDICINE CLINIC | Facility: CLINIC | Age: 74
End: 2024-01-30

## 2024-01-30 NOTE — TELEPHONE ENCOUNTER
"    Caller: Sari Self \"Sarah\"    Relationship: Self    Best call back number: [phone]    What orders are you requesting (i.e. lab or imaging): ORDERS FOR AWV    In what timeframe would the patient need to come in: ASAP    Where will you receive your lab/imaging services: HOSPITAL    Additional notes: PLEASE SEND ORDERS ASAP         "

## 2024-02-01 ENCOUNTER — LAB (OUTPATIENT)
Dept: LAB | Facility: HOSPITAL | Age: 74
End: 2024-02-01
Payer: MEDICARE

## 2024-02-01 DIAGNOSIS — M81.0 AGE-RELATED OSTEOPOROSIS WITHOUT CURRENT PATHOLOGICAL FRACTURE: ICD-10-CM

## 2024-02-01 DIAGNOSIS — M81.0 SENILE OSTEOPOROSIS: ICD-10-CM

## 2024-02-01 LAB
ALBUMIN SERPL-MCNC: 4.5 G/DL (ref 3.5–5.2)
ALBUMIN/GLOB SERPL: 1.9 G/DL
ALP SERPL-CCNC: 85 U/L (ref 39–117)
ALT SERPL W P-5'-P-CCNC: 11 U/L (ref 1–33)
ANION GAP SERPL CALCULATED.3IONS-SCNC: 11 MMOL/L (ref 5–15)
AST SERPL-CCNC: 12 U/L (ref 1–32)
BASOPHILS # BLD AUTO: 0.08 10*3/MM3 (ref 0–0.2)
BASOPHILS NFR BLD AUTO: 1.2 % (ref 0–1.5)
BILIRUB SERPL-MCNC: 0.2 MG/DL (ref 0–1.2)
BUN SERPL-MCNC: 23 MG/DL (ref 8–23)
BUN/CREAT SERPL: 21.9 (ref 7–25)
CALCIUM SPEC-SCNC: 10.2 MG/DL (ref 8.6–10.5)
CHLORIDE SERPL-SCNC: 104 MMOL/L (ref 98–107)
CO2 SERPL-SCNC: 24 MMOL/L (ref 22–29)
CREAT SERPL-MCNC: 1.05 MG/DL (ref 0.57–1)
DEPRECATED RDW RBC AUTO: 41.5 FL (ref 37–54)
EGFRCR SERPLBLD CKD-EPI 2021: 55.9 ML/MIN/1.73
EOSINOPHIL # BLD AUTO: 0.24 10*3/MM3 (ref 0–0.4)
EOSINOPHIL NFR BLD AUTO: 3.5 % (ref 0.3–6.2)
ERYTHROCYTE [DISTWIDTH] IN BLOOD BY AUTOMATED COUNT: 12.7 % (ref 12.3–15.4)
GLOBULIN UR ELPH-MCNC: 2.4 GM/DL
GLUCOSE SERPL-MCNC: 107 MG/DL (ref 65–99)
HCT VFR BLD AUTO: 40.3 % (ref 34–46.6)
HGB BLD-MCNC: 13.2 G/DL (ref 12–15.9)
IMM GRANULOCYTES # BLD AUTO: 0.02 10*3/MM3 (ref 0–0.05)
IMM GRANULOCYTES NFR BLD AUTO: 0.3 % (ref 0–0.5)
LYMPHOCYTES # BLD AUTO: 2.39 10*3/MM3 (ref 0.7–3.1)
LYMPHOCYTES NFR BLD AUTO: 34.6 % (ref 19.6–45.3)
MCH RBC QN AUTO: 29.1 PG (ref 26.6–33)
MCHC RBC AUTO-ENTMCNC: 32.8 G/DL (ref 31.5–35.7)
MCV RBC AUTO: 88.8 FL (ref 79–97)
MONOCYTES # BLD AUTO: 0.63 10*3/MM3 (ref 0.1–0.9)
MONOCYTES NFR BLD AUTO: 9.1 % (ref 5–12)
NEUTROPHILS NFR BLD AUTO: 3.55 10*3/MM3 (ref 1.7–7)
NEUTROPHILS NFR BLD AUTO: 51.3 % (ref 42.7–76)
NRBC BLD AUTO-RTO: 0 /100 WBC (ref 0–0.2)
PHOSPHATE SERPL-MCNC: 3.8 MG/DL (ref 2.5–4.5)
PLATELET # BLD AUTO: 320 10*3/MM3 (ref 140–450)
PMV BLD AUTO: 9.6 FL (ref 6–12)
POTASSIUM SERPL-SCNC: 4.4 MMOL/L (ref 3.5–5.2)
PROT SERPL-MCNC: 6.9 G/DL (ref 6–8.5)
RBC # BLD AUTO: 4.54 10*6/MM3 (ref 3.77–5.28)
SODIUM SERPL-SCNC: 139 MMOL/L (ref 136–145)
WBC NRBC COR # BLD AUTO: 6.91 10*3/MM3 (ref 3.4–10.8)

## 2024-02-01 PROCEDURE — 84100 ASSAY OF PHOSPHORUS: CPT

## 2024-02-01 PROCEDURE — 85025 COMPLETE CBC W/AUTO DIFF WBC: CPT

## 2024-02-05 ENCOUNTER — OFFICE VISIT (OUTPATIENT)
Dept: FAMILY MEDICINE CLINIC | Facility: CLINIC | Age: 74
End: 2024-02-05
Payer: MEDICARE

## 2024-02-05 ENCOUNTER — TELEPHONE (OUTPATIENT)
Dept: FAMILY MEDICINE CLINIC | Facility: CLINIC | Age: 74
End: 2024-02-05

## 2024-02-05 ENCOUNTER — APPOINTMENT (OUTPATIENT)
Dept: WOMENS IMAGING | Facility: HOSPITAL | Age: 74
End: 2024-02-05
Payer: MEDICARE

## 2024-02-05 VITALS
BODY MASS INDEX: 31.24 KG/M2 | SYSTOLIC BLOOD PRESSURE: 114 MMHG | WEIGHT: 176.3 LBS | TEMPERATURE: 97.7 F | OXYGEN SATURATION: 95 % | HEART RATE: 65 BPM | HEIGHT: 63 IN | DIASTOLIC BLOOD PRESSURE: 80 MMHG

## 2024-02-05 DIAGNOSIS — Z00.00 MEDICARE ANNUAL WELLNESS VISIT, SUBSEQUENT: Primary | ICD-10-CM

## 2024-02-05 DIAGNOSIS — N64.89 BREAST ASYMMETRY: ICD-10-CM

## 2024-02-05 DIAGNOSIS — R92.8 ABNORMAL MAMMOGRAM OF RIGHT BREAST: Primary | ICD-10-CM

## 2024-02-05 DIAGNOSIS — E03.9 HYPOTHYROIDISM, UNSPECIFIED TYPE: ICD-10-CM

## 2024-02-05 DIAGNOSIS — Z78.0 POST-MENOPAUSAL: ICD-10-CM

## 2024-02-05 DIAGNOSIS — E78.2 MIXED HYPERLIPIDEMIA: ICD-10-CM

## 2024-02-05 DIAGNOSIS — R73.9 HYPERGLYCEMIA: ICD-10-CM

## 2024-02-05 PROCEDURE — 1170F FXNL STATUS ASSESSED: CPT | Performed by: FAMILY MEDICINE

## 2024-02-05 PROCEDURE — G0279 TOMOSYNTHESIS, MAMMO: HCPCS | Performed by: RADIOLOGY

## 2024-02-05 PROCEDURE — G0439 PPPS, SUBSEQ VISIT: HCPCS | Performed by: FAMILY MEDICINE

## 2024-02-05 PROCEDURE — 76642 ULTRASOUND BREAST LIMITED: CPT | Performed by: RADIOLOGY

## 2024-02-05 PROCEDURE — 99214 OFFICE O/P EST MOD 30 MIN: CPT | Performed by: FAMILY MEDICINE

## 2024-02-05 PROCEDURE — 3074F SYST BP LT 130 MM HG: CPT | Performed by: FAMILY MEDICINE

## 2024-02-05 PROCEDURE — 77065 DX MAMMO INCL CAD UNI: CPT | Performed by: RADIOLOGY

## 2024-02-05 PROCEDURE — 3079F DIAST BP 80-89 MM HG: CPT | Performed by: FAMILY MEDICINE

## 2024-02-05 RX ORDER — LEVOTHYROXINE SODIUM 0.07 MG/1
75 TABLET ORAL
Qty: 90 EACH | Refills: 1 | Status: SHIPPED | OUTPATIENT
Start: 2024-02-05

## 2024-02-05 RX ORDER — POTASSIUM CHLORIDE 750 MG/1
10 TABLET, FILM COATED, EXTENDED RELEASE ORAL DAILY
Qty: 90 EACH | Refills: 0 | Status: SHIPPED | OUTPATIENT
Start: 2024-02-05

## 2024-02-05 RX ORDER — HYDROCHLOROTHIAZIDE 12.5 MG/1
12.5 TABLET ORAL DAILY
Qty: 90 TABLET | Refills: 0 | Status: SHIPPED | OUTPATIENT
Start: 2024-02-05

## 2024-02-05 RX ORDER — ATORVASTATIN CALCIUM 20 MG/1
20 TABLET, FILM COATED ORAL NIGHTLY
Qty: 90 TABLET | Refills: 1 | Status: SHIPPED | OUTPATIENT
Start: 2024-02-05

## 2024-02-05 NOTE — TELEPHONE ENCOUNTER
Cary from [facility] called requesting orders for right diagnostic mammo and right US limited. Pt scheduled today at 1:00 pm     CB# [phone]  Fax # [phone]

## 2024-02-05 NOTE — PROGRESS NOTES
The ABCs of the Annual Wellness Visit  Subsequent Medicare Wellness Visit    Subjective    Sari Self is a 73 y.o. female who presents for a Subsequent Medicare Wellness Visit.    The following portions of the patient's history were reviewed and   updated as appropriate: allergies, current medications, past family history, past medical history, past social history, past surgical history, and problem list.    Compared to one year ago, the patient feels her physical   health is worse.    Compared to one year ago, the patient feels her mental   health is better.    Recent Hospitalizations:  She was not admitted to the hospital during the last year.       Current Medical Providers:  Patient Care Team:  Nurys Edwards MD as PCP - General (Family Medicine)    Outpatient Medications Prior to Visit   Medication Sig Dispense Refill    cetirizine (EQ Allergy Relief, Cetirizine,) 10 MG tablet Take 1 tablet by mouth Daily. 90 tablet 0    gabapentin (NEURONTIN) 600 MG tablet TAKE 1 TABLET BY MOUTH 4 TIMES DAILY . DO NOT EXCEED 4 PER 24 HOURS      omeprazole (priLOSEC) 40 MG capsule Take 1 capsule by mouth Daily. 90 capsule 3    atorvastatin (LIPITOR) 20 MG tablet Take 1 tablet by mouth Every Night.      hydroCHLOROthiazide (HYDRODIURIL) 12.5 MG tablet TAKE 1 TABLET BY MOUTH ONCE DAILY AS NEEDED (Patient taking differently: Take 2 tablets by mouth Daily.) 90 tablet 0    levothyroxine (SYNTHROID, LEVOTHROID) 75 MCG tablet Take 1 tablet by mouth Every Morning. 90 tablet 1    potassium chloride 10 MEQ CR tablet Take 1 tablet by mouth once daily 90 tablet 0    COVID-19 mRNA Vac-Alli,Pfizer, (COMIRNATY IM SUSPENSION PREFILLED SYRINGE 30 MCG/0.3ML) Inject  into the appropriate muscle as directed by prescriber. (Patient not taking: Reported on 10/26/2023) 0.3 mL 0     Facility-Administered Medications Prior to Visit   Medication Dose Route Frequency Provider Last Rate Last Admin    Chlorhexidine Gluconate Cloth 2 % pads   Apply  "externally BID Everton Zuniga MD           No opioid medication identified on active medication list. I have reviewed chart for other potential  high risk medication/s and harmful drug interactions in the elderly.        Aspirin is not on active medication list.  Aspirin use is not indicated based on review of current medical condition/s. Risk of harm outweighs potential benefits.  .    Patient Active Problem List   Diagnosis    Osteoporosis    Senile osteoporosis    Gastroesophageal reflux disease    Diarrhea    Tubular adenoma of colon    Helicobacter pylori (H. pylori) infection    Acute medial meniscus tear of left knee    Complex tear of medial meniscus of right knee as current injury    Stress fracture of right tibia    History of IBS    RLS (restless legs syndrome)    Hypothyroidism    Essential hypertension    Left-sided trigeminal neuralgia    Nonruptured cerebral aneurysm    Primary osteoarthritis of left knee    Abnormal EKG    Chest pain    Chronic kidney disease (CKD), stage III (moderate)    Grief reaction    Hematuria    History of Helicobacter pylori infection    History of recurrent UTIs    Tubulovillous adenoma of colon    Diarrhea    Gastroesophageal reflux disease    Helicobacter pylori (H. pylori) infection    Osteoporosis    RLS (restless legs syndrome)    Tubular adenoma of colon    Abnormal CXR    Pure hypercholesterolemia    Colon polyps    Jaw pain     Advance Care Planning   Advance Care Planning     Advance Directive is not on file.  ACP discussion was held with the patient during this visit. Patient does not have an advance directive, information provided.     Objective    Vitals:    02/05/24 0900   BP: 114/80   Pulse: 65   Temp: 97.7 °F (36.5 °C)   TempSrc: Temporal   SpO2: 95%   Weight: 80 kg (176 lb 4.8 oz)   Height: 160 cm (62.99\")     Estimated body mass index is 31.24 kg/m² as calculated from the following:    Height as of this encounter: 160 cm (62.99\").    Weight as of this " encounter: 80 kg (176 lb 4.8 oz).           Does the patient have evidence of cognitive impairment? No          HEALTH RISK ASSESSMENT    Smoking Status:  Social History     Tobacco Use   Smoking Status Never   Smokeless Tobacco Never     Alcohol Consumption:  Social History     Substance and Sexual Activity   Alcohol Use Yes    Alcohol/week: 1.0 standard drink of alcohol    Types: 1 Glasses of wine per week    Comment: Occasionally     Fall Risk Screen:    ISAAC Fall Risk Assessment was completed, and patient is at HIGH risk for falls. Assessment completed on:2024    Depression Screenin/5/2024     9:49 AM   PHQ-2/PHQ-9 Depression Screening   Little Interest or Pleasure in Doing Things 0-->not at all   Feeling Down, Depressed or Hopeless 0-->not at all   Trouble Falling or Staying Asleep, or Sleeping Too Much 1-->several days   Feeling Tired or Having Little Energy 1-->several days   PHQ-9: Brief Depression Severity Measure Score 2   If You Checked Off Any Problems, How Difficult Have These Problems Made It For You to Do Your Work, Take Care of Things at Home, or Get Along with Other People? not difficult at all       Health Habits and Functional and Cognitive Screenin/5/2024     8:00 AM   Functional & Cognitive Status   Do you have difficulty preparing food and eating? No   Do you have difficulty bathing yourself, getting dressed or grooming yourself? No   Do you have difficulty using the toilet? No   Do you have difficulty moving around from place to place? No   Do you have trouble with steps or getting out of a bed or a chair? No   Current Diet Well Balanced Diet   Dental Exam Up to date   Eye Exam Up to date   Exercise (times per week) 1 times per week   Current Exercises Include Walking   Do you need help using the phone?  No   Are you deaf or do you have serious difficulty hearing?  No   Do you need help to go to places out of walking distance? No   Do you need help shopping? No   Do you  need help preparing meals?  No   Do you need help with housework?  No   Do you need help with laundry? No   Do you need help taking your medications? No   Do you need help managing money? No   Do you ever drive or ride in a car without wearing a seat belt? No   Have you felt unusual stress, anger or loneliness in the last month? No   Who do you live with? Child   If you need help, do you have trouble finding someone available to you? No   Have you been bothered in the last four weeks by sexual problems? No   Do you have difficulty concentrating, remembering or making decisions? No       Age-appropriate Screening Schedule:  Refer to the list below for future screening recommendations based on patient's age, sex and/or medical conditions. Orders for these recommended tests are listed in the plan section. The patient has been provided with a written plan.    Health Maintenance   Topic Date Due    DXA SCAN  03/04/2023    LIPID PANEL  01/30/2024    MAMMOGRAM  05/11/2024    BMI FOLLOWUP  10/26/2024    ANNUAL WELLNESS VISIT  02/05/2025    COLORECTAL CANCER SCREENING  08/15/2028    TDAP/TD VACCINES (2 - Td or Tdap) 10/28/2030    HEPATITIS C SCREENING  Completed    COVID-19 Vaccine  Completed    INFLUENZA VACCINE  Completed    Pneumococcal Vaccine 65+  Completed    ZOSTER VACCINE  Completed                  CMS Preventative Services Quick Reference  Risk Factors Identified During Encounter  Dental Screening Recommended  Vision Screening Recommended  The above risks/problems have been discussed with the patient.  Pertinent information has been shared with the patient in the After Visit Summary.  An After Visit Summary and PPPS were made available to the patient.    Follow Up:   Next Medicare Wellness visit to be scheduled in 1 year.       Additional E&M Note during same encounter follows:  Patient has multiple medical problems which are significant and separately identifiable that require additional work above and beyond the  "Medicare Wellness Visit.      Chief Complaint  Medicare Wellness-subsequent    Subjective        HPI  Sari Self is also being seen today for htn , hyperlipidemia and hypothyroidism  pt with h/o hypothyrodism.denies change in energy level, diarrhea, heat / cold intolerance, nervousness, palpitations , gained weight since last visit  Patient denies any headache, blurring of vision, lightheadedness or dizziness.  She is not checking her blood pressure at home.        Patient has long history of hyperlipidemia denies any body ache, nausea vomiting.  Denies any problem with medication.     Objective   Vital Signs:  /80   Pulse 65   Temp 97.7 °F (36.5 °C) (Temporal)   Ht 160 cm (62.99\")   Wt 80 kg (176 lb 4.8 oz)   SpO2 95%   BMI 31.24 kg/m²     Physical Exam  Constitutional:       General: She is not in acute distress.     Appearance: Normal appearance. She is well-developed.   HENT:      Head: Normocephalic and atraumatic.      Right Ear: Tympanic membrane normal.      Left Ear: Tympanic membrane normal.      Mouth/Throat:      Mouth: Mucous membranes are moist.   Eyes:      General:         Right eye: No discharge.         Left eye: No discharge.      Extraocular Movements: Extraocular movements intact.      Pupils: Pupils are equal, round, and reactive to light.   Cardiovascular:      Rate and Rhythm: Normal rate and regular rhythm.      Pulses: Normal pulses.      Heart sounds: Normal heart sounds.   Pulmonary:      Effort: Pulmonary effort is normal.      Breath sounds: Normal breath sounds. No wheezing or rales.   Abdominal:      General: Bowel sounds are normal.      Palpations: Abdomen is soft. There is no mass.      Tenderness: There is no abdominal tenderness.   Musculoskeletal:      Cervical back: Normal range of motion and neck supple.      Right lower leg: No edema.      Left lower leg: No edema.   Lymphadenopathy:      Cervical: No cervical adenopathy.   Neurological:      General: No focal " deficit present.      Mental Status: She is alert and oriented to person, place, and time.            CMP          3/11/2023    00:46 5/17/2023    09:56 2/1/2024    08:30   CMP   Glucose 125  107  107    BUN 20  18  23    Creatinine 0.94  0.88  1.05    EGFR 64.2  69.5  55.9    Sodium 141  139  139    Potassium 3.7  3.7  4.4    Chloride 104  104  104    Calcium 9.6  9.4  10.2    Total Protein 6.9  7.2  6.9    Albumin 4.2  4.4  4.5    Globulin 2.7  2.8  2.4    Total Bilirubin 0.2  0.5  0.2    Alkaline Phosphatase 75  84  85    AST (SGOT) 12  15  12    ALT (SGPT) 11  13  11    Albumin/Globulin Ratio 1.6  1.6  1.9    BUN/Creatinine Ratio 21.3  20.5  21.9    Anion Gap 11.3  10.0  11.0      CBC          5/17/2023    09:56 6/6/2023    16:40 2/1/2024    08:30   CBC   WBC 7.00  6.84     6.91    RBC 4.39  4.41     4.54    Hemoglobin 13.0  12.6     13.2    Hematocrit 38.8  39.0     40.3    MCV 88.4  88.4     88.8    MCH 29.6  28.6     29.1    MCHC 33.5  32.3     32.8    RDW 12.7  12.5     12.7    Platelets 326  331     320       Details          This result is from an external source.                        Assessment and Plan   Diagnoses and all orders for this visit:    1. Medicare annual wellness visit, subsequent (Primary)    2. Hypothyroidism, unspecified type  -     TSH Rfx On Abnormal To Free T4  -     levothyroxine (SYNTHROID, LEVOTHROID) 75 MCG tablet; Take 1 tablet by mouth Every Morning.  Dispense: 90 each; Refill: 1    3. Mixed hyperlipidemia  -     Lipid Panel    4. Hyperglycemia  -     Hemoglobin A1c    5. Post-menopausal  -     DEXA Bone Density Axial; Future    Other orders  -     hydroCHLOROthiazide (HYDRODIURIL) 12.5 MG tablet; Take 1 tablet by mouth Daily.  Dispense: 90 tablet; Refill: 0  -     potassium chloride 10 MEQ CR tablet; Take 1 tablet by mouth Daily.  Dispense: 90 each; Refill: 0  -     atorvastatin (LIPITOR) 20 MG tablet; Take 1 tablet by mouth Every Night.  Dispense: 90 tablet; Refill:  "1        Sari Self. \"Sarah\" is a 73-year-old female patient seen today for  Medicare annual wellness visit, preventive care discussed with her she is up-to-date with mammogram will schedule her for bone density.  She is also up-to-date with colonoscopy.  Low-carb diet recommended to her I also recommended weightbearing exercise and vitamin D supplement.  She is also seen today for follow-up on    Hypothyroidism we will check TSH and T4 has gained weight causes of fatigue discussed with her.  Hyperlipidemia, we will check fasting lipids today continue same   Hypertension, she is on hydrochlorothiazide 12.5 mg and potassium chloride 10 mEq.  We will check CMP again can continue same.  Elevated creatinine I advised her to increase p.o. fluid intake.       Follow Up   No follow-ups on file.  Patient was given instructions and counseling regarding her condition or for health maintenance advice. Please see specific information pulled into the AVS if appropriate.           "

## 2024-02-06 LAB
CHOLEST SERPL-MCNC: 166 MG/DL (ref 0–200)
HBA1C MFR BLD: 5.7 % (ref 4.8–5.6)
HDLC SERPL-MCNC: 72 MG/DL (ref 40–60)
LDLC SERPL CALC-MCNC: 80 MG/DL (ref 0–100)
TRIGL SERPL-MCNC: 73 MG/DL (ref 0–150)
TSH SERPL DL<=0.005 MIU/L-ACNC: 1.33 UIU/ML (ref 0.27–4.2)
VLDLC SERPL CALC-MCNC: 14 MG/DL (ref 5–40)

## 2024-02-07 NOTE — TELEPHONE ENCOUNTER
Facility requested orders 2 days ago on day of appt. Message just forward to front today. Orders faxed incase they are still needed

## 2024-02-08 ENCOUNTER — TELEPHONE (OUTPATIENT)
Dept: FAMILY MEDICINE CLINIC | Facility: CLINIC | Age: 74
End: 2024-02-08
Payer: MEDICARE

## 2024-02-08 DIAGNOSIS — R92.8 ABNORMAL MAMMOGRAM OF RIGHT BREAST: Primary | ICD-10-CM

## 2024-02-08 NOTE — TELEPHONE ENCOUNTER
Lisa from [facility] called requesting pt get a right breast stereotatic biopsy.     CB# [phone]

## 2024-02-15 RX ORDER — CETIRIZINE HYDROCHLORIDE 10 MG/1
10 TABLET, FILM COATED ORAL DAILY
Qty: 90 TABLET | Refills: 0 | Status: SHIPPED | OUTPATIENT
Start: 2024-02-15

## 2024-03-01 ENCOUNTER — TELEPHONE (OUTPATIENT)
Dept: GASTROENTEROLOGY | Facility: CLINIC | Age: 74
End: 2024-03-01
Payer: MEDICARE

## 2024-03-01 NOTE — TELEPHONE ENCOUNTER
Message sent to patient to see what medication she needs refilled.       ----- Message from Sari Self sent at 3/1/2024  3:01 PM EST -----  Regarding: Omeprazole   Contact: [phone]  I'm changing pharmacies. Can you send my refill to [facility] please?

## 2024-03-04 ENCOUNTER — HOSPITAL ENCOUNTER (OUTPATIENT)
Dept: MAMMOGRAPHY | Facility: HOSPITAL | Age: 74
Discharge: HOME | End: 2024-03-04
Payer: MEDICARE

## 2024-03-04 ENCOUNTER — LAB REQUISITION (OUTPATIENT)
Dept: LAB | Facility: HOSPITAL | Age: 74
End: 2024-03-04
Payer: MEDICARE

## 2024-03-04 ENCOUNTER — APPOINTMENT (OUTPATIENT)
Dept: WOMENS IMAGING | Facility: HOSPITAL | Age: 74
End: 2024-03-04
Payer: MEDICARE

## 2024-03-04 ENCOUNTER — TELEPHONE (OUTPATIENT)
Dept: GASTROENTEROLOGY | Facility: CLINIC | Age: 74
End: 2024-03-04
Payer: MEDICARE

## 2024-03-04 DIAGNOSIS — N64.89 BREAST ASYMMETRY: ICD-10-CM

## 2024-03-04 DIAGNOSIS — N63.0 UNSPECIFIED LUMP IN UNSPECIFIED BREAST: ICD-10-CM

## 2024-03-04 PROCEDURE — 88341 IMHCHEM/IMCYTCHM EA ADD ANTB: CPT | Performed by: FAMILY MEDICINE

## 2024-03-04 PROCEDURE — 88305 TISSUE EXAM BY PATHOLOGIST: CPT | Performed by: FAMILY MEDICINE

## 2024-03-04 PROCEDURE — 19081 BX BREAST 1ST LESION STRTCTC: CPT | Performed by: RADIOLOGY

## 2024-03-04 PROCEDURE — 88360 TUMOR IMMUNOHISTOCHEM/MANUAL: CPT | Performed by: FAMILY MEDICINE

## 2024-03-04 PROCEDURE — 88342 IMHCHEM/IMCYTCHM 1ST ANTB: CPT | Performed by: FAMILY MEDICINE

## 2024-03-04 PROCEDURE — 76098 X-RAY EXAM SURGICAL SPECIMEN: CPT

## 2024-03-04 PROCEDURE — A4648 IMPLANTABLE TISSUE MARKER: HCPCS | Performed by: RADIOLOGY

## 2024-03-04 PROCEDURE — C1819 TISSUE LOCALIZATION-EXCISION: HCPCS | Performed by: RADIOLOGY

## 2024-03-04 RX ORDER — OMEPRAZOLE 40 MG/1
40 CAPSULE, DELAYED RELEASE ORAL DAILY
Qty: 90 CAPSULE | Refills: 3 | Status: SHIPPED | OUTPATIENT
Start: 2024-03-04

## 2024-03-04 NOTE — TELEPHONE ENCOUNTER
----- Message from Sari Self sent at 3/1/2024  3:58 PM EST -----  Regarding: Omeprazole   Contact: [phone]  Sorry, had it in the header, but you may not see that, not sure, but Omeprazole.

## 2024-03-07 ENCOUNTER — TELEPHONE (OUTPATIENT)
Dept: SURGERY | Facility: CLINIC | Age: 74
End: 2024-03-07
Payer: MEDICARE

## 2024-03-07 ENCOUNTER — TELEPHONE (OUTPATIENT)
Dept: FAMILY MEDICINE CLINIC | Facility: CLINIC | Age: 74
End: 2024-03-07

## 2024-03-07 DIAGNOSIS — D05.11 DUCTAL CARCINOMA IN SITU (DCIS) OF RIGHT BREAST: Primary | ICD-10-CM

## 2024-03-07 NOTE — TELEPHONE ENCOUNTER
"  Caller: Sari Self \"Sarah\"    Relationship: Self    Best call back number:[phone]    What is the best time to reach you: ANYTIME    Who are you requesting to speak with (clinical staff, provider,  specific staff member): DR CARREON    What was the call regarding:PATIENT WOULD LIKE A CALL FROM DR CARREON REGARDING HER BIOPSY RESULTS.   "

## 2024-03-08 ENCOUNTER — TELEPHONE (OUTPATIENT)
Dept: SURGERY | Facility: CLINIC | Age: 74
End: 2024-03-08
Payer: MEDICARE

## 2024-03-08 DIAGNOSIS — D05.11 BREAST NEOPLASM, TIS (DCIS), RIGHT: Primary | ICD-10-CM

## 2024-03-08 LAB
DX PRELIMINARY: NORMAL
LAB AP CASE REPORT: NORMAL
LAB AP CLINICAL INFORMATION: NORMAL
LAB AP DIAGNOSIS COMMENT: NORMAL
LAB AP SPECIAL STAINS: NORMAL
LAB AP SYNOPTIC CHECKLIST: NORMAL
PATH REPORT.FINAL DX SPEC: NORMAL
PATH REPORT.GROSS SPEC: NORMAL

## 2024-03-08 NOTE — TELEPHONE ENCOUNTER
Pt notified of the following appts:    Bilateral breast MRI is scheduled on 3/15/2024 arrive at 8:15  New pt appt with Dr Kelly is scheduled on 3/21/02449 at 1:00.  I let her know we could potentially move her appt up depending on the MRI results.   Pt was happy with this plan.    CMA

## 2024-03-15 ENCOUNTER — HOSPITAL ENCOUNTER (OUTPATIENT)
Dept: MRI IMAGING | Facility: HOSPITAL | Age: 74
Discharge: HOME | End: 2024-03-15
Payer: MEDICARE

## 2024-03-15 DIAGNOSIS — D05.11 BREAST NEOPLASM, TIS (DCIS), RIGHT: ICD-10-CM

## 2024-03-15 LAB — CREAT BLDA-MCNC: 1 MG/DL (ref 0.6–1.3)

## 2024-03-15 PROCEDURE — 0 GADOBENATE DIMEGLUMINE 529 MG/ML SOLUTION: Performed by: SURGERY

## 2024-03-15 PROCEDURE — 82565 ASSAY OF CREATININE: CPT

## 2024-03-15 PROCEDURE — C8937 CAD BREAST MRI: HCPCS

## 2024-03-15 PROCEDURE — A9577 INJ MULTIHANCE: HCPCS | Performed by: SURGERY

## 2024-03-15 PROCEDURE — C8908 MRI W/O FOL W/CONT, BREAST,: HCPCS

## 2024-03-15 RX ADMIN — GADOBENATE DIMEGLUMINE 16 ML: 529 INJECTION, SOLUTION INTRAVENOUS at 09:47

## 2024-03-20 ENCOUNTER — TELEPHONE (OUTPATIENT)
Dept: SURGERY | Facility: CLINIC | Age: 74
End: 2024-03-20
Payer: MEDICARE

## 2024-03-20 NOTE — TELEPHONE ENCOUNTER
I let patient know that MRI abnormal recommend MRI guided RT breast biopsy. She will see Dr Kelly after this biopsy. Patient aware.

## 2024-04-08 DIAGNOSIS — F43.20 ANTICIPATORY GRIEVING: ICD-10-CM

## 2024-04-08 RX ORDER — ALPRAZOLAM 0.5 MG/1
TABLET ORAL
Qty: 30 TABLET | Refills: 0 | OUTPATIENT
Start: 2024-04-08

## 2024-04-09 ENCOUNTER — HOSPITAL ENCOUNTER (OUTPATIENT)
Dept: MAMMOGRAPHY | Facility: HOSPITAL | Age: 74
Discharge: HOME | End: 2024-04-09
Payer: MEDICARE

## 2024-04-09 ENCOUNTER — HOSPITAL ENCOUNTER (OUTPATIENT)
Dept: MRI IMAGING | Facility: HOSPITAL | Age: 74
Discharge: HOME | End: 2024-04-09
Payer: MEDICARE

## 2024-04-09 VITALS
HEIGHT: 63 IN | DIASTOLIC BLOOD PRESSURE: 76 MMHG | BODY MASS INDEX: 29.23 KG/M2 | OXYGEN SATURATION: 99 % | SYSTOLIC BLOOD PRESSURE: 148 MMHG | WEIGHT: 165 LBS | RESPIRATION RATE: 16 BRPM | TEMPERATURE: 96.9 F | HEART RATE: 64 BPM

## 2024-04-09 DIAGNOSIS — R92.8 ABNORMAL FINDINGS ON DIAGNOSTIC IMAGING OF BREAST: ICD-10-CM

## 2024-04-09 DIAGNOSIS — Z98.890 STATUS POST BIOPSY: ICD-10-CM

## 2024-04-09 LAB — CREAT BLDA-MCNC: 1 MG/DL (ref 0.6–1.3)

## 2024-04-09 PROCEDURE — C1894 INTRO/SHEATH, NON-LASER: HCPCS

## 2024-04-09 PROCEDURE — 0 GADOBENATE DIMEGLUMINE 529 MG/ML SOLUTION: Performed by: SURGERY

## 2024-04-09 PROCEDURE — 88342 IMHCHEM/IMCYTCHM 1ST ANTB: CPT | Performed by: SURGERY

## 2024-04-09 PROCEDURE — 88360 TUMOR IMMUNOHISTOCHEM/MANUAL: CPT | Performed by: SURGERY

## 2024-04-09 PROCEDURE — A9577 INJ MULTIHANCE: HCPCS | Performed by: SURGERY

## 2024-04-09 PROCEDURE — A4648 IMPLANTABLE TISSUE MARKER: HCPCS

## 2024-04-09 PROCEDURE — 25010000002 LIDOCAINE 1 % SOLUTION: Performed by: SURGERY

## 2024-04-09 PROCEDURE — 88341 IMHCHEM/IMCYTCHM EA ADD ANTB: CPT | Performed by: SURGERY

## 2024-04-09 PROCEDURE — 77065 DX MAMMO INCL CAD UNI: CPT

## 2024-04-09 PROCEDURE — 82565 ASSAY OF CREATININE: CPT

## 2024-04-09 PROCEDURE — 88305 TISSUE EXAM BY PATHOLOGIST: CPT | Performed by: SURGERY

## 2024-04-09 RX ORDER — LIDOCAINE HYDROCHLORIDE 10 MG/ML
1 INJECTION, SOLUTION INFILTRATION; PERINEURAL ONCE
Status: COMPLETED | OUTPATIENT
Start: 2024-04-09 | End: 2024-04-09

## 2024-04-09 RX ADMIN — LIDOCAINE HYDROCHLORIDE 1 ML: 10 INJECTION, SOLUTION INFILTRATION; PERINEURAL at 08:51

## 2024-04-09 RX ADMIN — LIDOCAINE HYDROCHLORIDE 15 ML: 10; .005 INJECTION, SOLUTION EPIDURAL; INFILTRATION; INTRACAUDAL; PERINEURAL at 08:51

## 2024-04-09 RX ADMIN — GADOBENATE DIMEGLUMINE 16 ML: 529 INJECTION, SOLUTION INTRAVENOUS at 08:39

## 2024-04-09 NOTE — NURSING NOTE
Biopsy site to   rt lower    breast clear with Dermabond dry and intact. No firmness or swelling noted at or around biopsy site. Denies pain. Ice pack with protective covering applied to biopsy site. Discharge instructions discussed with understanding voiced by patient. Copies provided to patient. No distress noted. To home via personal vehicle.

## 2024-04-11 LAB
LAB AP CASE REPORT: NORMAL
LAB AP SPECIAL STAINS: NORMAL
LAB AP SYNOPTIC CHECKLIST: NORMAL
PATH REPORT.FINAL DX SPEC: NORMAL
PATH REPORT.GROSS SPEC: NORMAL

## 2024-04-12 ENCOUNTER — OFFICE VISIT (OUTPATIENT)
Dept: SURGERY | Facility: CLINIC | Age: 74
End: 2024-04-12
Payer: MEDICARE

## 2024-04-12 VITALS
SYSTOLIC BLOOD PRESSURE: 144 MMHG | HEART RATE: 72 BPM | HEIGHT: 63 IN | BODY MASS INDEX: 31.01 KG/M2 | OXYGEN SATURATION: 97 % | WEIGHT: 175 LBS | DIASTOLIC BLOOD PRESSURE: 82 MMHG

## 2024-04-12 DIAGNOSIS — Z98.82 HISTORY OF BREAST AUGMENTATION: ICD-10-CM

## 2024-04-12 DIAGNOSIS — Z86.79 HISTORY OF CEREBRAL ANEURYSM: ICD-10-CM

## 2024-04-12 DIAGNOSIS — E66.09 CLASS 1 OBESITY DUE TO EXCESS CALORIES WITH SERIOUS COMORBIDITY AND BODY MASS INDEX (BMI) OF 31.0 TO 31.9 IN ADULT: ICD-10-CM

## 2024-04-12 DIAGNOSIS — R92.8 ABNORMAL MRI, BREAST: ICD-10-CM

## 2024-04-12 DIAGNOSIS — D05.11 BREAST NEOPLASM, TIS (DCIS), RIGHT: Primary | ICD-10-CM

## 2024-04-12 DIAGNOSIS — R63.5 RECENT WEIGHT GAIN: ICD-10-CM

## 2024-04-12 DIAGNOSIS — Z92.29 HISTORY OF HORMONE REPLACEMENT THERAPY: ICD-10-CM

## 2024-04-12 NOTE — PROGRESS NOTES
Chief Complaint: Sari Self is a 73 y.o. female who was seen in consultation at the request of Nurys Edwards MD  for newly diagnosed DCIS    History of Present Illness:  Patient presents with newly diagnosed DCIS. She noted no new masses, skin changes, nipple discharge, nipple changes prior to her most recent imaging.  Her most recent imaging includes the followin2021, Bilateral Diagnostic MMG w/Satya ([facility])  Scattered areas of fibroglandular density.  There are bilateral pre-pectoral saline implants. Follow up oval mass right breast, 8:30 o'clock. This completes two years of follow-up.  There are distorted implants seen in both breasts.  Right Breast Ultrasound  Finding 1: 8:30 right breast consistent with an intramammary lymph node.  BI-RADS 2: Benign Finding    2022, Bilateral Screening MMG w/Satya ([facility])  Scattered areas of fibroglandular density.  There are bilateral pre-pectoral saline implants.  BI-RADS 1: Negative    2024, Bilateral Screening MMG w/Satya ([facility])  Scattered areas of fibroglandular density.  There are bilateral pre-pectoral saline implants.  There is an asymmetry measuring 7 mm seen in the CC view only seen in the middle one third of the right breast in the central region and in the sub areolar region.  BI-RADS 0    2024, RIGHT Diagnostic MMG w/Satya ([facility])  Scattered areas of fibroglandular density.  There are bilateral pre-pectoral saline implants.  Additional evaluation was performed for the asymmetry in the right breast, central small focal asymmetry measuring 4 mm middle one third 6:30 o'clock region of the right breast located 5 cm from the nipple. This is new since prior exam(s).   Right Breast Ultrasound  There is no sonographic correlate.  IMPRESSION:  BI-RADS 4     She then had the following biopsy:    24  [facility] STEREOTACTIC BREAST BIOPSY   Right breast 6:30 11 core needle biopsy 9 gauge a top hat shaped s-leonardo eviva biopsy clip was deployed. Clip in  the expected location. Pathology is malignant and concordant.       03/04/2024, Stereotactic Biopsy ([facility])  Right breast 6:30, 11 core needle biopsy, 9-gauge.  Top hat shaped s-leonardo eviva biopsy clip was deployed, clip in the expected location. Pathology is malignant and concordant.    03/04/2024, Surgical Pathology Report ([facility])  1.  Right breast at 6:30 o'clock, (focal asymmetry with calcification), stereotactic core biopsy:               A.  Ductal carcinoma in situ (DCIS):                            1.  Low-grade cribriform and focal solid DCIS.                            2.  Scattered foci of DCIS span at least 3 mm. In single greatest dimension.                            3.  Focal microcalcification present within DCIS.               B.  No invasive carcinoma identified.    ER, 91-10%  NC, %  KI-67 13%     I then arranged for a MRI  03/15/2024, MRI Breast Bilateral  Middle third right breast 6 o'clock 4.5 cm from the nipple there is a post biopsy cavity that measures on the order of 2.2 cm. This represents the biopsy proven site of malignancy with the internal top hat shaped metallic clip.  4 cm posterior to the biopsy cavity non mass enhancement that measures 1.3 cm. No mammographic correlate. 5 mm inferior to the inferior margin of a retro glandular saline implant. No evidence for right axillary or internal chain adenopathy. Intact bilateral retro glandular saline implants are noted.  IMPRESSION  At the 6 o'clock position on the order of 4 cm posterior to the biopsy proven malignancy there is a 1.3 cm area of suspicious non mass enhancement. No mammographic correlate is appreciated.   Further evaluation with an MRI guided right breast biopsy and/or surgical excision of the tissue up to 4 cm posterior to the top hat shaped metallic clip is recommended.  No findings suspicious for malignancy in the left breast.     I then arranged for MRi guided biopsy:    4/11/24 [facility] MRI BREAST BIOPSY  JEANETTE D ENGLISH   A  9 gauge, 9 core samples. Infinity clip was deployed at the biopsy site. Infinity clip at the expected location. Only visualized on nondisplaced implant views. The infinity clip and the TOPHAT clip at the site of biopsy proven right breast malignancy are  by approximately 5 cm. Pathology is malignancy and concordant.     4/9/24 [facility] PATHOLOGY   Breast, right 6 o'clock position, MRI guided core biopsy:  A.  Ductal carcinoma in situ (DCIS), low to intermediate nuclear grade with apocrine features, cribriform and solid architecture, measuring up to 6 mm maximally and involving up to 7 core fragments.  ER POSITIVE %  GA POSITIVE %  KI-67 17%      She had not had a breast biopsy in the past.  She has had her uterus and ovaries removed, is postmenopausal, and takes no hormones. She took HRt for 10-15 years remotely  Her family history includes the following: no Fh breast or ovarian cancer  She is here for evaluation.    Review of Systems:  Review of Systems   Constitutional:  Positive for unexpected weight change (PATIENT REPORTS A 20 LB WT GAIN).   Gastrointestinal:  Positive for diarrhea (IBS).   Genitourinary:  Positive for frequency.    Musculoskeletal:         LIMITED ROM RT SHOULDER   Hematological:  Bruises/bleeds easily.   Psychiatric/Behavioral:  Positive for sleep disturbance.    All other systems reviewed and are negative.       Past Medical and Surgical History:  Breast Biopsy History:  Patient had not had a breast biopsy prior to her cancer diagnosis.  Breast Cancer HIstory:  Patient does not have a past medical history of breast cancer.  Breast Operations, and year:  NONE   Obstetric/Gynecologic History:  Age menstrual periods began: 13  Patient is postmenopausal due to removal of her uterus in the following year:1985   Number of pregnancies:6  Number of live births: 2  Number of abortions or miscarriages: 1  Age of delivery of first child: 18  Patient breast fed, for the following lenth  of time: 60 MONTHS   Length of time taking birth control pills: SHORT TIME   Patient took hormone replacement during the following dates: 15-20 YRS     Past Surgical History:   Procedure Laterality Date    BRAIN SURGERY  2011    CARDIAC CATHETERIZATION      CATARACT EXTRACTION, BILATERAL      CHOLECYSTECTOMY      COLONOSCOPY  11/10/2010    prep of colon fair,IH,stool in transverse colon,hepatic flexure and ascending colon,ileum normal,IBS    COLONOSCOPY N/A 02/27/2018    IH, diverticulosis, one 5mm polyp, tubular adenoma with low grade dysplasia    COLONOSCOPY N/A 08/15/2023    Procedure: COLONOSCOPY into cecum/terminal ileum with polypectomy;  Surgeon: Finesse Reza MD;  Location: [facility] ENDOSCOPY;  Service: Gastroenterology;  Laterality: N/A;  polyps    CYSTOSCOPY      ENDOSCOPY  11/27/2012    grd 1 reflux egitis,web upperd 3rd esoph    ENDOSCOPY N/A 02/27/2018    normal biopsies, H Pylori positive    EYE SURGERY  March 2020    Cararat removal both eyes    FOOT NEUROMA SURGERY Left     HAND SURGERY      HYSTERECTOMY  1985    IR CEREBRAL ANEURYSM COILING  2012    JOINT REPLACEMENT  March 2021    Partial left knee replacement    KNEE ARTHROPLASTY UNICOMPARTMENTAL Left 03/18/2022    Procedure: KNEE ARTHROPLASTY UNICOMPARTMENTAL;  Surgeon: Everton Zuniga MD;  Location: [facility] OR;  Service: Orthopedics;  Laterality: Left;    KNEE ARTHROSCOPY Left 07/03/2019    Procedure: KNEE ARTHROSCOPY ARTHRITIS DEBRIDEMENT PARTIAL MEDIAL AND LATERAL MENISECTOMY;  Surgeon: Nuris Bender MD;  Location: [facility] OR [facility] [place];  Service: Orthopedics    KNEE ARTHROSCOPY Right 09/28/2020    Procedure: RIGHT KNEE ARTHROSCOPY, PARTIAL MEDIAL AND LATERAL MENISCECTOMIES, DEBRIDEMENT OF ARTHRITIS, AND INTERNAL FIXATION TIBAL PLATEAU INSUFFICIENCY FRACTURE;  Surgeon: Nuris Bender MD;  Location: [facility] OR [facility] [place];  Service: Orthopedics;  Laterality: Right;    KNEE SURGERY  2019 & 2020    THUMB ARTHROSCOPY      TOTAL ABDOMINAL HYSTERECTOMY  WITH SALPINGO OOPHORECTOMY      TRIGGER POINT INJECTION      Hand, elbow, shoulders, knees    UPPER GASTROINTESTINAL ENDOSCOPY  02/27/2018    WRIST SURGERY       Past Medical History:   Diagnosis Date    Allergic     Arthritis     Cerebral aneurysm     Cholelithiasis 2016    Gallbladder removed    Colon polyp     COVID-19 2020    Disease of thyroid gland     Dislocation, shoulder 1968    Dislocated several times    Diverticulosis     GERD (gastroesophageal reflux disease)     H/O cerebral aneurysm repair     History of 2019 novel coronavirus disease (COVID-19) 08/2020 AUGUST 2020 STATES VERY MILD SYMTOMS    Hyperlipidemia     Hypertension     Hyperthyroidism 2012?    Hypothyroidism     IBS (irritable bowel syndrome)     Knee swelling     Low back pain     OAB (overactive bladder)     Osteopenia     Osteoporosis     Right knee pain     RLS (restless legs syndrome)     Rotator cuff syndrome 1968    Tears    Stress fracture of right tibia     Stroke     Tear of meniscus of knee 2019 & 2020    Surgery both knees    TIA (transient ischemic attack)     2011    Torn meniscus     RIGHT KNEE    Urinary tract infection 06/01/2018       Prior Hospitalizations, other than for surgery or childbirth, and year:  CEREBRAL ANEURYSM 2011    Social History     Socioeconomic History    Marital status:    Tobacco Use    Smoking status: Never    Smokeless tobacco: Never   Vaping Use    Vaping status: Never Used   Substance and Sexual Activity    Alcohol use: Yes     Alcohol/week: 1.0 standard drink of alcohol     Types: 1 Glasses of wine per week     Comment: Occasionally    Drug use: Never    Sexual activity: Not Currently     Partners: Male     Birth control/protection: Hysterectomy     Patient is .  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    Family History:  Family History   Problem Relation Age of Onset    Irritable bowel syndrome  "Daughter     Berenice Hyperthermia Neg Hx        Vital Signs:  /82 (BP Location: Left arm, Patient Position: Sitting, Cuff Size: Adult)   Pulse 72   Ht 160 cm (62.99\")   Wt 79.4 kg (175 lb)   LMP  (LMP Unknown)   SpO2 97%   BMI 31.01 kg/m²      Medications:    Current Outpatient Medications:     atorvastatin (LIPITOR) 20 MG tablet, Take 1 tablet by mouth Every Night., Disp: 90 tablet, Rfl: 1    EQ Allergy Relief, Cetirizine, 10 MG tablet, Take 1 tablet by mouth once daily, Disp: 90 tablet, Rfl: 0    gabapentin (NEURONTIN) 600 MG tablet, TAKE 1 TABLET BY MOUTH 4 TIMES DAILY . DO NOT EXCEED 4 PER 24 HOURS, Disp: , Rfl:     hydroCHLOROthiazide (HYDRODIURIL) 12.5 MG tablet, Take 1 tablet by mouth Daily., Disp: 90 tablet, Rfl: 0    levothyroxine (SYNTHROID, LEVOTHROID) 75 MCG tablet, Take 1 tablet by mouth Every Morning., Disp: 90 each, Rfl: 1    omeprazole (priLOSEC) 40 MG capsule, Take 1 capsule by mouth Daily., Disp: 90 capsule, Rfl: 3    potassium chloride 10 MEQ CR tablet, Take 1 tablet by mouth Daily., Disp: 90 each, Rfl: 0    COVID-19 mRNA Vac-Alli,Pfizer, (COMIRNATY IM SUSPENSION PREFILLED SYRINGE 30 MCG/0.3ML), Inject  into the appropriate muscle as directed by prescriber. (Patient not taking: Reported on 10/26/2023), Disp: 0.3 mL, Rfl: 0  No current facility-administered medications for this visit.    Facility-Administered Medications Ordered in Other Visits:     Chlorhexidine Gluconate Cloth 2 % pads, , Apply externally, BID, Everton Zuniga MD     Allergies:  Allergies   Allergen Reactions    Penicillins Hives       Physical Examination:  /82 (BP Location: Left arm, Patient Position: Sitting, Cuff Size: Adult)   Pulse 72   Ht 160 cm (62.99\")   Wt 79.4 kg (175 lb)   LMP  (LMP Unknown)   SpO2 97%   BMI 31.01 kg/m²   General Appearance:  Patient is in no distress.  She is well kept and has an overweight build.   Psychiatric:  Patient with appropriate mood and affect. Alert and oriented to " self, time, and place.    Breast, RIGHT:  large sized, augmented, symmetric with the contralateral side. Capsular contracture noted on the RIGHT.  Breast skin is without erythema, edema, rashes.  There are no visible abnormalities upon inspection during the arm-raising maneuver or with hands on hips in the sitting position. There is no nipple retraction, discharge or nipple/areolar skin changes.There are no masses palpable in the sitting or supine positions. Mild healing ecchymoses RIGHT and well healing mammotomy.    Breast, LEFT:  large sized, augmented, symmetric with the contralateral side.  Breast skin is without erythema, edema, rashes.  There are no visible abnormalities upon inspection during the arm-raising maneuver or with hands on hips in the sitting position. There is no nipple retraction, discharge or nipple/areolar skin changes.There are no masses palpable in the sitting or supine positions.    Lymphatic:  There is no axillary, cervical, infraclavicular, or supraclavicular adenopathy bilaterally.  Eyes:  Pupils are round and reactive to light.  Cardiovascular:  Heart rate and rhythm are regular.  Respiratory:  Lungs are clear bilaterally with no crackles or wheezes in any lung field.  Gastrointestinal:  Abdomen is soft, nondistended, and nontender.     Musculoskeletal:  Good strength in all 4 extremities.   There is good range of motion in both shoulders.    Skin:  No new skin lesions or rashes on the skin excluding the breast (see breast exam above).        Imagin2018, Bilateral Screening MMG w/Satya ([facility])  There are scattered areas of fibroglandular density.  There are bilateral pre-pectoral saline implants.  Finding 1: New equal density, oval mass measuring 9 mm posterior one third inferior region of the right breast.  IMPRESSION:  BI-RADS 0    2019, Right Diagnostic MMG w/Satya ([facility])  Scattered areas of fibroglandular density.  Additional evaluation was performed for the oval mass in  the right breast, inferior posterior one-third 8:30 o'clock region of the right breast located 9 cm from the nipple.  Right Breast Ultrasound  Ultrasound is suggestive of an oval intramammary lymph node with well defined, thin margins measuring 9 x 3 x 7 x mm. This corresponds to the mammographic finding.  IMPRESSION:  BI-RADS 3    07/19/2019, Right Diagnostic MMG w/Satya ([facility])  Scattered areas of fibroglandular density.  Follow-up oval mass in the right breast, 8:30 o'clock. There has been no significant change.  Right Breast Ultrasound:  A small stable intramammary lymph node project at 9:00, 10 cmfn.  BI-RADS 3    01/23/2020, Bilateral Diagnostic MMG w/Satya ([facility])  Scattered areas of fibroglandular density. Follow-up oval mass in the right breast, 8:30 o'clock. Bilateral pre pectoral saline implants with capsule calcifications are unchanged.  Right Breast Ultrasound  Demonstrates an oval elongated mass 9:00 10 cm from the nipple measuring 8 x 3 x 4 mm. this is unchanged. A follow-up mammogram and ultrasound in 6 months is recommended.  BI-RADS 3    09/22/2020, Right Breast Digital Diagnostic MMG w/Satya ([facility])  Scattered areas of fibroglandular density.  There are bilateral pre-pectoral saline implants.  Finding 1: Follow up oval mass right breast, 8:30 o'clock.  Finding 2: There is a distorted implant seen in the right breast an area of calcification associated with the right implant.  Right Breast Ultrasound  Finding 1: An oval elongated mass 4 x 2 x 4 mm posterior one third 8:30 right breast, mass has decreased in size.  BI-RADS 3    03/31/2021, Bilateral Diagnostic MMG w/Satya ([facility])  Scattered areas of fibroglandular density.  There are bilateral pre-pectoral saline implants. Follow up oval mass right breast, 8:30 o'clock. This completes two years of follow-up.  There are distorted implants seen in both breasts.  Right Breast Ultrasound  Finding 1: 8:30 right breast consistent with an intramammary lymph  node.  BI-RADS 2: Benign Finding    05/11/2022, Bilateral Screening MMG w/Satya ([facility])  Scattered areas of fibroglandular density.  There are bilateral pre-pectoral saline implants.  BI-RADS 1: Negative    01/18/2024, Bilateral Screening MMG w/Satya ([facility])  Scattered areas of fibroglandular density.  There are bilateral pre-pectoral saline implants.  There is an asymmetry measuring 7 mm seen in the CC view only seen in the middle one third of the right breast in the central region and in the sub areolar region.  BI-RADS 0    02/05/2024, RIGHT Diagnostic MMG w/Satya ([facility])  Scattered areas of fibroglandular density.  There are bilateral pre-pectoral saline implants.  Additional evaluation was performed for the asymmetry in the right breast, central small focal asymmetry measuring 4 mm middle one third 6:30 o'clock region of the right breast located 5 cm from the nipple. This is new since prior exam(s).   Right Breast Ultrasound  There is no sonographic correlate.  IMPRESSION:  BI-RADS 4    03/04/24  [facility] STEREOTACTIC BREAST BIOPSY   Right breast 6:30 11 core needle biopsy 9 gauge a top hat shaped s-leonardo eviva biopsy clip was deployed. Clip in the expected location. Pathology is malignant and concordant.     03/15/2024, MRI Breast Bilateral  Middle third right breast 6 o'clock 4.5 cm from the nipple there is a post biopsy cavity that measures on the order of 2.2 cm. This represents the biopsy proven site of malignancy with the internal top hat shaped metallic clip.  4 cm posterior to the biopsy cavity non mass enhancement that measures 1.3 cm. No mammographic correlate. 5 mm inferior to the inferior margin of a retro glandular saline implant. No evidence for right axillary or internal chain adenopathy. Intact bilateral retro glandular saline implants are noted.  IMPRESSION  At the 6 o'clock position on the order of 4 cm posterior to the biopsy proven malignancy there is a 1.3 cm area of suspicious non mass enhancement. No  mammographic correlate is appreciated.   Further evaluation with an MRI guided right breast biopsy and/or surgical excision of the tissue up to 4 cm posterior to the top hat shaped metallic clip is recommended.  No findings suspicious for malignancy in the left breast.    Pathology:  03/04/2024, Stereotactic Biopsy ([facility])  Right breast 6:30, 11 core needle biopsy, 9-gauge.  Top hat shaped s-leonardo eviva biopsy clip was deployed, clip in the expected location. Pathology is malignant and concordant.    03/04/2024, Surgical Pathology Report ([facility])  1.  Right breast at 6:30 o'clock, (focal asymmetry with calcification), stereotactic core biopsy:               A.  Ductal carcinoma in situ (DCIS):                            1.  Low-grade cribriform and focal solid DCIS.                            2.  Scattered foci of DCIS span at least 3 mm. In single greatest dimension.                            3.  Focal microcalcification present within DCIS.               B.  No invasive carcinoma identified.    ER, 91-10%  WA, %  KI-67 13%    4/11/24 [facility] MRI BREAST BIOPSY  JEANETTE D ENGLISH   A 9 gauge, 9 core samples. Infinity clip was deployed at the biopsy site. Infinity clip at the expected location. Only visualized on nondisplaced implant views. The infinity clip and the TOPHAT clip at the site of biopsy proven right breast malignancy are  by approximately 5 cm. Pathology is malignancy and concordant.     4/9/24 [facility] PATHOLOGY   Breast, right 6 o'clock position, MRI guided core biopsy:  A.  Ductal carcinoma in situ (DCIS), low to intermediate nuclear grade with apocrine features, cribriform and solid architecture, measuring up to 6 mm maximally and involving up to 7 core fragments.  ER POSITIVE %  WA POSITIVE %  KI-67 17%    Procedures:      Assessment:   Diagnosis Plan   1. Breast neoplasm, Tis (DCIS), right  Ambulatory Referral to Nutrition Services      2. Abnormal MRI, breast        3. History of hormone  replacement therapy        4. Class 1 obesity due to excess calories with serious comorbidity and body mass index (BMI) of 31.0 to 31.9 in adult  Ambulatory Referral to Nutrition Services      5. Recent weight gain  Ambulatory Referral to Nutrition Services      6. History of cerebral aneurysm        7. History of breast augmentation        1-2  RIGHT breast LOQ. 4mm on mammogram middle 1/3 6:30, 5 CFN. Tophat marker in good position  DCIS, low grade, cribiform and solid, 3mm in core, no invasive component  ER , [place] , ki 67 13%    MRi showed additional lesion 5 cm from the index posteriorly- 1.3 cm region. Infinity marker in good position.  DCIS, low to intermediate grade, cribiform and solid  ER , [place] , ki 67 is 17%    The 2 markers are 5 cm apart AP.    Clinical stage TisN0- stage 0    3-  10-15 years HRT  after menopause- off since  around 2002    4-5  BMI 31, with recent 20# weight gain since , related to life stressors    6-  Past cerebral aneurysm with coiling by Dr Yang- on aspirin for some time, now off blood thinners      7-  Prepectoral saline implants    Plan:  The patient goes by Sarah.  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    We reviewed the difference in systemic therapy versus locoregional. We discussed that she will be seeing a medical oncologist in the adjuvant setting. We discussed that for DCIS, that we would expect a 98% breast cancer specific survival and that the medications offered by medical oncology are for risk reduction for additional breast cancers and for a reduction in ipsilateral locoregional recurrence.    We also discussed , similarly, maintaining a healthy body weight to reduce estrogen levels. She was interested in talking with nutrition and we will arrange for this consultation.       We discussed that for her DCIS, there are 2 options for locoregional treatment that have equivalent  survival: total mastectomy versus lumpectomy and radiation, the former with sentinel node biopsy.     She is interested in breast conservation.     We discussed the option of oncoplastic closure with contralateral mastopexy, and she is interested in this, as well as possibly removing her old implants..  We discussed that implants have at least a 50% chance of complications after irradiation, such as capsular contracture.      We discussed the following procedure:  RIGHT breast bracketed needle localized lumpectomy, possible oncplastics, possible implant removal.    She understands the risks of lumpectomy including bleeding, infection, injury to implant, seroma and 25% chance of positive margins.    NCCN guidelines have been followed.    She will receive a recommendation for radiation after surgery.     Her next routine mammogram is due 1-19-25 at [facility].    Jennifer Kelly MD      Today I spent 65 minutes doing the following: Reviewing records, labs, outside imaging and reports in preparation for the patient visit; obtaining medical history; performing the physical exam; counseling and educating the patient and any available family or caregivers; ordering medications, tests or procedures; coordinating care with any other physicians on her care team as needed, and documenting all of the above in the medical record as well as sending communications with her other healthcare professionals.      Next Appointment:  Return for surgery.      EMR Dragon/transcription disclaimer:    Please note that portions of this note were completed with a voice recognition program.

## 2024-04-15 ENCOUNTER — TELEMEDICINE - AUDIO (OUTPATIENT)
Dept: OTHER | Facility: HOSPITAL | Age: 74
End: 2024-04-15
Payer: MEDICARE

## 2024-04-15 NOTE — PROGRESS NOTES
OUTPATIENT ONCOLOGY NUTRITION ASSESSMENT    Patient Name: Sari Self  YOB: 1950  MRN: [id number]  Assessment Date: 4/15/2024    COMMENTS: Referral received for nutrition education. The patient will have surgery and then radiation. She needs to meet with plastic surgeon.  She gained some weight and is interested in meeting but we discussed possibly waiting until she starts radiation. Agreed to have information emailed so that she can review it. She will contact me for any questions and we will set up a time to meet at a later time.          Reason for Assessment Physician referral, Education      Diagnosis/Problem   New DCIS   Treatment Plan Radiation, Surgery       Medical/Surgical History Past Medical History:   Diagnosis Date    Allergic     Arthritis     Cerebral aneurysm     Cholelithiasis 2016    Gallbladder removed    Colon polyp     COVID-19 2020    Disease of thyroid gland     Dislocation, shoulder 1968    Dislocated several times    Diverticulosis     GERD (gastroesophageal reflux disease)     H/O cerebral aneurysm repair     History of 2019 novel coronavirus disease (COVID-19) 08/2020 AUGUST 2020 STATES VERY MILD SYMTOMS    Hyperlipidemia     Hypertension     Hyperthyroidism 2012?    Hypothyroidism     IBS (irritable bowel syndrome)     Knee swelling     Low back pain     OAB (overactive bladder)     Osteopenia     Osteoporosis     Right knee pain     RLS (restless legs syndrome)     Rotator cuff syndrome 1968    Tears    Stress fracture of right tibia     Stroke     Tear of meniscus of knee 2019 & 2020    Surgery both knees    TIA (transient ischemic attack)     2011    Torn meniscus     RIGHT KNEE    Urinary tract infection 06/01/2018       Past Surgical History:   Procedure Laterality Date    BRAIN SURGERY  2011    CARDIAC CATHETERIZATION      CATARACT EXTRACTION, BILATERAL      CHOLECYSTECTOMY      COLONOSCOPY  11/10/2010    prep of colon fair,IH,stool in transverse colon,hepatic  "flexure and ascending colon,ileum normal,IBS    COLONOSCOPY N/A 02/27/2018    IH, diverticulosis, one 5mm polyp, tubular adenoma with low grade dysplasia    COLONOSCOPY N/A 08/15/2023    Procedure: COLONOSCOPY into cecum/terminal ileum with polypectomy;  Surgeon: Finesse Reza MD;  [facility];  Service: Gastroenterology;  Laterality: N/A;  polyps    CYSTOSCOPY      ENDOSCOPY  11/27/2012    grd 1 reflux egitis,web upperd 3rd esoph    ENDOSCOPY N/A 02/27/2018    normal biopsies, H Pylori positive    EYE SURGERY  March 2020    Cararat removal both eyes    FOOT NEUROMA SURGERY Left     HAND SURGERY      HYSTERECTOMY  1985    IR CEREBRAL ANEURYSM COILING  2012    JOINT REPLACEMENT  March 2021    Partial left knee replacement    KNEE ARTHROPLASTY UNICOMPARTMENTAL Left 03/18/2022    Procedure: KNEE ARTHROPLASTY UNICOMPARTMENTAL;  Surgeon: Everton Zuniga MD;  Location: [facility] MAIN OR;  Service: Orthopedics;  Laterality: Left;    KNEE ARTHROSCOPY Left 07/03/2019    Procedure: KNEE ARTHROSCOPY ARTHRITIS DEBRIDEMENT PARTIAL MEDIAL AND LATERAL MENISECTOMY;  Surgeon: Nuris Bender MD;  Location: [facility] OR [facility];  Service: Orthopedics    KNEE ARTHROSCOPY Right 09/28/2020    Procedure: RIGHT KNEE ARTHROSCOPY, PARTIAL MEDIAL AND LATERAL MENISCECTOMIES, DEBRIDEMENT OF ARTHRITIS, AND INTERNAL FIXATION TIBAL PLATEAU INSUFFICIENCY FRACTURE;  Surgeon: Nuris Bender MD;  Location: [facility];  Service: Orthopedics;  Laterality: Right;    KNEE SURGERY  2019 & 2020    THUMB ARTHROSCOPY      TOTAL ABDOMINAL HYSTERECTOMY WITH SALPINGO OOPHORECTOMY      TRIGGER POINT INJECTION      Hand, elbow, shoulders, knees    UPPER GASTROINTESTINAL ENDOSCOPY  02/27/2018    WRIST SURGERY              Anthropometrics        Current Height Ht Readings from Last 1 Encounters:   04/12/24 160 cm (62.99\")      Current Weight Wt Readings from Last 1 Encounters:   04/12/24 79.4 kg (175 lb)      BMI  31   Ideal Body Weight (IBW) 115 lb "   Usual Body Weight (UBW) 155 lb   Weight Change/Trend Gain   Weight History Wt Readings from Last 30 Encounters:   04/12/24 0930 79.4 kg (175 lb)   04/09/24 0740 74.8 kg (165 lb)   02/05/24 0900 80 kg (176 lb 4.8 oz)   10/26/23 0854 72.6 kg (160 lb)   10/12/23 0930 74.8 kg (165 lb)   10/05/23 0856 74.8 kg (165 lb)   08/15/23 0656 75.9 kg (167 lb 4.8 oz)   08/14/23 1404 72.6 kg (160 lb)   06/27/23 0945 78 kg (172 lb)   05/24/23 0859 72.6 kg (160 lb)   05/05/23 1543 75.3 kg (166 lb)   03/28/23 0815 75.5 kg (166 lb 6.4 oz)   02/13/23 1619 71.7 kg (158 lb)   02/02/23 1343 76.2 kg (168 lb)   11/29/22 1340 72.5 kg (159 lb 14.4 oz)   09/12/22 1431 74.2 kg (163 lb 9.6 oz)   05/26/22 1344 71.7 kg (158 lb)   05/25/22 0928 72 kg (158 lb 11.2 oz)   03/31/22 0920 70.3 kg (155 lb)   03/18/22 1155 71.7 kg (158 lb 1.1 oz)   03/10/22 1445 71.7 kg (158 lb)   03/07/22 1644 71.7 kg (158 lb)   03/03/22 0925 74.8 kg (165 lb)   02/24/22 0851 71.7 kg (158 lb)   02/18/22 0805 76.8 kg (169 lb 6.4 oz)   01/05/22 1851 71.7 kg (158 lb)   01/04/22 0912 73.5 kg (162 lb)   11/18/21 0854 67.1 kg (148 lb)   08/31/21 1357 72.1 kg (159 lb)   08/18/21 0822 70.8 kg (156 lb 1.6 oz)   07/23/21 0948 68 kg (150 lb)          Medications           Current medications: COVID-19 mRNA Vac-Alli(Pfizer), atorvastatin, cetirizine, gabapentin, hydroCHLOROthiazide, levothyroxine, omeprazole, and potassium chloride                   Labs       Pertinent Labs    Results from last 7 days   Lab Units 04/09/24  0720   CREATININE mg/dL 1.00             COVID19   Date Value Ref Range Status   03/16/2022 Not Detected Not Detected - Ref. Range Final     Lab Results   Component Value Date    HGBA1C 5.70 (H) 02/05/2024            Electronically signed by:  Katy Kendrick RD, LD  04/15/24 15:44 EDT

## 2024-04-18 DIAGNOSIS — D05.11 DUCTAL CARCINOMA IN SITU OF RIGHT BREAST: Primary | ICD-10-CM

## 2024-04-19 ENCOUNTER — PATIENT OUTREACH (OUTPATIENT)
Dept: OTHER | Facility: HOSPITAL | Age: 74
End: 2024-04-19
Payer: MEDICARE

## 2024-04-19 NOTE — PROGRESS NOTES
Referral received from Dr. Kelly's office. I called Ms. Self and introduced myself and navigational services. She stated the consult with Dr. Kelly went well and she is leaning toward a mastectomy with reconstruction. She has a good understanding of her pathology and treatment options. She was able to verbalize teach back on her plan of care. She had questions about her plastic surgery consult and message sent to Dr. Kelly's office to find out status on that appointment.      She stated she has a wonderful support system with her son and daughters. She stated she lives with her son and her daughter lives in [place] but is planning on coming up to help care for her after surgery.  She stated she feels comfortable talking to them about needs or issues.      She stated she has no financial or transportation concerns at this time. She has no resource needs or ongoing concerns at this time. 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      She stated she has been anxious about her diagnosis and we discussed that can be normal. Also discussed we have support options if the need arises. She was thankful for the information.      We discussed integrative therapies and other [facility]. She will received a navigation folder with the following information in the mail:     [facility], Cancer and Restorative Exercise (CARE), [employer] Exercise program, Guide for the Newly Diagnosed, [employer], [facility], Massage Therapy, Reiki Therapy, [employer], Cancer Nutrition, and Survivorship Clinic.     She verbalized appreciation for navigational services and she has my contact information and will call with any questions that arise.

## 2024-04-24 ENCOUNTER — TELEPHONE (OUTPATIENT)
Dept: SURGERY | Facility: CLINIC | Age: 74
End: 2024-04-24
Payer: MEDICARE

## 2024-04-24 DIAGNOSIS — D05.11 BREAST NEOPLASM, TIS (DCIS), RIGHT: Primary | ICD-10-CM

## 2024-04-24 NOTE — TELEPHONE ENCOUNTER
Spoke with patient about the surgery and associated appointments with the surgery.    The patient verbalized an understanding of the dates and times.    PAT: 05/02/24 at 9:30 am  Surgery: 05/08/24, arriving at 7:30 am, Needle loc at 9:30 am and surgery to start at 11:30 am  POST OP: 05/20/24 at 9:30 am    Informed patient on waiting on consult date and give the info once Dr. Brambila  has given the date.    Surgery packet mailed today ( 04/24/24)    [facility]

## 2024-05-02 ENCOUNTER — PRE-ADMISSION TESTING (OUTPATIENT)
Dept: PREADMISSION TESTING | Facility: HOSPITAL | Age: 74
End: 2024-05-02
Payer: MEDICARE

## 2024-05-02 ENCOUNTER — HOSPITAL ENCOUNTER (OUTPATIENT)
Dept: GENERAL RADIOLOGY | Facility: HOSPITAL | Age: 74
Discharge: HOME | End: 2024-05-02
Payer: MEDICARE

## 2024-05-02 VITALS
OXYGEN SATURATION: 98 % | HEIGHT: 63 IN | WEIGHT: 179 LBS | HEART RATE: 69 BPM | SYSTOLIC BLOOD PRESSURE: 151 MMHG | BODY MASS INDEX: 31.71 KG/M2 | RESPIRATION RATE: 20 BRPM | DIASTOLIC BLOOD PRESSURE: 87 MMHG | TEMPERATURE: 97.2 F

## 2024-05-02 DIAGNOSIS — D05.11 BREAST NEOPLASM, TIS (DCIS), RIGHT: ICD-10-CM

## 2024-05-02 LAB
ANION GAP SERPL CALCULATED.3IONS-SCNC: 8 MMOL/L (ref 5–15)
BASOPHILS # BLD AUTO: 0.08 10*3/MM3 (ref 0–0.2)
BASOPHILS NFR BLD AUTO: 1.4 % (ref 0–1.5)
BUN SERPL-MCNC: 18 MG/DL (ref 8–23)
BUN/CREAT SERPL: 20.7 (ref 7–25)
CALCIUM SPEC-SCNC: 9.3 MG/DL (ref 8.6–10.5)
CHLORIDE SERPL-SCNC: 108 MMOL/L (ref 98–107)
CO2 SERPL-SCNC: 23 MMOL/L (ref 22–29)
CREAT SERPL-MCNC: 0.87 MG/DL (ref 0.57–1)
DEPRECATED RDW RBC AUTO: 41.4 FL (ref 37–54)
EGFRCR SERPLBLD CKD-EPI 2021: 70 ML/MIN/1.73
EOSINOPHIL # BLD AUTO: 0.27 10*3/MM3 (ref 0–0.4)
EOSINOPHIL NFR BLD AUTO: 4.8 % (ref 0.3–6.2)
ERYTHROCYTE [DISTWIDTH] IN BLOOD BY AUTOMATED COUNT: 12.5 % (ref 12.3–15.4)
GLUCOSE SERPL-MCNC: 96 MG/DL (ref 65–99)
HCT VFR BLD AUTO: 38.7 % (ref 34–46.6)
HGB BLD-MCNC: 12.3 G/DL (ref 12–15.9)
IMM GRANULOCYTES # BLD AUTO: 0.01 10*3/MM3 (ref 0–0.05)
IMM GRANULOCYTES NFR BLD AUTO: 0.2 % (ref 0–0.5)
LYMPHOCYTES # BLD AUTO: 2.08 10*3/MM3 (ref 0.7–3.1)
LYMPHOCYTES NFR BLD AUTO: 37.1 % (ref 19.6–45.3)
MCH RBC QN AUTO: 28.5 PG (ref 26.6–33)
MCHC RBC AUTO-ENTMCNC: 31.8 G/DL (ref 31.5–35.7)
MCV RBC AUTO: 89.8 FL (ref 79–97)
MONOCYTES # BLD AUTO: 0.6 10*3/MM3 (ref 0.1–0.9)
MONOCYTES NFR BLD AUTO: 10.7 % (ref 5–12)
NEUTROPHILS NFR BLD AUTO: 2.57 10*3/MM3 (ref 1.7–7)
NEUTROPHILS NFR BLD AUTO: 45.8 % (ref 42.7–76)
NRBC BLD AUTO-RTO: 0 /100 WBC (ref 0–0.2)
PLATELET # BLD AUTO: 306 10*3/MM3 (ref 140–450)
PMV BLD AUTO: 9.6 FL (ref 6–12)
POTASSIUM SERPL-SCNC: 4.3 MMOL/L (ref 3.5–5.2)
RBC # BLD AUTO: 4.31 10*6/MM3 (ref 3.77–5.28)
SODIUM SERPL-SCNC: 139 MMOL/L (ref 136–145)
WBC NRBC COR # BLD AUTO: 5.61 10*3/MM3 (ref 3.4–10.8)

## 2024-05-02 PROCEDURE — 80048 BASIC METABOLIC PNL TOTAL CA: CPT

## 2024-05-02 PROCEDURE — 93005 ELECTROCARDIOGRAM TRACING: CPT

## 2024-05-02 PROCEDURE — 85025 COMPLETE CBC W/AUTO DIFF WBC: CPT

## 2024-05-02 PROCEDURE — 36415 COLL VENOUS BLD VENIPUNCTURE: CPT

## 2024-05-02 PROCEDURE — 71046 X-RAY EXAM CHEST 2 VIEWS: CPT

## 2024-05-02 RX ORDER — PROMETHAZINE HYDROCHLORIDE 25 MG/1
1 TABLET ORAL EVERY 12 HOURS SCHEDULED
COMMUNITY
Start: 2024-04-27

## 2024-05-02 RX ORDER — GABAPENTIN 600 MG/1
600 TABLET ORAL 4 TIMES DAILY
COMMUNITY
Start: 2024-03-01

## 2024-05-02 RX ORDER — CEPHALEXIN 500 MG/1
500 CAPSULE ORAL 4 TIMES DAILY
COMMUNITY
Start: 2024-04-27

## 2024-05-02 RX ORDER — HYDROCODONE BITARTRATE AND ACETAMINOPHEN 5; 325 MG/1; MG/1
1 TABLET ORAL EVERY 4 HOURS PRN
COMMUNITY
Start: 2024-04-30

## 2024-05-02 NOTE — DISCHARGE INSTRUCTIONS
Take the following medications the morning of surgery:    Gabapentin   levothyroxine      If you are on prescription narcotic pain medication to control your pain you may also take that medication the morning of surgery.    General Instructions:  Do not eat solid food after midnight the night before surgery.  You may drink clear liquids day of surgery but must stop at least one hour before your hospital arrival time.  It is beneficial for you to have a clear drink that contains carbohydrates the day of surgery.  We suggest a 12 to 20 ounce bottle of Gatorade or Powerade for non-diabetic patients or a 12 to 20 ounce bottle of G2 or Powerade Zero for diabetic patients. (Pediatric patients, are not advised to drink a 12 to 20 ounce carbohydrate drink)    Clear liquids are liquids you can see through.  Nothing red in color.     Plain water                               Sports drinks  Sodas                                   Gelatin (Jell-O)  Fruit juices without pulp such as white grape juice and apple juice  Popsicles that contain no fruit or yogurt  Tea or coffee (no cream or milk added)  Gatorade / Powerade  G2 / Powerade Zero    Infants may have breast milk up to four hours before surgery.  Infants drinking formula may drink formula up to six hours before surgery.   Patients who avoid smoking, chewing tobacco and alcohol for 4 weeks prior to surgery have a reduced risk of post-operative complications.  Quit smoking as many days before surgery as you can.  Do not smoke, use chewing tobacco or drink alcohol the day of surgery.   If applicable bring your C-PAP/ BI-PAP machine in with you to preop day of surgery.  Bring any papers given to you in the doctor’s office.  Wear clean comfortable clothes.  Do not wear contact lenses, false eyelashes or make-up.  Bring a case for your glasses.   Bring crutches or walker if applicable.  Remove all piercings.  Leave jewelry and any other valuables at home.  Hair extensions with  metal clips must be removed prior to surgery.  The Pre-Admission Testing nurse will instruct you to bring medications if unable to obtain an accurate list in Pre-Admission Testing.        If you were given a blood bank ID arm band remember to bring it with you the day of surgery.    Preventing a Surgical Site Infection:  For 2 to 3 days before surgery, avoid shaving with a razor because the razor can irritate skin and make it easier to develop an infection.    Any areas of open skin can increase the risk of a post-operative wound infection by allowing bacteria to enter and travel throughout the body.  Notify your surgeon if you have any skin wounds / rashes even if it is not near the expected surgical site.  The area will need assessed to determine if surgery should be delayed until it is healed.  The night prior to surgery shower using a fresh bar of anti-bacterial soap (such as Dial) and clean washcloth.  Sleep in a clean bed with clean clothing.  Do not allow pets to sleep with you.  Shower on the morning of surgery using a fresh bar of anti-bacterial soap (such as Dial) and clean washcloth.  Dry with a clean towel and dress in clean clothing.  Ask your surgeon if you will be receiving antibiotics prior to surgery.  Make sure you, your family, and all healthcare providers clean their hands with soap and water or an alcohol based hand  before caring for you or your wound.    Day of surgery:5/8/2024       Your arrival time is approximately two hours before your scheduled surgery time.  Upon arrival, a Pre-op nurse and Anesthesiologist will review your health history, obtain vital signs, and answer questions you may have.  The only belongings needed at this time will be a list of your home medications and if applicable your C-PAP/BI-PAP machine.  A Pre-op nurse will start an IV and you may receive medication in preparation for surgery, including something to help you relax.     Please be aware that surgery  does come with discomfort.  We want to make every effort to control your discomfort so please discuss any uncontrolled symptoms with your nurse.   Your doctor will most likely have prescribed pain medications.      If you are going home after surgery you will receive individualized written care instructions before being discharged.  A responsible adult must drive you to and from the hospital on the day of your surgery and ideally stay with you through the night.   .  Discharge prescriptions can be filled by the hospital pharmacy during regular pharmacy hours.  If you are having surgery late in the day/evening your prescription may be e-prescribed to your pharmacy.  Please verify your pharmacy hours or chose a 24 hour pharmacy to avoid not having access to your prescription because your pharmacy has closed for the day.    If you are staying overnight following surgery, you will be transported to your hospital room following the recovery period.  [facility] has all private rooms.    If you have any questions please call Pre-Admission Testing at [phone].  Deductibles and co-payments are collected on the day of service. Please be prepared to pay the required co-pay, deductible or deposit on the day of service as defined by your plan.    Call your surgeon immediately if you experience any of the following symptoms:  Sore Throat  Shortness of Breath or difficulty breathing  Cough  Chills  Body soreness or muscle pain  Headache  Fever  New loss of taste or smell  Do not arrive for your surgery ill.  Your procedure will need to be rescheduled to another time.  You will need to call your physician before the day of surgery to avoid any unnecessary exposure to hospital staff as well as other patients.

## 2024-05-03 ENCOUNTER — PATIENT OUTREACH (OUTPATIENT)
Dept: OTHER | Facility: HOSPITAL | Age: 74
End: 2024-05-03
Payer: MEDICARE

## 2024-05-03 LAB
QT INTERVAL: 412 MS
QTC INTERVAL: 419 MS

## 2024-05-03 NOTE — PROGRESS NOTES
Called MsGio Self to see how she was doing. Left a message with my contact information and asked her to call back at her convenience.

## 2024-05-07 ENCOUNTER — PATIENT OUTREACH (OUTPATIENT)
Dept: OTHER | Facility: HOSPITAL | Age: 74
End: 2024-05-07
Payer: MEDICARE

## 2024-05-08 ENCOUNTER — APPOINTMENT (OUTPATIENT)
Dept: GENERAL RADIOLOGY | Facility: HOSPITAL | Age: 74
End: 2024-05-08
Payer: MEDICARE

## 2024-05-08 ENCOUNTER — ANESTHESIA EVENT (OUTPATIENT)
Dept: PERIOP | Facility: HOSPITAL | Age: 74
End: 2024-05-08
Payer: MEDICARE

## 2024-05-08 ENCOUNTER — APPOINTMENT (OUTPATIENT)
Dept: MAMMOGRAPHY | Facility: HOSPITAL | Age: 74
End: 2024-05-08
Payer: MEDICARE

## 2024-05-08 ENCOUNTER — ANCILLARY PROCEDURE (OUTPATIENT)
Dept: LAB | Facility: HOSPITAL | Age: 74
End: 2024-05-08
Payer: MEDICARE

## 2024-05-08 ENCOUNTER — ANESTHESIA (OUTPATIENT)
Dept: PERIOP | Facility: HOSPITAL | Age: 74
End: 2024-05-08
Payer: MEDICARE

## 2024-05-08 ENCOUNTER — HOSPITAL ENCOUNTER (OUTPATIENT)
Dept: ULTRASOUND IMAGING | Facility: HOSPITAL | Age: 74
Discharge: HOME | End: 2024-05-08
Payer: MEDICARE

## 2024-05-08 ENCOUNTER — HOSPITAL ENCOUNTER (OUTPATIENT)
Facility: HOSPITAL | Age: 74
Setting detail: HOSPITAL OUTPATIENT SURGERY
Discharge: HOME | End: 2024-05-08
Attending: SURGERY | Admitting: SURGERY
Payer: MEDICARE

## 2024-05-08 ENCOUNTER — TRANSCRIBE ORDERS (OUTPATIENT)
Dept: LAB | Facility: HOSPITAL | Age: 74
End: 2024-05-08
Payer: MEDICARE

## 2024-05-08 VITALS
TEMPERATURE: 97.7 F | SYSTOLIC BLOOD PRESSURE: 128 MMHG | DIASTOLIC BLOOD PRESSURE: 70 MMHG | RESPIRATION RATE: 16 BRPM | HEART RATE: 80 BPM | OXYGEN SATURATION: 95 %

## 2024-05-08 DIAGNOSIS — D05.11 DUCTAL CARCINOMA IN SITU (DCIS) OF RIGHT BREAST: Primary | ICD-10-CM

## 2024-05-08 DIAGNOSIS — D05.11 BREAST NEOPLASM, TIS (DCIS), RIGHT: ICD-10-CM

## 2024-05-08 DIAGNOSIS — D05.11 DUCTAL CARCINOMA IN SITU (DCIS) OF RIGHT BREAST: ICD-10-CM

## 2024-05-08 PROCEDURE — 25010000002 FENTANYL CITRATE (PF) 50 MCG/ML SOLUTION: Performed by: NURSE ANESTHETIST, CERTIFIED REGISTERED

## 2024-05-08 PROCEDURE — 25010000002 INDOCYANINE GREEN 25 MG RECONSTITUTED SOLUTION: Performed by: NURSE ANESTHETIST, CERTIFIED REGISTERED

## 2024-05-08 PROCEDURE — 25010000002 HYDROMORPHONE 1 MG/ML SOLUTION: Performed by: NURSE ANESTHETIST, CERTIFIED REGISTERED

## 2024-05-08 PROCEDURE — 19301 PARTIAL MASTECTOMY: CPT | Performed by: SURGERY

## 2024-05-08 PROCEDURE — 25010000002 LIDOCAINE 1 % SOLUTION: Performed by: ANESTHESIOLOGY

## 2024-05-08 PROCEDURE — 76098 X-RAY EXAM SURGICAL SPECIMEN: CPT

## 2024-05-08 PROCEDURE — 88305 TISSUE EXAM BY PATHOLOGIST: CPT | Performed by: SURGERY

## 2024-05-08 PROCEDURE — 25810000003 LACTATED RINGERS PER 1000 ML: Performed by: ANESTHESIOLOGY

## 2024-05-08 PROCEDURE — S0260 H&P FOR SURGERY: HCPCS | Performed by: SURGERY

## 2024-05-08 PROCEDURE — 25010000002 ONDANSETRON PER 1 MG: Performed by: NURSE ANESTHETIST, CERTIFIED REGISTERED

## 2024-05-08 PROCEDURE — 25010000002 FENTANYL CITRATE (PF) 50 MCG/ML SOLUTION: Performed by: ANESTHESIOLOGY

## 2024-05-08 PROCEDURE — 25010000002 PROPOFOL 10 MG/ML EMULSION: Performed by: ANESTHESIOLOGY

## 2024-05-08 PROCEDURE — 25010000002 LIDOCAINE 1 % SOLUTION: Performed by: SURGERY

## 2024-05-08 PROCEDURE — 88305 TISSUE EXAM BY PATHOLOGIST: CPT | Performed by: PLASTIC SURGERY

## 2024-05-08 PROCEDURE — 88341 IMHCHEM/IMCYTCHM EA ADD ANTB: CPT | Performed by: SURGERY

## 2024-05-08 PROCEDURE — 88302 TISSUE EXAM BY PATHOLOGIST: CPT | Performed by: SURGERY

## 2024-05-08 PROCEDURE — 25010000002 HYDROMORPHONE PER 4 MG: Performed by: NURSE ANESTHETIST, CERTIFIED REGISTERED

## 2024-05-08 PROCEDURE — 88342 IMHCHEM/IMCYTCHM 1ST ANTB: CPT | Performed by: SURGERY

## 2024-05-08 PROCEDURE — 25010000002 CLINDAMYCIN 900 MG/50ML SOLUTION: Performed by: SURGERY

## 2024-05-08 PROCEDURE — 25010000002 MIDAZOLAM PER 1 MG: Performed by: ANESTHESIOLOGY

## 2024-05-08 PROCEDURE — C1819 TISSUE LOCALIZATION-EXCISION: HCPCS

## 2024-05-08 PROCEDURE — 25010000002 GENTAMICIN PER 80 MG: Performed by: SURGERY

## 2024-05-08 PROCEDURE — 88360 TUMOR IMMUNOHISTOCHEM/MANUAL: CPT | Performed by: SURGERY

## 2024-05-08 PROCEDURE — 19328 RMVL INTACT BREAST IMPLANT: CPT | Performed by: SURGERY

## 2024-05-08 PROCEDURE — 88307 TISSUE EXAM BY PATHOLOGIST: CPT | Performed by: SURGERY

## 2024-05-08 PROCEDURE — 25810000003 LACTATED RINGERS PER 1000 ML: Performed by: SURGERY

## 2024-05-08 PROCEDURE — 25010000002 DEXAMETHASONE PER 1 MG: Performed by: ANESTHESIOLOGY

## 2024-05-08 DEVICE — KNOTLESS TISSUE CONTROL DEVICE, UNDYED UNIDIRECTIONAL (ANTIBACTERIAL) SYNTHETIC ABSORBABLE DEVICE
Type: IMPLANTABLE DEVICE | Site: BREAST | Status: FUNCTIONAL
Brand: STRATAFIX

## 2024-05-08 DEVICE — HORIZON TI SMALL RED 6 CLIPS/CART
Type: IMPLANTABLE DEVICE | Site: BREAST | Status: FUNCTIONAL
Brand: WECK

## 2024-05-08 DEVICE — HORIZON TI MED 6/CART
Type: IMPLANTABLE DEVICE | Site: BREAST | Status: FUNCTIONAL
Brand: WECK

## 2024-05-08 RX ORDER — LIDOCAINE HYDROCHLORIDE 10 MG/ML
6 INJECTION, SOLUTION INFILTRATION; PERINEURAL ONCE
Status: COMPLETED | OUTPATIENT
Start: 2024-05-08 | End: 2024-05-08

## 2024-05-08 RX ORDER — CLINDAMYCIN PHOSPHATE 900 MG/50ML
900 INJECTION, SOLUTION INTRAVENOUS ONCE
Status: COMPLETED | OUTPATIENT
Start: 2024-05-08 | End: 2024-05-08

## 2024-05-08 RX ORDER — HYDROCODONE BITARTRATE AND ACETAMINOPHEN 7.5; 325 MG/1; MG/1
1 TABLET ORAL EVERY 4 HOURS PRN
Status: DISCONTINUED | OUTPATIENT
Start: 2024-05-08 | End: 2024-05-08 | Stop reason: HOSPADM

## 2024-05-08 RX ORDER — DROPERIDOL 2.5 MG/ML
0.62 INJECTION, SOLUTION INTRAMUSCULAR; INTRAVENOUS
Status: DISCONTINUED | OUTPATIENT
Start: 2024-05-08 | End: 2024-05-08 | Stop reason: HOSPADM

## 2024-05-08 RX ORDER — SODIUM CHLORIDE, SODIUM LACTATE, POTASSIUM CHLORIDE, CALCIUM CHLORIDE 600; 310; 30; 20 MG/100ML; MG/100ML; MG/100ML; MG/100ML
9 INJECTION, SOLUTION INTRAVENOUS CONTINUOUS
Status: DISCONTINUED | OUTPATIENT
Start: 2024-05-08 | End: 2024-05-08 | Stop reason: HOSPADM

## 2024-05-08 RX ORDER — LABETALOL HYDROCHLORIDE 5 MG/ML
5 INJECTION, SOLUTION INTRAVENOUS
Status: DISCONTINUED | OUTPATIENT
Start: 2024-05-08 | End: 2024-05-08 | Stop reason: HOSPADM

## 2024-05-08 RX ORDER — ONDANSETRON 2 MG/ML
INJECTION INTRAMUSCULAR; INTRAVENOUS AS NEEDED
Status: DISCONTINUED | OUTPATIENT
Start: 2024-05-08 | End: 2024-05-08 | Stop reason: SURG

## 2024-05-08 RX ORDER — DIPHENHYDRAMINE HYDROCHLORIDE 50 MG/ML
12.5 INJECTION INTRAMUSCULAR; INTRAVENOUS
Status: DISCONTINUED | OUTPATIENT
Start: 2024-05-08 | End: 2024-05-08 | Stop reason: HOSPADM

## 2024-05-08 RX ORDER — HYDROCODONE BITARTRATE AND ACETAMINOPHEN 5; 325 MG/1; MG/1
1 TABLET ORAL ONCE AS NEEDED
Status: DISCONTINUED | OUTPATIENT
Start: 2024-05-08 | End: 2024-05-08 | Stop reason: HOSPADM

## 2024-05-08 RX ORDER — PROMETHAZINE HYDROCHLORIDE 25 MG/1
25 SUPPOSITORY RECTAL ONCE AS NEEDED
Status: DISCONTINUED | OUTPATIENT
Start: 2024-05-08 | End: 2024-05-08 | Stop reason: HOSPADM

## 2024-05-08 RX ORDER — SODIUM CHLORIDE 0.9 % (FLUSH) 0.9 %
3 SYRINGE (ML) INJECTION EVERY 12 HOURS SCHEDULED
Status: DISCONTINUED | OUTPATIENT
Start: 2024-05-08 | End: 2024-05-08 | Stop reason: HOSPADM

## 2024-05-08 RX ORDER — PROPOFOL 10 MG/ML
VIAL (ML) INTRAVENOUS AS NEEDED
Status: DISCONTINUED | OUTPATIENT
Start: 2024-05-08 | End: 2024-05-08 | Stop reason: SURG

## 2024-05-08 RX ORDER — SODIUM CHLORIDE, SODIUM LACTATE, POTASSIUM CHLORIDE, CALCIUM CHLORIDE 600; 310; 30; 20 MG/100ML; MG/100ML; MG/100ML; MG/100ML
100 INJECTION, SOLUTION INTRAVENOUS CONTINUOUS
Status: DISCONTINUED | OUTPATIENT
Start: 2024-05-08 | End: 2024-05-08 | Stop reason: HOSPADM

## 2024-05-08 RX ORDER — BUPIVACAINE HYDROCHLORIDE AND EPINEPHRINE 2.5; 5 MG/ML; UG/ML
INJECTION, SOLUTION INFILTRATION; PERINEURAL AS NEEDED
Status: DISCONTINUED | OUTPATIENT
Start: 2024-05-08 | End: 2024-05-08 | Stop reason: HOSPADM

## 2024-05-08 RX ORDER — LIDOCAINE HYDROCHLORIDE 10 MG/ML
0.5 INJECTION, SOLUTION INFILTRATION; PERINEURAL ONCE AS NEEDED
Status: COMPLETED | OUTPATIENT
Start: 2024-05-08 | End: 2024-05-08

## 2024-05-08 RX ORDER — FLUMAZENIL 0.1 MG/ML
0.2 INJECTION INTRAVENOUS AS NEEDED
Status: DISCONTINUED | OUTPATIENT
Start: 2024-05-08 | End: 2024-05-08 | Stop reason: HOSPADM

## 2024-05-08 RX ORDER — ONDANSETRON 2 MG/ML
4 INJECTION INTRAMUSCULAR; INTRAVENOUS ONCE AS NEEDED
Status: COMPLETED | OUTPATIENT
Start: 2024-05-08 | End: 2024-05-08

## 2024-05-08 RX ORDER — NALOXONE HCL 0.4 MG/ML
0.2 VIAL (ML) INJECTION AS NEEDED
Status: DISCONTINUED | OUTPATIENT
Start: 2024-05-08 | End: 2024-05-08 | Stop reason: HOSPADM

## 2024-05-08 RX ORDER — SODIUM CHLORIDE 0.9 % (FLUSH) 0.9 %
3-10 SYRINGE (ML) INJECTION AS NEEDED
Status: DISCONTINUED | OUTPATIENT
Start: 2024-05-08 | End: 2024-05-08 | Stop reason: HOSPADM

## 2024-05-08 RX ORDER — FENTANYL CITRATE 50 UG/ML
50 INJECTION, SOLUTION INTRAMUSCULAR; INTRAVENOUS ONCE AS NEEDED
Status: COMPLETED | OUTPATIENT
Start: 2024-05-08 | End: 2024-05-08

## 2024-05-08 RX ORDER — FAMOTIDINE 10 MG/ML
20 INJECTION, SOLUTION INTRAVENOUS ONCE
Status: COMPLETED | OUTPATIENT
Start: 2024-05-08 | End: 2024-05-08

## 2024-05-08 RX ORDER — EPHEDRINE SULFATE 50 MG/ML
5 INJECTION, SOLUTION INTRAVENOUS ONCE AS NEEDED
Status: DISCONTINUED | OUTPATIENT
Start: 2024-05-08 | End: 2024-05-08 | Stop reason: HOSPADM

## 2024-05-08 RX ORDER — DIAZEPAM 5 MG/1
10 TABLET ORAL ONCE
Status: COMPLETED | OUTPATIENT
Start: 2024-05-08 | End: 2024-05-08

## 2024-05-08 RX ORDER — PROMETHAZINE HYDROCHLORIDE 25 MG/1
25 TABLET ORAL ONCE AS NEEDED
Status: DISCONTINUED | OUTPATIENT
Start: 2024-05-08 | End: 2024-05-08 | Stop reason: HOSPADM

## 2024-05-08 RX ORDER — ROCURONIUM BROMIDE 10 MG/ML
INJECTION, SOLUTION INTRAVENOUS AS NEEDED
Status: DISCONTINUED | OUTPATIENT
Start: 2024-05-08 | End: 2024-05-08 | Stop reason: SURG

## 2024-05-08 RX ORDER — HYDROMORPHONE HYDROCHLORIDE 1 MG/ML
0.25 INJECTION, SOLUTION INTRAMUSCULAR; INTRAVENOUS; SUBCUTANEOUS
Status: DISCONTINUED | OUTPATIENT
Start: 2024-05-08 | End: 2024-05-08 | Stop reason: HOSPADM

## 2024-05-08 RX ORDER — DEXAMETHASONE SODIUM PHOSPHATE 4 MG/ML
INJECTION, SOLUTION INTRA-ARTICULAR; INTRALESIONAL; INTRAMUSCULAR; INTRAVENOUS; SOFT TISSUE AS NEEDED
Status: DISCONTINUED | OUTPATIENT
Start: 2024-05-08 | End: 2024-05-08 | Stop reason: SURG

## 2024-05-08 RX ORDER — LIDOCAINE 40 MG/G
1 CREAM TOPICAL AS NEEDED
Status: DISCONTINUED | OUTPATIENT
Start: 2024-05-08 | End: 2024-05-08

## 2024-05-08 RX ORDER — PHENYLEPHRINE HCL IN 0.9% NACL 1 MG/10 ML
SYRINGE (ML) INTRAVENOUS AS NEEDED
Status: DISCONTINUED | OUTPATIENT
Start: 2024-05-08 | End: 2024-05-08 | Stop reason: SURG

## 2024-05-08 RX ORDER — MIDAZOLAM HYDROCHLORIDE 1 MG/ML
0.5 INJECTION INTRAMUSCULAR; INTRAVENOUS
Status: COMPLETED | OUTPATIENT
Start: 2024-05-08 | End: 2024-05-08

## 2024-05-08 RX ORDER — HYDRALAZINE HYDROCHLORIDE 20 MG/ML
5 INJECTION INTRAMUSCULAR; INTRAVENOUS
Status: DISCONTINUED | OUTPATIENT
Start: 2024-05-08 | End: 2024-05-08 | Stop reason: HOSPADM

## 2024-05-08 RX ORDER — FENTANYL CITRATE 50 UG/ML
25 INJECTION, SOLUTION INTRAMUSCULAR; INTRAVENOUS
Status: DISCONTINUED | OUTPATIENT
Start: 2024-05-08 | End: 2024-05-08 | Stop reason: HOSPADM

## 2024-05-08 RX ORDER — IPRATROPIUM BROMIDE AND ALBUTEROL SULFATE 2.5; .5 MG/3ML; MG/3ML
3 SOLUTION RESPIRATORY (INHALATION) ONCE AS NEEDED
Status: DISCONTINUED | OUTPATIENT
Start: 2024-05-08 | End: 2024-05-08 | Stop reason: HOSPADM

## 2024-05-08 RX ORDER — INDOCYANINE GREEN AND WATER 25 MG
KIT INJECTION AS NEEDED
Status: DISCONTINUED | OUTPATIENT
Start: 2024-05-08 | End: 2024-05-08 | Stop reason: SURG

## 2024-05-08 RX ORDER — MAGNESIUM HYDROXIDE 1200 MG/15ML
LIQUID ORAL AS NEEDED
Status: DISCONTINUED | OUTPATIENT
Start: 2024-05-08 | End: 2024-05-08 | Stop reason: HOSPADM

## 2024-05-08 RX ADMIN — LIDOCAINE HYDROCHLORIDE 6 ML: 10 INJECTION, SOLUTION INFILTRATION; PERINEURAL at 09:45

## 2024-05-08 RX ADMIN — FENTANYL CITRATE 25 MCG: 50 INJECTION, SOLUTION INTRAMUSCULAR; INTRAVENOUS at 16:15

## 2024-05-08 RX ADMIN — HYDROMORPHONE HYDROCHLORIDE 0.25 MG: 1 INJECTION, SOLUTION INTRAMUSCULAR; INTRAVENOUS; SUBCUTANEOUS at 15:52

## 2024-05-08 RX ADMIN — FAMOTIDINE 20 MG: 10 INJECTION INTRAVENOUS at 10:30

## 2024-05-08 RX ADMIN — Medication 100 MCG: at 11:55

## 2024-05-08 RX ADMIN — CLINDAMYCIN PHOSPHATE 900 MG: 900 INJECTION, SOLUTION INTRAVENOUS at 11:14

## 2024-05-08 RX ADMIN — MIDAZOLAM 0.5 MG: 1 INJECTION INTRAMUSCULAR; INTRAVENOUS at 11:03

## 2024-05-08 RX ADMIN — ONDANSETRON 4 MG: 2 INJECTION INTRAMUSCULAR; INTRAVENOUS at 11:37

## 2024-05-08 RX ADMIN — MIDAZOLAM 0.5 MG: 1 INJECTION INTRAMUSCULAR; INTRAVENOUS at 10:31

## 2024-05-08 RX ADMIN — PROPOFOL 150 MG: 10 INJECTION, EMULSION INTRAVENOUS at 11:34

## 2024-05-08 RX ADMIN — INDOCYANINE GREEN AND WATER 12.5 MG: KIT at 14:50

## 2024-05-08 RX ADMIN — HYDROMORPHONE HYDROCHLORIDE 0.25 MG: 1 INJECTION, SOLUTION INTRAMUSCULAR; INTRAVENOUS; SUBCUTANEOUS at 16:41

## 2024-05-08 RX ADMIN — HYDROMORPHONE HYDROCHLORIDE 0.5 MG: 1 INJECTION, SOLUTION INTRAMUSCULAR; INTRAVENOUS; SUBCUTANEOUS at 15:04

## 2024-05-08 RX ADMIN — DIAZEPAM 10 MG: 5 TABLET ORAL at 08:35

## 2024-05-08 RX ADMIN — ROCURONIUM BROMIDE 20 MG: 10 INJECTION, SOLUTION INTRAVENOUS at 12:15

## 2024-05-08 RX ADMIN — INDOCYANINE GREEN AND WATER 12.5 MG: KIT at 14:27

## 2024-05-08 RX ADMIN — SODIUM CHLORIDE, POTASSIUM CHLORIDE, SODIUM LACTATE AND CALCIUM CHLORIDE: 600; 310; 30; 20 INJECTION, SOLUTION INTRAVENOUS at 11:35

## 2024-05-08 RX ADMIN — FENTANYL CITRATE 25 MCG: 50 INJECTION, SOLUTION INTRAMUSCULAR; INTRAVENOUS at 16:30

## 2024-05-08 RX ADMIN — ROCURONIUM BROMIDE 30 MG: 10 INJECTION, SOLUTION INTRAVENOUS at 13:41

## 2024-05-08 RX ADMIN — ONDANSETRON 4 MG: 2 INJECTION INTRAMUSCULAR; INTRAVENOUS at 18:25

## 2024-05-08 RX ADMIN — ROCURONIUM BROMIDE 30 MG: 10 INJECTION, SOLUTION INTRAVENOUS at 11:34

## 2024-05-08 RX ADMIN — LIDOCAINE HYDROCHLORIDE 80 ML: 10 INJECTION, SOLUTION INFILTRATION; PERINEURAL at 11:35

## 2024-05-08 RX ADMIN — DEXAMETHASONE SODIUM PHOSPHATE 4 MG: 4 INJECTION, SOLUTION INTRA-ARTICULAR; INTRALESIONAL; INTRAMUSCULAR; INTRAVENOUS; SOFT TISSUE at 11:35

## 2024-05-08 RX ADMIN — HYDROMORPHONE HYDROCHLORIDE 0.25 MG: 1 INJECTION, SOLUTION INTRAMUSCULAR; INTRAVENOUS; SUBCUTANEOUS at 16:00

## 2024-05-08 RX ADMIN — FENTANYL CITRATE 50 MCG: 50 INJECTION, SOLUTION INTRAMUSCULAR; INTRAVENOUS at 11:35

## 2024-05-08 RX ADMIN — SODIUM CHLORIDE, POTASSIUM CHLORIDE, SODIUM LACTATE AND CALCIUM CHLORIDE 9 ML/HR: 600; 310; 30; 20 INJECTION, SOLUTION INTRAVENOUS at 11:14

## 2024-05-08 RX ADMIN — FENTANYL CITRATE 50 MCG: 50 INJECTION, SOLUTION INTRAMUSCULAR; INTRAVENOUS at 13:13

## 2024-05-08 RX ADMIN — HYDROCODONE BITARTRATE AND ACETAMINOPHEN 1 TABLET: 7.5; 325 TABLET ORAL at 16:29

## 2024-05-08 NOTE — BRIEF OP NOTE
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  BILATERAL IMPLANT REMOVAL WITH CAPSULECTOMY, RIGHT ONCOPLASTIC CLOSURE LEFT BREAST REDUCTION FOR SYMMETRY, USE OF SPY              Surgeon(s):  Jennifer Kelly MD Palazzo, Michelle D, MD    Anesthesia: General    Staff:   Circulator: Katy Brown RN; Nkii Lacy RN; Harriet Nobles RN  Scrub Person: Candie Nation; Vani Raymundo PCT  Orientee: Ade Carrillo RN         Estimated Blood Loss:  50 cc    Urine Voided: 400 mL    Specimens:                Specimens       ID Source Type Tests Collected By Collected At Frozen?    A Breast, Right Tissue TISSUE PATHOLOGY EXAM   Jennifer Kelly MD 5/8/24 1215 No    Description: RIGHT BREAST LUMPECTOMY WITH IMPLANT, SHORT STITCH SUPERIOR, LONG STITCH LATERAL, DOUBLE STITCH DEEP    Comment: 640 GRAMS, FRESH FOR PERMANENT    B Breast, Right Tissue TISSUE PATHOLOGY EXAM   Jennifer Kelly MD 5/8/24 1216 No    Description: RIGHT BREAST NEW ANTERIOR MARGIN, STITCH MARKS TRUE MARGIN    C Breast, Right Tissue TISSUE PATHOLOGY EXAM   Jennifer Kelly MD 5/8/24 1223 No    Description: RIGHT BREAST NEW SUPERIOR MARGIN, STITCH MARKS TRUE MARGIN    D Breast, Right Tissue TISSUE PATHOLOGY EXAM   Jennifer Kelly MD 5/8/24 1223 No    Description: RIGHT BREAST NEW LATERAL MARGIN, STITCH MARKS TRUE MARGIN    E Breast, Right Tissue TISSUE PATHOLOGY EXAM   Jennifer Kelly MD 5/8/24 1224 No    Description: RIGHT BREAST NEW INFERIOR MARGIN, STITCH MARKS TRUE MARGIN    F Breast, Right Tissue TISSUE PATHOLOGY EXAM   Jennifer Kelly MD 5/8/24 1224 No    Description: RIGHT BREAST NEW  MEDIAL MARGIN, STITCH ANDRES TRUE MARGIN    G Breast, Right Tissue TISSUE PATHOLOGY EXAM   Jennifer Kelly MD 5/8/24 1224 No    Description: RIGHT BREAST NEW POSTERIOR MARGIN, STITCH MARKS TRUE MARGIN    H Breast, Left Tissue TISSUE PATHOLOGY EXAM   Lisa Brambila MD 5/8/24 1242 No    Description: LEFT BREAST TISSUE    Comment: FRESH FOR PERMANENT, 305 GRAMS    I Breast, Left Tissue TISSUE PATHOLOGY EXAM   Jennifer Kelly MD 5/8/24 1247 No    Description: LEFT BREAST CAPSULE AND IMPLANT    Comment: 300ML IMPLANT    J Breast, Right Tissue TISSUE PATHOLOGY EXAM   Jennifer Kelly MD 5/8/24 1427 No    Description: RIGHT BREAST ADDITIONAL TISSUE 80 GRAMS. FRESH FOR PERMAENT    Comment: FRESH FOR PERMANENT.                   Drains:   Closed/Suction Drain 1 Inferior;Lateral;Left Breast Bulb 15 Fr. (Active)   Dressing Status Clean;Dry;Other (Comment) 05/08/24 1501       Closed/Suction Drain 2 Inferior;Lateral;Right Breast Bulb 15 Fr. (Active)   Dressing Status Clean;Dry;Other (Comment) 05/08/24 1500       Urethral Catheter Silicone 16 Fr. (Active)       Findings: R nipple with on/off sluggish refill/return, spied x2 with inflow  B calcified capsules around saline breast implants approximately 300 cc each      Complications: none          Lisa Brambila MD     Date: 5/8/2024  Time: 15:22 EDT

## 2024-05-08 NOTE — H&P
There are no changes in the history or physical exam, aside from any that are listed as an addendum at the bottom of this document.   Today, patient will go for the surgery listed in the plan below.     Chief Complaint: Sari Self is a 73 y.o. female who was seen in consultation at the request of Nurys Edwards MD  for newly diagnosed DCIS    History of Present Illness:  Patient presents with newly diagnosed DCIS. She noted no new masses, skin changes, nipple discharge, nipple changes prior to her most recent imaging.  Her most recent imaging includes the followin2021, Bilateral Diagnostic MMG w/Satya ([facility])  Scattered areas of fibroglandular density.  There are bilateral pre-pectoral saline implants. Follow up oval mass right breast, 8:30 o'clock. This completes two years of follow-up.  There are distorted implants seen in both breasts.  Right Breast Ultrasound  Finding 1: 8:30 right breast consistent with an intramammary lymph node.  BI-RADS 2: Benign Finding    2022, Bilateral Screening MMG w/Satya ([facility])  Scattered areas of fibroglandular density.  There are bilateral pre-pectoral saline implants.  BI-RADS 1: Negative    2024, Bilateral Screening MMG w/Satya ([facility])  Scattered areas of fibroglandular density.  There are bilateral pre-pectoral saline implants.  There is an asymmetry measuring 7 mm seen in the CC view only seen in the middle one third of the right breast in the central region and in the sub areolar region.  BI-RADS 0    2024, RIGHT Diagnostic MMG w/Satya ([facility])  Scattered areas of fibroglandular density.  There are bilateral pre-pectoral saline implants.  Additional evaluation was performed for the asymmetry in the right breast, central small focal asymmetry measuring 4 mm middle one third 6:30 o'clock region of the right breast located 5 cm from the nipple. This is new since prior exam(s).   Right Breast Ultrasound  There is no sonographic  correlate.  IMPRESSION:  BI-RADS 4     She then had the following biopsy:    03/04/24  [facility] STEREOTACTIC BREAST BIOPSY   Right breast 6:30 11 core needle biopsy 9 gauge a top hat shaped s-leonardo eviva biopsy clip was deployed. Clip in the expected location. Pathology is malignant and concordant.       03/04/2024, Stereotactic Biopsy ([facility])  Right breast 6:30, 11 core needle biopsy, 9-gauge.  Top hat shaped s-leonardo eviva biopsy clip was deployed, clip in the expected location. Pathology is malignant and concordant.    03/04/2024, Surgical Pathology Report ([facility])  1.  Right breast at 6:30 o'clock, (focal asymmetry with calcification), stereotactic core biopsy:               A.  Ductal carcinoma in situ (DCIS):                            1.  Low-grade cribriform and focal solid DCIS.                            2.  Scattered foci of DCIS span at least 3 mm. In single greatest dimension.                            3.  Focal microcalcification present within DCIS.               B.  No invasive carcinoma identified.    ER, 91-10%  MA, %  KI-67 13%     I then arranged for a MRI  03/15/2024, MRI Breast Bilateral  Middle third right breast 6 o'clock 4.5 cm from the nipple there is a post biopsy cavity that measures on the order of 2.2 cm. This represents the biopsy proven site of malignancy with the internal top hat shaped metallic clip.  4 cm posterior to the biopsy cavity non mass enhancement that measures 1.3 cm. No mammographic correlate. 5 mm inferior to the inferior margin of a retro glandular saline implant. No evidence for right axillary or internal chain adenopathy. Intact bilateral retro glandular saline implants are noted.  IMPRESSION  At the 6 o'clock position on the order of 4 cm posterior to the biopsy proven malignancy there is a 1.3 cm area of suspicious non mass enhancement. No mammographic correlate is appreciated.   Further evaluation with an MRI guided right breast biopsy and/or surgical excision of the  tissue up to 4 cm posterior to the top hat shaped metallic clip is recommended.  No findings suspicious for malignancy in the left breast.     I then arranged for MRi guided biopsy:    4/11/24 [facility] MRI BREAST BIOPSY  JEANETTE OLSON   A 9 gauge, 9 core samples. Infinity clip was deployed at the biopsy site. Infinity clip at the expected location. Only visualized on nondisplaced implant views. The infinity clip and the TOPHAT clip at the site of biopsy proven right breast malignancy are  by approximately 5 cm. Pathology is malignancy and concordant.     4/9/24 [facility] PATHOLOGY   Breast, right 6 o'clock position, MRI guided core biopsy:  A.  Ductal carcinoma in situ (DCIS), low to intermediate nuclear grade with apocrine features, cribriform and solid architecture, measuring up to 6 mm maximally and involving up to 7 core fragments.  ER POSITIVE %  AL POSITIVE %  KI-67 17%      She had not had a breast biopsy in the past.  She has had her uterus and ovaries removed, is postmenopausal, and takes no hormones. She took HRt for 10-15 years remotely  Her family history includes the following: no Fh breast or ovarian cancer  She is here for evaluation.    Review of Systems:  Review of Systems   Constitutional:  Positive for unexpected weight change (PATIENT REPORTS A 20 LB WT GAIN).   Gastrointestinal:  Positive for diarrhea (IBS).   Genitourinary:  Positive for frequency.    Musculoskeletal:         LIMITED ROM RT SHOULDER   Hematological:  Bruises/bleeds easily.   Psychiatric/Behavioral:  Positive for sleep disturbance.    All other systems reviewed and are negative.       Past Medical and Surgical History:  Breast Biopsy History:  Patient had not had a breast biopsy prior to her cancer diagnosis.  Breast Cancer HIstory:  Patient does not have a past medical history of breast cancer.  Breast Operations, and year:  NONE   Obstetric/Gynecologic History:  Age menstrual periods began: 13  Patient is  postmenopausal due to removal of her uterus in the following year:1985   Number of pregnancies:6  Number of live births: 2  Number of abortions or miscarriages: 1  Age of delivery of first child: 18  Patient breast fed, for the following lenth of time: 60 MONTHS   Length of time taking birth control pills: SHORT TIME   Patient took hormone replacement during the following dates: 15-20 YRS     Past Surgical History:   Procedure Laterality Date    BRAIN SURGERY  2011    CARDIAC CATHETERIZATION      CATARACT EXTRACTION, BILATERAL      CHOLECYSTECTOMY      COLONOSCOPY  11/10/2010    prep of colon fair,IH,stool in transverse colon,hepatic flexure and ascending colon,ileum normal,IBS    COLONOSCOPY N/A 02/27/2018    IH, diverticulosis, one 5mm polyp, tubular adenoma with low grade dysplasia    COLONOSCOPY N/A 08/15/2023    Procedure: COLONOSCOPY into cecum/terminal ileum with polypectomy;  Surgeon: Finesse Reza MD;  Location: [facility] ENDOSCOPY;  Service: Gastroenterology;  Laterality: N/A;  polyps    CYSTOSCOPY      ENDOSCOPY  11/27/2012    grd 1 reflux egitis,web upperd 3rd esoph    ENDOSCOPY N/A 02/27/2018    normal biopsies, H Pylori positive    EYE SURGERY  March 2020    Cararat removal both eyes    FOOT NEUROMA SURGERY Left     HAND SURGERY      HYSTERECTOMY  1985    IR CEREBRAL ANEURYSM COILING  2012    JOINT REPLACEMENT  March 2021    Partial left knee replacement    KNEE ARTHROPLASTY UNICOMPARTMENTAL Left 03/18/2022    Procedure: KNEE ARTHROPLASTY UNICOMPARTMENTAL;  Surgeon: Everton Zuniga MD;  Location: [facility] OR;  Service: Orthopedics;  Laterality: Left;    KNEE ARTHROSCOPY Left 07/03/2019    Procedure: KNEE ARTHROSCOPY ARTHRITIS DEBRIDEMENT PARTIAL MEDIAL AND LATERAL MENISECTOMY;  Surgeon: Nuris Bender MD;  Location: [facility];  Service: Orthopedics    KNEE ARTHROSCOPY Right 09/28/2020    Procedure: RIGHT KNEE ARTHROSCOPY, PARTIAL MEDIAL AND LATERAL MENISCECTOMIES, DEBRIDEMENT OF ARTHRITIS, AND  INTERNAL FIXATION TIBAL PLATEAU INSUFFICIENCY FRACTURE;  Surgeon: Nuris Bender MD;  [place] [facility] [place] [facility]  Service: Orthopedics;  Laterality: Right;    KNEE SURGERY  2019 & 2020    THUMB ARTHROSCOPY      TOTAL ABDOMINAL HYSTERECTOMY WITH SALPINGO OOPHORECTOMY      TRIGGER POINT INJECTION      Hand, elbow, shoulders, knees    UPPER GASTROINTESTINAL ENDOSCOPY  02/27/2018    WRIST SURGERY       Past Medical History:   Diagnosis Date    Allergic     Arthritis     Cerebral aneurysm     Cholelithiasis 2016    Gallbladder removed    Colon polyp     COVID-19 2020    Disease of thyroid gland     Dislocation, shoulder 1968    Dislocated several times    Diverticulosis     GERD (gastroesophageal reflux disease)     H/O cerebral aneurysm repair     History of 2019 novel coronavirus disease (COVID-19) 08/2020 AUGUST 2020 STATES VERY MILD SYMTOMS    Hyperlipidemia     Hypertension     Hyperthyroidism 2012?    Hypothyroidism     IBS (irritable bowel syndrome)     Knee swelling     Low back pain     OAB (overactive bladder)     Osteopenia     Osteoporosis     Right knee pain     RLS (restless legs syndrome)     Rotator cuff syndrome 1968    Tears    Stress fracture of right tibia     Stroke     Tear of meniscus of knee 2019 & 2020    Surgery both knees    TIA (transient ischemic attack)     2011    Torn meniscus     RIGHT KNEE    Urinary tract infection 06/01/2018       Prior Hospitalizations, other than for surgery or childbirth, and year:  CEREBRAL ANEURYSM 2011    Social History     Socioeconomic History    Marital status:    Tobacco Use    Smoking status: Never    Smokeless tobacco: Never   Vaping Use    Vaping status: Never Used   Substance and Sexual Activity    Alcohol use: Yes     Alcohol/week: 1.0 standard drink of alcohol     Types: 1 Glasses of wine per week     Comment: Occasionally    Drug use: Never    Sexual activity: Not Currently     Partners: Male     Birth control/protection: Hysterectomy  "    Patient is .  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    Family History:  Family History   Problem Relation Age of Onset    Irritable bowel syndrome Daughter     Berenice Hyperthermia Neg Hx        Vital Signs:  /82 (BP Location: Left arm, Patient Position: Sitting, Cuff Size: Adult)   Pulse 72   Ht 160 cm (62.99\")   Wt 79.4 kg (175 lb)   LMP  (LMP Unknown)   SpO2 97%   BMI 31.01 kg/m²      Medications:    Current Outpatient Medications:     atorvastatin (LIPITOR) 20 MG tablet, Take 1 tablet by mouth Every Night., Disp: 90 tablet, Rfl: 1    EQ Allergy Relief, Cetirizine, 10 MG tablet, Take 1 tablet by mouth once daily, Disp: 90 tablet, Rfl: 0    gabapentin (NEURONTIN) 600 MG tablet, TAKE 1 TABLET BY MOUTH 4 TIMES DAILY . DO NOT EXCEED 4 PER 24 HOURS, Disp: , Rfl:     hydroCHLOROthiazide (HYDRODIURIL) 12.5 MG tablet, Take 1 tablet by mouth Daily., Disp: 90 tablet, Rfl: 0    levothyroxine (SYNTHROID, LEVOTHROID) 75 MCG tablet, Take 1 tablet by mouth Every Morning., Disp: 90 each, Rfl: 1    omeprazole (priLOSEC) 40 MG capsule, Take 1 capsule by mouth Daily., Disp: 90 capsule, Rfl: 3    potassium chloride 10 MEQ CR tablet, Take 1 tablet by mouth Daily., Disp: 90 each, Rfl: 0    COVID-19 mRNA Vac-Alli,Pfizer, (COMIRNATY IM SUSPENSION PREFILLED SYRINGE 30 MCG/0.3ML), Inject  into the appropriate muscle as directed by prescriber. (Patient not taking: Reported on 10/26/2023), Disp: 0.3 mL, Rfl: 0  No current facility-administered medications for this visit.    Facility-Administered Medications Ordered in Other Visits:     Chlorhexidine Gluconate Cloth 2 % pads, , Apply externally, BID, Everton Zuniga MD     Allergies:  Allergies   Allergen Reactions    Penicillins Hives       Physical Examination:  /82 (BP Location: Left arm, Patient Position: Sitting, Cuff Size: Adult)   Pulse 72   Ht 160 cm (62.99\")   Wt 79.4 " kg (175 lb)   LMP  (LMP Unknown)   SpO2 97%   BMI 31.01 kg/m²   General Appearance:  Patient is in no distress.  She is well kept and has an overweight build.   Psychiatric:  Patient with appropriate mood and affect. Alert and oriented to self, time, and place.    Breast, RIGHT:  large sized, augmented, symmetric with the contralateral side. Capsular contracture noted on the RIGHT.  Breast skin is without erythema, edema, rashes.  There are no visible abnormalities upon inspection during the arm-raising maneuver or with hands on hips in the sitting position. There is no nipple retraction, discharge or nipple/areolar skin changes.There are no masses palpable in the sitting or supine positions. Mild healing ecchymoses RIGHT and well healing mammotomy.    Breast, LEFT:  large sized, augmented, symmetric with the contralateral side.  Breast skin is without erythema, edema, rashes.  There are no visible abnormalities upon inspection during the arm-raising maneuver or with hands on hips in the sitting position. There is no nipple retraction, discharge or nipple/areolar skin changes.There are no masses palpable in the sitting or supine positions.    Lymphatic:  There is no axillary, cervical, infraclavicular, or supraclavicular adenopathy bilaterally.  Eyes:  Pupils are round and reactive to light.  Cardiovascular:  Heart rate and rhythm are regular.  Respiratory:  Lungs are clear bilaterally with no crackles or wheezes in any lung field.  Gastrointestinal:  Abdomen is soft, nondistended, and nontender.     Musculoskeletal:  Good strength in all 4 extremities.   There is good range of motion in both shoulders.    Skin:  No new skin lesions or rashes on the skin excluding the breast (see breast exam above).        Imagin2018, Bilateral Screening MMG w/Satya ([facility])  There are scattered areas of fibroglandular density.  There are bilateral pre-pectoral saline implants.  Finding 1: New equal density, oval mass  measuring 9 mm posterior one third inferior region of the right breast.  IMPRESSION:  BI-RADS 0    01/07/2019, Right Diagnostic MMG w/Satya ([facility])  Scattered areas of fibroglandular density.  Additional evaluation was performed for the oval mass in the right breast, inferior posterior one-third 8:30 o'clock region of the right breast located 9 cm from the nipple.  Right Breast Ultrasound  Ultrasound is suggestive of an oval intramammary lymph node with well defined, thin margins measuring 9 x 3 x 7 x mm. This corresponds to the mammographic finding.  IMPRESSION:  BI-RADS 3    07/19/2019, Right Diagnostic MMG w/Satya ([facility])  Scattered areas of fibroglandular density.  Follow-up oval mass in the right breast, 8:30 o'clock. There has been no significant change.  Right Breast Ultrasound:  A small stable intramammary lymph node project at 9:00, 10 cmfn.  BI-RADS 3    01/23/2020, Bilateral Diagnostic MMG w/Satya ([facility])  Scattered areas of fibroglandular density. Follow-up oval mass in the right breast, 8:30 o'clock. Bilateral pre pectoral saline implants with capsule calcifications are unchanged.  Right Breast Ultrasound  Demonstrates an oval elongated mass 9:00 10 cm from the nipple measuring 8 x 3 x 4 mm. this is unchanged. A follow-up mammogram and ultrasound in 6 months is recommended.  BI-RADS 3    09/22/2020, Right Breast Digital Diagnostic MMG w/Satya ([facility])  Scattered areas of fibroglandular density.  There are bilateral pre-pectoral saline implants.  Finding 1: Follow up oval mass right breast, 8:30 o'clock.  Finding 2: There is a distorted implant seen in the right breast an area of calcification associated with the right implant.  Right Breast Ultrasound  Finding 1: An oval elongated mass 4 x 2 x 4 mm posterior one third 8:30 right breast, mass has decreased in size.  BI-RADS 3    03/31/2021, Bilateral Diagnostic MMG w/Satya ([facility])  Scattered areas of fibroglandular density.  There are bilateral pre-pectoral saline  implants. Follow up oval mass right breast, 8:30 o'clock. This completes two years of follow-up.  There are distorted implants seen in both breasts.  Right Breast Ultrasound  Finding 1: 8:30 right breast consistent with an intramammary lymph node.  BI-RADS 2: Benign Finding    05/11/2022, Bilateral Screening MMG w/Satya ([facility])  Scattered areas of fibroglandular density.  There are bilateral pre-pectoral saline implants.  BI-RADS 1: Negative    01/18/2024, Bilateral Screening MMG w/Satya ([facility])  Scattered areas of fibroglandular density.  There are bilateral pre-pectoral saline implants.  There is an asymmetry measuring 7 mm seen in the CC view only seen in the middle one third of the right breast in the central region and in the sub areolar region.  BI-RADS 0    02/05/2024, RIGHT Diagnostic MMG w/Satya ([facility])  Scattered areas of fibroglandular density.  There are bilateral pre-pectoral saline implants.  Additional evaluation was performed for the asymmetry in the right breast, central small focal asymmetry measuring 4 mm middle one third 6:30 o'clock region of the right breast located 5 cm from the nipple. This is new since prior exam(s).   Right Breast Ultrasound  There is no sonographic correlate.  IMPRESSION:  BI-RADS 4    03/04/24  [facility] STEREOTACTIC BREAST BIOPSY   Right breast 6:30 11 core needle biopsy 9 gauge a top hat shaped s-leonardo eviva biopsy clip was deployed. Clip in the expected location. Pathology is malignant and concordant.     03/15/2024, MRI Breast Bilateral  Middle third right breast 6 o'clock 4.5 cm from the nipple there is a post biopsy cavity that measures on the order of 2.2 cm. This represents the biopsy proven site of malignancy with the internal top hat shaped metallic clip.  4 cm posterior to the biopsy cavity non mass enhancement that measures 1.3 cm. No mammographic correlate. 5 mm inferior to the inferior margin of a retro glandular saline implant. No evidence for right axillary or internal  chain adenopathy. Intact bilateral retro glandular saline implants are noted.  IMPRESSION  At the 6 o'clock position on the order of 4 cm posterior to the biopsy proven malignancy there is a 1.3 cm area of suspicious non mass enhancement. No mammographic correlate is appreciated.   Further evaluation with an MRI guided right breast biopsy and/or surgical excision of the tissue up to 4 cm posterior to the top hat shaped metallic clip is recommended.  No findings suspicious for malignancy in the left breast.    Pathology:  03/04/2024, Stereotactic Biopsy ([facility])  Right breast 6:30, 11 core needle biopsy, 9-gauge.  Top hat shaped s-leonardo eviva biopsy clip was deployed, clip in the expected location. Pathology is malignant and concordant.    03/04/2024, Surgical Pathology Report ([facility])  1.  Right breast at 6:30 o'clock, (focal asymmetry with calcification), stereotactic core biopsy:               A.  Ductal carcinoma in situ (DCIS):                            1.  Low-grade cribriform and focal solid DCIS.                            2.  Scattered foci of DCIS span at least 3 mm. In single greatest dimension.                            3.  Focal microcalcification present within DCIS.               B.  No invasive carcinoma identified.    ER, 91-10%  VA, %  KI-67 13%    4/11/24 [facility] MRI BREAST BIOPSY  JEANETTE D ENGLISH   A 9 gauge, 9 core samples. Infinity clip was deployed at the biopsy site. Infinity clip at the expected location. Only visualized on nondisplaced implant views. The infinity clip and the TOPHAT clip at the site of biopsy proven right breast malignancy are  by approximately 5 cm. Pathology is malignancy and concordant.     4/9/24 [facility] PATHOLOGY   Breast, right 6 o'clock position, MRI guided core biopsy:  A.  Ductal carcinoma in situ (DCIS), low to intermediate nuclear grade with apocrine features, cribriform and solid architecture, measuring up to 6 mm maximally and involving up to 7 core  fragments.  ER POSITIVE %  FL POSITIVE %  KI-67 17%    Procedures:      Assessment:   Diagnosis Plan   1. Breast neoplasm, Tis (DCIS), right  Ambulatory Referral to Nutrition Services      2. Abnormal MRI, breast        3. History of hormone replacement therapy        4. Class 1 obesity due to excess calories with serious comorbidity and body mass index (BMI) of 31.0 to 31.9 in adult  Ambulatory Referral to Nutrition Services      5. Recent weight gain  Ambulatory Referral to Nutrition Services      6. History of cerebral aneurysm        7. History of breast augmentation        1-2  RIGHT breast LOQ. 4mm on mammogram middle 1/3 6:30, 5 CFN. Tophat marker in good position  DCIS, low grade, cribiform and solid, 3mm in core, no invasive component  ER , [place] , ki 67 13%    MRi showed additional lesion 5 cm from the index posteriorly- 1.3 cm region. Infinity marker in good position.  DCIS, low to intermediate grade, cribiform and solid  ER , [place] , ki 67 is 17%    The 2 markers are 5 cm apart AP.    Clinical stage TisN0- stage 0    3-  10-15 years HRT  after menopause- off since  around 2002    4-5  BMI 31, with recent 20# weight gain since , related to life stressors    6-  Past cerebral aneurysm with coiling by Dr Yang- on aspirin for some time, now off blood thinners      7-  Prepectoral saline implants    Plan:  The patient goes by Sarah.  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    We reviewed the difference in systemic therapy versus locoregional. We discussed that she will be seeing a medical oncologist in the adjuvant setting. We discussed that for DCIS, that we would expect a 98% breast cancer specific survival and that the medications offered by medical oncology are for risk reduction for additional breast cancers and for a reduction in ipsilateral locoregional recurrence.    We also discussed , similarly,  maintaining a healthy body weight to reduce estrogen levels. She was interested in talking with nutrition and we will arrange for this consultation.       We discussed that for her DCIS, there are 2 options for locoregional treatment that have equivalent survival: total mastectomy versus lumpectomy and radiation, the former with sentinel node biopsy.     She is interested in breast conservation.     We discussed the option of oncoplastic closure with contralateral mastopexy, and she is interested in this, as well as possibly removing her old implants..  We discussed that implants have at least a 50% chance of complications after irradiation, such as capsular contracture.      We discussed the following procedure:  RIGHT breast bracketed needle localized lumpectomy, possible oncplastics, possible implant removal.    She understands the risks of lumpectomy including bleeding, infection, injury to implant, seroma and 25% chance of positive margins.    NCCN guidelines have been followed.    She will receive a recommendation for radiation after surgery.     Her next routine mammogram is due 1-19-25 at [facility].    Jennifer Kelly MD      Today I spent 65 minutes doing the following: Reviewing records, labs, outside imaging and reports in preparation for the patient visit; obtaining medical history; performing the physical exam; counseling and educating the patient and any available family or caregivers; ordering medications, tests or procedures; coordinating care with any other physicians on her care team as needed, and documenting all of the above in the medical record as well as sending communications with her other healthcare professionals.      Next Appointment:  Return for surgery.      EMR Dragon/transcription disclaimer:    Please note that portions of this note were completed with a voice recognition program.

## 2024-05-09 RX ORDER — OMEPRAZOLE 40 MG/1
40 CAPSULE, DELAYED RELEASE ORAL DAILY
Qty: 90 CAPSULE | Refills: 3 | Status: SHIPPED | OUTPATIENT
Start: 2024-05-09

## 2024-05-09 NOTE — OP NOTE
Operative note    Preoperative Diagnosis: DCIS    Postoperative Diagnosis: DCIS    Procedure Performed: right needle localized lumpectomy with removal of calcified implant en bloc with lumpectomy, as posterior margin of the specimen,  with oncoplastic closure to follow,  followed by a contralateral mastopexy..    Dictating physician and surgeon: Jennifer Kelly MD    Plastic Surgeon: Lisa Brambila MD    EBL:20cc           FINDINGS AND DESCRIPTION OF PROCEDURE:     The patient was brought to the operating room and placed on the table in the supine position. After adequate general ETT anesthesia was obtained, we sterilely prepped and draped the breast and axilla. We did a time out to identify correct patient and correct operative site.  She did receive IV antibiotic within an hour of and prior to incision.       I marked the intended area for resection and planned my incision based on the mammographic localization imaging. The localization needles were the following length: 5cm and 7.5cm     I included an ellipse of skin overlying the tumor for margins and orientation. This skin fell within the reduction pattern incision skin inferiorly    I used a 10 blade to incise the skin. I then dissected using bovie electrocautery superiorly, inferiorly, medially and laterally around the lesion. I removed the densely calcified implant en block with the lumpectomy specimen and 2 sires. The implant went back to pectoralis.  I examined the specimen using the intraoperative faxitron to document the presence of the lesion within the specimen.I then took the following additional margins: superior, inferior, medial, lateral, anterior, posterior.     I then passed the specimens and labelled as follows: For the lumpectomy,  long stitch lateral short stitch superior, double stitch deep. For the margins, stitch marks true margin.    I then irrigated, assured hemostasis.     At this time, plastic surgery continued the oncoplastic  portion of the case.  This will be dictated separately.    She tolerated the procedure well, there were no immediate complications, and all counts were correct at the end of my portion of the case.

## 2024-05-11 LAB
LAB AP CASE REPORT: NORMAL
LAB AP CASE REPORT: NORMAL
LAB AP CLINICAL INFORMATION: NORMAL
LAB AP CLINICAL INFORMATION: NORMAL
LAB AP DIAGNOSIS COMMENT: NORMAL
LAB AP DIAGNOSIS COMMENT: NORMAL
LAB AP SPECIAL STAINS: NORMAL
LAB AP SPECIAL STAINS: NORMAL
LAB AP SYNOPTIC CHECKLIST: NORMAL
LAB AP SYNOPTIC CHECKLIST: NORMAL
PATH REPORT.ADDENDUM SPEC: NORMAL
PATH REPORT.ADDENDUM SPEC: NORMAL
PATH REPORT.FINAL DX SPEC: NORMAL
PATH REPORT.FINAL DX SPEC: NORMAL
PATH REPORT.GROSS SPEC: NORMAL
PATH REPORT.GROSS SPEC: NORMAL

## 2024-05-15 ENCOUNTER — TELEPHONE (OUTPATIENT)
Dept: SURGERY | Facility: CLINIC | Age: 74
End: 2024-05-15
Payer: MEDICARE

## 2024-05-15 DIAGNOSIS — C50.511 MALIGNANT NEOPLASM OF LOWER-OUTER QUADRANT OF RIGHT BREAST OF FEMALE, ESTROGEN RECEPTOR POSITIVE: Primary | ICD-10-CM

## 2024-05-15 DIAGNOSIS — Z17.0 MALIGNANT NEOPLASM OF LOWER-OUTER QUADRANT OF RIGHT BREAST OF FEMALE, ESTROGEN RECEPTOR POSITIVE: Primary | ICD-10-CM

## 2024-05-15 NOTE — TELEPHONE ENCOUNTER
5-8-24 Pathology from RIGHT lumpectomy and removal of implant and sentinel node biopsy returned as :  Focal invasive mammary carcinoma, no special type intermediate grade, 3, 2, 1, 1.2 mm, no lymphovascular space invasion.  DCIS present spanning an area measuring 4 x 2.8 cm, low to intermediate grade, cribriform, papillary, micropapillary with comedonecrosis.  Margins are clear on the invasive carcinoma and the DCIS.  Pathologic stage T1a NX since no lymph nodes were sampled.  Estrogen estrogen , progesterone , HER2/miguel is 1+ negative.  Ki-67 14%.    Diagnosis is RIGHT LOQ cancer.  I will call and let her know and arrange for her to see medical oncology and radiation oncology.  Will not send Oncotype due to the small size        Final Diagnosis   1.  Right breast, needle localization oriented lumpectomy (with implant):               A.  Focal invasive ductal carcinoma (near Infinity clip):                            1.  Overall [place] grade II (tubular score = 3, nuclear score = 2, mitotic score = 1)                            2.  Invasive carcinoma measures 1.2 mm maximally.  3.  No lymphovascular space invasion identified.               B.  Ductal carcinoma in situ (DCIS):                            1.  DCIS spans an area estimated at 40 mm x 28 mm x 10 mm.                            2.  Low to intermediate grade cribriform, papillary and micropapillary DCIS with                                   comedo type necrosis.                        3. Microcalcification present in DCIS.                  C.  No lobular neoplasia (LCIS, ALH) identified.               D.  All margins are negative for invasive carcinoma.  Invasive carcinoma measures 20 mm. from                      the closest (anterior) margin. Invasive carcinoma measures 35 mm or greater from all other                      surgical margins.     E   All margins are negative for DCIS                        DCIS measures 10 mm from the closest  (anterior) margin of excision.                          All other margins measure 20 mm or greater from DCIS.                  F. Multiple biopsy site changes (X2) are identified transecting DCIS, and 2 metallic clips retrieved.               G. No Pagetoid involvement of skin by malignancy identified.               H. Associated calcified fibrous implant capsule and grossly intact synthetic implant.               I.  Previous Biomarkers: Estrogen receptors: Positive (%), Progesterone receptors: Positive                       (%),  Ki-67 = 13-17% (see [id number] and [id number]).        2.  Right breast, Additional anterior margin:               A. No atypical hyperplasia, in-situ carcinoma nor invasive carcinoma identified.               B. New margin is negative for malignancy by additional 8 mm     3.  Right breast, Additional superior margin:               A. No atypical hyperplasia, in-situ carcinoma nor invasive carcinoma identified.               B. New margin is negative for malignancy by additional 10 mm     4.  Right breast, Additional lateral margin:               A. No atypical hyperplasia, in-situ carcinoma nor invasive carcinoma identified.               B. New margin is negative for malignancy by additional 10 mm     5.  Right breast, Additional inferior margin:               A. No atypical hyperplasia, in-situ carcinoma nor invasive carcinoma identified.               B. New margin is negative for malignancy by additional 8 mm     6.  Right breast, Additional medial margin:               A. No atypical hyperplasia, in-situ carcinoma nor invasive carcinoma identified.               B. New margin is negative for malignancy by additional 6 mm     7.  Right breast, Additional posterior margin:               A. No atypical hyperplasia, in-situ carcinoma nor invasive carcinoma identified.               B. New margin is negative for malignancy by additional 6 mm     9.  Left breast capsule/tissue  "and implant:               A.  Fibrous capsule with marked dystrophic calcification.               B.  No atypical hyperplasia, carcinoma in situ nor invasive carcinoma identified.     10.  Additional right breast tissue, plastic repair:   A.  Benign skin and breast tissue (80 g).     See synoptic for tumor details   Electronically signed by Jordan Granados MD on 5/10/2024 at 1410   Synoptic Checklist   INVASIVE CARCINOMA OF THE BREAST: Resection   8th Edition - Protocol posted: 12/13/2023INVASIVE CARCINOMA OF THE BREAST: RESECTION - All Specimens  SPECIMEN   Procedure  Excision (less than total mastectomy)   Specimen Laterality  Right   TUMOR   Tumor Site  Clock position     6 o'clock   Histologic Type  Invasive carcinoma of no special type (ductal)   Histologic Grade ([place] Histologic Score)     Glandular (Acinar) / Tubular Differentiation  Score 3   Nuclear Pleomorphism  Score 2   Mitotic Rate  Score 1   Overall Grade  Grade 2 (scores of 6 or 7)   Tumor Size  Greatest dimension of largest invasive focus (Millimeters): 1.2 mm   Tumor Focality  Single focus of invasive carcinoma   Ductal Carcinoma In Situ (DCIS)  Present     Negative for extensive intraductal component (EIC)   Size (Extent) of DCIS  Estimated size (extent) of DCIS is at least (Millimeters): 40 mm   Additional Dimension (Millimeters)  28 mm     10 mm   Architectural Patterns  Cribriform     Micropapillary     Papillary   Nuclear Grade  Grade II (intermediate)   Necrosis  Present, central (expansive \"comedo\" necrosis)   Lobular Carcinoma In Situ (LCIS)  Not identified   Lymphatic and / or Vascular Invasion  Not identified   Dermal Lymphatic and / or Vascular Invasion  Not identified   Microcalcifications  Present in DCIS   Treatment Effect in the Breast  No known presurgical therapy   MARGINS   Margin Status for Invasive Carcinoma  All margins negative for invasive carcinoma   Distance from Invasive Carcinoma to Closest Margin  28 mm   Closest " Margin(s) to Invasive Carcinoma  Anterior   Margin Status for DCIS  All margins negative for DCIS   Distance from DCIS to Closest Margin  18 mm   Closest Margin(s) to DCIS  Anterior   REGIONAL LYMPH NODES   Regional Lymph Node Status  Not applicable (no regional lymph nodes submitted or found)   pTNM CLASSIFICATION (AJCC 8th Edition)   Reporting of pT, pN, and (when applicable) pM categories is based on information available to the pathologist at the time the report is issued. As per the AJCC (Chapter 1, 8th Ed.) it is the managing physician’s responsibility to establish the final pathologic stage based upon all pertinent information, including but potentially not limited to this pathology report.   pT Category  pT1a   pN Category  pN not assigned (no nodes submitted or found)   SPECIAL STUDIES        Estrogen Receptor (ER) Status  Positive (greater than 10% of cells demonstrate nuclear positivity)        Progesterone Receptor (PgR) Status  Positive        Ki-67 Percentage of Positive Nuclei  17 %   Testing Performed on Case Number  See synoptic for biomarkers on invasive carcinoma   .   Breast Biomarker Reporting Template   Protocol posted: 12/13/2023BREAST BIOMARKER REPORTING TEMPLATE - All Specimens  Test(s) Performed     Estrogen Receptor (ER) Status  Positive (greater than 10% of cells demonstrate nuclear positivity)   Percentage of Cells with Nuclear Positivity  %   Average Intensity of Staining  Strong   Test Type  Food and Drug Administration (FDA) cleared (test / vendor): [place]   Primary Antibody  SP1   Scoring System  Jose Cruz   Proportion Score  5   Intensity Score  3   Total Jose Cruz Score  8   Test(s) Performed     Progesterone Receptor (PgR) Status  Positive   Percentage of Cells with Nuclear Positivity  %   Average Intensity of Staining  Strong   Test Type  Food and Drug Administration (FDA) cleared (test / vendor): [place]   Primary Antibody  1E2   Scoring System  Jose Cruz   Proportion Score   5   Intensity Score  3   Total Jose Cruz Score  8   Test(s) Performed  Ki-67   Ki-67 Percentage of Positive Nuclei  14 %   Primary Antibody  30-9   Cold Ischemia and Fixation Times  Meet requirements specified in latest version of the ASCO / CAP Guidelines   Cold Ischemia Time (minutes)  41 min   Fixation Time (hours)  8 hours   Testing Performed on Block Number(s)  1N   METHODS   Fixative  Formalin   Image Analysis  Not performed   Comment(s)  Her 2 ([facility]) will be reported in an addendum.   .      Comment    Immunostains for CK5/6, myosin, ER, NH and Ki67 are performed on block 1N and utilizing appropriate controls.  The majority of the areas of concern maintain intact myoepithelial layer staining by CK5/6 and SMMHC supporting DCIS.  An incidental focus lacks intact myoepithelial layer and is consistent with a small focus of invasive ductal carcinoma (1.2 mm).  All margins are widely free of DCIS and invasive carcinoma.  Biomarkers performed on the small invasive carcinoma component are reported in synoptic format.  HER2/miguel ([facility]) will be performed and reported separately in an addendum.  Representative sections and immunostains were shared in consultation with Era Stone and Maris, who concurred.

## 2024-05-20 ENCOUNTER — OFFICE VISIT (OUTPATIENT)
Dept: SURGERY | Facility: CLINIC | Age: 74
End: 2024-05-20
Payer: MEDICARE

## 2024-05-20 VITALS
HEART RATE: 85 BPM | HEIGHT: 63 IN | OXYGEN SATURATION: 97 % | BODY MASS INDEX: 32.07 KG/M2 | SYSTOLIC BLOOD PRESSURE: 128 MMHG | DIASTOLIC BLOOD PRESSURE: 64 MMHG | WEIGHT: 181 LBS

## 2024-05-20 DIAGNOSIS — E66.09 CLASS 1 OBESITY DUE TO EXCESS CALORIES WITH SERIOUS COMORBIDITY AND BODY MASS INDEX (BMI) OF 31.0 TO 31.9 IN ADULT: ICD-10-CM

## 2024-05-20 DIAGNOSIS — Z92.29 HISTORY OF HORMONE REPLACEMENT THERAPY: ICD-10-CM

## 2024-05-20 DIAGNOSIS — C50.511 MALIGNANT NEOPLASM OF LOWER-OUTER QUADRANT OF RIGHT BREAST OF FEMALE, ESTROGEN RECEPTOR POSITIVE: Primary | ICD-10-CM

## 2024-05-20 DIAGNOSIS — Z86.79 HISTORY OF CEREBRAL ANEURYSM: ICD-10-CM

## 2024-05-20 DIAGNOSIS — Z98.82 HISTORY OF BREAST AUGMENTATION: ICD-10-CM

## 2024-05-20 DIAGNOSIS — R63.5 RECENT WEIGHT GAIN: ICD-10-CM

## 2024-05-20 DIAGNOSIS — Z17.0 MALIGNANT NEOPLASM OF LOWER-OUTER QUADRANT OF RIGHT BREAST OF FEMALE, ESTROGEN RECEPTOR POSITIVE: Primary | ICD-10-CM

## 2024-05-20 DIAGNOSIS — Z12.31 ENCOUNTER FOR SCREENING MAMMOGRAM FOR MALIGNANT NEOPLASM OF BREAST: ICD-10-CM

## 2024-05-20 PROCEDURE — 3074F SYST BP LT 130 MM HG: CPT | Performed by: SURGERY

## 2024-05-20 PROCEDURE — 99024 POSTOP FOLLOW-UP VISIT: CPT | Performed by: SURGERY

## 2024-05-20 PROCEDURE — 1159F MED LIST DOCD IN RCRD: CPT | Performed by: SURGERY

## 2024-05-20 PROCEDURE — 1160F RVW MEDS BY RX/DR IN RCRD: CPT | Performed by: SURGERY

## 2024-05-20 PROCEDURE — 3078F DIAST BP <80 MM HG: CPT | Performed by: SURGERY

## 2024-05-20 NOTE — PROGRESS NOTES
Chief Complaint: Sari Self is a 73 y.o. female who was seen in consultation at the request of Nurys Edwards MD  for newly diagnosed DCIS and a postoperative visit    History of Present Illness:  Patient presents with newly diagnosed DCIS. She noted no new masses, skin changes, nipple discharge, nipple changes prior to her most recent imaging.  Her most recent imaging includes the followin2021, Bilateral Diagnostic MMG w/Satya ([facility])  Scattered areas of fibroglandular density.  There are bilateral pre-pectoral saline implants. Follow up oval mass right breast, 8:30 o'clock. This completes two years of follow-up.  There are distorted implants seen in both breasts.  Right Breast Ultrasound  Finding 1: 8:30 right breast consistent with an intramammary lymph node.  BI-RADS 2: Benign Finding    2022, Bilateral Screening MMG w/Satya ([facility])  Scattered areas of fibroglandular density.  There are bilateral pre-pectoral saline implants.  BI-RADS 1: Negative    2024, Bilateral Screening MMG w/Satya ([facility])  Scattered areas of fibroglandular density.  There are bilateral pre-pectoral saline implants.  There is an asymmetry measuring 7 mm seen in the CC view only seen in the middle one third of the right breast in the central region and in the sub areolar region.  BI-RADS 0    2024, RIGHT Diagnostic MMG w/Satya ([facility])  Scattered areas of fibroglandular density.  There are bilateral pre-pectoral saline implants.  Additional evaluation was performed for the asymmetry in the right breast, central small focal asymmetry measuring 4 mm middle one third 6:30 o'clock region of the right breast located 5 cm from the nipple. This is new since prior exam(s).   Right Breast Ultrasound  There is no sonographic correlate.  IMPRESSION:  BI-RADS 4     She then had the following biopsy:    24  [facility] STEREOTACTIC BREAST BIOPSY   Right breast 6:30 11 core needle biopsy 9 gauge a top hat shaped s-leonardo eviva biopsy  clip was deployed. Clip in the expected location. Pathology is malignant and concordant.       03/04/2024, Stereotactic Biopsy ([facility])  Right breast 6:30, 11 core needle biopsy, 9-gauge.  Top hat shaped s-leonardo eviva biopsy clip was deployed, clip in the expected location. Pathology is malignant and concordant.    03/04/2024, Surgical Pathology Report ([facility])  1.  Right breast at 6:30 o'clock, (focal asymmetry with calcification), stereotactic core biopsy:               A.  Ductal carcinoma in situ (DCIS):                            1.  Low-grade cribriform and focal solid DCIS.                            2.  Scattered foci of DCIS span at least 3 mm. In single greatest dimension.                            3.  Focal microcalcification present within DCIS.               B.  No invasive carcinoma identified.    ER, 91-10%  DE, %  KI-67 13%     I then arranged for a MRI  03/15/2024, MRI Breast Bilateral  Middle third right breast 6 o'clock 4.5 cm from the nipple there is a post biopsy cavity that measures on the order of 2.2 cm. This represents the biopsy proven site of malignancy with the internal top hat shaped metallic clip.  4 cm posterior to the biopsy cavity non mass enhancement that measures 1.3 cm. No mammographic correlate. 5 mm inferior to the inferior margin of a retro glandular saline implant. No evidence for right axillary or internal chain adenopathy. Intact bilateral retro glandular saline implants are noted.  IMPRESSION  At the 6 o'clock position on the order of 4 cm posterior to the biopsy proven malignancy there is a 1.3 cm area of suspicious non mass enhancement. No mammographic correlate is appreciated.   Further evaluation with an MRI guided right breast biopsy and/or surgical excision of the tissue up to 4 cm posterior to the top hat shaped metallic clip is recommended.  No findings suspicious for malignancy in the left breast.     I then arranged for MRi guided biopsy:    4/11/24 [facility] MRI BREAST  BIOPSY  JEANETTE OLSON   A 9 gauge, 9 core samples. Infinity clip was deployed at the biopsy site. Infinity clip at the expected location. Only visualized on nondisplaced implant views. The infinity clip and the TOPHAT clip at the site of biopsy proven right breast malignancy are  by approximately 5 cm. Pathology is malignancy and concordant.     4/9/24 [facility] PATHOLOGY   Breast, right 6 o'clock position, MRI guided core biopsy:  A.  Ductal carcinoma in situ (DCIS), low to intermediate nuclear grade with apocrine features, cribriform and solid architecture, measuring up to 6 mm maximally and involving up to 7 core fragments.  ER POSITIVE %  NM POSITIVE %  KI-67 17%      She had not had a breast biopsy in the past.  She has had her uterus and ovaries removed, is postmenopausal, and takes no hormones. She took HRt for 10-15 years remotely  Her family history includes the following: no [facility] breast or ovarian cancer      Interval History:  5-8-24 Pathology from RIGHT lumpectomy and removal of implant and sentinel node biopsy folowed by oncoplastic lift bilateral Dr Brambila returned as :  Focal invasive mammary carcinoma, no special type intermediate grade, 3, 2, 1, 1.2 mm, no lymphovascular space invasion.  DCIS present spanning an area measuring 4 x 2.8 cm, low to intermediate grade, cribriform, papillary, micropapillary with comedonecrosis.  Margins are clear on the invasive carcinoma and the DCIS.  Pathologic stage T1a NX since no lymph nodes were sampled.  Estrogen estrogen , progesterone , HER2/miguel is 1+ negative.  Ki-67 14%.       She denies redness, warmth, drainage at incisions.  Denies significant discomfort        Review of Systems:  Review of Systems   Constitutional:  Positive for unexpected weight change (#6 LB WT GAIN).   Gastrointestinal:  Diarrhea: IBS.   Musculoskeletal:         LIMITED ROM RT SHOULDER   All other systems reviewed and are negative.       Past Medical and  Surgical History:  Breast Biopsy History:  Patient had not had a breast biopsy prior to her cancer diagnosis.  Breast Cancer HIstory:  Patient does not have a past medical history of breast cancer.  Breast Operations, and year:  NONE   Obstetric/Gynecologic History:  Age menstrual periods began: 13  Patient is postmenopausal due to removal of her uterus in the following year:1985   Number of pregnancies:6  Number of live births: 2  Number of abortions or miscarriages: 1  Age of delivery of first child: 18  Patient breast fed, for the following lenth of time: 60 MONTHS   Length of time taking birth control pills: SHORT TIME   Patient took hormone replacement during the following dates: 15-20 YRS     Past Surgical History:   Procedure Laterality Date    BREAST LUMPECTOMY Right 5/8/2024    Procedure: RIGHT breast bracketed needle localized lumpectomy, oncplastics,  implant removal.;  Surgeon: Jennifer Kelly MD;  Location: [facility] OR;  Service: General;  Laterality: Right;    BREAST SURGERY Bilateral 5/8/2024    Procedure: BILATERAL IMPLANT REMOVAL WITH CAPSULECTOMY, RIGHT ONCOPLASTIC CLOSURE LEFT BREAST REDUCTION FOR SYMMETRY, USE OF SPY;  Surgeon: Lisa Brambila MD;  Location: [facility] OR;  Service: Plastics;  Laterality: Bilateral;    CARDIAC CATHETERIZATION      CATARACT EXTRACTION, BILATERAL  03/2020    CHOLECYSTECTOMY      COLONOSCOPY  11/10/2010    prep of colon fair,IH,stool in transverse colon,hepatic flexure and ascending colon,ileum normal,IBS    COLONOSCOPY N/A 02/27/2018    IH, diverticulosis, one 5mm polyp, tubular adenoma with low grade dysplasia    COLONOSCOPY N/A 08/15/2023    Procedure: COLONOSCOPY into cecum/terminal ileum with polypectomy;  Surgeon: Finesse Reza MD;  Location: [facility] ENDOSCOPY;  Service: Gastroenterology;  Laterality: N/A;  polyps    CYSTOSCOPY      ENDOSCOPY  11/27/2012    grd 1 reflux egitis,web upperd 3rd esoph    ENDOSCOPY N/A 02/27/2018    normal biopsies,  H Pylori positive    FOOT NEUROMA SURGERY Left     HYSTERECTOMY  1985    IR CEREBRAL ANEURYSM COILING  2012    JOINT REPLACEMENT Left     Partial left knee replacement    KNEE ARTHROPLASTY UNICOMPARTMENTAL Left 03/18/2022    Procedure: KNEE ARTHROPLASTY UNICOMPARTMENTAL;  Surgeon: Everton Zuniga MD;  Location: [facility];  Service: Orthopedics;  Laterality: Left;    KNEE ARTHROSCOPY Left 07/03/2019    Procedure: KNEE ARTHROSCOPY ARTHRITIS DEBRIDEMENT PARTIAL MEDIAL AND LATERAL MENISECTOMY;  Surgeon: Nuris Bender MD;  Location: [facility];  Service: Orthopedics    KNEE ARTHROSCOPY Right 09/28/2020    Procedure: RIGHT KNEE ARTHROSCOPY, PARTIAL MEDIAL AND LATERAL MENISCECTOMIES, DEBRIDEMENT OF ARTHRITIS, AND INTERNAL FIXATION TIBAL PLATEAU INSUFFICIENCY FRACTURE;  Surgeon: Nuris Bender MD;  Location: [facility];  Service: Orthopedics;  Laterality: Right;    TENSION FREE VAGINAL TAPING WITH MINI ARC SLING      THUMB ARTHROSCOPY      TOTAL ABDOMINAL HYSTERECTOMY WITH SALPINGO OOPHORECTOMY      TRIGGER POINT INJECTION      Hand, elbow, shoulders, knees    UPPER GASTROINTESTINAL ENDOSCOPY  02/27/2018     Past Medical History:   Diagnosis Date    Allergic     Arthritis     Breast cancer, right 2024    Cerebral aneurysm     had coiling    Cholelithiasis 2016    Gallbladder removed    Colon polyp     COVID-19 2020    Diarrhea 2024    Disease of thyroid gland     Dislocation, shoulder 1968    Dislocated several times    Diverticulosis     GERD (gastroesophageal reflux disease)     H/O cerebral aneurysm repair     History of 2019 novel coronavirus disease (COVID-19) 08/2020 AUGUST 2020 STATES VERY MILD SYMTOMS    Hyperlipidemia     Hypertension     Hyperthyroidism 2012?    Hypothyroidism     IBS (irritable bowel syndrome)     Knee swelling     Low back pain     OAB (overactive bladder)     Osteoporosis     Right knee pain     RLS (restless legs syndrome)     Rotator cuff syndrome 1968    Tears    Stress  "fracture of right tibia     Stroke     Tear of meniscus of knee 2019 & 2020    Surgery both knees    TIA (transient ischemic attack)     2011    Torn meniscus     RIGHT KNEE    Urinary tract infection 06/01/2018       Prior Hospitalizations, other than for surgery or childbirth, and year:  CEREBRAL ANEURYSM 2011    Social History     Socioeconomic History    Marital status:    Tobacco Use    Smoking status: Never    Smokeless tobacco: Never   Vaping Use    Vaping status: Never Used   Substance and Sexual Activity    Alcohol use: Not Currently     Comment: 1-2 monthly    Drug use: Never    Sexual activity: Not Currently     Partners: Male     Birth control/protection: Hysterectomy     Patient is .  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    Family History:  Family History   Problem Relation Age of Onset    Irritable bowel syndrome Daughter     Malig Hyperthermia Neg Hx        Vital Signs:  /64 (BP Location: Left arm, Patient Position: Sitting, Cuff Size: Adult)   Pulse 85   Ht 158.8 cm (62.52\")   Wt 82.1 kg (181 lb)   LMP  (LMP Unknown)   SpO2 97%   BMI 32.56 kg/m²      Medications:    Current Outpatient Medications:     atorvastatin (LIPITOR) 20 MG tablet, Take 1 tablet by mouth Every Night., Disp: 90 tablet, Rfl: 1    EQ Allergy Relief, Cetirizine, 10 MG tablet, Take 1 tablet by mouth once daily, Disp: 90 tablet, Rfl: 0    gabapentin (NEURONTIN) 600 MG tablet, Take 1 tablet by mouth 4 (Four) Times a Day., Disp: , Rfl:     hydroCHLOROthiazide (HYDRODIURIL) 12.5 MG tablet, Take 1 tablet by mouth Daily., Disp: 90 tablet, Rfl: 0    levothyroxine (SYNTHROID, LEVOTHROID) 75 MCG tablet, Take 1 tablet by mouth Every Morning., Disp: 90 each, Rfl: 1    omeprazole (priLOSEC) 40 MG capsule, Take 1 capsule by mouth once daily, Disp: 90 capsule, Rfl: 3    potassium chloride 10 MEQ CR tablet, Take 1 tablet by mouth " "Daily., Disp: 90 each, Rfl: 0  No current facility-administered medications for this visit.    Facility-Administered Medications Ordered in Other Visits:     Chlorhexidine Gluconate Cloth 2 % pads, , Apply externally, BID, Everton Zuniga MD     Allergies:  Allergies   Allergen Reactions    Penicillins Hives     ..Beta lactam allergy details  Antibiotic reaction: hives  Age at reaction: adult  Dose to reaction time: unknown  Reason for antibiotic:  (uti)  Epinephrine required for reaction?: no       Bee Venom Swelling       Physical Examination:  /64 (BP Location: Left arm, Patient Position: Sitting, Cuff Size: Adult)   Pulse 85   Ht 158.8 cm (62.52\")   Wt 82.1 kg (181 lb)   LMP  (LMP Unknown)   SpO2 97%   BMI 32.56 kg/m²   General Appearance:  Patient is in no distress.  She is well kept and has an overweight build.   Psychiatric:  Patient with appropriate mood and affect. Alert and oriented to self, time, and place.    Breast, RIGHT:  medium sized, symmetric with the contralateral side.Well healing reduction pattern incisions from Dr Brambila oncoplastic pexy. No erythema, warmth, drainage.  Breast skin is without erythema, edema, rashes.  There are no visible abnormalities upon inspection during the arm-raising maneuver or with hands on hips in the sitting position. There is no nipple retraction, discharge or nipple/areolar skin changes.There are no masses palpable in the sitting or supine positions.     Breast, LEFT: medium sized, symmetric with the contralateral side. Well healing reduction pattern incisions from Dr Brambila oncoplastic pexy. No erythema, warmth, drainage. Breast skin is without erythema, edema, rashes.  There are no visible abnormalities upon inspection during the arm-raising maneuver or with hands on hips in the sitting position. There is no nipple retraction, discharge or nipple/areolar skin changes.There are no masses palpable in the sitting or supine " positions.    Lymphatic:  There is no axillary, cervical, infraclavicular, or supraclavicular adenopathy bilaterally.  Eyes:  Pupils are round and reactive to light.  Cardiovascular:  Heart rate and rhythm are regular.  Respiratory:  Lungs are clear bilaterally with no crackles or wheezes in any lung field.  Gastrointestinal:  Abdomen is soft, nondistended, and nontender.     Musculoskeletal:  Good strength in all 4 extremities.   There is good range of motion in both shoulders.    Skin:  No new skin lesions or rashes on the skin excluding the breast (see breast exam above).        Imagin2018, Bilateral Screening MMG w/Satya ([facility])  There are scattered areas of fibroglandular density.  There are bilateral pre-pectoral saline implants.  Finding 1: New equal density, oval mass measuring 9 mm posterior one third inferior region of the right breast.  IMPRESSION:  BI-RADS 0    2019, Right Diagnostic MMG w/Satya ([facility])  Scattered areas of fibroglandular density.  Additional evaluation was performed for the oval mass in the right breast, inferior posterior one-third 8:30 o'clock region of the right breast located 9 cm from the nipple.  Right Breast Ultrasound  Ultrasound is suggestive of an oval intramammary lymph node with well defined, thin margins measuring 9 x 3 x 7 x mm. This corresponds to the mammographic finding.  IMPRESSION:  BI-RADS 3    2019, Right Diagnostic MMG w/Satya ([facility])  Scattered areas of fibroglandular density.  Follow-up oval mass in the right breast, 8:30 o'clock. There has been no significant change.  Right Breast Ultrasound:  A small stable intramammary lymph node project at 9:00, 10 cmfn.  BI-RADS 3    2020, Bilateral Diagnostic MMG w/Satya ([facility])  Scattered areas of fibroglandular density. Follow-up oval mass in the right breast, 8:30 o'clock. Bilateral pre pectoral saline implants with capsule calcifications are unchanged.  Right Breast Ultrasound  Demonstrates an oval  elongated mass 9:00 10 cm from the nipple measuring 8 x 3 x 4 mm. this is unchanged. A follow-up mammogram and ultrasound in 6 months is recommended.  BI-RADS 3    09/22/2020, Right Breast Digital Diagnostic MMG w/Satya ([facility])  Scattered areas of fibroglandular density.  There are bilateral pre-pectoral saline implants.  Finding 1: Follow up oval mass right breast, 8:30 o'clock.  Finding 2: There is a distorted implant seen in the right breast an area of calcification associated with the right implant.  Right Breast Ultrasound  Finding 1: An oval elongated mass 4 x 2 x 4 mm posterior one third 8:30 right breast, mass has decreased in size.  BI-RADS 3    03/31/2021, Bilateral Diagnostic MMG w/Satya ([facility])  Scattered areas of fibroglandular density.  There are bilateral pre-pectoral saline implants. Follow up oval mass right breast, 8:30 o'clock. This completes two years of follow-up.  There are distorted implants seen in both breasts.  Right Breast Ultrasound  Finding 1: 8:30 right breast consistent with an intramammary lymph node.  BI-RADS 2: Benign Finding    05/11/2022, Bilateral Screening MMG w/Satya ([facility])  Scattered areas of fibroglandular density.  There are bilateral pre-pectoral saline implants.  BI-RADS 1: Negative    01/18/2024, Bilateral Screening MMG w/Satya ([facility])  Scattered areas of fibroglandular density.  There are bilateral pre-pectoral saline implants.  There is an asymmetry measuring 7 mm seen in the CC view only seen in the middle one third of the right breast in the central region and in the sub areolar region.  BI-RADS 0    02/05/2024, RIGHT Diagnostic MMG w/Satya ([facility])  Scattered areas of fibroglandular density.  There are bilateral pre-pectoral saline implants.  Additional evaluation was performed for the asymmetry in the right breast, central small focal asymmetry measuring 4 mm middle one third 6:30 o'clock region of the right breast located 5 cm from the nipple. This is new since prior exam(s).    Right Breast Ultrasound  There is no sonographic correlate.  IMPRESSION:  BI-RADS 4    03/04/24  [facility] STEREOTACTIC BREAST BIOPSY   Right breast 6:30 11 core needle biopsy 9 gauge a top hat shaped s-leonardo eviva biopsy clip was deployed. Clip in the expected location. Pathology is malignant and concordant.     03/15/2024, MRI Breast Bilateral  Middle third right breast 6 o'clock 4.5 cm from the nipple there is a post biopsy cavity that measures on the order of 2.2 cm. This represents the biopsy proven site of malignancy with the internal top hat shaped metallic clip.  4 cm posterior to the biopsy cavity non mass enhancement that measures 1.3 cm. No mammographic correlate. 5 mm inferior to the inferior margin of a retro glandular saline implant. No evidence for right axillary or internal chain adenopathy. Intact bilateral retro glandular saline implants are noted.  IMPRESSION  At the 6 o'clock position on the order of 4 cm posterior to the biopsy proven malignancy there is a 1.3 cm area of suspicious non mass enhancement. No mammographic correlate is appreciated.   Further evaluation with an MRI guided right breast biopsy and/or surgical excision of the tissue up to 4 cm posterior to the top hat shaped metallic clip is recommended.  No findings suspicious for malignancy in the left breast.    Pathology:  03/04/2024, Stereotactic Biopsy ([facility])  Right breast 6:30, 11 core needle biopsy, 9-gauge.  Top hat shaped s-leonardo eviva biopsy clip was deployed, clip in the expected location. Pathology is malignant and concordant.    03/04/2024, Surgical Pathology Report ([facility])  1.  Right breast at 6:30 o'clock, (focal asymmetry with calcification), stereotactic core biopsy:               A.  Ductal carcinoma in situ (DCIS):                            1.  Low-grade cribriform and focal solid DCIS.                            2.  Scattered foci of DCIS span at least 3 mm. In single greatest dimension.                            3.  Focal  microcalcification present within DCIS.               B.  No invasive carcinoma identified.    ER, 91-10%  MD, %  KI-67 13%    4/11/24 [facility] MRI BREAST BIOPSY  JEANETTE OLSON   A 9 gauge, 9 core samples. Infinity clip was deployed at the biopsy site. Infinity clip at the expected location. Only visualized on nondisplaced implant views. The infinity clip and the TOPHAT clip at the site of biopsy proven right breast malignancy are  by approximately 5 cm. Pathology is malignancy and concordant.     4/9/24 [facility] PATHOLOGY   Breast, right 6 o'clock position, MRI guided core biopsy:  A.  Ductal carcinoma in situ (DCIS), low to intermediate nuclear grade with apocrine features, cribriform and solid architecture, measuring up to 6 mm maximally and involving up to 7 core fragments.  ER POSITIVE %  MD POSITIVE %  KI-67 17%      5-8-24 Pathology from RIGHT lumpectomy and removal of implant and sentinel node biopsy returned as :  Focal invasive mammary carcinoma, no special type intermediate grade, 3, 2, 1, 1.2 mm, no lymphovascular space invasion.  DCIS present spanning an area measuring 4 x 2.8 cm, low to intermediate grade, cribriform, papillary, micropapillary with comedonecrosis.  Margins are clear on the invasive carcinoma and the DCIS.  Pathologic stage T1a NX since no lymph nodes were sampled.  Estrogen estrogen , progesterone , HER2/miguel is 1+ negative.  Ki-67 14%.     Diagnosis is RIGHT LOQ cancer.  I will call and let her know and arrange for her to see medical oncology and radiation oncology.  Will not send Oncotype due to the small size               Final Diagnosis   1.  Right breast, needle localization oriented lumpectomy (with implant):               A.  Focal invasive ductal carcinoma (near Infinity clip):                            1.  Overall [place] grade II (tubular score = 3, nuclear score = 2, mitotic score = 1)                            2.  Invasive carcinoma  measures 1.2 mm maximally.  3.  No lymphovascular space invasion identified.               B.  Ductal carcinoma in situ (DCIS):                            1.  DCIS spans an area estimated at 40 mm x 28 mm x 10 mm.                            2.  Low to intermediate grade cribriform, papillary and micropapillary DCIS with                                   comedo type necrosis.                        3. Microcalcification present in DCIS.                  C.  No lobular neoplasia (LCIS, ALH) identified.               D.  All margins are negative for invasive carcinoma.  Invasive carcinoma measures 20 mm. from                      the closest (anterior) margin. Invasive carcinoma measures 35 mm or greater from all other                      surgical margins.     E   All margins are negative for DCIS                        DCIS measures 10 mm from the closest (anterior) margin of excision.                          All other margins measure 20 mm or greater from DCIS.                  F. Multiple biopsy site changes (X2) are identified transecting DCIS, and 2 metallic clips retrieved.               G. No Pagetoid involvement of skin by malignancy identified.               H. Associated calcified fibrous implant capsule and grossly intact synthetic implant.               I.  Previous Biomarkers: Estrogen receptors: Positive (%), Progesterone receptors: Positive                       (%),  Ki-67 = 13-17% (see [id number] and [id number]).        2.  Right breast, Additional anterior margin:               A. No atypical hyperplasia, in-situ carcinoma nor invasive carcinoma identified.               B. New margin is negative for malignancy by additional 8 mm     3.  Right breast, Additional superior margin:               A. No atypical hyperplasia, in-situ carcinoma nor invasive carcinoma identified.               B. New margin is negative for malignancy by additional 10 mm     4.  Right breast, Additional lateral  margin:               A. No atypical hyperplasia, in-situ carcinoma nor invasive carcinoma identified.               B. New margin is negative for malignancy by additional 10 mm     5.  Right breast, Additional inferior margin:               A. No atypical hyperplasia, in-situ carcinoma nor invasive carcinoma identified.               B. New margin is negative for malignancy by additional 8 mm     6.  Right breast, Additional medial margin:               A. No atypical hyperplasia, in-situ carcinoma nor invasive carcinoma identified.               B. New margin is negative for malignancy by additional 6 mm     7.  Right breast, Additional posterior margin:               A. No atypical hyperplasia, in-situ carcinoma nor invasive carcinoma identified.               B. New margin is negative for malignancy by additional 6 mm     9.  Left breast capsule/tissue and implant:               A.  Fibrous capsule with marked dystrophic calcification.               B.  No atypical hyperplasia, carcinoma in situ nor invasive carcinoma identified.     10.  Additional right breast tissue, plastic repair:   A.  Benign skin and breast tissue (80 g).     See synoptic for tumor details   Electronically signed by Jordan Granados MD on 5/10/2024 at 1410   Synoptic Checklist   INVASIVE CARCINOMA OF THE BREAST: Resection   8th Edition - Protocol posted: 12/13/2023INVASIVE CARCINOMA OF THE BREAST: RESECTION - All Specimens        SPECIMEN   Procedure   Excision (less than total mastectomy)   Specimen Laterality   Right   TUMOR   Tumor Site   Clock position       6 o'clock   Histologic Type   Invasive carcinoma of no special type (ductal)   Histologic Grade (Zackery Histologic Score)       Glandular (Acinar) / Tubular Differentiation   Score 3   Nuclear Pleomorphism   Score 2   Mitotic Rate   Score 1   Overall Grade   Grade 2 (scores of 6 or 7)   Tumor Size   Greatest dimension of largest invasive focus (Millimeters): 1.2 mm   Tumor  "Focality   Single focus of invasive carcinoma   Ductal Carcinoma In Situ (DCIS)   Present       Negative for extensive intraductal component (EIC)   Size (Extent) of DCIS   Estimated size (extent) of DCIS is at least (Millimeters): 40 mm   Additional Dimension (Millimeters)   28 mm       10 mm   Architectural Patterns   Cribriform       Micropapillary       Papillary   Nuclear Grade   Grade II (intermediate)   Necrosis   Present, central (expansive \"comedo\" necrosis)   Lobular Carcinoma In Situ (LCIS)   Not identified   Lymphatic and / or Vascular Invasion   Not identified   Dermal Lymphatic and / or Vascular Invasion   Not identified   Microcalcifications   Present in DCIS   Treatment Effect in the Breast   No known presurgical therapy   MARGINS   Margin Status for Invasive Carcinoma   All margins negative for invasive carcinoma   Distance from Invasive Carcinoma to Closest Margin   28 mm   Closest Margin(s) to Invasive Carcinoma   Anterior   Margin Status for DCIS   All margins negative for DCIS   Distance from DCIS to Closest Margin   18 mm   Closest Margin(s) to DCIS   Anterior   REGIONAL LYMPH NODES   Regional Lymph Node Status   Not applicable (no regional lymph nodes submitted or found)   pTNM CLASSIFICATION (AJCC 8th Edition)   Reporting of pT, pN, and (when applicable) pM categories is based on information available to the pathologist at the time the report is issued. As per the AJCC (Chapter 1, 8th Ed.) it is the managing physician’s responsibility to establish the final pathologic stage based upon all pertinent information, including but potentially not limited to this pathology report.   pT Category   pT1a   pN Category   pN not assigned (no nodes submitted or found)   SPECIAL STUDIES           Estrogen Receptor (ER) Status   Positive (greater than 10% of cells demonstrate nuclear positivity)           Progesterone Receptor (PgR) Status   Positive           Ki-67 Percentage of Positive Nuclei   17 % "   Testing Performed on Case Number   See synoptic for biomarkers on invasive carcinoma   .   Breast Biomarker Reporting Template   Protocol posted: 12/13/2023BREAST BIOMARKER REPORTING TEMPLATE - All Specimens         Test(s) Performed       Estrogen Receptor (ER) Status   Positive (greater than 10% of cells demonstrate nuclear positivity)   Percentage of Cells with Nuclear Positivity   %   Average Intensity of Staining   Strong   Test Type   Food and Drug Administration (FDA) cleared (test / vendor): [place]   Primary Antibody   SP1   Scoring System   Jose Cruz   Proportion Score   5   Intensity Score   3   Total Jose Cruz Score   8   Test(s) Performed       Progesterone Receptor (PgR) Status   Positive   Percentage of Cells with Nuclear Positivity   %   Average Intensity of Staining   Strong   Test Type   Food and Drug Administration (FDA) cleared (test / vendor): [place]   Primary Antibody   1E2   Scoring System   Jose Cruz   Proportion Score   5   Intensity Score   3   Total Jose Cruz Score   8   Test(s) Performed   Ki-67   Ki-67 Percentage of Positive Nuclei   14 %   Primary Antibody   30-9   Cold Ischemia and Fixation Times   Meet requirements specified in latest version of the ASCO / CAP Guidelines   Cold Ischemia Time (minutes)   41 min   Fixation Time (hours)   8 hours   Testing Performed on Block Number(s)   1N   METHODS   Fixative   Formalin   Image Analysis   Not performed   Comment(s)   Her 2 (IHC) will be reported in an addendum.   .      Comment     Immunostains for CK5/6, myosin, ER, DC and Ki67 are performed on block 1N and utilizing appropriate controls.  The majority of the areas of concern maintain intact myoepithelial layer staining by CK5/6 and SMMHC supporting DCIS.  An incidental focus lacks intact myoepithelial layer and is consistent with a small focus of invasive ductal carcinoma (1.2 mm).  All margins are widely free of DCIS and invasive carcinoma.  Biomarkers performed on the small  invasive carcinoma component are reported in synoptic format.  HER2/miguel (IHC) will be performed and reported separately in an addendum.  Representative sections and immunostains were shared in consultation with Era Stone and Maris, who concurred.                 Procedures:      Assessment:   Diagnosis Plan   1. Malignant neoplasm of lower-outer quadrant of right breast of female, estrogen receptor positive  Mammo Screening Digital Tomosynthesis Bilateral With CAD      2. History of hormone replacement therapy        3. Class 1 obesity due to excess calories with serious comorbidity and body mass index (BMI) of 31.0 to 31.9 in adult        4. Recent weight gain        5. History of cerebral aneurysm        6. History of breast augmentation        7. Encounter for screening mammogram for malignant neoplasm of breast  Mammo Screening Digital Tomosynthesis Bilateral With CAD        1-  RIGHT breast LOQ. 4mm on mammogram middle 1/3 6:30, 5 CFN. Tophat marker in good position  DCIS, low grade, cribiform and solid, 3mm in core, no invasive component  ER , IN , ki 67 13%    MRi showed additional lesion 5 cm from the index posteriorly- 1.3 cm region. Infinity marker in good position.  DCIS, low to intermediate grade, cribiform and solid  ER , IN , ki 67 is 17%    The 2 markers are 5 cm apart AP.    Clinical stage TisN0- stage 0    5-8-24 Pathology from RIGHT lumpectomy and removal of implant and sentinel node biopsy folowed by oncoplastic lift bilateral Dr Brambila returned as :  Focal invasive mammary carcinoma, no special type intermediate grade, 3, 2, 1, 1.2 mm, no lymphovascular space invasion.  DCIS present spanning an area measuring 4 x 2.8 cm, low to intermediate grade, cribriform, papillary, micropapillary with comedonecrosis.  Margins are clear on the invasive carcinoma and the DCIS.    Pathologic stage T1a NX since no lymph nodes were sampled. Stage IA.   Estrogen estrogen ,  progesterone , HER2/miguel is 1+ negative.  Ki-67 14%.    - consults with Dr Ortega and Dr Starks pending.      2-  10-15 years HRT  after menopause- off since  around 2002    3-4  BMI 31, with recent 20# weight gain since , related to life stressors    5-  Past cerebral aneurysm with coiling by Dr Yang- on aspirin for some time, now off blood thinners      6-  Prepectoral saline implants- calcified and removed at the time of her lumpectomy and lift 2024      Plan:  The patient goes by Sarah.  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    We reviewed her exam and pathology together today.  I have not sent an oncotype due to the small size of the invasive component.    She is healing nicely.      I have arranged for her to see Dr Ortega and Dr Starks.      I will arrange for her to have her 1-19-25 mammogram at [facility] 1-19-25 and see her back after.      Jennifer Kelly MD        Next Appointment:  Return for Next scheduled follow up, after imaging.      EMR Dragon/transcription disclaimer:    Please note that portions of this note were completed with a voice recognition program.

## 2024-05-22 ENCOUNTER — TELEPHONE (OUTPATIENT)
Dept: RADIATION ONCOLOGY | Facility: HOSPITAL | Age: 74
End: 2024-05-22
Payer: MEDICARE

## 2024-05-22 ENCOUNTER — PATIENT OUTREACH (OUTPATIENT)
Dept: OTHER | Facility: HOSPITAL | Age: 74
End: 2024-05-22
Payer: MEDICARE

## 2024-05-23 ENCOUNTER — CONSULT (OUTPATIENT)
Dept: RADIATION ONCOLOGY | Facility: HOSPITAL | Age: 74
End: 2024-05-23
Payer: MEDICARE

## 2024-05-23 VITALS
SYSTOLIC BLOOD PRESSURE: 150 MMHG | OXYGEN SATURATION: 98 % | HEART RATE: 66 BPM | BODY MASS INDEX: 32.34 KG/M2 | WEIGHT: 179.8 LBS | DIASTOLIC BLOOD PRESSURE: 84 MMHG

## 2024-05-23 DIAGNOSIS — D05.11 BREAST NEOPLASM, TIS (DCIS), RIGHT: Primary | ICD-10-CM

## 2024-05-23 PROCEDURE — G0463 HOSPITAL OUTPT CLINIC VISIT: HCPCS | Performed by: RADIOLOGY

## 2024-05-23 PROCEDURE — 77263 THER RADIOLOGY TX PLNG CPLX: CPT | Performed by: RADIOLOGY

## 2024-05-23 NOTE — PROGRESS NOTES
.      CC: DCIS right breast with small foci of invasive ductal carcinoma, pT1aNx, ER OK pos                                Dear Jennifer Kelly MD    I had the pleasure of seeing Sari Self  [facility].   The patient is a 73 y.o. female with recently diagnosed right breast cancer.  She had a screening mammogram on 1/18/24 which showed bilateral pre-pectoral saline implants. There is an asymmetry measuring 7 mm seen in the CC view only seen in the middle one third of the right breast in the central region and in the sub areolar region.BI-RADS 0.  She had a diagnostic right mammogram on 2/5/24 which showed bilateral pre-pectoral saline implants. Additional evaluation was performed for the asymmetry in the right breast, central small focal asymmetry measuring 4 mm middle one third 6:30 o'clock region of the right breast located 5 cm from the nipple. This is new since prior exam(s). Right Breast Ultrasound There is no sonographic correlate.IMPRESSION: BI-RADS 4.    She had a stereotactic guided biopsy of the right breast on 3/4/24 with pathology revealing low grade DCIS, cribriform and focal solid dcis, 3mm, ER % OK % ki 67 13%.    She had a breast mri on 3/15/24 with pathology revealing biopsy-proven site of malignancy in the right breast at the 6-o'clock position in the middle third represented by the top hat shaped metallic clip within the biopsy cavity. Only nonspecific residual enhancement at this location is noted.  At the 6-o'clock position on the order of 4 cm posterior to the biopsy-proven malignancy there is a 1.3 cm area of suspicious non-mass  enhancement. No mammographic correlate is appreciated. Further evaluation with an MRI guided right breast biopsy and/or surgical  excision of the tissue up to 4 cm posterior to the top hat shaped metallic clip is recommended.    She had a mri guided biopsy of right breast 6 oclock position on 4/9/24 which revealed Ductal  carcinoma in situ (DCIS), low to intermediate nuclear grade with apocrine features, cribriform and solid architecture, measuring up to 6 mm maximally and involving up to 7 core fragments, ER KS positive ki 7 17%.     She underwent a right breast lumpectomy on 24 with pathology revealing grade II invasive carcinoma 1.2mm no lvsi, with low to intermediate grade DCIS measuring 40mm, papillary and micropapillary with comedotype necrosis.  The invasive component was 28mm from the closest anterior margin and the dcis was 18mm from the closest anterior margin.  Her pathologic stage was pT1aNx.  She had removal of bilateral implants and oncoplastic closure performed by Dr. Brambila.     She is  with menarche age 13  and first childbirth age 18.  She breast fed for 60 months total.  She had a hysterectomy in .  She took birth control pills for a short perior and hormone replacement therapy for 15 years.     She is recovering well from her surgery and referred today for evaluation for adjuvant radiation.  She will see Dr. Ortega on May 29.            Review of Systems   Constitutional: Negative.    HENT: Negative.     Eyes: Negative.    Respiratory: Negative.     Cardiovascular:  Positive for leg swelling.   Gastrointestinal:  Positive for diarrhea.   Genitourinary: Negative.    Musculoskeletal:  Positive for arthralgias and back pain.   Skin: Negative.    Neurological: Negative.    Psychiatric/Behavioral: Negative.           Past Medical History:   Diagnosis Date   • Allergic    • Arthritis    • Breast cancer, right    • Cerebral aneurysm     had coiling   • Cholelithiasis 2016    Gallbladder removed   • Colon polyp    • COVID-19    • Diarrhea    • Disease of thyroid gland    • Dislocation, shoulder 1968    Dislocated several times   • Diverticulosis    • GERD (gastroesophageal reflux disease)    • H/O cerebral aneurysm repair    • History of 2019 novel coronavirus disease (COVID-19) 2020  2020 STATES VERY MILD SYMTOMS   • Hyperlipidemia    • Hypertension    • Hyperthyroidism 2012?   • Hypothyroidism    • IBS (irritable bowel syndrome)    • Knee swelling    • Low back pain    • OAB (overactive bladder)    • Osteoporosis    • Right knee pain    • RLS (restless legs syndrome)    • Rotator cuff syndrome 1968    Tears   • Stress fracture of right tibia    • Stroke    • Tear of meniscus of knee 2019 & 2020    Surgery both knees   • TIA (transient ischemic attack)     2011   • Torn meniscus     RIGHT KNEE   • Urinary tract infection 06/01/2018         Past Surgical History:   Procedure Laterality Date   • BREAST LUMPECTOMY Right 5/8/2024    Procedure: RIGHT breast bracketed needle localized lumpectomy, oncplastics,  implant removal.;  Surgeon: Jennifer Kelly MD;  Location: [facility] MAIN OR;  Service: General;  Laterality: Right;   • BREAST SURGERY Bilateral 5/8/2024    Procedure: BILATERAL IMPLANT REMOVAL WITH CAPSULECTOMY, RIGHT ONCOPLASTIC CLOSURE LEFT BREAST REDUCTION FOR SYMMETRY, USE OF SPY;  Surgeon: Lisa Brambila MD;  Location: [facility] MAIN OR;  Service: Plastics;  Laterality: Bilateral;   • CARDIAC CATHETERIZATION     • CATARACT EXTRACTION, BILATERAL  03/2020   • CHOLECYSTECTOMY     • COLONOSCOPY  11/10/2010    prep of colon fair,IH,stool in transverse colon,hepatic flexure and ascending colon,ileum normal,IBS   • COLONOSCOPY N/A 02/27/2018    IH, diverticulosis, one 5mm polyp, tubular adenoma with low grade dysplasia   • COLONOSCOPY N/A 08/15/2023    Procedure: COLONOSCOPY into cecum/terminal ileum with polypectomy;  Surgeon: Finesse Reza MD;  Location: [facility] ENDOSCOPY;  Service: Gastroenterology;  Laterality: N/A;  polyps   • CYSTOSCOPY     • ENDOSCOPY  11/27/2012    grd 1 reflux egitis,web upperd 3rd esoph   • ENDOSCOPY N/A 02/27/2018    normal biopsies, H Pylori positive   • FOOT NEUROMA SURGERY Left    • HYSTERECTOMY  1985   • IR CEREBRAL ANEURYSM COILING  2012   • JOINT  REPLACEMENT Left     Partial left knee replacement   • KNEE ARTHROPLASTY UNICOMPARTMENTAL Left 03/18/2022    Procedure: KNEE ARTHROPLASTY UNICOMPARTMENTAL;  Surgeon: Everton Zuniga MD;  Location: [facility] OR;  Service: Orthopedics;  Laterality: Left;   • KNEE ARTHROSCOPY Left 07/03/2019    Procedure: KNEE ARTHROSCOPY ARTHRITIS DEBRIDEMENT PARTIAL MEDIAL AND LATERAL MENISECTOMY;  Surgeon: Nuris Bender MD;  [place] [facility] [place] [facility] [place]  Service: [place]opedics   • KNEE ARTHROSCOPY Right 09/28/2020    Procedure: RIGHT KNEE ARTHROSCOPY, PARTIAL MEDIAL AND LATERAL MENISCECTOMIES, DEBRIDEMENT OF ARTHRITIS, AND INTERNAL FIXATION TIBAL PLATEAU INSUFFICIENCY FRACTURE;  Surgeon: Nuris Bender MD;  [place] [facility] [place] [facility] [place]  Service: [place]opedics;  Laterality: Right;   • TENSION FREE VAGINAL TAPING WITH MINI ARC SLING     • THUMB ARTHROSCOPY     • TOTAL ABDOMINAL HYSTERECTOMY WITH SALPINGO OOPHORECTOMY     • TRIGGER POINT INJECTION      Hand, elbow, shoulders, knees   • UPPER GASTROINTESTINAL ENDOSCOPY  02/27/2018         Social History     Socioeconomic History   • Marital status:    Tobacco Use   • Smoking status: Never   • Smokeless tobacco: Never   Vaping Use   • Vaping status: Never Used   Substance and Sexual Activity   • Alcohol use: Not Currently     Comment: 1-2 monthly   • Drug use: Never   • Sexual activity: Not Currently     Partners: Male     Birth control/protection: Hysterectomy         Family History   Problem Relation Age of Onset   • Irritable bowel syndrome Daughter    • Malig Hyperthermia Neg Hx           Objective    Physical Exam  Constitutional:       Appearance: Normal appearance.   HENT:      Head: Atraumatic.   Eyes:      Extraocular Movements: Extraocular movements intact.   Chest:          Comments: Bilateral incisions healing well, no palpable masses in either breast   Musculoskeletal:         General: Normal range of motion.   Neurological:      General: No focal deficit present.       Mental Status: She is alert.   Psychiatric:         Mood and Affect: Mood normal.         Current Outpatient Medications on File Prior to Visit   Medication Sig Dispense Refill   • atorvastatin (LIPITOR) 20 MG tablet Take 1 tablet by mouth Every Night. 90 tablet 1   • EQ Allergy Relief, Cetirizine, 10 MG tablet Take 1 tablet by mouth once daily 90 tablet 0   • gabapentin (NEURONTIN) 600 MG tablet Take 1 tablet by mouth 4 (Four) Times a Day.     • hydroCHLOROthiazide (HYDRODIURIL) 12.5 MG tablet Take 1 tablet by mouth Daily. 90 tablet 0   • levothyroxine (SYNTHROID, LEVOTHROID) 75 MCG tablet Take 1 tablet by mouth Every Morning. 90 each 1   • omeprazole (priLOSEC) 40 MG capsule Take 1 capsule by mouth once daily 90 capsule 3   • potassium chloride 10 MEQ CR tablet Take 1 tablet by mouth Daily. 90 each 0     Current Facility-Administered Medications on File Prior to Visit   Medication Dose Route Frequency Provider Last Rate Last Admin   • Chlorhexidine Gluconate Cloth 2 % pads   Apply externally BID Everton Zuniga MD           ALLERGIES:    Allergies   Allergen Reactions   • Penicillins Hives     ..Beta lactam allergy details  Antibiotic reaction: hives  Age at reaction: adult  Dose to reaction time: unknown  Reason for antibiotic:  (uti)  Epinephrine required for reaction?: no      • Bee Venom Swelling       LMP  (LMP Unknown)      (0) Fully active, able to carry on all predisease performance without restriction       No data to display                  Assessment & Plan     73 year old female with pT1aNx right breast cancer with 4cm of low to intermediate grade dcis s/p lumpectomy ER ND positive.  I discussed with her my recommendation for post-operative radiation therapy to the right breast to decrease the risk of local recurrence.      I discussed with her in detail the risks, benefits and rationale of radiation therapy to the right breast to include but not limited to the following:    Acute: skin erythema,  breakdown/moist desquamation, swelling or discomfort of the breast, fatigue, pneumonitis resulting in shortness of breath, cough or pain    Late: Permanent skin changes including hyperpigmentation, telangiectasias, fibrosis of the breast resulting in smaller size or poor cosmetic outcome, late edema or cellulitis, late rib fracture, late pulmonary fibrosis and the remote risk of second malignancies.      She voiced understanding and was given an opportunity to ask questions which I believe were answered to her satisfaction.  We will proceed with CT simulation for treatment planning today in anticipation of starting her treatments next week. I plan to treat the right breast with tangential fields to a dose of approximately 3990cGy in 15 fractions followed by a boost to the tumor bed for an additional 1000cGy in 5 fractions.    I personally spent greater than 60 minutes today assessing, managing, discussing and documenting my visit with the patient. That time includes review of records, imaging and pathology reports, obtaining my own history, performing a medically appropriate evaluation, counseling and educating the patient, discussing goals, logistics, alternatives and risks of my recommendations, surveillance options and potential outcomes. It also includes the time documenting the clinical information in the EMR and communicating my recommendations to the other involved physicians.                Thank you very much for allowing me to participate in the care of this very pleasant patient.    Sincerely,      Kayla Starks MD     Subjective     Jennifer Kelly MD    Cancer Staging   No asya

## 2024-05-29 ENCOUNTER — CONSULT (OUTPATIENT)
Dept: ONCOLOGY | Facility: CLINIC | Age: 74
End: 2024-05-29
Payer: MEDICARE

## 2024-05-29 ENCOUNTER — LAB (OUTPATIENT)
Dept: LAB | Facility: HOSPITAL | Age: 74
End: 2024-05-29
Payer: MEDICARE

## 2024-05-29 ENCOUNTER — HOSPITAL ENCOUNTER (OUTPATIENT)
Dept: RADIATION ONCOLOGY | Facility: HOSPITAL | Age: 74
Setting detail: RADIATION/ONCOLOGY SERIES
End: 2024-05-29
Payer: MEDICARE

## 2024-05-29 VITALS
HEIGHT: 63 IN | HEART RATE: 71 BPM | SYSTOLIC BLOOD PRESSURE: 149 MMHG | WEIGHT: 175.9 LBS | DIASTOLIC BLOOD PRESSURE: 79 MMHG | OXYGEN SATURATION: 99 % | BODY MASS INDEX: 31.17 KG/M2 | TEMPERATURE: 97.7 F | RESPIRATION RATE: 18 BRPM

## 2024-05-29 DIAGNOSIS — D05.11 BREAST NEOPLASM, TIS (DCIS), RIGHT: Primary | ICD-10-CM

## 2024-05-29 DIAGNOSIS — R79.89 ABNORMAL CBC: Primary | ICD-10-CM

## 2024-05-29 LAB
BASOPHILS # BLD AUTO: 0.1 10*3/MM3 (ref 0–0.2)
BASOPHILS NFR BLD AUTO: 1 % (ref 0–1.5)
DEPRECATED RDW RBC AUTO: 40.8 FL (ref 37–54)
EOSINOPHIL # BLD AUTO: 1.72 10*3/MM3 (ref 0–0.4)
EOSINOPHIL NFR BLD AUTO: 17.3 % (ref 0.3–6.2)
ERYTHROCYTE [DISTWIDTH] IN BLOOD BY AUTOMATED COUNT: 12.9 % (ref 12.3–15.4)
HCT VFR BLD AUTO: 40.2 % (ref 34–46.6)
HGB BLD-MCNC: 13.3 G/DL (ref 12–15.9)
IMM GRANULOCYTES # BLD AUTO: 0.08 10*3/MM3 (ref 0–0.05)
IMM GRANULOCYTES NFR BLD AUTO: 0.8 % (ref 0–0.5)
LYMPHOCYTES # BLD AUTO: 2.85 10*3/MM3 (ref 0.7–3.1)
LYMPHOCYTES NFR BLD AUTO: 28.7 % (ref 19.6–45.3)
MCH RBC QN AUTO: 29 PG (ref 26.6–33)
MCHC RBC AUTO-ENTMCNC: 33.1 G/DL (ref 31.5–35.7)
MCV RBC AUTO: 87.6 FL (ref 79–97)
MONOCYTES # BLD AUTO: 0.64 10*3/MM3 (ref 0.1–0.9)
MONOCYTES NFR BLD AUTO: 6.4 % (ref 5–12)
NEUTROPHILS NFR BLD AUTO: 4.54 10*3/MM3 (ref 1.7–7)
NEUTROPHILS NFR BLD AUTO: 45.8 % (ref 42.7–76)
NRBC BLD AUTO-RTO: 0 /100 WBC (ref 0–0.2)
PLATELET # BLD AUTO: 340 10*3/MM3 (ref 140–450)
PMV BLD AUTO: 10 FL (ref 6–12)
RBC # BLD AUTO: 4.59 10*6/MM3 (ref 3.77–5.28)
WBC NRBC COR # BLD AUTO: 9.93 10*3/MM3 (ref 3.4–10.8)

## 2024-05-29 PROCEDURE — 77290 THER RAD SIMULAJ FIELD CPLX: CPT | Performed by: RADIOLOGY

## 2024-05-29 PROCEDURE — 77332 RADIATION TREATMENT AID(S): CPT | Performed by: RADIOLOGY

## 2024-05-29 PROCEDURE — 85025 COMPLETE CBC W/AUTO DIFF WBC: CPT

## 2024-05-29 PROCEDURE — 36415 COLL VENOUS BLD VENIPUNCTURE: CPT

## 2024-05-29 RX ORDER — TAMOXIFEN CITRATE 20 MG/1
20 TABLET ORAL DAILY
Qty: 90 TABLET | Refills: 2 | Status: SHIPPED | OUTPATIENT
Start: 2024-05-29 | End: 2025-02-23

## 2024-05-29 NOTE — PROGRESS NOTES
Subjective     REASON FOR CONSULTATION: DCIS right breast pTis NX ER/FL positive  Provide an opinion on any further workup or treatment                             REQUESTING PHYSICIAN: MD Nurys Francois MD    RECORDS OBTAINED:  Records of the patients history including those obtained from the referring provider were reviewed and summarized in detail.    HISTORY OF PRESENT ILLNESS:  The patient is a 73 y.o. year old female who is here for an opinion about the above issue.    History of Present Illness patient is a 73-year-old female who works at [employer] department and was found  on her routine mammogram to have an abnormality in the right breast that warranted diagnostic imaging which showed a 4 mm abnormality at the 6:30 position of the right breast.  This was biopsied and showed low-grade DCIS ER/FL positive.  The patient was referred to Dr. Kelly and underwent bilateral breast MRI which showed postbiopsy cavity in the right breast with nonspecific surrounding enhancement and intact retroglandular saline implants with no axillary adenopathy and a normal left breast    Patient was taken to surgery had lumpectomy the findings of a 1.2 mm invasive ductal carcinoma and associated DCIS measuring 40 mm with clear margins no sentinel nodes were removed.  Removal of her implants and bilateral lift procedures and has done well postoperatively.  She is here to discuss treatment versus prevention    She is  6 para 5 first childbirth age 18 she did  breast-feed all 5 children for 12 months    Menarche age  13 menopause at 43 when she had an oopherectomy .  She had been on  hormonal therapy after menopause for 12 yrs then stopped    Family history is negative for any malignancy except for her sister with CLL  Genetic testing not done    She is not a smoker or drinker  No DVT MI  TIA  in  related to aneurysm which was  coiled  Bilateral implants 1993    She has had colon polyps and is up-to-date with colonoscopies      Past Medical History:   Diagnosis Date    Allergic     Arthritis     Breast cancer, right 2024    Cerebral aneurysm     had coiling    Cholelithiasis 2016    Gallbladder removed    Colon polyp     COVID-19 2020    Diarrhea 2024    Disease of thyroid gland     Dislocation, shoulder 1968    Dislocated several times    Diverticulosis     GERD (gastroesophageal reflux disease)     H/O cerebral aneurysm repair     History of 2019 novel coronavirus disease (COVID-19) 08/2020 AUGUST 2020 STATES VERY MILD SYMTOMS    Hyperlipidemia     Hypertension     Hyperthyroidism 2012?    Hypothyroidism     IBS (irritable bowel syndrome)     Knee swelling     Low back pain     OAB (overactive bladder)     Osteoporosis     Right knee pain     RLS (restless legs syndrome)     Rotator cuff syndrome 1968    Tears    Stress fracture of right tibia     Stroke     Tear of meniscus of knee 2019 & 2020    Surgery both knees    TIA (transient ischemic attack)     2011    Torn meniscus     RIGHT KNEE    Urinary tract infection 06/01/2018        Past Surgical History:   Procedure Laterality Date    BREAST LUMPECTOMY Right 5/8/2024    Procedure: RIGHT breast bracketed needle localized lumpectomy, oncplastics,  implant removal.;  Surgeon: Jennifer Kelly MD;  Location: [facility];  Service: General;  Laterality: Right;    BREAST SURGERY Bilateral 5/8/2024    Procedure: BILATERAL IMPLANT REMOVAL WITH CAPSULECTOMY, RIGHT ONCOPLASTIC CLOSURE LEFT BREAST REDUCTION FOR SYMMETRY, USE OF SPY;  Surgeon: Lisa Brambila MD;  Location: [facility];  Service: Plastics;  Laterality: Bilateral;    CARDIAC CATHETERIZATION      CATARACT EXTRACTION, BILATERAL  03/2020    CHOLECYSTECTOMY      COLONOSCOPY  11/10/2010    prep of colon fair,IH,stool in transverse colon,hepatic flexure and ascending colon,ileum normal,IBS    COLONOSCOPY N/A 02/27/2018     IH, diverticulosis, one 5mm polyp, tubular adenoma with low grade dysplasia    COLONOSCOPY N/A 08/15/2023    Procedure: COLONOSCOPY into cecum/terminal ileum with polypectomy;  Surgeon: Finesse Reza MD;  [facility];  Service: Gastroenterology;  Laterality: N/A;  polyps    CYSTOSCOPY      ENDOSCOPY  11/27/2012    grd 1 reflux egitis,web upperd 3rd esoph    ENDOSCOPY N/A 02/27/2018    normal biopsies, H Pylori positive    FOOT NEUROMA SURGERY Left     HYSTERECTOMY  1985    IR CEREBRAL ANEURYSM COILING  2012    JOINT REPLACEMENT Left     Partial left knee replacement    KNEE ARTHROPLASTY UNICOMPARTMENTAL Left 03/18/2022    Procedure: KNEE ARTHROPLASTY UNICOMPARTMENTAL;  Surgeon: Everton Zuniga MD;  Location: [facility] MAIN OR;  Service: Orthopedics;  Laterality: Left;    KNEE ARTHROSCOPY Left 07/03/2019    Procedure: KNEE ARTHROSCOPY ARTHRITIS DEBRIDEMENT PARTIAL MEDIAL AND LATERAL MENISECTOMY;  Surgeon: Nuris Bender MD;  Location: [facility];  Service: Orthopedics    KNEE ARTHROSCOPY Right 09/28/2020    Procedure: RIGHT KNEE ARTHROSCOPY, PARTIAL MEDIAL AND LATERAL MENISCECTOMIES, DEBRIDEMENT OF ARTHRITIS, AND INTERNAL FIXATION TIBAL PLATEAU INSUFFICIENCY FRACTURE;  Surgeon: Nuris Bender MD;  Location:  [facility];  Service: Orthopedics;  Laterality: Right;    TENSION FREE VAGINAL TAPING WITH MINI ARC SLING      THUMB ARTHROSCOPY      TOTAL ABDOMINAL HYSTERECTOMY WITH SALPINGO OOPHORECTOMY      TRIGGER POINT INJECTION      Hand, elbow, shoulders, knees    UPPER GASTROINTESTINAL ENDOSCOPY  02/27/2018        Current Outpatient Medications on File Prior to Visit   Medication Sig Dispense Refill    atorvastatin (LIPITOR) 20 MG tablet Take 1 tablet by mouth Every Night. 90 tablet 1    EQ Allergy Relief, Cetirizine, 10 MG tablet Take 1 tablet by mouth once daily 90 tablet 0    gabapentin (NEURONTIN) 600 MG tablet Take 1 tablet by mouth 4 (Four) Times a Day.      hydroCHLOROthiazide  "(HYDRODIURIL) 12.5 MG tablet Take 1 tablet by mouth Daily. 90 tablet 0    levothyroxine (SYNTHROID, LEVOTHROID) 75 MCG tablet Take 1 tablet by mouth Every Morning. 90 each 1    omeprazole (priLOSEC) 40 MG capsule Take 1 capsule by mouth once daily 90 capsule 3    potassium chloride 10 MEQ CR tablet Take 1 tablet by mouth Daily. 90 each 0     Current Facility-Administered Medications on File Prior to Visit   Medication Dose Route Frequency Provider Last Rate Last Admin    Chlorhexidine Gluconate Cloth 2 % pads   Apply externally BID Everton Zuniga MD            ALLERGIES:    Allergies   Allergen Reactions    Penicillins Hives     ..Beta lactam allergy details  Antibiotic reaction: hives  Age at reaction: adult  Dose to reaction time: unknown  Reason for antibiotic:  (uti)  Epinephrine required for reaction?: no       Bee Venom Swelling        Social History     Socioeconomic History    Marital status:    Tobacco Use    Smoking status: Never    Smokeless tobacco: Never   Vaping Use    Vaping status: Never Used   Substance and Sexual Activity    Alcohol use: Not Currently     Comment: 1-2 monthly    Drug use: Never    Sexual activity: Not Currently     Partners: Male     Birth control/protection: Hysterectomy        Family History   Problem Relation Age of Onset    Irritable bowel syndrome Daughter     [place] Hyperthermia Neg Hx         Review of Systems   All other systems reviewed and are negative.       Objective     Vitals:    05/29/24 1553   BP: 149/79   Pulse: 71   Resp: 18   Temp: 97.7 °F (36.5 °C)   TempSrc: Oral   SpO2: 99%   Weight: 79.8 kg (175 lb 14.4 oz)   Height: 158.8 cm (62.52\")   PainSc:   5   PainLoc: Breast         5/29/2024     3:51 PM   Current Status   ECOG score 0       Physical Exam  CONSTITUTIONAL:  Vital signs reviewed.  No distress, looks comfortable.  EYES:  Conjunctivae and lids unremarkable.  PERRLA  EARS,NOSE,MOUTH,THROAT:  Ears and nose appear unremarkable.  Lips, teeth, gums " appear unremarkable.  RESPIRATORY:  Normal respiratory effort.  Lungs clear to auscultation bilaterally.  BREASTS: Bilateral lift procedures well-healed no masses  CARDIOVASCULAR:  Normal S1, S2.  No murmurs rubs or gallops.  No significant lower extremity edema.  GASTROINTESTINAL: Abdomen appears unremarkable.  Nontender.  No hepatomegaly.  No splenomegaly.  LYMPHATIC:  No cervical, supraclavicular, axillary lymphadenopathy.  SKIN:  Warm.  No rashes.  PSYCHIATRIC:  Normal judgment and insight.  Normal mood and affect.      RECENT LABS:  Hematology WBC   Date Value Ref Range Status   05/29/2024 9.93 3.40 - 10.80 10*3/mm3 Final   06/06/2023 6.84 4.5 - 11.0 10*3/uL Final     RBC   Date Value Ref Range Status   05/29/2024 4.59 3.77 - 5.28 10*6/mm3 Final   06/06/2023 4.41 4.0 - 5.2 10*6/uL Final     Hemoglobin   Date Value Ref Range Status   05/29/2024 13.3 12.0 - 15.9 g/dL Final   06/06/2023 12.6 12.0 - 16.0 g/dL Final     Hematocrit   Date Value Ref Range Status   05/29/2024 40.2 34.0 - 46.6 % Final   06/06/2023 39.0 36.0 - 46.0 % Final     Platelets   Date Value Ref Range Status   05/29/2024 340 140 - 450 10*3/mm3 Final   06/06/2023 331 140 - 440 10*3/uL Final              Component    Case Report   Surgical Pathology Report                         Case: [id number]                                   Authorizing Provider:  Nurys Edwards MD      Collected:           03/04/2024 02:52 PM           Ordering Location:     [facility]  Received:            03/04/2024 04:10 PM                                  LABORATORY                                                                   Pathologist:           Jordan Granados MD                                                         Specimen:    Breast, Right, Right breast FA with calcs 630 o'clock Ex 1452 Formalin 1500                Clinical Information    FA w/ calcs BI-RADS 4B   Final Diagnosis   1.  Right breast at 6:30 o'clock, (focal asymmetry with  calcification), stereotactic core biopsy:               A.  Ductal carcinoma in situ (DCIS):                            1.  Low-grade cribriform and focal solid DCIS.                            2.  Scattered foci of DCIS span at least 3 mm. In single greatest dimension.                            3.  Focal microcalcification present within DCIS.               B.  No invasive carcinoma identified.   Electronically signed by Jordan Granados MD         Estrogen Receptor (ER) Status  Positive (greater than 10% of cells demonstrate nuclear positivity)   Percentage of Cells with Nuclear Positivity  %   Average Intensity of Staining  Strong   Test Type  Food and Drug Administration (FDA) cleared (test / vendor): [place]   Primary Antibody  SP1   Scoring System  Jose Cruz   Proportion Score  5   Intensity Score  3   Total Jose Cruz Score  8   Test(s) Performed     Progesterone Receptor (PgR) Status  Positive   Percentage of Cells with Nuclear Positivity  %   Average Intensity of Staining  Strong   Test Type  Food and Drug Administration (FDA) cleared (test / vendor): [place]   Primary Antibody  1E2   Scoring System  Jose Cruz   Proportion Score  5   Intensity Score  3   Total Jose Cruz Score  8   Test(s) Performed  Ki-67   Ki-67 Percentage of Positive Nuclei  13 %           Component    Case Report   Surgical Pathology Report                         Case: [id number]                                   Authorizing Provider:  Jennifer Kelly MD    Collected:           04/09/2024 09:00 AM           Ordering Location:     [facility]  Received:            04/09/2024 11:17 AM                                  MRI                                                                           Pathologist:           Donte De Luna MD                                                           Specimen:    Breast, Right, Right breast Bx MRI by Dr Fonseca @ 0900, 9 cores, formalin time 0915,                clock face  0600                                                                            Final Diagnosis   1.  Breast, right 6 o'clock position, MRI guided core biopsy:  A.  Ductal carcinoma in situ (DCIS), low to intermediate nuclear grade with apocrine features, cribriform and solid architecture, measuring up to 6 mm maximally and involving up to 7 core fragments.   Electronically signed by Donte De Luna MD on 4/11/2024 at 0806   Synoptic Checklist   Breast Biomarker Reporting Template   Protocol posted: 12/13/2023BREAST BIOMARKER REPORTING TEMPLATE - All Specimens  Test(s) Performed     Estrogen Receptor (ER) Status  Positive (greater than 10% of cells demonstrate nuclear positivity)   Percentage of Cells with Nuclear Positivity  %   Average Intensity of Staining  Strong   Test Type  Food and Drug Administration (FDA) cleared (test / vendor): [place]   Primary Antibody  SP1   Test(s) Performed     Progesterone Receptor (PgR) Status  Positive   Percentage of Cells with Nuclear Positivity  %   Average Intensity of Staining  Moderate   Test Type  Food and Drug Administration (FDA) cleared (test / vendor): [place]   Primary Antibody  1E2   Test(s) Performed  Ki-67   Ki-67 Percentage of Positive Nuclei  17 %   Primary Antibody  30-9             Synoptic Checklist  INVASIVE CARCINOMA OF THE BREAST: Resection (INVASIVE CARCINOMA OF THE BREAST: RESECTION - All Specimens) 8th Edition  - Protocol posted: 12/13/2023  SPECIMEN  Procedure Excision (less than total mastectomy)  Specimen Laterality Right  .  TUMOR  Tumor Site 6 o'clock  Histologic Type Invasive carcinoma of no special type (ductal)  Histologic Grade ([place] Histologic Score)  Glandular ([place]cinar) / Tubular Differentiation Score 3  Nuclear Pleomorphism Score 2  Mitotic Rate Score 1  Overall Grade Grade 2 (scores of 6 or 7)  Tumor Size Greatest dimension of largest invasive focus (Millimeters): 1.2 mm  Tumor Focality Single focus of invasive  "carcinoma  Ductal Carcinoma In Situ (DCIS) Present  Negative for extensive intraductal component (EIC)  Size (Extent) of DCIS Estimated size (extent) of DCIS is at least (Millimeters): 40 mm  Additional Dimension (Millimeters) 28 mm  10 mm  Architectural Patterns Cribriform  Micropapillary  Papillary  Nuclear Grade Grade II (intermediate)  Necrosis Present, central (expansive \"comedo\" necrosis)  Lobular Carcinoma In Situ (LCIS) Not identified  Lymphatic and / or Vascular Invasion Not identified  Dermal Lymphatic and / or Vascular Invasion Not identified  Microcalcifications Present in DCIS  Treatment Effect in the Breast No known presurgical therapy  .  MARGINS  Margin Status for Invasive Carcinoma All margins negative for invasive carcinoma  Distance from Invasive Carcinoma to Closest Margin 28 mm  Closest Margin(s) to Invasive Carcinoma Anterior  Margin Status for DCIS All margins negative for DCIS  Distance from DCIS to Closest Margin 18 mm  Closest Margin(s) to DCIS Anterior  .  REGIONAL LYMPH NODES  Regional Lymph Node Status Not applicable (no regional lymph nodes submitted or found)  .  pTNM CLASSIFICATION (AJCC 8th Edition)  Reporting of pT, pN, and (when applicable) pM categories is based on information available to the pathologist at the time the report is issued. As  per the AJCC (Chapter 1, 8th Ed.) it is the managing physician’s responsibility to establish the final pathologic stage based upon all pertinent  information, including but potentially not limited to this pathology report.  pT Category pT1a  pN Category pN not assigned (no nodes submitted or found)  .  [place] Biomarker Reporting Template   Protocol posted: 12/13/2023BREAST BIOMARKER REPORTING TEMPLATE - All Specimens  Test(s) Performed     Estrogen Receptor (ER) Status  Positive (greater than 10% of cells demonstrate nuclear positivity)   Percentage of Cells with Nuclear Positivity  %   Average Intensity of Staining  Strong   Test Type  " Food and Drug Administration (FDA) cleared (test / vendor): [place]   Primary Antibody  SP1   Scoring System  Jose Cruz   Proportion Score  5   Intensity Score  3   Total Jose Cruz Score  8   Test(s) Performed     Progesterone Receptor (PgR) Status  Positive   Percentage of Cells with Nuclear Positivity  %   Average Intensity of Staining  Strong   Test Type  Food and Drug Administration (FDA) cleared (test / vendor): [place]   Primary Antibody  1E2   Scoring System  Jose Cruz   Proportion Score  5   Intensity Score  3   Total Jose Cruz Score  8   Test(s) Performed  Ki-67   Ki-67 Percentage of Positive Nuclei  14 %   Primary Antibody  30-9   Cold Ischemia and Fixation Times  Meet requirements specified in latest version of the ASCO / CAP Guidelines   Cold Ischemia Time (minutes)  41 min   Fixation Time (hours)  8 hours   Testing Performed on Block Number(s)  1N   METHODS   Fixative  Formalin   Image Analysis  Not performed   Comment(s)  Her 2 ([facility]) will be reported in an addendum. NEGATIVE       Assessment & Plan   1.pT1a Nx Right breast  ER/TX+ HER2 negative postlumpectomy with associated DCIS measuring 40 mm with clear margins  Radiation planned followed by tamoxifen    2.  Osteoporosis on Reclast-bone density pending    3.  Negative family history of malignancy genetic testing not done    4.  Cerebral aneurysm post coiling in 2012 associated with a TIA    5.  Hypertension hypercholesterolemia on treatment    6.  Hypothyroidism on replacement    7.  Reflux on Prilosec    Plan  We discussed the fact that her invasive breast cancer is very small and does not need treatment and essentially radiation to the breast would help with lowering the risk of a second malignancy in the breast    Because of her known osteoporosis she is not a good candidate for aromatase inhibitor and I have recommended tamoxifen in an  adjuvant and preventative fashion if tolerated for 5 years and she is agreeable.    The side effects and toxicities  of Tamoxifen were discussed with the patient, including hot flashes, mood swings,depression, DVT and endometrial cancer. A list of drugs that interfere with the efficacy of tamoxifen and are to be avoided were given to the patient.  She will have her bone density scheduled and review this and start her tamoxifen the last week of radiation which would be sometime in July      Will plan follow-up in 4 to 5 months and told her to call us in the interim with any side effects that she could not tolerate

## 2024-06-03 ENCOUNTER — PATIENT MESSAGE (OUTPATIENT)
Dept: FAMILY MEDICINE CLINIC | Facility: CLINIC | Age: 74
End: 2024-06-03
Payer: MEDICARE

## 2024-06-03 DIAGNOSIS — Z78.0 POST-MENOPAUSAL: Primary | ICD-10-CM

## 2024-06-03 NOTE — TELEPHONE ENCOUNTER
From: Sari Self  To: Nurys Edwards  Sent: 6/3/2024 11:12 AM EDT  Subject: Hello, my medical oncologist needs for me to get a new bone density. I start radiation next week could you please place an order in epic for me to get the bone density? My last one was with [facility].      Thank You!

## 2024-06-06 ENCOUNTER — APPOINTMENT (OUTPATIENT)
Dept: WOMENS IMAGING | Facility: HOSPITAL | Age: 74
End: 2024-06-06
Payer: MEDICARE

## 2024-06-06 ENCOUNTER — HOSPITAL ENCOUNTER (OUTPATIENT)
Dept: PET IMAGING | Facility: HOSPITAL | Age: 74
Discharge: HOME | End: 2024-06-06
Payer: MEDICARE

## 2024-06-06 DIAGNOSIS — Z78.0 POST-MENOPAUSAL: ICD-10-CM

## 2024-06-06 PROCEDURE — 77080 DXA BONE DENSITY AXIAL: CPT

## 2024-06-07 ENCOUNTER — PATIENT MESSAGE (OUTPATIENT)
Dept: FAMILY MEDICINE CLINIC | Facility: CLINIC | Age: 74
End: 2024-06-07
Payer: MEDICARE

## 2024-06-07 ENCOUNTER — HOSPITAL ENCOUNTER (OUTPATIENT)
Dept: RADIATION ONCOLOGY | Facility: HOSPITAL | Age: 74
Setting detail: RADIATION/ONCOLOGY SERIES
End: 2024-06-07
Payer: MEDICARE

## 2024-06-07 PROCEDURE — 77300 RADIATION THERAPY DOSE PLAN: CPT | Performed by: RADIOLOGY

## 2024-06-07 PROCEDURE — 77295 3-D RADIOTHERAPY PLAN: CPT | Performed by: RADIOLOGY

## 2024-06-07 PROCEDURE — 77334 RADIATION TREATMENT AID(S): CPT | Performed by: RADIOLOGY

## 2024-06-12 ENCOUNTER — PATIENT OUTREACH (OUTPATIENT)
Dept: OTHER | Facility: HOSPITAL | Age: 74
End: 2024-06-12
Payer: MEDICARE

## 2024-06-12 ENCOUNTER — HOSPITAL ENCOUNTER (OUTPATIENT)
Dept: RADIATION ONCOLOGY | Facility: HOSPITAL | Age: 74
Setting detail: RADIATION/ONCOLOGY SERIES
Discharge: HOME | End: 2024-06-12
Payer: MEDICARE

## 2024-06-12 LAB
RAD ONC ARIA COURSE ID: NORMAL
RAD ONC ARIA COURSE INTENT: NORMAL
RAD ONC ARIA COURSE LAST TREATMENT DATE: NORMAL
RAD ONC ARIA COURSE START DATE: NORMAL
RAD ONC ARIA COURSE TREATMENT ELAPSED DAYS: 0
RAD ONC ARIA FIRST TREATMENT DATE: NORMAL
RAD ONC ARIA PLAN FRACTIONS TREATED TO DATE: 1
RAD ONC ARIA PLAN ID: NORMAL
RAD ONC ARIA PLAN PRESCRIBED DOSE PER FRACTION: 2.66 GY
RAD ONC ARIA PLAN PRIMARY REFERENCE POINT: NORMAL
RAD ONC ARIA PLAN TOTAL FRACTIONS PRESCRIBED: 16
RAD ONC ARIA PLAN TOTAL PRESCRIBED DOSE: 4256 CGY
RAD ONC ARIA REFERENCE POINT DOSAGE GIVEN TO DATE: 2.66 GY
RAD ONC ARIA REFERENCE POINT ID: NORMAL
RAD ONC ARIA REFERENCE POINT SESSION DOSAGE GIVEN: 2.66 GY

## 2024-06-12 PROCEDURE — 77412 RADIATION TX DELIVERY LVL 3: CPT | Performed by: RADIOLOGY

## 2024-06-12 PROCEDURE — 77427 RADIATION TX MANAGEMENT X5: CPT | Performed by: RADIOLOGY

## 2024-06-12 PROCEDURE — 77280 THER RAD SIMULAJ FIELD SMPL: CPT | Performed by: RADIOLOGY

## 2024-06-12 NOTE — PROGRESS NOTES
Ms.  came to the cancer center to talk through questions and resources. We discussed these and she was thankful for the help.

## 2024-06-13 ENCOUNTER — HOSPITAL ENCOUNTER (OUTPATIENT)
Dept: RADIATION ONCOLOGY | Facility: HOSPITAL | Age: 74
Setting detail: RADIATION/ONCOLOGY SERIES
Discharge: HOME | End: 2024-06-13
Payer: MEDICARE

## 2024-06-13 LAB
RAD ONC ARIA COURSE ID: NORMAL
RAD ONC ARIA COURSE INTENT: NORMAL
RAD ONC ARIA COURSE LAST TREATMENT DATE: NORMAL
RAD ONC ARIA COURSE START DATE: NORMAL
RAD ONC ARIA COURSE TREATMENT ELAPSED DAYS: 1
RAD ONC ARIA FIRST TREATMENT DATE: NORMAL
RAD ONC ARIA PLAN FRACTIONS TREATED TO DATE: 2
RAD ONC ARIA PLAN ID: NORMAL
RAD ONC ARIA PLAN PRESCRIBED DOSE PER FRACTION: 2.66 GY
RAD ONC ARIA PLAN PRIMARY REFERENCE POINT: NORMAL
RAD ONC ARIA PLAN TOTAL FRACTIONS PRESCRIBED: 16
RAD ONC ARIA PLAN TOTAL PRESCRIBED DOSE: 4256 CGY
RAD ONC ARIA REFERENCE POINT DOSAGE GIVEN TO DATE: 5.32 GY
RAD ONC ARIA REFERENCE POINT ID: NORMAL
RAD ONC ARIA REFERENCE POINT SESSION DOSAGE GIVEN: 2.66 GY

## 2024-06-13 PROCEDURE — 77412 RADIATION TX DELIVERY LVL 3: CPT | Performed by: RADIOLOGY

## 2024-06-14 ENCOUNTER — HOSPITAL ENCOUNTER (OUTPATIENT)
Dept: RADIATION ONCOLOGY | Facility: HOSPITAL | Age: 74
Setting detail: RADIATION/ONCOLOGY SERIES
Discharge: HOME | End: 2024-06-14
Payer: MEDICARE

## 2024-06-14 LAB
RAD ONC ARIA COURSE ID: NORMAL
RAD ONC ARIA COURSE INTENT: NORMAL
RAD ONC ARIA COURSE LAST TREATMENT DATE: NORMAL
RAD ONC ARIA COURSE START DATE: NORMAL
RAD ONC ARIA COURSE TREATMENT ELAPSED DAYS: 2
RAD ONC ARIA FIRST TREATMENT DATE: NORMAL
RAD ONC ARIA PLAN FRACTIONS TREATED TO DATE: 3
RAD ONC ARIA PLAN ID: NORMAL
RAD ONC ARIA PLAN PRESCRIBED DOSE PER FRACTION: 2.66 GY
RAD ONC ARIA PLAN PRIMARY REFERENCE POINT: NORMAL
RAD ONC ARIA PLAN TOTAL FRACTIONS PRESCRIBED: 16
RAD ONC ARIA PLAN TOTAL PRESCRIBED DOSE: 4256 CGY
RAD ONC ARIA REFERENCE POINT DOSAGE GIVEN TO DATE: 7.98 GY
RAD ONC ARIA REFERENCE POINT ID: NORMAL
RAD ONC ARIA REFERENCE POINT SESSION DOSAGE GIVEN: 2.66 GY

## 2024-06-14 PROCEDURE — 77336 RADIATION PHYSICS CONSULT: CPT | Performed by: RADIOLOGY

## 2024-06-14 PROCEDURE — 77412 RADIATION TX DELIVERY LVL 3: CPT | Performed by: RADIOLOGY

## 2024-06-17 ENCOUNTER — HOSPITAL ENCOUNTER (OUTPATIENT)
Dept: RADIATION ONCOLOGY | Facility: HOSPITAL | Age: 74
Setting detail: RADIATION/ONCOLOGY SERIES
Discharge: HOME | End: 2024-06-17
Payer: MEDICARE

## 2024-06-17 LAB
RAD ONC ARIA COURSE ID: NORMAL
RAD ONC ARIA COURSE INTENT: NORMAL
RAD ONC ARIA COURSE LAST TREATMENT DATE: NORMAL
RAD ONC ARIA COURSE START DATE: NORMAL
RAD ONC ARIA COURSE TREATMENT ELAPSED DAYS: 5
RAD ONC ARIA FIRST TREATMENT DATE: NORMAL
RAD ONC ARIA PLAN FRACTIONS TREATED TO DATE: 4
RAD ONC ARIA PLAN ID: NORMAL
RAD ONC ARIA PLAN PRESCRIBED DOSE PER FRACTION: 2.66 GY
RAD ONC ARIA PLAN PRIMARY REFERENCE POINT: NORMAL
RAD ONC ARIA PLAN TOTAL FRACTIONS PRESCRIBED: 16
RAD ONC ARIA PLAN TOTAL PRESCRIBED DOSE: 4256 CGY
RAD ONC ARIA REFERENCE POINT DOSAGE GIVEN TO DATE: 10.64 GY
RAD ONC ARIA REFERENCE POINT ID: NORMAL
RAD ONC ARIA REFERENCE POINT SESSION DOSAGE GIVEN: 2.66 GY

## 2024-06-17 PROCEDURE — 77412 RADIATION TX DELIVERY LVL 3: CPT | Performed by: RADIOLOGY

## 2024-06-17 NOTE — TELEPHONE ENCOUNTER
From: Sydney FREITAS  To: Sari Self  Sent: 6/7/2024 2:09 PM EDT  Subject: DEXA Results    Hi, this is Sydney from Dr. Edwards's office and I noticed you have read your DEXA results in "[employer]t. Let us know if you need anything else.    Dr. Edwards would like to know if you would like to discuss the medication with your Oncologist or have Dr. Edwards start you.    Please contact the office.      NAVEED Grimes

## 2024-06-18 ENCOUNTER — HOSPITAL ENCOUNTER (OUTPATIENT)
Dept: RADIATION ONCOLOGY | Facility: HOSPITAL | Age: 74
Setting detail: RADIATION/ONCOLOGY SERIES
Discharge: HOME | End: 2024-06-18
Payer: MEDICARE

## 2024-06-18 ENCOUNTER — RADIATION ONCOLOGY WEEKLY ASSESSMENT (OUTPATIENT)
Dept: RADIATION ONCOLOGY | Facility: HOSPITAL | Age: 74
End: 2024-06-18
Payer: MEDICARE

## 2024-06-18 VITALS — DIASTOLIC BLOOD PRESSURE: 85 MMHG | SYSTOLIC BLOOD PRESSURE: 138 MMHG | HEART RATE: 82 BPM | OXYGEN SATURATION: 98 %

## 2024-06-18 DIAGNOSIS — D05.11 BREAST NEOPLASM, TIS (DCIS), RIGHT: Primary | ICD-10-CM

## 2024-06-18 LAB
RAD ONC ARIA COURSE ID: NORMAL
RAD ONC ARIA COURSE INTENT: NORMAL
RAD ONC ARIA COURSE LAST TREATMENT DATE: NORMAL
RAD ONC ARIA COURSE START DATE: NORMAL
RAD ONC ARIA COURSE TREATMENT ELAPSED DAYS: 6
RAD ONC ARIA FIRST TREATMENT DATE: NORMAL
RAD ONC ARIA PLAN FRACTIONS TREATED TO DATE: 5
RAD ONC ARIA PLAN ID: NORMAL
RAD ONC ARIA PLAN PRESCRIBED DOSE PER FRACTION: 2.66 GY
RAD ONC ARIA PLAN PRIMARY REFERENCE POINT: NORMAL
RAD ONC ARIA PLAN TOTAL FRACTIONS PRESCRIBED: 16
RAD ONC ARIA PLAN TOTAL PRESCRIBED DOSE: 4256 CGY
RAD ONC ARIA REFERENCE POINT DOSAGE GIVEN TO DATE: 13.3 GY
RAD ONC ARIA REFERENCE POINT ID: NORMAL
RAD ONC ARIA REFERENCE POINT SESSION DOSAGE GIVEN: 2.66 GY

## 2024-06-18 NOTE — PROGRESS NOTES
Radiation Oncology  On-Treatment Note      Patient: Sari Self    MRN: [id number]    Attending Physician: Kayla Starks MD     Diagnosis:     ICD-10-CM ICD-9-CM   1. Breast neoplasm, Tis (DCIS), right  D05.11 233.0       Radiation Therapy Visit:  Continue radiation therapy, Dosimetry plan remains acceptable, Films reviewed and remains acceptable, Pain assessed, Pain management planned, Radiation dose schedule reviewed and remains acceptable, Radiation technique remains acceptable, and Symptoms within expected range    Radiation Treatments       Active   Plans   RT Breast   Most recent treatment: Dose planned: 266 cGy (fraction 5 on 6/18/2024)   Total: Dose planned: 4,256 cGy (16 fractions)   Elapsed Days: 6      Reference Points   RX: Rt Breast   Most recent treatment: Dose given: 266 cGy (on 6/18/2024)   Total: Dose given: 1,330 cGy   Elapsed Days: 6                      Physical Examination:  Vitals: Blood pressure 138/85, pulse 82, SpO2 98%, not currently breastfeeding.  Pain Score    06/18/24 1340   PainSc: 0-No pain       Fully active, able to carry on all pre-disease performance without restriction = 0    We examined the relevant areas: yes  Findings are within the expected range for this stage of treatment: yes  -------------------------------------------------------------------------------------------------------------------    ACTION ITEMS:  Patient tolerating treatment well and as expected for this stage in their treatment          Kayla Starks MD  Radiation Oncology

## 2024-06-19 ENCOUNTER — TELEPHONE (OUTPATIENT)
Dept: FAMILY MEDICINE CLINIC | Facility: CLINIC | Age: 74
End: 2024-06-19
Payer: MEDICARE

## 2024-06-19 ENCOUNTER — HOSPITAL ENCOUNTER (OUTPATIENT)
Dept: RADIATION ONCOLOGY | Facility: HOSPITAL | Age: 74
Setting detail: RADIATION/ONCOLOGY SERIES
Discharge: HOME | End: 2024-06-19
Payer: MEDICARE

## 2024-06-19 LAB
RAD ONC ARIA COURSE ID: NORMAL
RAD ONC ARIA COURSE INTENT: NORMAL
RAD ONC ARIA COURSE LAST TREATMENT DATE: NORMAL
RAD ONC ARIA COURSE START DATE: NORMAL
RAD ONC ARIA COURSE TREATMENT ELAPSED DAYS: 7
RAD ONC ARIA FIRST TREATMENT DATE: NORMAL
RAD ONC ARIA PLAN FRACTIONS TREATED TO DATE: 6
RAD ONC ARIA PLAN ID: NORMAL
RAD ONC ARIA PLAN PRESCRIBED DOSE PER FRACTION: 2.66 GY
RAD ONC ARIA PLAN PRIMARY REFERENCE POINT: NORMAL
RAD ONC ARIA PLAN TOTAL FRACTIONS PRESCRIBED: 16
RAD ONC ARIA PLAN TOTAL PRESCRIBED DOSE: 4256 CGY
RAD ONC ARIA REFERENCE POINT DOSAGE GIVEN TO DATE: 15.96 GY
RAD ONC ARIA REFERENCE POINT ID: NORMAL
RAD ONC ARIA REFERENCE POINT SESSION DOSAGE GIVEN: 2.66 GY

## 2024-06-19 PROCEDURE — 77427 RADIATION TX MANAGEMENT X5: CPT | Performed by: RADIOLOGY

## 2024-06-19 PROCEDURE — 77412 RADIATION TX DELIVERY LVL 3: CPT | Performed by: RADIOLOGY

## 2024-06-19 NOTE — TELEPHONE ENCOUNTER
Pt sent a message wanting to know if it is safe to do the reclast while also doing radiation treatments.  Already sent her a [employer] message informing her labs are already placed.

## 2024-06-20 ENCOUNTER — HOSPITAL ENCOUNTER (OUTPATIENT)
Dept: RADIATION ONCOLOGY | Facility: HOSPITAL | Age: 74
Setting detail: RADIATION/ONCOLOGY SERIES
Discharge: HOME | End: 2024-06-20
Payer: MEDICARE

## 2024-06-20 LAB
RAD ONC ARIA COURSE ID: NORMAL
RAD ONC ARIA COURSE INTENT: NORMAL
RAD ONC ARIA COURSE LAST TREATMENT DATE: NORMAL
RAD ONC ARIA COURSE START DATE: NORMAL
RAD ONC ARIA COURSE TREATMENT ELAPSED DAYS: 8
RAD ONC ARIA FIRST TREATMENT DATE: NORMAL
RAD ONC ARIA PLAN FRACTIONS TREATED TO DATE: 7
RAD ONC ARIA PLAN ID: NORMAL
RAD ONC ARIA PLAN PRESCRIBED DOSE PER FRACTION: 2.66 GY
RAD ONC ARIA PLAN PRIMARY REFERENCE POINT: NORMAL
RAD ONC ARIA PLAN TOTAL FRACTIONS PRESCRIBED: 16
RAD ONC ARIA PLAN TOTAL PRESCRIBED DOSE: 4256 CGY
RAD ONC ARIA REFERENCE POINT DOSAGE GIVEN TO DATE: 18.62 GY
RAD ONC ARIA REFERENCE POINT ID: NORMAL
RAD ONC ARIA REFERENCE POINT SESSION DOSAGE GIVEN: 2.66 GY

## 2024-06-20 PROCEDURE — 77412 RADIATION TX DELIVERY LVL 3: CPT | Performed by: RADIOLOGY

## 2024-06-20 NOTE — TELEPHONE ENCOUNTER
Sent pt a [employer] message informing her to contact her Oncologist in regards to Reclast questions and/or concerns.

## 2024-06-21 ENCOUNTER — HOSPITAL ENCOUNTER (OUTPATIENT)
Dept: RADIATION ONCOLOGY | Facility: HOSPITAL | Age: 74
Setting detail: RADIATION/ONCOLOGY SERIES
Discharge: HOME | End: 2024-06-21
Payer: MEDICARE

## 2024-06-21 LAB
RAD ONC ARIA COURSE ID: NORMAL
RAD ONC ARIA COURSE INTENT: NORMAL
RAD ONC ARIA COURSE LAST TREATMENT DATE: NORMAL
RAD ONC ARIA COURSE START DATE: NORMAL
RAD ONC ARIA COURSE TREATMENT ELAPSED DAYS: 9
RAD ONC ARIA FIRST TREATMENT DATE: NORMAL
RAD ONC ARIA PLAN FRACTIONS TREATED TO DATE: 8
RAD ONC ARIA PLAN ID: NORMAL
RAD ONC ARIA PLAN PRESCRIBED DOSE PER FRACTION: 2.66 GY
RAD ONC ARIA PLAN PRIMARY REFERENCE POINT: NORMAL
RAD ONC ARIA PLAN TOTAL FRACTIONS PRESCRIBED: 16
RAD ONC ARIA PLAN TOTAL PRESCRIBED DOSE: 4256 CGY
RAD ONC ARIA REFERENCE POINT DOSAGE GIVEN TO DATE: 21.28 GY
RAD ONC ARIA REFERENCE POINT ID: NORMAL
RAD ONC ARIA REFERENCE POINT SESSION DOSAGE GIVEN: 2.66 GY

## 2024-06-21 PROCEDURE — 77336 RADIATION PHYSICS CONSULT: CPT | Performed by: RADIOLOGY

## 2024-06-24 ENCOUNTER — HOSPITAL ENCOUNTER (OUTPATIENT)
Dept: RADIATION ONCOLOGY | Facility: HOSPITAL | Age: 74
Setting detail: RADIATION/ONCOLOGY SERIES
Discharge: HOME | End: 2024-06-24
Payer: MEDICARE

## 2024-06-24 LAB
RAD ONC ARIA COURSE ID: NORMAL
RAD ONC ARIA COURSE INTENT: NORMAL
RAD ONC ARIA COURSE LAST TREATMENT DATE: NORMAL
RAD ONC ARIA COURSE START DATE: NORMAL
RAD ONC ARIA COURSE TREATMENT ELAPSED DAYS: 12
RAD ONC ARIA FIRST TREATMENT DATE: NORMAL
RAD ONC ARIA PLAN FRACTIONS TREATED TO DATE: 9
RAD ONC ARIA PLAN ID: NORMAL
RAD ONC ARIA PLAN PRESCRIBED DOSE PER FRACTION: 2.66 GY
RAD ONC ARIA PLAN PRIMARY REFERENCE POINT: NORMAL
RAD ONC ARIA PLAN TOTAL FRACTIONS PRESCRIBED: 16
RAD ONC ARIA PLAN TOTAL PRESCRIBED DOSE: 4256 CGY
RAD ONC ARIA REFERENCE POINT DOSAGE GIVEN TO DATE: 23.94 GY
RAD ONC ARIA REFERENCE POINT ID: NORMAL
RAD ONC ARIA REFERENCE POINT SESSION DOSAGE GIVEN: 2.66 GY

## 2024-06-24 PROCEDURE — 77412 RADIATION TX DELIVERY LVL 3: CPT | Performed by: RADIOLOGY

## 2024-06-25 ENCOUNTER — RADIATION ONCOLOGY WEEKLY ASSESSMENT (OUTPATIENT)
Dept: RADIATION ONCOLOGY | Facility: HOSPITAL | Age: 74
End: 2024-06-25
Payer: MEDICARE

## 2024-06-25 ENCOUNTER — HOSPITAL ENCOUNTER (OUTPATIENT)
Dept: RADIATION ONCOLOGY | Facility: HOSPITAL | Age: 74
Setting detail: RADIATION/ONCOLOGY SERIES
Discharge: HOME | End: 2024-06-25
Payer: MEDICARE

## 2024-06-25 VITALS — DIASTOLIC BLOOD PRESSURE: 90 MMHG | HEART RATE: 66 BPM | SYSTOLIC BLOOD PRESSURE: 149 MMHG | OXYGEN SATURATION: 97 %

## 2024-06-25 DIAGNOSIS — D05.11 BREAST NEOPLASM, TIS (DCIS), RIGHT: ICD-10-CM

## 2024-06-25 DIAGNOSIS — R11.0 NAUSEA: Primary | ICD-10-CM

## 2024-06-25 LAB
RAD ONC ARIA COURSE ID: NORMAL
RAD ONC ARIA COURSE INTENT: NORMAL
RAD ONC ARIA COURSE LAST TREATMENT DATE: NORMAL
RAD ONC ARIA COURSE START DATE: NORMAL
RAD ONC ARIA COURSE TREATMENT ELAPSED DAYS: 13
RAD ONC ARIA FIRST TREATMENT DATE: NORMAL
RAD ONC ARIA PLAN FRACTIONS TREATED TO DATE: 10
RAD ONC ARIA PLAN ID: NORMAL
RAD ONC ARIA PLAN PRESCRIBED DOSE PER FRACTION: 2.66 GY
RAD ONC ARIA PLAN PRIMARY REFERENCE POINT: NORMAL
RAD ONC ARIA PLAN TOTAL FRACTIONS PRESCRIBED: 16
RAD ONC ARIA PLAN TOTAL PRESCRIBED DOSE: 4256 CGY
RAD ONC ARIA REFERENCE POINT DOSAGE GIVEN TO DATE: 26.6 GY
RAD ONC ARIA REFERENCE POINT ID: NORMAL
RAD ONC ARIA REFERENCE POINT SESSION DOSAGE GIVEN: 2.66 GY

## 2024-06-25 RX ORDER — ONDANSETRON 4 MG/1
4 TABLET, FILM COATED ORAL EVERY 12 HOURS PRN
Qty: 24 TABLET | Refills: 0 | Status: SHIPPED | OUTPATIENT
Start: 2024-06-25

## 2024-06-25 NOTE — PROGRESS NOTES
Radiation Oncology  On-Treatment Note      Patient: Srai Self    MRN: [id number]    Attending Physician: Kayla Starks MD     Diagnosis: DCIS right breast with small foci of invasive ductal carcinoma, pT1aNx, ER NC pos     ICD-10-CM ICD-9-CM   1. Nausea  R11.0 787.02       Radiation Therapy Visit:  Continue radiation therapy, Dosimetry plan remains acceptable, Films reviewed and remains acceptable, Pain assessed, Pain management planned, Radiation dose schedule reviewed and remains acceptable, Radiation technique remains acceptable, and Symptoms within expected range    Radiation Treatments       Active   Plans   RT Breast   Most recent treatment: Dose planned: 266 cGy (fraction 10 on 6/25/2024)   Total: Dose planned: 4,256 cGy (16 fractions)   Elapsed Days: 13      Reference Points   RX: Rt Breast   Most recent treatment: Dose given: 266 cGy (on 6/25/2024)   Total: Dose given: 2,660 cGy   Elapsed Days: 13                      Physical Examination:  Vitals: Blood pressure 149/90, pulse 66, SpO2 97%, not currently breastfeeding.  Pain Score    06/25/24 1345   PainSc: 0-No pain   Complaints today include mild tenderness along the right sternal border consistent with costochondritis.  Patient is also having some mild nausea.  As result, we will prescribe Zofran to be used up to twice a day.    Fully active, able to carry on all pre-disease performance without restriction = 0    We examined the relevant areas: yes  Findings are within the expected range for this stage of treatment: yes  -------------------------------------------------------------------------------------------------------------------    ACTION ITEMS:  1.Patient tolerating treatment well and as expected for this stage in their treatment  2.  Patient will try Advil to relieve the right-sided peristernal discomfort  3.  Patient will continue Miaderm lotion to the skin    Estimated Completion Date: 2 weeks      Daniel Prakash MD  Radiation  Oncology

## 2024-06-26 ENCOUNTER — HOSPITAL ENCOUNTER (OUTPATIENT)
Dept: RADIATION ONCOLOGY | Facility: HOSPITAL | Age: 74
Setting detail: RADIATION/ONCOLOGY SERIES
Discharge: HOME | End: 2024-06-26
Payer: MEDICARE

## 2024-06-26 LAB
RAD ONC ARIA COURSE ID: NORMAL
RAD ONC ARIA COURSE INTENT: NORMAL
RAD ONC ARIA COURSE LAST TREATMENT DATE: NORMAL
RAD ONC ARIA COURSE START DATE: NORMAL
RAD ONC ARIA COURSE TREATMENT ELAPSED DAYS: 14
RAD ONC ARIA FIRST TREATMENT DATE: NORMAL
RAD ONC ARIA PLAN FRACTIONS TREATED TO DATE: 11
RAD ONC ARIA PLAN ID: NORMAL
RAD ONC ARIA PLAN PRESCRIBED DOSE PER FRACTION: 2.66 GY
RAD ONC ARIA PLAN PRIMARY REFERENCE POINT: NORMAL
RAD ONC ARIA PLAN TOTAL FRACTIONS PRESCRIBED: 16
RAD ONC ARIA PLAN TOTAL PRESCRIBED DOSE: 4256 CGY
RAD ONC ARIA REFERENCE POINT DOSAGE GIVEN TO DATE: 29.26 GY
RAD ONC ARIA REFERENCE POINT ID: NORMAL
RAD ONC ARIA REFERENCE POINT SESSION DOSAGE GIVEN: 2.66 GY

## 2024-06-27 ENCOUNTER — HOSPITAL ENCOUNTER (OUTPATIENT)
Dept: RADIATION ONCOLOGY | Facility: HOSPITAL | Age: 74
Setting detail: RADIATION/ONCOLOGY SERIES
Discharge: HOME | End: 2024-06-27
Payer: MEDICARE

## 2024-06-27 LAB
RAD ONC ARIA COURSE ID: NORMAL
RAD ONC ARIA COURSE INTENT: NORMAL
RAD ONC ARIA COURSE LAST TREATMENT DATE: NORMAL
RAD ONC ARIA COURSE START DATE: NORMAL
RAD ONC ARIA COURSE TREATMENT ELAPSED DAYS: 15
RAD ONC ARIA FIRST TREATMENT DATE: NORMAL
RAD ONC ARIA PLAN FRACTIONS TREATED TO DATE: 12
RAD ONC ARIA PLAN ID: NORMAL
RAD ONC ARIA PLAN PRESCRIBED DOSE PER FRACTION: 2.66 GY
RAD ONC ARIA PLAN PRIMARY REFERENCE POINT: NORMAL
RAD ONC ARIA PLAN TOTAL FRACTIONS PRESCRIBED: 16
RAD ONC ARIA PLAN TOTAL PRESCRIBED DOSE: 4256 CGY
RAD ONC ARIA REFERENCE POINT DOSAGE GIVEN TO DATE: 31.92 GY
RAD ONC ARIA REFERENCE POINT ID: NORMAL
RAD ONC ARIA REFERENCE POINT SESSION DOSAGE GIVEN: 2.66 GY

## 2024-06-27 PROCEDURE — 77412 RADIATION TX DELIVERY LVL 3: CPT | Performed by: STUDENT IN AN ORGANIZED HEALTH CARE EDUCATION/TRAINING PROGRAM

## 2024-06-28 ENCOUNTER — HOSPITAL ENCOUNTER (OUTPATIENT)
Dept: RADIATION ONCOLOGY | Facility: HOSPITAL | Age: 74
Setting detail: RADIATION/ONCOLOGY SERIES
Discharge: HOME | End: 2024-06-28
Payer: MEDICARE

## 2024-06-28 LAB
RAD ONC ARIA COURSE ID: NORMAL
RAD ONC ARIA COURSE INTENT: NORMAL
RAD ONC ARIA COURSE LAST TREATMENT DATE: NORMAL
RAD ONC ARIA COURSE START DATE: NORMAL
RAD ONC ARIA COURSE TREATMENT ELAPSED DAYS: 16
RAD ONC ARIA FIRST TREATMENT DATE: NORMAL
RAD ONC ARIA PLAN FRACTIONS TREATED TO DATE: 13
RAD ONC ARIA PLAN ID: NORMAL
RAD ONC ARIA PLAN PRESCRIBED DOSE PER FRACTION: 2.66 GY
RAD ONC ARIA PLAN PRIMARY REFERENCE POINT: NORMAL
RAD ONC ARIA PLAN TOTAL FRACTIONS PRESCRIBED: 16
RAD ONC ARIA PLAN TOTAL PRESCRIBED DOSE: 4256 CGY
RAD ONC ARIA REFERENCE POINT DOSAGE GIVEN TO DATE: 34.58 GY
RAD ONC ARIA REFERENCE POINT ID: NORMAL
RAD ONC ARIA REFERENCE POINT SESSION DOSAGE GIVEN: 2.66 GY

## 2024-06-28 PROCEDURE — 77336 RADIATION PHYSICS CONSULT: CPT | Performed by: RADIOLOGY

## 2024-06-28 PROCEDURE — 77412 RADIATION TX DELIVERY LVL 3: CPT | Performed by: STUDENT IN AN ORGANIZED HEALTH CARE EDUCATION/TRAINING PROGRAM

## 2024-07-01 ENCOUNTER — HOSPITAL ENCOUNTER (OUTPATIENT)
Dept: RADIATION ONCOLOGY | Facility: HOSPITAL | Age: 74
Setting detail: RADIATION/ONCOLOGY SERIES
End: 2024-07-01
Payer: MEDICARE

## 2024-07-01 ENCOUNTER — HOSPITAL ENCOUNTER (OUTPATIENT)
Dept: RADIATION ONCOLOGY | Facility: HOSPITAL | Age: 74
Setting detail: RADIATION/ONCOLOGY SERIES
Discharge: HOME | End: 2024-07-01
Payer: MEDICARE

## 2024-07-01 LAB
RAD ONC ARIA COURSE ID: NORMAL
RAD ONC ARIA COURSE INTENT: NORMAL
RAD ONC ARIA COURSE LAST TREATMENT DATE: NORMAL
RAD ONC ARIA COURSE START DATE: NORMAL
RAD ONC ARIA COURSE TREATMENT ELAPSED DAYS: 19
RAD ONC ARIA FIRST TREATMENT DATE: NORMAL
RAD ONC ARIA PLAN FRACTIONS TREATED TO DATE: 14
RAD ONC ARIA PLAN ID: NORMAL
RAD ONC ARIA PLAN PRESCRIBED DOSE PER FRACTION: 2.66 GY
RAD ONC ARIA PLAN PRIMARY REFERENCE POINT: NORMAL
RAD ONC ARIA PLAN TOTAL FRACTIONS PRESCRIBED: 16
RAD ONC ARIA PLAN TOTAL PRESCRIBED DOSE: 4256 CGY
RAD ONC ARIA REFERENCE POINT DOSAGE GIVEN TO DATE: 37.24 GY
RAD ONC ARIA REFERENCE POINT ID: NORMAL
RAD ONC ARIA REFERENCE POINT SESSION DOSAGE GIVEN: 2.66 GY

## 2024-07-01 PROCEDURE — 77412 RADIATION TX DELIVERY LVL 3: CPT | Performed by: RADIOLOGY

## 2024-07-02 ENCOUNTER — HOSPITAL ENCOUNTER (OUTPATIENT)
Dept: RADIATION ONCOLOGY | Facility: HOSPITAL | Age: 74
Setting detail: RADIATION/ONCOLOGY SERIES
Discharge: HOME | End: 2024-07-02
Payer: MEDICARE

## 2024-07-02 ENCOUNTER — RADIATION ONCOLOGY WEEKLY ASSESSMENT (OUTPATIENT)
Dept: RADIATION ONCOLOGY | Facility: HOSPITAL | Age: 74
End: 2024-07-02
Payer: MEDICARE

## 2024-07-02 VITALS
SYSTOLIC BLOOD PRESSURE: 129 MMHG | DIASTOLIC BLOOD PRESSURE: 84 MMHG | BODY MASS INDEX: 31.3 KG/M2 | WEIGHT: 174 LBS | OXYGEN SATURATION: 99 % | HEART RATE: 66 BPM

## 2024-07-02 DIAGNOSIS — D05.11 BREAST NEOPLASM, TIS (DCIS), RIGHT: Primary | ICD-10-CM

## 2024-07-02 LAB
RAD ONC ARIA COURSE ID: NORMAL
RAD ONC ARIA COURSE INTENT: NORMAL
RAD ONC ARIA COURSE LAST TREATMENT DATE: NORMAL
RAD ONC ARIA COURSE START DATE: NORMAL
RAD ONC ARIA COURSE TREATMENT ELAPSED DAYS: 20
RAD ONC ARIA FIRST TREATMENT DATE: NORMAL
RAD ONC ARIA PLAN FRACTIONS TREATED TO DATE: 15
RAD ONC ARIA PLAN ID: NORMAL
RAD ONC ARIA PLAN PRESCRIBED DOSE PER FRACTION: 2.66 GY
RAD ONC ARIA PLAN PRIMARY REFERENCE POINT: NORMAL
RAD ONC ARIA PLAN TOTAL FRACTIONS PRESCRIBED: 16
RAD ONC ARIA PLAN TOTAL PRESCRIBED DOSE: 4256 CGY
RAD ONC ARIA REFERENCE POINT DOSAGE GIVEN TO DATE: 39.9 GY
RAD ONC ARIA REFERENCE POINT ID: NORMAL
RAD ONC ARIA REFERENCE POINT SESSION DOSAGE GIVEN: 2.66 GY

## 2024-07-02 PROCEDURE — 77417 THER RADIOLOGY PORT IMAGE(S): CPT | Performed by: RADIOLOGY

## 2024-07-02 PROCEDURE — 77412 RADIATION TX DELIVERY LVL 3: CPT | Performed by: RADIOLOGY

## 2024-07-02 NOTE — PROGRESS NOTES
Radiation Oncology  On-Treatment Note      Patient: Sari Self    MRN: [id number]    Attending Physician: Kayla Starks MD     Diagnosis:     ICD-10-CM ICD-9-CM   1. Breast neoplasm, Tis (DCIS), right  D05.11 233.0       Radiation Therapy Visit:  Continue radiation therapy, Dosimetry plan remains acceptable, Films reviewed and remains acceptable, Pain assessed, Pain management planned, Radiation dose schedule reviewed and remains acceptable, Radiation technique remains acceptable, and Symptoms within expected range    Radiation Treatments       Active   Plans   RT Breast   Most recent treatment: Dose planned: 266 cGy (fraction 15 on 7/2/2024)   Total: Dose planned: 4,256 cGy (16 fractions)   Elapsed Days: 20      Reference Points   RX: Rt Breast   Most recent treatment: Dose given: 266 cGy (on 7/2/2024)   Total: Dose given: 3,990 cGy   Elapsed Days: 20                      Physical Examination:  Vitals: Blood pressure 129/84, pulse 66, weight 78.9 kg (174 lb), SpO2 99%, not currently breastfeeding.  Pain Score    07/02/24 1337   PainSc:   3   PainLoc: Breast       Fully active, able to carry on all pre-disease performance without restriction = 0    We examined the relevant areas: yes  Findings are within the expected range for this stage of treatment: yes  -------------------------------------------------------------------------------------------------------------------    ACTION ITEMS:  Patient tolerating treatment well and as expected for this stage in their treatment        Kayla Starks MD  Radiation Oncology

## 2024-07-03 ENCOUNTER — HOSPITAL ENCOUNTER (OUTPATIENT)
Dept: RADIATION ONCOLOGY | Facility: HOSPITAL | Age: 74
Setting detail: RADIATION/ONCOLOGY SERIES
Discharge: HOME | End: 2024-07-03

## 2024-07-03 ENCOUNTER — APPOINTMENT (OUTPATIENT)
Dept: RADIATION ONCOLOGY | Facility: HOSPITAL | Age: 74
End: 2024-07-03
Payer: MEDICARE

## 2024-07-03 DIAGNOSIS — D05.11 BREAST NEOPLASM, TIS (DCIS), RIGHT: Primary | ICD-10-CM

## 2024-07-03 LAB
RAD ONC ARIA COURSE ID: NORMAL
RAD ONC ARIA COURSE INTENT: NORMAL
RAD ONC ARIA COURSE LAST TREATMENT DATE: NORMAL
RAD ONC ARIA COURSE START DATE: NORMAL
RAD ONC ARIA COURSE TREATMENT ELAPSED DAYS: 21
RAD ONC ARIA FIRST TREATMENT DATE: NORMAL
RAD ONC ARIA PLAN FRACTIONS TREATED TO DATE: 16
RAD ONC ARIA PLAN ID: NORMAL
RAD ONC ARIA PLAN PRESCRIBED DOSE PER FRACTION: 2.66 GY
RAD ONC ARIA PLAN PRIMARY REFERENCE POINT: NORMAL
RAD ONC ARIA PLAN TOTAL FRACTIONS PRESCRIBED: 16
RAD ONC ARIA PLAN TOTAL PRESCRIBED DOSE: 4256 CGY
RAD ONC ARIA REFERENCE POINT DOSAGE GIVEN TO DATE: 42.56 GY
RAD ONC ARIA REFERENCE POINT ID: NORMAL
RAD ONC ARIA REFERENCE POINT SESSION DOSAGE GIVEN: 2.66 GY

## 2024-07-03 PROCEDURE — 77336 RADIATION PHYSICS CONSULT: CPT | Performed by: RADIOLOGY

## 2024-07-03 PROCEDURE — 77412 RADIATION TX DELIVERY LVL 3: CPT | Performed by: RADIOLOGY

## 2024-07-05 ENCOUNTER — APPOINTMENT (OUTPATIENT)
Dept: RADIATION ONCOLOGY | Facility: HOSPITAL | Age: 74
End: 2024-07-05
Payer: MEDICARE

## 2024-07-08 ENCOUNTER — APPOINTMENT (OUTPATIENT)
Dept: RADIATION ONCOLOGY | Facility: HOSPITAL | Age: 74
End: 2024-07-08
Payer: MEDICARE

## 2024-07-09 ENCOUNTER — APPOINTMENT (OUTPATIENT)
Dept: RADIATION ONCOLOGY | Facility: HOSPITAL | Age: 74
End: 2024-07-09
Payer: MEDICARE

## 2024-07-10 ENCOUNTER — APPOINTMENT (OUTPATIENT)
Dept: RADIATION ONCOLOGY | Facility: HOSPITAL | Age: 74
End: 2024-07-10
Payer: MEDICARE

## 2024-07-10 LAB
RAD ONC ARIA COURSE END DATE: NORMAL
RAD ONC ARIA COURSE ID: NORMAL
RAD ONC ARIA COURSE INTENT: NORMAL
RAD ONC ARIA COURSE LAST TREATMENT DATE: NORMAL
RAD ONC ARIA COURSE START DATE: NORMAL
RAD ONC ARIA COURSE TREATMENT ELAPSED DAYS: 21
RAD ONC ARIA FIRST TREATMENT DATE: NORMAL
RAD ONC ARIA PLAN FRACTIONS TREATED TO DATE: 16
RAD ONC ARIA PLAN ID: NORMAL
RAD ONC ARIA PLAN NAME: NORMAL
RAD ONC ARIA PLAN PRESCRIBED DOSE PER FRACTION: 2.66 GY
RAD ONC ARIA PLAN PRIMARY REFERENCE POINT: NORMAL
RAD ONC ARIA PLAN TOTAL FRACTIONS PRESCRIBED: 16
RAD ONC ARIA PLAN TOTAL PRESCRIBED DOSE: 4256 CGY
RAD ONC ARIA REFERENCE POINT DOSAGE GIVEN TO DATE: 42.56 GY
RAD ONC ARIA REFERENCE POINT ID: NORMAL

## 2024-07-11 ENCOUNTER — APPOINTMENT (OUTPATIENT)
Dept: RADIATION ONCOLOGY | Facility: HOSPITAL | Age: 74
End: 2024-07-11
Payer: MEDICARE

## 2024-07-12 ENCOUNTER — PATIENT OUTREACH (OUTPATIENT)
Dept: OTHER | Facility: HOSPITAL | Age: 74
End: 2024-07-12
Payer: MEDICARE

## 2024-07-12 NOTE — PROGRESS NOTES
Chart reviewed. Patient will see survivorship APRN July 16th. No further follow up needed from navigational services.

## 2024-07-16 ENCOUNTER — OFFICE VISIT (OUTPATIENT)
Dept: OTHER | Facility: HOSPITAL | Age: 74
End: 2024-07-16
Payer: MEDICARE

## 2024-07-16 ENCOUNTER — OFFICE VISIT (OUTPATIENT)
Dept: ORTHOPEDIC SURGERY | Facility: CLINIC | Age: 74
End: 2024-07-16
Payer: MEDICARE

## 2024-07-16 VITALS
WEIGHT: 174.9 LBS | OXYGEN SATURATION: 97 % | RESPIRATION RATE: 16 BRPM | SYSTOLIC BLOOD PRESSURE: 127 MMHG | BODY MASS INDEX: 31.46 KG/M2 | DIASTOLIC BLOOD PRESSURE: 72 MMHG | HEART RATE: 75 BPM

## 2024-07-16 VITALS — HEIGHT: 63 IN | TEMPERATURE: 98.2 F | BODY MASS INDEX: 30.83 KG/M2 | WEIGHT: 174 LBS

## 2024-07-16 DIAGNOSIS — R52 PAIN: Primary | ICD-10-CM

## 2024-07-16 DIAGNOSIS — M75.40 IMPINGEMENT SYNDROME OF SHOULDER REGION, UNSPECIFIED LATERALITY: ICD-10-CM

## 2024-07-16 DIAGNOSIS — D05.11 BREAST NEOPLASM, TIS (DCIS), RIGHT: Primary | ICD-10-CM

## 2024-07-16 PROCEDURE — 1160F RVW MEDS BY RX/DR IN RCRD: CPT | Performed by: NURSE PRACTITIONER

## 2024-07-16 PROCEDURE — 3074F SYST BP LT 130 MM HG: CPT | Performed by: NURSE PRACTITIONER

## 2024-07-16 PROCEDURE — G0463 HOSPITAL OUTPT CLINIC VISIT: HCPCS | Performed by: NURSE PRACTITIONER

## 2024-07-16 PROCEDURE — 1159F MED LIST DOCD IN RCRD: CPT | Performed by: NURSE PRACTITIONER

## 2024-07-16 PROCEDURE — 99215 OFFICE O/P EST HI 40 MIN: CPT | Performed by: NURSE PRACTITIONER

## 2024-07-16 PROCEDURE — 3078F DIAST BP <80 MM HG: CPT | Performed by: NURSE PRACTITIONER

## 2024-07-16 PROCEDURE — 1125F AMNT PAIN NOTED PAIN PRSNT: CPT | Performed by: NURSE PRACTITIONER

## 2024-07-16 RX ORDER — METHYLPREDNISOLONE ACETATE 80 MG/ML
80 INJECTION, SUSPENSION INTRA-ARTICULAR; INTRALESIONAL; INTRAMUSCULAR; SOFT TISSUE
Status: COMPLETED | OUTPATIENT
Start: 2024-07-16 | End: 2024-07-16

## 2024-07-16 RX ORDER — LIDOCAINE HYDROCHLORIDE 10 MG/ML
2 INJECTION, SOLUTION EPIDURAL; INFILTRATION; INTRACAUDAL; PERINEURAL
Status: COMPLETED | OUTPATIENT
Start: 2024-07-16 | End: 2024-07-16

## 2024-07-16 RX ADMIN — METHYLPREDNISOLONE ACETATE 80 MG: 80 INJECTION, SUSPENSION INTRA-ARTICULAR; INTRALESIONAL; INTRAMUSCULAR; SOFT TISSUE at 16:47

## 2024-07-16 RX ADMIN — LIDOCAINE HYDROCHLORIDE 2 ML: 10 INJECTION, SOLUTION EPIDURAL; INFILTRATION; INTRACAUDAL; PERINEURAL at 16:47

## 2024-07-16 NOTE — PROGRESS NOTES
[facility] MULTIDISCIPLINARY CLINIC   IN CLINIC Initial Visit  Survivorship Care Plan Treatment Summary      Sari Self is a pleasant 74 y.o. female being followed by Cammy Ortega MD for right breast DCIS. Reviewed today in Multidisciplinary Clinic, for initial Survivorship Care Plan Treatment Summary    HPI  74 year old patient with hypertension, GERD, history of stroke, IBS, hypercholesterolemia, osteoporosis, left sided trigeminal neuralgia. She is status pos right breast lumpectomy on 5/8/24 for hormone positive DCIS. She completed right breast adjuvant radiation on 7/3/24, Tamoxifen is planned, she will start tomorrow as she remembered being instructed to start two weeks post radiation.    She is having some fatigue, some cognitive changes - slower processing speed. Frustrated by recent weight gain during treatment, despite lack of appetite typically eating one meal a day     Reports some itch at radiation treatment site, continues aquafor. Skin reddened, intact. Some limitation in right upper extremity range of motion, related to pre-existing ortho injury, will see Dr Bender for steroid injection this week    Distress: 3  PHQ-9: 6 5-9 (mild depression sx)  WILLIE-7: 4 0-4 (minimal anxiety sx)    TREATMENT HISTORY:     Oncology/Hematology History   Breast neoplasm, Tis (DCIS), right   3/4/2024 Initial Diagnosis    Breast neoplasm, Tis (DCIS), right     3/4/2024 Biopsy    Final Diagnosis   1.  Right breast at 6:30 o'clock, (focal asymmetry with calcification), stereotactic core biopsy:               A.  Ductal carcinoma in situ (DCIS):                            1.  Low-grade cribriform and focal solid DCIS.                            2.  Scattered foci of DCIS span at least 3 mm. In single greatest dimension.                            3.  Focal microcalcification present within DCIS.               B.  No invasive carcinoma identified.     Estrogen receptor: %  Progesterone receptor:  %  Ki 67: 13%     4/9/2024 Biopsy    Final Diagnosis   1.  Breast, right 6 o'clock position, MRI guided core biopsy:  A.  Ductal carcinoma in situ (DCIS), low to intermediate nuclear grade with apocrine features, cribriform and solid architecture, measuring up to 6 mm maximally and involving up to 7 core fragments.     Estrogen receptor: %  Progesterone receptor: %  Ki 67: 17%     5/8/2024 Surgery    Final Diagnosis   1.  Right breast, needle localization oriented lumpectomy (with implant):               A.  Focal invasive ductal carcinoma (near Infinity clip):                            1.  Overall Cam[place] grade II (tubular score = 3, nuclear score = 2, mitotic score = 1)                            2.  Invasive carcinoma measures 1.2 mm maximally.  3.  No lymphovascular space invasion identified.               B.  Ductal carcinoma in situ (DCIS):                            1.  DCIS spans an area estimated at 40 mm x 28 mm x 10 mm.                            2.  Low to intermediate grade cribriform, papillary and micropapillary DCIS with                                   comedo type necrosis.                        3. Microcalcification present in DCIS.                  C.  No lobular neoplasia (LCIS, ALH) identified.               D.  All margins are negative for invasive carcinoma.  Invasive carcinoma measures 20 mm. from                      the closest (anterior) margin. Invasive carcinoma measures 35 mm or greater from all other                      surgical margins.     E   All margins are negative for DCIS                        DCIS measures 10 mm from the closest (anterior) margin of excision.                          All other margins measure 20 mm or greater from DCIS.                  F. Multiple biopsy site changes (X2) are identified transecting DCIS, and 2 metallic clips retrieved.               G. No Pagetoid involvement of skin by malignancy identified.               H.  Associated calcified fibrous implant capsule and grossly intact synthetic implant.               I.  Previous Biomarkers: Estrogen receptors: Positive (%), Progesterone receptors: Positive                       (%),  Ki-67 = 13-17% (see [id number] and [id number]).        2.  Right breast, Additional anterior margin:               A. No atypical hyperplasia, in-situ carcinoma nor invasive carcinoma identified.               B. New margin is negative for malignancy by additional 8 mm     3.  Right breast, Additional superior margin:               A. No atypical hyperplasia, in-situ carcinoma nor invasive carcinoma identified.               B. New margin is negative for malignancy by additional 10 mm     4.  Right breast, Additional lateral margin:               A. No atypical hyperplasia, in-situ carcinoma nor invasive carcinoma identified.               B. New margin is negative for malignancy by additional 10 mm     5.  Right breast, Additional inferior margin:               A. No atypical hyperplasia, in-situ carcinoma nor invasive carcinoma identified.               B. New margin is negative for malignancy by additional 8 mm     6.  Right breast, Additional medial margin:               A. No atypical hyperplasia, in-situ carcinoma nor invasive carcinoma identified.               B. New margin is negative for malignancy by additional 6 mm     7.  Right breast, Additional posterior margin:               A. No atypical hyperplasia, in-situ carcinoma nor invasive carcinoma identified.               B. New margin is negative for malignancy by additional 6 mm     9.  Left breast capsule/tissue and implant:               A.  Fibrous capsule with marked dystrophic calcification.               B.  No atypical hyperplasia, carcinoma in situ nor invasive carcinoma identified.     10.  Additional right breast tissue, plastic repair:   A.  Benign skin and breast tissue (80 g).     Pathologic stage: pT1a pN not  assigned    Estrogen receptor: %  Progesterone receptor: %  HER2 status: Negative 1+ IHC  Ki 67: 14%     6/6/2024 Imaging    DEXA BONE DENSITY AXIAL     Date of Exam:  6/6/2024 10:20 AM EDT     Indication:  Screening     Comparison: 3/4/2021     Technique:  Lumbar vertebral and proximal femoral Dual-Energy X-ray Absorptiometry (DEXA) was performed on the AudioTag Horizon C.     Findings:  According to the World Health Organization criteria, this is classified as osteoporosis.     The T-score at the femoral neck is -1.2.  The T-score for the total spine is -2.6.     The bone mineral density has decreased by 3.3% at the femoral neck and increased by 0.8% at the spine when compared to the prior study from 3/4/2021.              Using the FRAX scores, which utilized risk factors and femoral neck bone density:  The 10 year probability of major osteoporotic fracture is 9.8%.  The 10 year probability of a hip fracture is 1.6%.     IMPRESSION:  Impression:     Osteoporosis.     6/12/2024 - 7/3/2024 Radiation    Radiation OncologyTreatment Course:  Sari Self received 4256 cGy in 16 fractions to right breast.     7/4/2024 -  Hormonal Therapy    Tamoxifen         Past Medical History:   Diagnosis Date    Allergic     Arthritis     Breast cancer, right 2024    Cerebral aneurysm     had coiling    Cholelithiasis 2016    Gallbladder removed    Colon polyp     COVID-19 2020    Diarrhea 2024    Disease of thyroid gland     Dislocation, shoulder 1968    Dislocated several times    Diverticulosis     GERD (gastroesophageal reflux disease)     H/O cerebral aneurysm repair     History of 2019 novel coronavirus disease (COVID-19) 08/2020 AUGUST 2020 STATES VERY MILD SYMTOMS    Hyperlipidemia     Hypertension     Hyperthyroidism 2012?    Hypothyroidism     IBS (irritable bowel syndrome)     Knee swelling     Low back pain     OAB (overactive bladder)     Osteoporosis     Right knee pain     RLS (restless legs syndrome)      Rotator cuff syndrome 1968    Tears    Stress fracture of right tibia     Stroke     Tear of meniscus of knee 2019 & 2020    Surgery both knees    TIA (transient ischemic attack)     2011    Torn meniscus     RIGHT KNEE    Urinary tract infection 06/01/2018       Past Surgical History:   Procedure Laterality Date    BREAST LUMPECTOMY Right 5/8/2024    Procedure: RIGHT breast bracketed needle localized lumpectomy, oncplastics,  implant removal.;  Surgeon: Jennifer Kelly MD;  Location: [facility] MAIN OR;  Service: General;  Laterality: Right;    BREAST SURGERY Bilateral 5/8/2024    Procedure: BILATERAL IMPLANT REMOVAL WITH CAPSULECTOMY, RIGHT ONCOPLASTIC CLOSURE LEFT BREAST REDUCTION FOR SYMMETRY, USE OF SPY;  Surgeon: Lisa Brambila MD;  Location: [facility] MAIN OR;  Service: Plastics;  Laterality: Bilateral;    CARDIAC CATHETERIZATION      CATARACT EXTRACTION, BILATERAL  03/2020    CHOLECYSTECTOMY      COLONOSCOPY  11/10/2010    prep of colon fair,IH,stool in transverse colon,hepatic flexure and ascending colon,ileum normal,IBS    COLONOSCOPY N/A 02/27/2018    IH, diverticulosis, one 5mm polyp, tubular adenoma with low grade dysplasia    COLONOSCOPY N/A 08/15/2023    Procedure: COLONOSCOPY into cecum/terminal ileum with polypectomy;  Surgeon: Finesse Reza MD;  Location: [facility] ENDOSCOPY;  Service: Gastroenterology;  Laterality: N/A;  polyps    CYSTOSCOPY      ENDOSCOPY  11/27/2012    grd 1 reflux egitis,web upperd 3rd esoph    ENDOSCOPY N/A 02/27/2018    normal biopsies, H Pylori positive    FOOT NEUROMA SURGERY Left     HYSTERECTOMY  1985    IR CEREBRAL ANEURYSM COILING  2012    JOINT REPLACEMENT Left     Partial left knee replacement    KNEE ARTHROPLASTY UNICOMPARTMENTAL Left 03/18/2022    Procedure: KNEE ARTHROPLASTY UNICOMPARTMENTAL;  Surgeon: Everton Zuniga MD;  Location: [facility] MAIN OR;  Service: Orthopedics;  Laterality: Left;    KNEE ARTHROSCOPY Left 07/03/2019    Procedure: KNEE ARTHROSCOPY  "ARTHRITIS DEBRIDEMENT PARTIAL MEDIAL AND LATERAL MENISECTOMY;  Surgeon: Nuris Bender MD;  [place] [facility] [place] [facility] [place]: [place]ics    KNEE ARTHROSCOPY Right 09/28/2020    Procedure: RIGHT KNEE ARTHROSCOPY, PARTIAL MEDIAL AND LATERAL MENISCECTOMIES, DEBRIDEMENT OF ARTHRITIS, AND INTERNAL FIXATION TIBAL PLATEAU INSUFFICIENCY FRACTURE;  Surgeon: Nuris Bender MD;  [place] [facility] [place] [facility] [place]  Service: [place]ics;  Laterality: Right;    TENSION FREE VAGINAL TAPING WITH MINI ARC SLING      THUMB ARTHROSCOPY      TOTAL ABDOMINAL HYSTERECTOMY WITH SALPINGO OOPHORECTOMY      TRIGGER POINT INJECTION      Hand, elbow, shoulders, knees    UPPER GASTROINTESTINAL ENDOSCOPY  02/27/2018       Social History     Socioeconomic History    Marital status:     Number of children: 5   Tobacco Use    Smoking status: Never    Smokeless tobacco: Never   Vaping Use    Vaping status: Never Used   Substance and Sexual Activity    Alcohol use: Not Currently     Comment: 1-2 monthly    Drug use: Never    Sexual activity: Not Currently     Partners: Male     Birth control/protection: Hysterectomy         No results found for: \"LDH\", \"URICACID\"      Lab Results   Component Value Date    GLUCOSE 96 05/02/2024    BUN 18 05/02/2024    CREATININE 0.87 05/02/2024    EGFRIFNONA 62 02/09/2022    EGFRIFAFRI 80 08/11/2021    BCR 20.7 05/02/2024    K 4.3 05/02/2024    CO2 23.0 05/02/2024    CALCIUM 9.3 05/02/2024    PROTENTOTREF 7.4 08/11/2021    ALBUMIN 4.5 02/01/2024    LABIL2 1.7 08/11/2021    AST 12 02/01/2024    ALT 11 02/01/2024       CBC w/diff          2/1/2024    08:30 5/2/2024    09:51 5/29/2024    14:38   CBC w/Diff   WBC 6.91  5.61  9.93    RBC 4.54  4.31  4.59    Hemoglobin 13.2  12.3  13.3    Hematocrit 40.3  38.7  40.2    MCV 88.8  89.8  87.6    MCH 29.1  28.5  29.0    MCHC 32.8  31.8  33.1    RDW 12.7  12.5  12.9    Platelets 320  306  340    Neutrophil Rel % 51.3  45.8  45.8    Immature Granulocyte Rel % 0.3  0.2  0.8  "   Lymphocyte Rel % 34.6  37.1  28.7    Monocyte Rel % 9.1  10.7  6.4    Eosinophil Rel % 3.5  4.8  17.3    Basophil Rel % 1.2  1.4  1.0        Allergies as of 07/16/2024 - Reviewed 07/02/2024   Allergen Reaction Noted    Penicillins Hives 08/08/2016    Bee venom Swelling 05/02/2024        MEDICATIONS:  Patient medication list reviewed today    Review of Systems   Constitutional:  Positive for appetite change (decreased), fatigue and unexpected weight change (gain). Negative for activity change.   Respiratory:  Negative for chest tightness and shortness of breath.    Cardiovascular:  Negative for chest pain and leg swelling.   Gastrointestinal:  Positive for diarrhea. Negative for blood in stool and constipation.   Genitourinary:  Negative for dysuria and hematuria.   Musculoskeletal:  Negative for arthralgias and myalgias.   Skin:  Positive for color change (red at right breast radaition treatment site).   Neurological:  Negative for weakness and numbness.   Psychiatric/Behavioral:  Positive for decreased concentration. Negative for dysphoric mood and sleep disturbance. The patient is not nervous/anxious.        /72   Pulse 75   Resp 16   Wt 79.3 kg (174 lb 14.4 oz)   LMP  (LMP Unknown)   SpO2 97%   BMI 31.46 kg/m²     Wt Readings from Last 3 Encounters:   07/16/24 79.3 kg (174 lb 14.4 oz)   07/02/24 78.9 kg (174 lb)   05/29/24 79.8 kg (175 lb 14.4 oz)        Pain Score    07/16/24 1025   PainSc:   4           Physical Exam  Constitutional:       Appearance: Normal appearance. She is well-groomed.   HENT:      Head: Normocephalic and atraumatic.   Cardiovascular:      Rate and Rhythm: Normal rate and regular rhythm.   Pulmonary:      Effort: No tachypnea or respiratory distress.   Abdominal:      General: Abdomen is flat. There is no distension.   Musculoskeletal:      Right ankle: No swelling. No tenderness. Normal pulse.      Left ankle: No swelling. No tenderness. Normal pulse.   Skin:     General:  Skin is warm and dry.   Neurological:      Mental Status: She is alert and oriented to person, place, and time.   Psychiatric:         Attention and Perception: Attention and perception normal.         Mood and Affect: Mood and affect normal.         Speech: Speech normal.         Behavior: Behavior normal. Behavior is cooperative.         Thought Content: Thought content normal.           Advance Care Planning     Patient does not have advance care planning complete, we do not have a copy on file.    Patient has not designated a healthcare surrogate:      Reviewed each section of [facility] will document.     Arrangements for further assistance with advance care planning can be made by scheduling an appointment with myself or another advance care planning facilitator at their convenience. Patients may also call the toll free [facility] ACP Help Line at [phone].           DISCUSSION HELD TODAY:   Discussed NCCN recommendations for all cancer survivors of 150 minutes/week moderate intensity exercise, achieve and maintain a healthy weight, plants-based whole-foods diet, avoid tobacco and second hand smoke, avoid alcohol or minimize alcohol intake - no more than 1 drink in a day for adults.     A copy of the Survivorship Treatment Summary & Care Plan for Ms. Self was provided to and forwarded to the providers identified on the care team.    Fatigue with cognitive changes: Persistent through radiation. She is now about two weeks out from completion and we discussed to expect gradual return to baseline, importance of regular physical activity, pacing.  Lymphedema: reviewed lymphedema causes, early signs and symptoms, prevention and management. Discussed extremely low risk of lymphedema with no lymph node procedure performed at surgery.   She is seeing Dr Bender for steroid injection right shoulder, has had long standing issues, typically this provides relief  Surveillance: continue annual mammography,  last c-scope last year, not further cervical cancer screening. She is a never smoker  Discussed strategies for preservation of bone massin context of osteoporosis including calcium and vitamin D supplementation and importance of weight bearing exercise to include moderate intensity walking most days of the week.    Plan and recommendations:  She plans to start Tamoxifen tomorrow  Provided [employer] information and application  Discussed also resources at [employer]  Referral to oncology nutrition, loss of appetite with treatment related weight gain  Surveillance as above  Continue aquafor to right breast  No formal follow up arranged for now. Return to clinic as needed.  Call my office as needed at any point for additional information, resources or support at [phone]      Diagnoses and all orders for this visit:    1. Breast neoplasm, Tis (DCIS), right (Primary)  -     Ambulatory Referral to OP ONC Nutrition Services            I spent 40 minutes caring for this patient on this date of service by face-to-face counseling. This time includes time spent by me in the following activities: preparing for the visit, reviewing tests, performing a medically appropriate examination and/or evaluation, counseling and educating the patient/family/caregiver, referring and communicating with other health care professionals, documenting information in the medical record, independently interpreting results and communicating that information with the patient/family/caregiver, care coordination, obtaining a separately obtained history, and reviewing a separately obtained history

## 2024-07-16 NOTE — PROGRESS NOTES
New Shoulder      Patient: Sari Self        YOB: 1950    Medical Record Number: [id number]        Chief Complaints: Bilateral shoulder pain      History of Present Illness: This is a 74-year-old female I have not seen in a year and a half who presents complaining of bilateral shoulder pain she is a lot going on since I last saw her she had a diagnosis of breast cancer is currently doing radiation her shoulders have been bothering her recently and this is the first time she has been able to get in no new history of injury or change in activity per se      Allergies:   Allergies   Allergen Reactions    Penicillins Hives     ..Beta lactam allergy details  Antibiotic reaction: hives  Age at reaction: adult  Dose to reaction time: unknown  Reason for antibiotic:  (uti)  Epinephrine required for reaction?: no       Bee Venom Swelling       Medications:   Home Medications:  Current Outpatient Medications on File Prior to Visit   Medication Sig    atorvastatin (LIPITOR) 20 MG tablet Take 1 tablet by mouth Every Night.    EQ Allergy Relief, Cetirizine, 10 MG tablet Take 1 tablet by mouth once daily    gabapentin (NEURONTIN) 600 MG tablet Take 1 tablet by mouth 4 (Four) Times a Day.    hydroCHLOROthiazide (HYDRODIURIL) 12.5 MG tablet Take 1 tablet by mouth Daily.    levothyroxine (SYNTHROID, LEVOTHROID) 75 MCG tablet Take 1 tablet by mouth Every Morning.    omeprazole (priLOSEC) 40 MG capsule Take 1 capsule by mouth once daily    ondansetron (Zofran) 4 MG tablet Take 1 tablet by mouth Every 12 (Twelve) Hours As Needed for Nausea or Vomiting.    potassium chloride 10 MEQ CR tablet Take 1 tablet by mouth Daily.    tamoxifen (NOLVADEX) 20 MG chemo tablet Take 1 tablet by mouth Daily for 270 days.     Current Facility-Administered Medications on File Prior to Visit   Medication    Chlorhexidine Gluconate Cloth 2 % pads     Current Medications:  Scheduled Meds:  Continuous Infusions:No current  facility-administered medications for this visit.    PRN Meds:.    Past Medical History:   Diagnosis Date    Allergic     Ankle sprain 10/23    Arthritis     Breast cancer, right 2024    Cerebral aneurysm     had coiling    Cholelithiasis 2016    Gallbladder removed    Colon polyp     COVID-19 2020    Diarrhea 2024    Disease of thyroid gland     Dislocation, shoulder 1968    Dislocated several times    Diverticulosis     GERD (gastroesophageal reflux disease)     H/O cerebral aneurysm repair     History of 2019 novel coronavirus disease (COVID-19) 08/2020 AUGUST 2020 STATES VERY MILD SYMTOMS    Hyperlipidemia     Hypertension     Hyperthyroidism 2012?    Hypothyroidism     IBS (irritable bowel syndrome)     Knee swelling     Low back pain     OAB (overactive bladder)     Osteoporosis     Right knee pain     RLS (restless legs syndrome)     Rotator cuff syndrome 1968    Tears    Stress fracture of right tibia     Stroke     Tear of meniscus of knee 2019 & 2020    Surgery both knees    TIA (transient ischemic attack)     2011    Torn meniscus     RIGHT KNEE    Urinary tract infection 06/01/2018        Past Surgical History:   Procedure Laterality Date    BREAST LUMPECTOMY Right 05/08/2024    Procedure: RIGHT breast bracketed needle localized lumpectomy, oncplastics,  implant removal.;  Surgeon: Jennifer Kelly MD;  Location: [facility];  Service: General;  Laterality: Right;    BREAST SURGERY Bilateral 05/08/2024    Procedure: BILATERAL IMPLANT REMOVAL WITH CAPSULECTOMY, RIGHT ONCOPLASTIC CLOSURE LEFT BREAST REDUCTION FOR SYMMETRY, USE OF SPY;  Surgeon: Lisa Brambila MD;  Location: [facility];  Service: Plastics;  Laterality: Bilateral;    CARDIAC CATHETERIZATION      CATARACT EXTRACTION, BILATERAL  03/2020    CHOLECYSTECTOMY      COLONOSCOPY  11/10/2010    prep of colon fair,IH,stool in transverse colon,hepatic flexure and ascending colon,ileum normal,IBS    COLONOSCOPY N/A 02/27/2018    IH,  diverticulosis, one 5mm polyp, tubular adenoma with low grade dysplasia    COLONOSCOPY N/A 08/15/2023    Procedure: COLONOSCOPY into cecum/terminal ileum with polypectomy;  Surgeon: Finesse Reza MD;  Location:  [facility];  Service: Gastroenterology;  Laterality: N/A;  polyps    CYSTOSCOPY      ENDOSCOPY  11/27/2012    grd 1 reflux egitis,web upperd 3rd esoph    ENDOSCOPY N/A 02/27/2018    normal biopsies, H Pylori positive    FOOT NEUROMA SURGERY Left     HAND SURGERY      HYSTERECTOMY  1985    IR CEREBRAL ANEURYSM COILING  2012    JOINT REPLACEMENT Left     Partial left knee replacement    KNEE ARTHROPLASTY UNICOMPARTMENTAL Left 03/18/2022    Procedure: KNEE ARTHROPLASTY UNICOMPARTMENTAL;  Surgeon: Everton Zuniga MD;  Location: [facility] MA[facility] OR;  Service: Orthopedics;  Laterality: Left;    KNEE ARTHROSCOPY Left 07/03/2019    Procedure: KNEE ARTHROSCOPY ARTHRITIS DEBRIDEMENT PARTIAL MEDIAL AND LATERAL MENISECTOMY;  Surgeon: Nuris Bender MD;  Location: [facility] OR [facility];  Service: Orthopedics    KNEE ARTHROSCOPY Right 09/28/2020    Procedure: RIGHT KNEE ARTHROSCOPY, PARTIAL MEDIAL AND LATERAL MENISCECTOMIES, DEBRIDEMENT OF ARTHRITIS, AND INTERNAL FIXATION TIBAL PLATEAU INSUFFICIENCY FRACTURE;  Surgeon: Nuris Bender MD;  [facility];  Service: Orthopedics;  Laterality: Right;    KNEE SURGERY  2019 & 2020    TENSION FREE VAGINAL TAPING WITH MINI ARC SLING      THUMB ARTHROSCOPY      TOTAL ABDOMINAL HYSTERECTOMY WITH SALPINGO OOPHORECTOMY      TRIGGER POINT INJECTION      Hand, elbow, shoulders, knees    UPPER GASTROINTESTINAL ENDOSCOPY  02/27/2018    WRIST SURGERY          Social History     Occupational History    Not on file   Tobacco Use    Smoking status: Never    Smokeless tobacco: Never   Vaping Use    Vaping status: Never Used   Substance and Sexual Activity    Alcohol use: Not Currently     Alcohol/week: 1.0 standard drink of alcohol     Types: 1 Glasses of wine per week     Comment:  "Occasionally    Drug use: Never    Sexual activity: Not Currently     Partners: Male     Birth control/protection: Hysterectomy      Social History     Social History Narrative    Not on file        Family History   Problem Relation Age of Onset    Cancer Sister 60        CLL    Irritable bowel syndrome Daughter     Malig Hyperthermia Neg Hx              Review of Systems:     Review of Systems      Physical Exam: 74 y.o. female  General Appearance:    Alert, cooperative, in no acute distress                   Vitals:    07/16/24 1643   Temp: 98.2 °F (36.8 °C)   Weight: 78.9 kg (174 lb)   Height: 158.8 cm (62.5\")      Patient is alert and read ×3 no acute distress appears her above-listed at height weight and age.  Affect is normal respiratory rate is normal unlabored. Heart rate regular rate rhythm, sclera, dentition and hearing are normal for the purpose of this exam.    Ortho Exam  Physical exam of the right shoulder reveals no overlying skin changes no lymphedema no lymphadenopathy.  Patient has active flexion 180 with mild symptoms abduction is similar external rotation is to 50 and internal rotation to the upper lumbar spine with mild symptoms.  Patient has good rotator cuff strength 4+ over 5 with isometric strength testing with pain.  Patient has a positive impingement and a positive Ruiz sign.  Patient has good cervical range of motion which is full and asymptomatic no radicular symptoms.  Patient has a normal elbow exam.  Good distal pulses are present  Patient has pain with overhead activity and a positive Neer sign and a positive empty can sign , a positive drop arm and a definitive painful arc     Physical exam of the left shoulder reveals no overlying skin changes no lymphedema no lymphadenopathy.  Patient has active flexion 180 with mild symptoms abduction is similar external rotation is to 50 and internal rotation to the upper lumbar spine with mild symptoms.  Patient has good rotator cuff " strength 4+ over 5 with isometric strength testing with pain.  Patient has a positive impingement and a positive Ruiz sign.  Patient has good cervical range of motion which is full and asymptomatic no radicular symptoms.  Patient has a normal elbow exam.  Good distal pulses are presentPatient has pain with overhead activity and a positive Neer sign and a positive empty can sign  They have a positive drop arm any definitive painful arc   Large Joint Arthrocentesis: R subacromial bursa  Date/Time: 7/16/2024 4:47 PM  Consent given by: patient  Site marked: site marked  Timeout: Immediately prior to procedure a time out was called to verify the correct patient, procedure, equipment, support staff and site/side marked as required   Supporting Documentation  Indications: pain   Procedure Details  Location: shoulder - R subacromial bursa  Preparation: Patient was prepped and draped in the usual sterile fashion  Needle gauge: 21G.  Approach: posterior  Medications administered: 80 mg methylPREDNISolone acetate 80 MG/ML; 2 mL lidocaine PF 1% 1 %  Patient tolerance: patient tolerated the procedure well with no immediate complications      Large Joint Arthrocentesis: L subacromial bursa  Date/Time: 7/16/2024 4:47 PM  Consent given by: patient  Site marked: site marked  Timeout: Immediately prior to procedure a time out was called to verify the correct patient, procedure, equipment, support staff and site/side marked as required   Supporting Documentation  Indications: pain   Procedure Details  Location: shoulder - L subacromial bursa  Preparation: Patient was prepped and draped in the usual sterile fashion  Needle gauge: 21G.  Approach: posterior  Medications administered: 80 mg methylPREDNISolone acetate 80 MG/ML; 2 mL lidocaine PF 1% 1 %  Patient tolerance: patient tolerated the procedure well with no immediate complications            Radiology:   AP, Scapular Y and Axillary Lateral of the right and left shoulder were  ordered/reviewed to evauate shoulder pain.  I did compare to x-rays done last year no real change she has acromioclavicular arthritis no other acute bony pathology  Imaging Results (Most Recent)       Procedure Component Value Units Date/Time    XR Shoulder 2+ View Bilateral [[id number]] Resulted: 07/16/24 1623     Updated: 07/16/24 1624    Impression:      Ordering physician's impression is located in the Encounter Note dated 07/16/24. X-ray performed in the DR room.            Assessment/Plan: Bilateral shoulder pain I think this is impingement is very similar to what she had previously she responds well to injections I think it is reasonable to do that she responds well to those and has in the past that seems very similar we will inject both to get back on her exercises should she fail to improve with that we will pursue other means of testing Cortisone Injection. See procedure note.  Cortisone Injection for DIAGNOSTIC and THERAPUTIC purposes.

## 2024-07-18 ENCOUNTER — TELEPHONE (OUTPATIENT)
Dept: ONCOLOGY | Facility: CLINIC | Age: 74
End: 2024-07-18
Payer: MEDICARE

## 2024-07-18 NOTE — TELEPHONE ENCOUNTER
Called the patient back and she stated she just took 2 doses of her Tamoxifen 20mg and now she has a red face. She states her face only is red and warm, but denies full hot flashes. She states this happened a couple years ago and no etiology was found and it went away on its own. She wanted to make Dr. Carvajal aware and see if she had any recommendations. She denied changing anything that would come in contact with her face or being in the sun.

## 2024-07-18 NOTE — TELEPHONE ENCOUNTER
Called the patient after talking with Dr. Ortega she wanted the patient to take benadryl tonight and then continue taking her tamoxifen as she does not believe this is the cause. Patient v/u.

## 2024-07-18 NOTE — TELEPHONE ENCOUNTER
Provider: Jordan  Caller: patient  Relationship to Patient: self  Call Back Phone Number: [phone]  Reason for Call: patient says she started Tamoxifen on Tuesday and now her face is very red.

## 2024-08-05 ENCOUNTER — OFFICE VISIT (OUTPATIENT)
Dept: FAMILY MEDICINE CLINIC | Facility: CLINIC | Age: 74
End: 2024-08-05
Payer: MEDICARE

## 2024-08-05 ENCOUNTER — DOCUMENTATION (OUTPATIENT)
Dept: RADIATION ONCOLOGY | Facility: HOSPITAL | Age: 74
End: 2024-08-05
Payer: MEDICARE

## 2024-08-05 VITALS
WEIGHT: 171.6 LBS | RESPIRATION RATE: 16 BRPM | TEMPERATURE: 98 F | HEIGHT: 63 IN | SYSTOLIC BLOOD PRESSURE: 120 MMHG | DIASTOLIC BLOOD PRESSURE: 88 MMHG | HEART RATE: 60 BPM | BODY MASS INDEX: 30.41 KG/M2 | OXYGEN SATURATION: 98 %

## 2024-08-05 DIAGNOSIS — R10.819 SUPRAPUBIC TENDERNESS: ICD-10-CM

## 2024-08-05 DIAGNOSIS — R73.9 HYPERGLYCEMIA: ICD-10-CM

## 2024-08-05 DIAGNOSIS — D05.11 BREAST NEOPLASM, TIS (DCIS), RIGHT: Primary | ICD-10-CM

## 2024-08-05 DIAGNOSIS — E78.2 MIXED HYPERLIPIDEMIA: Primary | ICD-10-CM

## 2024-08-05 DIAGNOSIS — E03.9 HYPOTHYROIDISM, UNSPECIFIED TYPE: ICD-10-CM

## 2024-08-05 DIAGNOSIS — M81.0 AGE-RELATED OSTEOPOROSIS WITHOUT CURRENT PATHOLOGICAL FRACTURE: ICD-10-CM

## 2024-08-05 LAB
BILIRUB BLD-MCNC: NEGATIVE MG/DL
CLARITY, POC: CLEAR
COLOR UR: YELLOW
EXPIRATION DATE: NORMAL
GLUCOSE UR STRIP-MCNC: NEGATIVE MG/DL
KETONES UR QL: NEGATIVE
LEUKOCYTE EST, POC: NEGATIVE
Lab: NORMAL
NITRITE UR-MCNC: NEGATIVE MG/ML
PH UR: 6 [PH] (ref 5–8)
PROT UR STRIP-MCNC: NEGATIVE MG/DL
RBC # UR STRIP: NEGATIVE /UL
SP GR UR: 1.02 (ref 1–1.03)
UROBILINOGEN UR QL: NORMAL

## 2024-08-05 PROCEDURE — 81003 URINALYSIS AUTO W/O SCOPE: CPT | Performed by: FAMILY MEDICINE

## 2024-08-05 PROCEDURE — 1125F AMNT PAIN NOTED PAIN PRSNT: CPT | Performed by: FAMILY MEDICINE

## 2024-08-05 PROCEDURE — 3079F DIAST BP 80-89 MM HG: CPT | Performed by: FAMILY MEDICINE

## 2024-08-05 PROCEDURE — 99214 OFFICE O/P EST MOD 30 MIN: CPT | Performed by: FAMILY MEDICINE

## 2024-08-05 PROCEDURE — 3074F SYST BP LT 130 MM HG: CPT | Performed by: FAMILY MEDICINE

## 2024-08-05 NOTE — PROGRESS NOTES
Radiation Treatment Summary Note      Patient Name: Sari Self  : 1950    Attending Provider: Kayla Starks MD      Diagnosis:     ICD-10-CM ICD-9-CM   1. Breast neoplasm, Tis (DCIS), right  D05.11 233.0       Radiation Start Date: 24    Radiation Completion Date: 7/3/24      Prescription:     She received a dose of [id number] in 16 fractions delivered tangential photon fields.    Final Delivered Dose Deviated From Initially Prescribed Dose: No    Concurrent Chemotherapy: No    Patient Tolerated Treatment Without Unexpected Side Effects/Complications: No, describe:     ECOG: Fully active, able to carry on all pre-disease performance without restriction = 0    Pain Management Plan: None Indicated/PRN OTC    Follow-Up Plan: 3 months    Imaging Ordered for Follow-Up: None/NA        Kayla Starks MD

## 2024-08-06 LAB
ALBUMIN SERPL-MCNC: 4.1 G/DL (ref 3.5–5.2)
ALBUMIN/GLOB SERPL: 1.6 G/DL
ALP SERPL-CCNC: 101 U/L (ref 39–117)
ALT SERPL-CCNC: 12 U/L (ref 1–33)
AST SERPL-CCNC: 14 U/L (ref 1–32)
BILIRUB SERPL-MCNC: 0.4 MG/DL (ref 0–1.2)
BUN SERPL-MCNC: 20 MG/DL (ref 8–23)
BUN/CREAT SERPL: 23.8 (ref 7–25)
CALCIUM SERPL-MCNC: 9.6 MG/DL (ref 8.6–10.5)
CHLORIDE SERPL-SCNC: 107 MMOL/L (ref 98–107)
CHOLEST SERPL-MCNC: 161 MG/DL (ref 0–200)
CO2 SERPL-SCNC: 23 MMOL/L (ref 22–29)
CREAT SERPL-MCNC: 0.84 MG/DL (ref 0.57–1)
EGFRCR SERPLBLD CKD-EPI 2021: 72.6 ML/MIN/1.73
GLOBULIN SER CALC-MCNC: 2.6 GM/DL
GLUCOSE SERPL-MCNC: 96 MG/DL (ref 65–99)
HBA1C MFR BLD: 5.8 % (ref 4.8–5.6)
HDLC SERPL-MCNC: 74 MG/DL (ref 40–60)
LDLC SERPL CALC-MCNC: 74 MG/DL (ref 0–100)
MAGNESIUM SERPL-MCNC: 2.2 MG/DL (ref 1.6–2.4)
POTASSIUM SERPL-SCNC: 4.5 MMOL/L (ref 3.5–5.2)
PROT SERPL-MCNC: 6.7 G/DL (ref 6–8.5)
SODIUM SERPL-SCNC: 138 MMOL/L (ref 136–145)
TRIGL SERPL-MCNC: 65 MG/DL (ref 0–150)
TSH SERPL DL<=0.005 MIU/L-ACNC: 2.12 UIU/ML (ref 0.27–4.2)
VLDLC SERPL CALC-MCNC: 13 MG/DL (ref 5–40)

## 2024-08-08 ENCOUNTER — PATIENT MESSAGE (OUTPATIENT)
Dept: FAMILY MEDICINE CLINIC | Facility: CLINIC | Age: 74
End: 2024-08-08
Payer: MEDICARE

## 2024-08-08 RX ORDER — VENLAFAXINE HYDROCHLORIDE 37.5 MG/1
37.5 CAPSULE, EXTENDED RELEASE ORAL DAILY
Qty: 90 CAPSULE | Refills: 0 | Status: SHIPPED | OUTPATIENT
Start: 2024-08-08

## 2024-08-09 NOTE — TELEPHONE ENCOUNTER
From: Sari Self  To: Nurys Edwards  Sent: 8/8/2024 12:10 PM EDT  Subject: You said we would discuss labs at upcoming appt, but I do not have one scheduled     Did you want to see me? Also you mentioned calling in Effexor for the hot flashes.

## 2024-08-12 RX ORDER — HYDROCHLOROTHIAZIDE 12.5 MG/1
12.5 TABLET ORAL DAILY
Qty: 90 TABLET | Refills: 1 | Status: SHIPPED | OUTPATIENT
Start: 2024-08-12

## 2024-08-12 RX ORDER — CETIRIZINE HYDROCHLORIDE 10 MG/1
10 TABLET, FILM COATED ORAL DAILY
Qty: 90 TABLET | Refills: 0 | Status: SHIPPED | OUTPATIENT
Start: 2024-08-12

## 2024-08-12 NOTE — TELEPHONE ENCOUNTER
Rx Refill Note  Requested Prescriptions     Pending Prescriptions Disp Refills    EQ Allergy Relief, Cetirizine, 10 MG tablet [Pharmacy Med Name: EQ Allergy Relief (Cetirizine) 10 MG Oral Tablet] 90 tablet 0     Sig: Take 1 tablet by mouth once daily      Last office visit with prescribing clinician: 8/5/2024   Last telemedicine visit with prescribing clinician: Visit date not found   Next office visit with prescribing clinician: Visit date not found                         Would you like a call back once the refill request has been completed: [] Yes [] No    If the office needs to give you a call back, can they leave a voicemail: [] Yes [] No    Ame Rossi MA  08/12/24, 10:33 EDT

## 2024-08-13 ENCOUNTER — PATIENT MESSAGE (OUTPATIENT)
Dept: FAMILY MEDICINE CLINIC | Facility: CLINIC | Age: 74
End: 2024-08-13
Payer: MEDICARE

## 2024-08-14 ENCOUNTER — TELEPHONE (OUTPATIENT)
Dept: GASTROENTEROLOGY | Facility: CLINIC | Age: 74
End: 2024-08-14
Payer: MEDICARE

## 2024-08-14 RX ORDER — OMEPRAZOLE 40 MG/1
40 CAPSULE, DELAYED RELEASE ORAL DAILY
Qty: 90 CAPSULE | Refills: 2 | Status: SHIPPED | OUTPATIENT
Start: 2024-08-14

## 2024-08-14 NOTE — TELEPHONE ENCOUNTER
From: Constance CLAUDIO  To: Sari Self  Sent: 8/13/2024 3:42 PM EDT  Subject: Lab Follow up    Sarah,  I see that you have viewed your results and your message from Dr. Edwards. I was following up to see if you had any questions for her.   WARREN Nolasco

## 2024-08-18 NOTE — PROGRESS NOTES
"Chief Complaint  Hyperlipidemia, Hypothyroidism, and Hyperglycemia    Subjective        Sari Self presents to [facility]  Hyperlipidemia  Exacerbating diseases include hypothyroidism.   Hypothyroidism    Hyperglycemia     for follow-up on hyperlipidemia, hyperglycemia and hypothyroidism.  Patient with recent diagnosis of right breast cancer status post lumpectomy and radiation currently on tamoxifen.  Patient has long history of hyperlipidemia denies any body ache, nausea vomiting.  Denies any problem with medication.   pt with h/o hypothyrodism.denies change in energy level, diarrhea, heat / cold intolerance, nervousness, palpitations and weight changes   Objective   Vital Signs:  /88 (BP Location: Left arm, Patient Position: Sitting, Cuff Size: Adult)   Pulse 60   Temp 98 °F (36.7 °C) (Oral)   Resp 16   Ht 158.8 cm (62.5\")   Wt 77.8 kg (171 lb 9.6 oz)   SpO2 98%   BMI 30.89 kg/m²   Estimated body mass index is 30.89 kg/m² as calculated from the following:    Height as of this encounter: 158.8 cm (62.5\").    Weight as of this encounter: 77.8 kg (171 lb 9.6 oz).            Physical Exam  Constitutional:       General: She is not in acute distress.     Appearance: Normal appearance. She is well-developed.   HENT:      Head: Normocephalic and atraumatic.      Right Ear: Tympanic membrane normal.      Left Ear: Tympanic membrane normal.      Mouth/Throat:      Mouth: Mucous membranes are moist.   Eyes:      General:         Right eye: No discharge.         Left eye: No discharge.      Extraocular Movements: Extraocular movements intact.      Pupils: Pupils are equal, round, and reactive to light.   Cardiovascular:      Rate and Rhythm: Normal rate and regular rhythm.      Pulses: Normal pulses.      Heart sounds: Normal heart sounds.   Pulmonary:      Effort: Pulmonary effort is normal.      Breath sounds: Normal breath sounds. No wheezing or rales.   Abdominal:      " "General: Bowel sounds are normal.      Palpations: Abdomen is soft. There is no mass.      Tenderness: There is no abdominal tenderness.   Musculoskeletal:      Cervical back: Normal range of motion and neck supple.      Right lower leg: No edema.      Left lower leg: No edema.   Lymphadenopathy:      Cervical: No cervical adenopathy.   Neurological:      General: No focal deficit present.      Mental Status: She is alert and oriented to person, place, and time.        Result Review :                Assessment and Plan   Diagnoses and all orders for this visit:    1. Mixed hyperlipidemia (Primary)  -     Lipid Panel  -     Comprehensive Metabolic Panel    2. Hyperglycemia  -     Hemoglobin A1c    3. Hypothyroidism, unspecified type  -     TSH Rfx On Abnormal To Free T4    4. Age-related osteoporosis without current pathological fracture  -     Magnesium    5. Suprapubic tenderness  -     POC Urinalysis Dipstick, Automated    Sari SelfGio \"Sarah\" is a 74-year-old female patient seen today for follow-up on  Hyperlipidemia will check CMP and lipids today, continue Lipitor 20 mg a day will send a [employer] message as after the labs and medication changes if needed.  Hypothyroidism, continue same we will check TSH today.  Suprapubic tenderness she also has a suprapubic tenderness on exam , urine dip done in the office today, negative for nitrite or leukocyte advised her to increase fluid intake denies any constipation.  She is also complaining of hot flashes since she started on tamoxifen.  Will start her on Effexor       Follow Up   No follow-ups on file.  Patient was given instructions and counseling regarding her condition or for health maintenance advice. Please see specific information pulled into the AVS if appropriate.             "

## 2024-08-27 DIAGNOSIS — M81.0 SENILE OSTEOPOROSIS: Primary | ICD-10-CM

## 2024-09-03 ENCOUNTER — HOSPITAL ENCOUNTER (OUTPATIENT)
Dept: INFUSION THERAPY | Facility: HOSPITAL | Age: 74
Discharge: HOME | End: 2024-09-03
Admitting: FAMILY MEDICINE
Payer: MEDICARE

## 2024-09-03 VITALS
SYSTOLIC BLOOD PRESSURE: 128 MMHG | OXYGEN SATURATION: 93 % | WEIGHT: 168 LBS | HEART RATE: 68 BPM | BODY MASS INDEX: 30.24 KG/M2 | RESPIRATION RATE: 18 BRPM | DIASTOLIC BLOOD PRESSURE: 70 MMHG | TEMPERATURE: 96.8 F

## 2024-09-03 DIAGNOSIS — M81.0 SENILE OSTEOPOROSIS: ICD-10-CM

## 2024-09-03 DIAGNOSIS — M81.0 AGE-RELATED OSTEOPOROSIS WITHOUT CURRENT PATHOLOGICAL FRACTURE: Primary | ICD-10-CM

## 2024-09-03 LAB — PHOSPHATE SERPL-MCNC: 2.1 MG/DL (ref 2.5–4.5)

## 2024-09-03 PROCEDURE — 25010000002 ZOLEDRONIC ACID 5 MG/100ML SOLUTION: Performed by: FAMILY MEDICINE

## 2024-09-03 PROCEDURE — 36415 COLL VENOUS BLD VENIPUNCTURE: CPT

## 2024-09-03 PROCEDURE — 96374 THER/PROPH/DIAG INJ IV PUSH: CPT

## 2024-09-03 PROCEDURE — 84100 ASSAY OF PHOSPHORUS: CPT | Performed by: FAMILY MEDICINE

## 2024-09-03 PROCEDURE — 96365 THER/PROPH/DIAG IV INF INIT: CPT

## 2024-09-03 RX ORDER — ZOLEDRONIC ACID 5 MG/100ML
5 INJECTION, SOLUTION INTRAVENOUS ONCE
Status: COMPLETED | OUTPATIENT
Start: 2024-09-03 | End: 2024-09-03

## 2024-09-03 RX ORDER — ZOLEDRONIC ACID 5 MG/100ML
5 INJECTION, SOLUTION INTRAVENOUS ONCE
Status: CANCELLED | OUTPATIENT
Start: 2024-09-03

## 2024-09-03 RX ORDER — SODIUM CHLORIDE 9 MG/ML
20 INJECTION, SOLUTION INTRAVENOUS ONCE
Status: CANCELLED | OUTPATIENT
Start: 2024-09-03

## 2024-09-03 RX ADMIN — ZOLEDRONIC ACID 5 MG: 5 INJECTION INTRAVENOUS at 15:41

## 2024-09-25 ENCOUNTER — TELEPHONE (OUTPATIENT)
Dept: SURGERY | Facility: CLINIC | Age: 74
End: 2024-09-25
Payer: MEDICARE

## 2024-10-02 DIAGNOSIS — E03.9 HYPOTHYROIDISM, UNSPECIFIED TYPE: ICD-10-CM

## 2024-10-03 RX ORDER — LEVOTHYROXINE SODIUM 75 UG/1
75 TABLET ORAL EVERY MORNING
Qty: 90 TABLET | Refills: 1 | Status: SHIPPED | OUTPATIENT
Start: 2024-10-03

## 2024-10-10 RX ORDER — CETIRIZINE HYDROCHLORIDE 10 MG/1
10 TABLET, FILM COATED ORAL DAILY
Qty: 90 TABLET | Refills: 0 | Status: SHIPPED | OUTPATIENT
Start: 2024-10-10

## 2024-10-10 NOTE — TELEPHONE ENCOUNTER
Rx Refill Note  Requested Prescriptions     Pending Prescriptions Disp Refills    EQ Allergy Relief, Cetirizine, 10 MG tablet [Pharmacy Med Name: EQ Allergy Relief (Cetirizine) 10 MG Oral Tablet] 90 tablet 0     Sig: Take 1 tablet by mouth once daily      Last office visit with prescribing clinician: 8/5/2024   Last telemedicine visit with prescribing clinician: Visit date not found   Next office visit with prescribing clinician: Visit date not found                         Would you like a call back once the refill request has been completed: [] Yes [] No    If the office needs to give you a call back, can they leave a voicemail: [] Yes [] No    Singh Bang MA  10/10/24, 08:28 EDT

## 2024-10-16 ENCOUNTER — OFFICE VISIT (OUTPATIENT)
Dept: ONCOLOGY | Facility: CLINIC | Age: 74
End: 2024-10-16
Payer: MEDICARE

## 2024-10-16 ENCOUNTER — LAB (OUTPATIENT)
Dept: LAB | Facility: HOSPITAL | Age: 74
End: 2024-10-16
Payer: MEDICARE

## 2024-10-16 VITALS
BODY MASS INDEX: 31.17 KG/M2 | HEIGHT: 63 IN | SYSTOLIC BLOOD PRESSURE: 156 MMHG | RESPIRATION RATE: 16 BRPM | TEMPERATURE: 97.4 F | DIASTOLIC BLOOD PRESSURE: 84 MMHG | WEIGHT: 175.9 LBS | HEART RATE: 61 BPM | OXYGEN SATURATION: 98 %

## 2024-10-16 DIAGNOSIS — D05.11 BREAST NEOPLASM, TIS (DCIS), RIGHT: Primary | ICD-10-CM

## 2024-10-16 DIAGNOSIS — D05.11 BREAST NEOPLASM, TIS (DCIS), RIGHT: ICD-10-CM

## 2024-10-16 LAB
ALBUMIN SERPL-MCNC: 4.1 G/DL (ref 3.5–5.2)
ALBUMIN/GLOB SERPL: 1.3 G/DL
ALP SERPL-CCNC: 66 U/L (ref 39–117)
ALT SERPL W P-5'-P-CCNC: 7 U/L (ref 1–33)
ANION GAP SERPL CALCULATED.3IONS-SCNC: 9.9 MMOL/L (ref 5–15)
AST SERPL-CCNC: 14 U/L (ref 1–32)
BASOPHILS # BLD AUTO: 0.09 10*3/MM3 (ref 0–0.2)
BASOPHILS NFR BLD AUTO: 1.4 % (ref 0–1.5)
BILIRUB SERPL-MCNC: 0.5 MG/DL (ref 0–1.2)
BUN SERPL-MCNC: 19 MG/DL (ref 8–23)
BUN/CREAT SERPL: 18.6 (ref 7–25)
CALCIUM SPEC-SCNC: 9.3 MG/DL (ref 8.6–10.5)
CHLORIDE SERPL-SCNC: 103 MMOL/L (ref 98–107)
CO2 SERPL-SCNC: 25.1 MMOL/L (ref 22–29)
CREAT SERPL-MCNC: 1.02 MG/DL (ref 0.57–1)
DEPRECATED RDW RBC AUTO: 43.5 FL (ref 37–54)
EGFRCR SERPLBLD CKD-EPI 2021: 57.5 ML/MIN/1.73
EOSINOPHIL # BLD AUTO: 0.32 10*3/MM3 (ref 0–0.4)
EOSINOPHIL NFR BLD AUTO: 4.8 % (ref 0.3–6.2)
ERYTHROCYTE [DISTWIDTH] IN BLOOD BY AUTOMATED COUNT: 12.7 % (ref 12.3–15.4)
GLOBULIN UR ELPH-MCNC: 3.2 GM/DL
GLUCOSE SERPL-MCNC: 106 MG/DL (ref 65–99)
HCT VFR BLD AUTO: 40.9 % (ref 34–46.6)
HGB BLD-MCNC: 12.9 G/DL (ref 12–15.9)
IMM GRANULOCYTES # BLD AUTO: 0.02 10*3/MM3 (ref 0–0.05)
IMM GRANULOCYTES NFR BLD AUTO: 0.3 % (ref 0–0.5)
LYMPHOCYTES # BLD AUTO: 1.77 10*3/MM3 (ref 0.7–3.1)
LYMPHOCYTES NFR BLD AUTO: 26.8 % (ref 19.6–45.3)
MCH RBC QN AUTO: 29.4 PG (ref 26.6–33)
MCHC RBC AUTO-ENTMCNC: 31.5 G/DL (ref 31.5–35.7)
MCV RBC AUTO: 93.2 FL (ref 79–97)
MONOCYTES # BLD AUTO: 0.69 10*3/MM3 (ref 0.1–0.9)
MONOCYTES NFR BLD AUTO: 10.4 % (ref 5–12)
NEUTROPHILS NFR BLD AUTO: 3.72 10*3/MM3 (ref 1.7–7)
NEUTROPHILS NFR BLD AUTO: 56.3 % (ref 42.7–76)
NRBC BLD AUTO-RTO: 0 /100 WBC (ref 0–0.2)
PLATELET # BLD AUTO: 266 10*3/MM3 (ref 140–450)
PMV BLD AUTO: 9.4 FL (ref 6–12)
POTASSIUM SERPL-SCNC: 4.7 MMOL/L (ref 3.5–5.2)
PROT SERPL-MCNC: 7.3 G/DL (ref 6–8.5)
RBC # BLD AUTO: 4.39 10*6/MM3 (ref 3.77–5.28)
SODIUM SERPL-SCNC: 138 MMOL/L (ref 136–145)
WBC NRBC COR # BLD AUTO: 6.61 10*3/MM3 (ref 3.4–10.8)

## 2024-10-16 PROCEDURE — 80053 COMPREHEN METABOLIC PANEL: CPT

## 2024-10-16 PROCEDURE — 85025 COMPLETE CBC W/AUTO DIFF WBC: CPT

## 2024-10-16 PROCEDURE — 36415 COLL VENOUS BLD VENIPUNCTURE: CPT

## 2024-10-16 RX ORDER — FEZOLINETANT 45 MG/1
45 TABLET, FILM COATED ORAL EVERY 24 HOURS
Qty: 30 TABLET | Refills: 6 | Status: SHIPPED | OUTPATIENT
Start: 2024-10-16

## 2024-10-16 NOTE — PROGRESS NOTES
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    The 20 mg dose is causing a lot of hot flashes but she is willing to put up with it.  Her family doctor prescribed venlafaxine but she has not started because a side effect profile and because she really basically is doing this for prevention I have recommended cutting her tamoxifen in half and seeing if this helps with the side effects and if not to try Veozah before trying Effexor    Mammogram is due again in January And her bone density in June shows osteoporosis in the spine for which she is on Reclast    Past Medical History:   Diagnosis Date    Allergic     Ankle sprain 10/23    Arthritis     Breast cancer, right 2024    Cerebral aneurysm     had coiling    Cholelithiasis 2016    Gallbladder removed    Colon polyp     COVID-19 2020    Diarrhea 2024    Disease of thyroid gland     Dislocation, shoulder 1968    Dislocated several times    Diverticulosis     GERD (gastroesophageal reflux disease)     H/O cerebral aneurysm repair     History of 2019 novel coronavirus disease (COVID-19) 08/2020 AUGUST 2020 STATES VERY MILD SYMTOMS    Hyperlipidemia     Hypertension     Hyperthyroidism 2012?    Hypothyroidism     IBS (irritable bowel syndrome)     Knee swelling     Low back pain     OAB (overactive bladder)     Osteoporosis     Right knee pain     RLS (restless legs syndrome)     Rotator cuff syndrome 1968    Tears    Stress fracture of right tibia     Stroke     Tear of meniscus of knee  2019 & 2020    Surgery both knees    TIA (transient ischemic attack)     2011    Torn meniscus     RIGHT KNEE    Urinary tract infection 06/01/2018        Past Surgical History:   Procedure Laterality Date    BREAST LUMPECTOMY Right 05/08/2024    Procedure: RIGHT breast bracketed needle localized lumpectomy, oncplastics,  implant removal.;  Surgeon: Jennifer Kelly MD;  Location: [facility] MAIN OR;  Service: General;  Laterality: Right;    BREAST SURGERY Bilateral 05/08/2024    Procedure: BILATERAL IMPLANT REMOVAL WITH CAPSULECTOMY, RIGHT ONCOPLASTIC CLOSURE LEFT BREAST REDUCTION FOR SYMMETRY, USE OF SPY;  Surgeon: Lisa Brambila MD;  Location: [facility] OR;  Service: Plastics;  Laterality: Bilateral;    CARDIAC CATHETERIZATION      CATARACT EXTRACTION, BILATERAL  03/2020    CHOLECYSTECTOMY      COLONOSCOPY  11/10/2010    prep of colon fair,IH,stool in transverse colon,hepatic flexure and ascending colon,ileum normal,IBS    COLONOSCOPY N/A 02/27/2018    IH, diverticulosis, one 5mm polyp, tubular adenoma with low grade dysplasia    COLONOSCOPY N/A 08/15/2023    Procedure: COLONOSCOPY into cecum/terminal ileum with polypectomy;  Surgeon: Finesse Reza MD;  Location: [facility] ENDOSCOPY;  Service: Gastroenterology;  Laterality: N/A;  polyps    CYSTOSCOPY      ENDOSCOPY  11/27/2012    grd 1 reflux egitis,web upperd 3rd esoph    ENDOSCOPY N/A 02/27/2018    normal biopsies, H Pylori positive    FOOT NEUROMA SURGERY Left     HAND SURGERY      HYSTERECTOMY  1985    IR CEREBRAL ANEURYSM COILING  2012    JOINT REPLACEMENT Left     Partial left knee replacement    KNEE ARTHROPLASTY UNICOMPARTMENTAL Left 03/18/2022    Procedure: KNEE ARTHROPLASTY UNICOMPARTMENTAL;  Surgeon: Everton Zuniga MD;  Location: [facility] OR;  Service: Orthopedics;  Laterality: Left;    KNEE ARTHROSCOPY Left 07/03/2019    Procedure: KNEE ARTHROSCOPY ARTHRITIS DEBRIDEMENT PARTIAL MEDIAL AND LATERAL MENISECTOMY;  Surgeon: Nuris Bender MD;   Location: [facility] OR [facility] [place];  Service: Orthopedics    KNEE ARTHROSCOPY Right 2020    Procedure: RIGHT KNEE ARTHROSCOPY, PARTIAL MEDIAL AND LATERAL MENISCECTOMIES, DEBRIDEMENT OF ARTHRITIS, AND INTERNAL FIXATION TIBAL PLATEAU INSUFFICIENCY FRACTURE;  Surgeon: Nuris Bender MD;  Location: [facility] OR [facility] [place];  Service: Orthopedics;  Laterality: Right;    KNEE SURGERY   &     TENSION FREE VAGINAL TAPING WITH MINI ARC SLING      THUMB ARTHROSCOPY      TOTAL ABDOMINAL HYSTERECTOMY WITH SALPINGO OOPHORECTOMY      TRIGGER POINT INJECTION      Hand, elbow, shoulders, knees    UPPER GASTROINTESTINAL ENDOSCOPY  2018    WRIST SURGERY     Oncologic history  patient is a 73-year-old female who works at [employer] department and was found  on her routine mammogram to have an abnormality in the right breast that warranted diagnostic imaging which showed a 4 mm abnormality at the 6:30 position of the right breast.  This was biopsied and showed low-grade DCIS ER/MD positive.  The patient was referred to Dr. Kelly and underwent bilateral breast MRI which showed postbiopsy cavity in the right breast with nonspecific surrounding enhancement and intact retroglandular saline implants with no axillary adenopathy and a normal left breast    Patient was taken to surgery had lumpectomy the findings of a 1.2 mm invasive ductal carcinoma and associated DCIS measuring 40 mm with clear margins no sentinel nodes were removed.  Removal of her implants and bilateral lift procedures and has done well postoperatively.  She is here to discuss treatment versus prevention    She is  6 para 5 first childbirth age 18 she did  breast-feed all 5 children for 12 months    Menarche age  13 menopause at 43 when she had an oopherectomy .  She had been on  hormonal therapy after menopause for 12 yrs then stopped    Family history is negative for any malignancy except for her sister with CLL  Genetic testing not done    She  is not a smoker or drinker  No DVT MI  TIA  in 2012 related to aneurysm which was coiled  Bilateral implants 1993    She has had colon polyps and is up-to-date with colonoscopies    We discussed the fact that her invasive breast cancer is very small and does not need treatment and essentially radiation to the breast would help with lowering the risk of a second malignancy in the breast    Because of her known osteoporosis she is not a good candidate for aromatase inhibitor and I have recommended tamoxifen in an  adjuvant and preventative fashion if tolerated for 5 years and she is agreeable.    The side effects and toxicities of Tamoxifen were discussed with the patient, including hot flashes, mood swings,depression, DVT and endometrial cancer. A list of drugs that interfere with the efficacy of tamoxifen and are to be avoided were given to the patient.  She will have her bone density scheduled and review this and start her tamoxifen the last week of radiation which would be sometime in July  Current Outpatient Medications on File Prior to Visit   Medication Sig Dispense Refill    atorvastatin (LIPITOR) 20 MG tablet Take 1 tablet by mouth Every Night. 90 tablet 1    EQ Allergy Relief, Cetirizine, 10 MG tablet Take 1 tablet by mouth once daily 90 tablet 0    gabapentin (NEURONTIN) 600 MG tablet Take 1 tablet by mouth 4 (Four) Times a Day.      hydroCHLOROthiazide 12.5 MG tablet TAKE 1 TABLET DAILY 90 tablet 1    levothyroxine (SYNTHROID, LEVOTHROID) 75 MCG tablet TAKE 1 TABLET EVERY MORNING 90 tablet 1    omeprazole (priLOSEC) 40 MG capsule Take 1 capsule by mouth Daily. 90 capsule 2    potassium chloride 10 MEQ CR tablet Take 1 tablet by mouth Daily. (Patient taking differently: Take 1 tablet by mouth Daily. PRN) 90 each 0    tamoxifen (NOLVADEX) 20 MG chemo tablet Take 1 tablet by mouth Daily for 270 days. 90 tablet 2    venlafaxine XR (Effexor XR) 37.5 MG 24 hr capsule Take 1 capsule by mouth Daily. 1 po qd x 1 wk  "then increase to 75 mg po qd 90 capsule 0     Current Facility-Administered Medications on File Prior to Visit   Medication Dose Route Frequency Provider Last Rate Last Admin    Chlorhexidine Gluconate Cloth 2 % pads   Apply externally BID Everton Zuniga MD            ALLERGIES:    Allergies   Allergen Reactions    Penicillins Hives     ..Beta lactam allergy details  Antibiotic reaction: hives  Age at reaction: adult  Dose to reaction time: unknown  Reason for antibiotic:  (uti)  Epinephrine required for reaction?: no       Bee Venom Swelling        Social History     Socioeconomic History    Marital status:     Number of children: 5   Tobacco Use    Smoking status: Never    Smokeless tobacco: Never   Vaping Use    Vaping status: Never Used   Substance and Sexual Activity    Alcohol use: Not Currently     Alcohol/week: 1.0 standard drink of alcohol     Types: 1 Glasses of wine per week     Comment: Occasionally    Drug use: Never    Sexual activity: Not Currently     Partners: Male     Birth control/protection: Hysterectomy        Family History   Problem Relation Age of Onset    Cancer Sister 60        CLL    Irritable bowel syndrome Daughter     Malig Hyperthermia Neg Hx         Review of Systems   All other systems reviewed and are negative.       Objective     Vitals:    10/16/24 1022   BP: 156/84   Pulse: 61   Resp: 16   Temp: 97.4 °F (36.3 °C)   TempSrc: Oral   SpO2: 98%   Weight: 79.8 kg (175 lb 14.4 oz)   Height: 158.8 cm (62.52\")   PainSc: 0-No pain         10/16/2024    10:26 AM   Current Status   ECOG score 0       Physical Exam  CONSTITUTIONAL:  Vital signs reviewed.  No distress, looks comfortable.  EYES:  Conjunctivae and lids unremarkable.  PERRLA  EARS,NOSE,MOUTH,THROAT:  Ears and nose appear unremarkable.  Lips, teeth, gums appear unremarkable.  RESPIRATORY:  Normal respiratory effort.  Lungs clear to auscultation bilaterally.  BREASTS: Bilateral lift procedures well-healed no " masses  CARDIOVASCULAR:  Normal S1, S2.  No murmurs rubs or gallops.  No significant lower extremity edema.  GASTROINTESTINAL: Abdomen appears unremarkable.  Nontender.  No hepatomegaly.  No splenomegaly.  LYMPHATIC:  No cervical, supraclavicular, axillary lymphadenopathy.  SKIN:  Warm.  No rashes.  PSYCHIATRIC:  Normal judgment and insight.  Normal mood and affect.  I have reexamined the patient and the results are consistent with the previously documented exam. Alex Ortega MD       RECENT LABS:  Hematology WBC   Date Value Ref Range Status   10/16/2024 6.61 3.40 - 10.80 10*3/mm3 Final   06/06/2023 6.84 4.5 - 11.0 10*3/uL Final     RBC   Date Value Ref Range Status   10/16/2024 4.39 3.77 - 5.28 10*6/mm3 Final   06/06/2023 4.41 4.0 - 5.2 10*6/uL Final     Hemoglobin   Date Value Ref Range Status   10/16/2024 12.9 12.0 - 15.9 g/dL Final   06/06/2023 12.6 12.0 - 16.0 g/dL Final     Hematocrit   Date Value Ref Range Status   10/16/2024 40.9 34.0 - 46.6 % Final   06/06/2023 39.0 36.0 - 46.0 % Final     Platelets   Date Value Ref Range Status   10/16/2024 266 140 - 450 10*3/mm3 Final   06/06/2023 331 140 - 440 10*3/uL Final              Component    Case Report   Surgical Pathology Report                         Case: [id number]                                   Authorizing Provider:  Nurys Edwards MD      Collected:           03/04/2024 02:52 PM           Ordering Location:     [facility]  Received:            03/04/2024 04:10 PM                                  LABORATORY                                                                   Pathologist:           Jordan Granados MD                                                         Specimen:    Breast, Right, Right breast FA with calcs 630 o'clock Ex 1452 Formalin 1500                Clinical Information    FA w/ calcs BI-RADS 4B   Final Diagnosis   1.  Right breast at 6:30 o'clock, (focal asymmetry with calcification), stereotactic core  biopsy:               A.  Ductal carcinoma in situ (DCIS):                            1.  Low-grade cribriform and focal solid DCIS.                            2.  Scattered foci of DCIS span at least 3 mm. In single greatest dimension.                            3.  Focal microcalcification present within DCIS.               B.  No invasive carcinoma identified.   Electronically signed by Jordan Granados MD         Estrogen Receptor (ER) Status  Positive (greater than 10% of cells demonstrate nuclear positivity)   Percentage of Cells with Nuclear Positivity  %   Average Intensity of Staining  Strong   Test Type  Food and Drug Administration (FDA) cleared (test / vendor): [place]   Primary Antibody  SP1   Scoring System  Jose Cruz   Proportion Score  5   Intensity Score  3   Total Jose Cruz Score  8   Test(s) Performed     Progesterone Receptor (PgR) Status  Positive   Percentage of Cells with Nuclear Positivity  %   Average Intensity of Staining  Strong   Test Type  Food and Drug Administration (FDA) cleared (test / vendor): [place]   Primary Antibody  1E2   Scoring System  Jose Cruz   Proportion Score  5   Intensity Score  3   Total Jose Cruz Score  8   Test(s) Performed  Ki-67   Ki-67 Percentage of Positive Nuclei  13 %           Component    Case Report   Surgical Pathology Report                         Case: [id number]                                   Authorizing Provider:  Jennifer Kelly MD    Collected:           04/09/2024 09:00 AM           Ordering Location:     [facility]  Received:            04/09/2024 11:17 AM                                  MRI                                                                           Pathologist:           Donte De Luna MD                                                           Specimen:    Breast, Right, Right breast Bx MRI by Dr Fonseca @ 0900, 9 cores, formalin time 0915,                clock face 0600                                                                             Final Diagnosis   1.  Breast, right 6 o'clock position, MRI guided core biopsy:  A.  Ductal carcinoma in situ (DCIS), low to intermediate nuclear grade with apocrine features, cribriform and solid architecture, measuring up to 6 mm maximally and involving up to 7 core fragments.   Electronically signed by Donte De Luna MD on 4/11/2024 at 0806   Synoptic Checklist   Breast Biomarker Reporting Template   Protocol posted: 12/13/2023BREAST BIOMARKER REPORTING TEMPLATE - All Specimens  Test(s) Performed     Estrogen Receptor (ER) Status  Positive (greater than 10% of cells demonstrate nuclear positivity)   Percentage of Cells with Nuclear Positivity  %   Average Intensity of Staining  Strong   Test Type  Food and Drug Administration (FDA) cleared (test / vendor): [place]   Primary Antibody  SP1   Test(s) Performed     Progesterone Receptor (PgR) Status  Positive   Percentage of Cells with Nuclear Positivity  %   Average Intensity of Staining  Moderate   Test Type  Food and Drug Administration (FDA) cleared (test / vendor): [place]   Primary Antibody  1E2   Test(s) Performed  Ki-67   Ki-67 Percentage of Positive Nuclei  17 %   Primary Antibody  30-9             Synoptic Checklist  INVASIVE CARCINOMA OF THE BREAST: Resection (INVASIVE CARCINOMA OF THE BREAST: RESECTION - All Specimens) 8th Edition  - Protocol posted: 12/13/2023  SPECIMEN  Procedure Excision (less than total mastectomy)  Specimen Laterality Right  .  TUMOR  Tumor Site 6 o'clock  Histologic Type Invasive carcinoma of no special type (ductal)  Histologic Grade ([place] Histologic Score)  Glandular (Acinar) / Tubular Differentiation Score 3  Nuclear Pleomorphism Score 2  Mitotic Rate Score 1  Overall Grade Grade 2 (scores of 6 or 7)  Tumor Size Greatest dimension of largest invasive focus (Millimeters): 1.2 mm  Tumor Focality Single focus of invasive carcinoma  Ductal Carcinoma In Situ (DCIS)  "Present  Negative for extensive intraductal component (EIC)  Size (Extent) of DCIS Estimated size (extent) of DCIS is at least (Millimeters): 40 mm  Additional Dimension (Millimeters) 28 mm  10 mm  Architectural Patterns Cribriform  Micropapillary  Papillary  Nuclear Grade Grade II (intermediate)  Necrosis Present, central (expansive \"comedo\" necrosis)  Lobular Carcinoma In Situ (LCIS) Not identified  Lymphatic and / or Vascular Invasion Not identified  Dermal Lymphatic and / or Vascular Invasion Not identified  Microcalcifications Present in DCIS  Treatment Effect in the Breast No known presurgical therapy  .  MARGINS  Margin Status for Invasive Carcinoma All margins negative for invasive carcinoma  Distance from Invasive Carcinoma to Closest Margin 28 mm  Closest Margin(s) to Invasive Carcinoma Anterior  Margin Status for DCIS All margins negative for DCIS  Distance from DCIS to Closest Margin 18 mm  Closest Margin(s) to DCIS Anterior  .  REGIONAL LYMPH NODES  Regional Lymph Node Status Not applicable (no regional lymph nodes submitted or found)  .  pTNM CLASSIFICATION (AJCC 8th Edition)  Reporting of pT, pN, and (when applicable) pM categories is based on information available to the pathologist at the time the report is issued. As  per the AJCC (Chapter 1, 8th Ed.) it is the managing physician’s responsibility to establish the final pathologic stage based upon all pertinent  information, including but potentially not limited to this pathology report.  pT Category pT1a  pN Category pN not assigned (no nodes submitted or found)  .  [place] Biomarker Reporting Template   Protocol posted: 12/13/2023BREAST BIOMARKER REPORTING TEMPLATE - All Specimens  Test(s) Performed     Estrogen Receptor (ER) Status  Positive (greater than 10% of cells demonstrate nuclear positivity)   Percentage of Cells with Nuclear Positivity  %   Average Intensity of Staining  Strong   Test Type  Food and Drug Administration (FDA) cleared " (test / vendor): [place]   Primary Antibody  SP1   Scoring System  Jose Cruz   Proportion Score  5   Intensity Score  3   Total Jose Cruz Score  8   Test(s) Performed     Progesterone Receptor (PgR) Status  Positive   Percentage of Cells with Nuclear Positivity  %   Average Intensity of Staining  Strong   Test Type  Food and Drug Administration (FDA) cleared (test / vendor): [place]   Primary Antibody  1E2   Scoring System  Jose Cruz   Proportion Score  5   Intensity Score  3   Total Jose Cruz Score  8   Test(s) Performed  Ki-67   Ki-67 Percentage of Positive Nuclei  14 %   Primary Antibody  30-9   Cold Ischemia and Fixation Times  Meet requirements specified in latest version of the ASCO / CAP Guidelines   Cold Ischemia Time (minutes)  41 min   Fixation Time (hours)  8 hours   Testing Performed on Block Number(s)  1N   METHODS   Fixative  Formalin   Image Analysis  Not performed   Comment(s)  Her 2 ([facility]) will be reported in an addendum. NEGATIVE       Assessment & Plan   1.pT1a Nx Right breast  ER/SD+ HER2 negative postlumpectomy with associated DCIS measuring 40 mm with clear margins  Radiation planned followed by tamoxifen 7/24  Tolerating tamoxifen 20 mg fairly well but a lot of hot flashes therefore we have decreased to 10 mg as of 10/24    2.  Osteoporosis on Reclast-bone density with osteoporosis in 6/24    3.  Negative family history of malignancy genetic testing not done    4.  Cerebral aneurysm post coiling in 2012 associated with a TIA    5.  Hypertension hypercholesterolemia on treatment    6.  Hypothyroidism on replacement    7.  Reflux on Prilosec    Plan  1.  Decrease tamoxifen to 10 mg daily  2.  Hot flashes persist trial of Veozah 45 mg daily  3.  See me back in 6 months with mammogram in January

## 2024-10-17 ENCOUNTER — TELEPHONE (OUTPATIENT)
Dept: ONCOLOGY | Facility: CLINIC | Age: 74
End: 2024-10-17
Payer: MEDICARE

## 2024-10-17 NOTE — TELEPHONE ENCOUNTER
Called patient, states that she was told about the program yesterday by Dr. Ortega, and that she would look it up today.

## 2024-10-17 NOTE — TELEPHONE ENCOUNTER
Called [facility], they said the the veozah is not covered on their insurance. Spoke to Corazon, she said the patient has been informed on how to apply for the sample on the website if she chooses to take it.

## 2024-10-18 ENCOUNTER — TELEPHONE (OUTPATIENT)
Dept: RADIATION ONCOLOGY | Facility: HOSPITAL | Age: 74
End: 2024-10-18
Payer: MEDICARE

## 2024-10-21 ENCOUNTER — OFFICE VISIT (OUTPATIENT)
Dept: RADIATION ONCOLOGY | Facility: HOSPITAL | Age: 74
End: 2024-10-21
Payer: MEDICARE

## 2024-10-21 VITALS
SYSTOLIC BLOOD PRESSURE: 138 MMHG | WEIGHT: 178 LBS | BODY MASS INDEX: 32.02 KG/M2 | HEART RATE: 63 BPM | OXYGEN SATURATION: 99 % | DIASTOLIC BLOOD PRESSURE: 79 MMHG

## 2024-10-21 DIAGNOSIS — D05.11 BREAST NEOPLASM, TIS (DCIS), RIGHT: Primary | ICD-10-CM

## 2024-10-21 NOTE — PROGRESS NOTES
CC: 3 month follow upf or right breast cancer    S:                        I had the pleasure of seeing Sari [facility]  [facility].  She is a pleasant 73 year old female with pT1aNx right breast cancer with 4cm of low to intermediate grade dcis s/p lumpectomy ER CO positive. She returns today for follow up now 3 months out from completion of her radiation therapy to the breast.  She completed a dose of [id number] in 16 fractions on 7/3/24. She has been doing well since I last saw her.      Review of Systems   Constitutional: Negative.    HENT: Negative.     Respiratory: Negative.     Skin: Negative.    Psychiatric/Behavioral: Negative.         Past Medical History:   Diagnosis Date    Allergic     Ankle sprain 10/23    Arthritis     Breast cancer, right 2024    Cerebral aneurysm     had coiling    Cholelithiasis 2016    Gallbladder removed    Colon polyp     COVID-19 2020    Diarrhea 2024    Disease of thyroid gland     Dislocation, shoulder 1968    Dislocated several times    Diverticulosis     GERD (gastroesophageal reflux disease)     H/O cerebral aneurysm repair     History of 2019 novel coronavirus disease (COVID-19) 08/2020 AUGUST 2020 STATES VERY MILD SYMTOMS    Hyperlipidemia     Hypertension     Hyperthyroidism 2012?    Hypothyroidism     IBS (irritable bowel syndrome)     Knee swelling     Low back pain     OAB (overactive bladder)     Osteoporosis     Right knee pain     RLS (restless legs syndrome)     Rotator cuff syndrome 1968    Tears    Stress fracture of right tibia     Stroke     Tear of meniscus of knee 2019 & 2020    Surgery both knees    TIA (transient ischemic attack)     2011    Torn meniscus     RIGHT KNEE    Urinary tract infection 06/01/2018         Past Surgical History:   Procedure Laterality Date    BREAST LUMPECTOMY Right 05/08/2024    Procedure: RIGHT breast bracketed needle localized lumpectomy, oncplastics,  implant removal.;  Surgeon: Jennifer Kelly MD;   Location: [facility] MAIN OR;  Service: General;  Laterality: Right;    BREAST SURGERY Bilateral 05/08/2024    Procedure: BILATERAL IMPLANT REMOVAL WITH CAPSULECTOMY, RIGHT ONCOPLASTIC CLOSURE LEFT BREAST REDUCTION FOR SYMMETRY, USE OF SPY;  Surgeon: Lisa Brambila MD;  Location: [facility] MAIN OR;  Service: Plastics;  Laterality: Bilateral;    CARDIAC CATHETERIZATION      CATARACT EXTRACTION, BILATERAL  03/2020    CHOLECYSTECTOMY      COLONOSCOPY  11/10/2010    prep of colon fair,IH,stool in transverse colon,hepatic flexure and ascending colon,ileum normal,IBS    COLONOSCOPY N/A 02/27/2018    IH, diverticulosis, one 5mm polyp, tubular adenoma with low grade dysplasia    COLONOSCOPY N/A 08/15/2023    Procedure: COLONOSCOPY into cecum/terminal ileum with polypectomy;  Surgeon: Finesse Reza MD;  Location: [facility] ENDOSCOPY;  Service: Gastroenterology;  Laterality: N/A;  polyps    CYSTOSCOPY      ENDOSCOPY  11/27/2012    grd 1 reflux egitis,web upperd 3rd esoph    ENDOSCOPY N/A 02/27/2018    normal biopsies, H Pylori positive    FOOT NEUROMA SURGERY Left     HAND SURGERY      HYSTERECTOMY  1985    IR CEREBRAL ANEURYSM COILING  2012    JOINT REPLACEMENT Left     Partial left knee replacement    KNEE ARTHROPLASTY UNICOMPARTMENTAL Left 03/18/2022    Procedure: KNEE ARTHROPLASTY UNICOMPARTMENTAL;  Surgeon: Everton Zuniga MD;  Location: [facility] MAIN OR;  Service: Orthopedics;  Laterality: Left;    KNEE ARTHROSCOPY Left 07/03/2019    Procedure: KNEE ARTHROSCOPY ARTHRITIS DEBRIDEMENT PARTIAL MEDIAL AND LATERAL MENISECTOMY;  Surgeon: Nuris Bender MD;  Location: [facility] OR OSC;  Service: Orthopedics    KNEE ARTHROSCOPY Right 09/28/2020    Procedure: RIGHT KNEE ARTHROSCOPY, PARTIAL MEDIAL AND LATERAL MENISCECTOMIES, DEBRIDEMENT OF ARTHRITIS, AND INTERNAL FIXATION TIBAL PLATEAU INSUFFICIENCY FRACTURE;  Surgeon: Nuris Bender MD;  Location: [facility] OR OSC;  Service: Orthopedics;  Laterality: Right;    KNEE SURGERY  2019 &  2020    TENSION FREE VAGINAL TAPING WITH MINI ARC SLING      THUMB ARTHROSCOPY      TOTAL ABDOMINAL HYSTERECTOMY WITH SALPINGO OOPHORECTOMY      TRIGGER POINT INJECTION      Hand, elbow, shoulders, knees    UPPER GASTROINTESTINAL ENDOSCOPY  02/27/2018    WRIST SURGERY           Social History     Socioeconomic History    Marital status:     Number of children: 5   Tobacco Use    Smoking status: Never    Smokeless tobacco: Never   Vaping Use    Vaping status: Never Used   Substance and Sexual Activity    Alcohol use: Not Currently     Alcohol/week: 1.0 standard drink of alcohol     Types: 1 Glasses of wine per week     Comment: Occasionally    Drug use: Never    Sexual activity: Not Currently     Partners: Male     Birth control/protection: Hysterectomy         Family History   Problem Relation Age of Onset    Cancer Sister 60        CLL    Irritable bowel syndrome Daughter     Malig Hyperthermia Neg Hx           Objective    Physical Exam  Constitutional:       Appearance: Normal appearance.   Chest:          Comments: No palpable masses in right breast, no hyperpigmentation  Neurological:      Mental Status: She is alert.   Psychiatric:         Mood and Affect: Mood normal.         Behavior: Behavior normal.         Current Outpatient Medications on File Prior to Visit   Medication Sig Dispense Refill    atorvastatin (LIPITOR) 20 MG tablet Take 1 tablet by mouth Every Night. 90 tablet 1    EQ Allergy Relief, Cetirizine, 10 MG tablet Take 1 tablet by mouth once daily 90 tablet 0    Fezolinetant (Veozah) 45 MG tablet Take 1 tablet by mouth Daily. 30 tablet 6    gabapentin (NEURONTIN) 600 MG tablet Take 1 tablet by mouth 4 (Four) Times a Day.      hydroCHLOROthiazide 12.5 MG tablet TAKE 1 TABLET DAILY 90 tablet 1    levothyroxine (SYNTHROID, LEVOTHROID) 75 MCG tablet TAKE 1 TABLET EVERY MORNING 90 tablet 1    omeprazole (priLOSEC) 40 MG capsule Take 1 capsule by mouth Daily. 90 capsule 2    potassium chloride  10 MEQ CR tablet Take 1 tablet by mouth Daily. (Patient taking differently: Take 1 tablet by mouth Daily. PRN) 90 each 0    tamoxifen (NOLVADEX) 20 MG chemo tablet Take 1 tablet by mouth Daily for 270 days. 90 tablet 2    venlafaxine XR (Effexor XR) 37.5 MG 24 hr capsule Take 1 capsule by mouth Daily. 1 po qd x 1 wk then increase to 75 mg po qd 90 capsule 0     Current Facility-Administered Medications on File Prior to Visit   Medication Dose Route Frequency Provider Last Rate Last Admin    Chlorhexidine Gluconate Cloth 2 % pads   Apply externally BID Everton Zuniga MD           ALLERGIES:    Allergies   Allergen Reactions    Penicillins Hives     ..Beta lactam allergy details  Antibiotic reaction: hives  Age at reaction: adult  Dose to reaction time: unknown  Reason for antibiotic:  (uti)  Epinephrine required for reaction?: no       Bee Venom Swelling       LMP  (LMP Unknown)          10/16/2024    10:26 AM   Current Status   ECOG score 0         Assessment & Plan     74 year old female with pT1aNx right breast cancer with 4cm of low to intermediate grade dcis s/p lumpectomy ER MT positive now 3 months out from radiation. She will due for her yearly mammogram in January and follow up with  shortly after.  She will have regular follow up with her medical oncologist.  I will see her back in one year for follow up.      I personally spent greater than 20 minutes today assessing, managing, discussing and documenting my visit with the patient. That time includes review of records, imaging and pathology reports, obtaining my own history, performing a medically appropriate evaluation, counseling and educating the patient, discussing goals, logistics, alternatives and risks of my recommendations, surveillance options and potential outcomes. It also includes the time documenting the clinical information in the EMR and communicating my recommendations to the other involved physicians.            Thank you very  much for allowing me to participate in the care of this very pleasant patient.    Sincerely,      Kayla Starks MD

## 2024-10-22 RX ORDER — ATORVASTATIN CALCIUM 20 MG/1
20 TABLET, FILM COATED ORAL NIGHTLY
Qty: 90 TABLET | Refills: 1 | Status: SHIPPED | OUTPATIENT
Start: 2024-10-22

## 2024-12-24 ENCOUNTER — APPOINTMENT (OUTPATIENT)
Dept: CT IMAGING | Facility: HOSPITAL | Age: 74
End: 2024-12-24
Payer: MEDICARE

## 2024-12-24 ENCOUNTER — APPOINTMENT (OUTPATIENT)
Dept: GENERAL RADIOLOGY | Facility: HOSPITAL | Age: 74
End: 2024-12-24
Payer: MEDICARE

## 2024-12-24 ENCOUNTER — HOSPITAL ENCOUNTER (EMERGENCY)
Facility: HOSPITAL | Age: 74
Discharge: HOME | End: 2024-12-24
Attending: EMERGENCY MEDICINE | Admitting: EMERGENCY MEDICINE
Payer: MEDICARE

## 2024-12-24 VITALS
RESPIRATION RATE: 16 BRPM | OXYGEN SATURATION: 98 % | SYSTOLIC BLOOD PRESSURE: 131 MMHG | TEMPERATURE: 98.9 F | DIASTOLIC BLOOD PRESSURE: 65 MMHG | HEART RATE: 64 BPM

## 2024-12-24 DIAGNOSIS — E78.00 HYPERCHOLESTEROLEMIA: ICD-10-CM

## 2024-12-24 DIAGNOSIS — Z85.3 HISTORY OF BREAST CANCER: ICD-10-CM

## 2024-12-24 DIAGNOSIS — R07.9 CHEST PAIN, UNSPECIFIED TYPE: Primary | ICD-10-CM

## 2024-12-24 LAB
ALBUMIN SERPL-MCNC: 4 G/DL (ref 3.5–5.2)
ALBUMIN/GLOB SERPL: 1.4 G/DL
ALP SERPL-CCNC: 70 U/L (ref 39–117)
ALT SERPL W P-5'-P-CCNC: 11 U/L (ref 1–33)
ANION GAP SERPL CALCULATED.3IONS-SCNC: 8.9 MMOL/L (ref 5–15)
AST SERPL-CCNC: 14 U/L (ref 1–32)
BASOPHILS # BLD AUTO: 0.06 10*3/MM3 (ref 0–0.2)
BASOPHILS NFR BLD AUTO: 0.9 % (ref 0–1.5)
BILIRUB SERPL-MCNC: 0.3 MG/DL (ref 0–1.2)
BUN SERPL-MCNC: 12 MG/DL (ref 8–23)
BUN/CREAT SERPL: 12 (ref 7–25)
CALCIUM SPEC-SCNC: 8.8 MG/DL (ref 8.6–10.5)
CHLORIDE SERPL-SCNC: 106 MMOL/L (ref 98–107)
CO2 SERPL-SCNC: 21.1 MMOL/L (ref 22–29)
CREAT SERPL-MCNC: 1 MG/DL (ref 0.57–1)
DEPRECATED RDW RBC AUTO: 40.4 FL (ref 37–54)
EGFRCR SERPLBLD CKD-EPI 2021: 58.9 ML/MIN/1.73
EOSINOPHIL # BLD AUTO: 0.22 10*3/MM3 (ref 0–0.4)
EOSINOPHIL NFR BLD AUTO: 3.1 % (ref 0.3–6.2)
ERYTHROCYTE [DISTWIDTH] IN BLOOD BY AUTOMATED COUNT: 12.2 % (ref 12.3–15.4)
GEN 5 1HR TROPONIN T REFLEX: 10 NG/L
GLOBULIN UR ELPH-MCNC: 2.8 GM/DL
GLUCOSE SERPL-MCNC: 121 MG/DL (ref 65–99)
HCT VFR BLD AUTO: 38.7 % (ref 34–46.6)
HGB BLD-MCNC: 12.9 G/DL (ref 12–15.9)
HOLD SPECIMEN: NORMAL
HOLD SPECIMEN: NORMAL
IMM GRANULOCYTES # BLD AUTO: 0.02 10*3/MM3 (ref 0–0.05)
IMM GRANULOCYTES NFR BLD AUTO: 0.3 % (ref 0–0.5)
LYMPHOCYTES # BLD AUTO: 2 10*3/MM3 (ref 0.7–3.1)
LYMPHOCYTES NFR BLD AUTO: 28.4 % (ref 19.6–45.3)
MCH RBC QN AUTO: 30.4 PG (ref 26.6–33)
MCHC RBC AUTO-ENTMCNC: 33.3 G/DL (ref 31.5–35.7)
MCV RBC AUTO: 91.3 FL (ref 79–97)
MONOCYTES # BLD AUTO: 0.58 10*3/MM3 (ref 0.1–0.9)
MONOCYTES NFR BLD AUTO: 8.2 % (ref 5–12)
NEUTROPHILS NFR BLD AUTO: 4.16 10*3/MM3 (ref 1.7–7)
NEUTROPHILS NFR BLD AUTO: 59.1 % (ref 42.7–76)
NRBC BLD AUTO-RTO: 0 /100 WBC (ref 0–0.2)
PLATELET # BLD AUTO: 292 10*3/MM3 (ref 140–450)
PMV BLD AUTO: 9.4 FL (ref 6–12)
POTASSIUM SERPL-SCNC: 3.8 MMOL/L (ref 3.5–5.2)
PROT SERPL-MCNC: 6.8 G/DL (ref 6–8.5)
QT INTERVAL: 391 MS
QTC INTERVAL: 446 MS
RBC # BLD AUTO: 4.24 10*6/MM3 (ref 3.77–5.28)
SODIUM SERPL-SCNC: 136 MMOL/L (ref 136–145)
TROPONIN T NUMERIC DELTA: -1 NG/L
TROPONIN T SERPL HS-MCNC: 11 NG/L
WBC NRBC COR # BLD AUTO: 7.04 10*3/MM3 (ref 3.4–10.8)
WHOLE BLOOD HOLD COAG: NORMAL
WHOLE BLOOD HOLD SPECIMEN: NORMAL

## 2024-12-24 PROCEDURE — 93005 ELECTROCARDIOGRAM TRACING: CPT

## 2024-12-24 PROCEDURE — 84484 ASSAY OF TROPONIN QUANT: CPT | Performed by: EMERGENCY MEDICINE

## 2024-12-24 PROCEDURE — 85025 COMPLETE CBC W/AUTO DIFF WBC: CPT | Performed by: EMERGENCY MEDICINE

## 2024-12-24 PROCEDURE — 71275 CT ANGIOGRAPHY CHEST: CPT

## 2024-12-24 PROCEDURE — 36415 COLL VENOUS BLD VENIPUNCTURE: CPT

## 2024-12-24 PROCEDURE — 25510000001 IOPAMIDOL PER 1 ML: Performed by: EMERGENCY MEDICINE

## 2024-12-24 PROCEDURE — 71045 X-RAY EXAM CHEST 1 VIEW: CPT

## 2024-12-24 PROCEDURE — 93005 ELECTROCARDIOGRAM TRACING: CPT | Performed by: EMERGENCY MEDICINE

## 2024-12-24 PROCEDURE — 99285 EMERGENCY DEPT VISIT HI MDM: CPT

## 2024-12-24 PROCEDURE — 80053 COMPREHEN METABOLIC PANEL: CPT | Performed by: EMERGENCY MEDICINE

## 2024-12-24 RX ORDER — ASPIRIN 325 MG
325 TABLET ORAL ONCE
Status: DISCONTINUED | OUTPATIENT
Start: 2024-12-24 | End: 2024-12-24 | Stop reason: HOSPADM

## 2024-12-24 RX ORDER — DICLOFENAC SODIUM 75 MG/1
75 TABLET, DELAYED RELEASE ORAL 2 TIMES DAILY PRN
Qty: 20 TABLET | Refills: 0 | Status: SHIPPED | OUTPATIENT
Start: 2024-12-24

## 2024-12-24 RX ORDER — SODIUM CHLORIDE 0.9 % (FLUSH) 0.9 %
10 SYRINGE (ML) INJECTION AS NEEDED
Status: DISCONTINUED | OUTPATIENT
Start: 2024-12-24 | End: 2024-12-24 | Stop reason: HOSPADM

## 2024-12-24 RX ORDER — IOPAMIDOL 755 MG/ML
95 INJECTION, SOLUTION INTRAVASCULAR
Status: COMPLETED | OUTPATIENT
Start: 2024-12-24 | End: 2024-12-24

## 2024-12-24 RX ORDER — BACLOFEN 10 MG/1
10 TABLET ORAL 3 TIMES DAILY PRN
Qty: 20 TABLET | Refills: 0 | Status: SHIPPED | OUTPATIENT
Start: 2024-12-24

## 2024-12-24 RX ADMIN — IOPAMIDOL 95 ML: 755 INJECTION, SOLUTION INTRAVENOUS at 13:05

## 2024-12-24 NOTE — ED PROVIDER NOTES
EMERGENCY DEPARTMENT ENCOUNTER    Room Number:  02/02  Date of encounter:  12/24/2024  PCP: Nurys Edwards MD  Historian: Patient  Chronic or social conditions impacting care (social determinants of health): None    HPI:  Chief Complaint: Chest pain   A complete HPI/ROS/PMH/PSH/SH/FH are unobtainable due to: Nothing    Context: Sari Self is a 74 y.o. female with a history of breast cancer, hypercholesterolemia, who presents to the ED c/o acute chest pain.  Patient reports anterior chest wall pain x 1 week.  She states the pain is fairly constant.  There are no precipitating or alleviating factors.  Over the past day the pain seems to radiate through to the back.  She states she has had occasional exertional shortness of breath.  Denies any associated nausea or vomiting.  Denies any history of coronary artery disease.  She does report having an outpatient noncontrasted abdominal CT scan last week from her urologist.  She was told that she had an enlarged heart and possible pericarditis.  She denies any recent fevers or URI symptoms.    Review of prior external notes (non-ED):   I reviewed oncology office visit from 10/16/2024.  Patient being followed for breast cancer.    Review of prior external test results outside of this encounter:  I reviewed a CMP dated 10/16/2024.  Creatinine 1.02, potassium 4.7    PAST MEDICAL HISTORY  Active Ambulatory Problems     Diagnosis Date Noted    Osteoporosis 08/05/2016    Senile osteoporosis 09/27/2017    Gastroesophageal reflux disease 01/04/2018    Diarrhea 01/04/2018    Tubular adenoma of colon 03/01/2018    Helicobacter pylori (H. pylori) infection 03/01/2018    Acute medial meniscus tear of left knee 06/14/2019    Complex tear of medial meniscus of right knee as current injury 09/15/2020    Stress fracture of right tibia 09/15/2020    History of IBS 09/18/2020    RLS (restless legs syndrome) 09/18/2020    Hypothyroidism 09/18/2020    Essential hypertension 09/18/2020     Left-sided trigeminal neuralgia 10/19/2018    Nonruptured cerebral aneurysm 01/21/2011    Primary osteoarthritis of left knee 01/04/2022    Abnormal EKG 04/12/2018    Chest pain 04/12/2018    Chronic kidney disease (CKD), stage III (moderate) 10/19/2018    Grief reaction 10/25/2018    Hematuria 10/25/2018    History of Helicobacter pylori infection 10/19/2018    History of recurrent UTIs 10/19/2018    Tubulovillous adenoma of colon 10/19/2018    Diarrhea 01/04/2018    Gastroesophageal reflux disease 04/12/2018    Helicobacter pylori (H. pylori) infection 03/01/2018    Osteoporosis 08/05/2016    RLS (restless legs syndrome) 08/24/2012    Tubular adenoma of colon 03/01/2018    Abnormal CXR 03/15/2023    Pure hypercholesterolemia 03/15/2023    Colon polyps 04/19/2023    Jaw pain 05/19/2023    Breast neoplasm, Tis (DCIS), right 03/20/2024     Resolved Ambulatory Problems     Diagnosis Date Noted    No Resolved Ambulatory Problems     Past Medical History:   Diagnosis Date    Allergic     Ankle sprain 10/23    Arthritis     Breast cancer, right 2024    Cerebral aneurysm     Cholelithiasis 2016    Colon polyp     COVID-19 2020    Disease of thyroid gland     Dislocation, shoulder 1968    Diverticulosis     GERD (gastroesophageal reflux disease)     H/O cerebral aneurysm repair     History of 2019 novel coronavirus disease (COVID-19) 08/2020    Hyperlipidemia     Hypertension     Hyperthyroidism 2012?    IBS (irritable bowel syndrome)     Knee swelling     Low back pain     OAB (overactive bladder)     Right knee pain     Rotator cuff syndrome 1968    Stroke     Tear of meniscus of knee 2019 & 2020    TIA (transient ischemic attack)     Torn meniscus     Urinary tract infection 06/01/2018         PAST SURGICAL HISTORY  Past Surgical History:   Procedure Laterality Date    BREAST LUMPECTOMY Right 05/08/2024    Procedure: RIGHT breast bracketed needle localized lumpectomy, oncplastics,  implant removal.;  Surgeon:  Jennifer Kelly MD;  Location: [facility] MAIN OR;  Service: General;  Laterality: Right;    BREAST SURGERY Bilateral 05/08/2024    Procedure: BILATERAL IMPLANT REMOVAL WITH CAPSULECTOMY, RIGHT ONCOPLASTIC CLOSURE LEFT BREAST REDUCTION FOR SYMMETRY, USE OF SPY;  Surgeon: Lisa Brambila MD;  Location: [facility] MAIN OR;  Service: Plastics;  Laterality: Bilateral;    CARDIAC CATHETERIZATION      CATARACT EXTRACTION, BILATERAL  03/2020    CHOLECYSTECTOMY      COLONOSCOPY  11/10/2010    prep of colon fair,IH,stool in transverse colon,hepatic flexure and ascending colon,ileum normal,IBS    COLONOSCOPY N/A 02/27/2018    IH, diverticulosis, one 5mm polyp, tubular adenoma with low grade dysplasia    COLONOSCOPY N/A 08/15/2023    Procedure: COLONOSCOPY into cecum/terminal ileum with polypectomy;  Surgeon: Finesse Reza MD;  Location: [facility] ENDOSCOPY;  Service: Gastroenterology;  Laterality: N/A;  polyps    CYSTOSCOPY      ENDOSCOPY  11/27/2012    grd 1 reflux egitis,web upperd 3rd esoph    ENDOSCOPY N/A 02/27/2018    normal biopsies, H Pylori positive    FOOT NEUROMA SURGERY Left     HAND SURGERY      HYSTERECTOMY  1985    IR CEREBRAL ANEURYSM COILING  2012    JOINT REPLACEMENT Left     Partial left knee replacement    KNEE ARTHROPLASTY UNICOMPARTMENTAL Left 03/18/2022    Procedure: KNEE ARTHROPLASTY UNICOMPARTMENTAL;  Surgeon: Everton Zuniga MD;  Location: [facility] MAIN OR;  Service: Orthopedics;  Laterality: Left;    KNEE ARTHROSCOPY Left 07/03/2019    Procedure: KNEE ARTHROSCOPY ARTHRITIS DEBRIDEMENT PARTIAL MEDIAL AND LATERAL MENISECTOMY;  Surgeon: Nuris Bender MD;  Location: [facility] OR [facility];  Service: Orthopedics    KNEE ARTHROSCOPY Right 09/28/2020    Procedure: RIGHT KNEE ARTHROSCOPY, PARTIAL MEDIAL AND LATERAL MENISCECTOMIES, DEBRIDEMENT OF ARTHRITIS, AND INTERNAL FIXATION TIBAL PLATEAU INSUFFICIENCY FRACTURE;  Surgeon: Nuris Bender MD;  Location: [facility] OR [facility];  Service: Orthopedics;  Laterality:  Right;    KNEE SURGERY  2019 & 2020    TENSION FREE VAGINAL TAPING WITH MINI ARC SLING      THUMB ARTHROSCOPY      TOTAL ABDOMINAL HYSTERECTOMY WITH SALPINGO OOPHORECTOMY      TRIGGER POINT INJECTION      Hand, elbow, shoulders, knees    UPPER GASTROINTESTINAL ENDOSCOPY  02/27/2018    WRIST SURGERY           FAMILY HISTORY  Family History   Problem Relation Age of Onset    Cancer Sister 60        CLL    Irritable bowel syndrome Daughter     Malig Hyperthermia Neg Hx          SOCIAL HISTORY  Social History     Socioeconomic History    Marital status:     Number of children: 5   Tobacco Use    Smoking status: Never    Smokeless tobacco: Never   Vaping Use    Vaping status: Never Used   Substance and Sexual Activity    Alcohol use: Not Currently     Alcohol/week: 1.0 standard drink of alcohol     Types: 1 Glasses of wine per week     Comment: Occasionally    Drug use: Never    Sexual activity: Not Currently     Partners: Male     Birth control/protection: Hysterectomy         ALLERGIES  Penicillins and Bee venom        REVIEW OF SYSTEMS  All systems reviewed and negative except for those discussed in HPI.       PHYSICAL EXAM    I have reviewed the triage vital signs and nursing notes.    ED Triage Vitals   Temp Heart Rate Resp BP SpO2   12/24/24 1014 12/24/24 1014 12/24/24 1014 12/24/24 1023 12/24/24 1014   98.9 °F (37.2 °C) 113 20 143/72 98 %      Temp src Heart Rate Source Patient Position BP Location FiO2 (%)   12/24/24 1014 -- -- -- --   Tympanic           Physical Exam  GENERAL: Alert, oriented, not distressed  HENT: head atraumatic, no nuchal rigidity  EYES: no scleral icterus, EOMI  CV: regular rhythm, regular rate, no murmur  RESPIRATORY: normal effort, CTA  ABDOMEN: soft, nontender  MUSCULOSKELETAL: no deformity, FROM, no calf swelling or tenderness  NEURO: alert, moves all extremities, follows commands  SKIN: warm, dry        LAB RESULTS  Recent Results (from the past 24 hours)   ECG 12 Lead ED Triage  Standing Order; Chest Pain    Collection Time: 12/24/24 10:20 AM   Result Value Ref Range    QT Interval 391 ms    QTC Interval 446 ms   Comprehensive Metabolic Panel    Collection Time: 12/24/24 10:24 AM    Specimen: Arm, Left; Blood   Result Value Ref Range    Glucose 121 (H) 65 - 99 mg/dL    BUN 12 8 - 23 mg/dL    Creatinine 1.00 0.57 - 1.00 mg/dL    Sodium 136 136 - 145 mmol/L    Potassium 3.8 3.5 - 5.2 mmol/L    Chloride 106 98 - 107 mmol/L    CO2 21.1 (L) 22.0 - 29.0 mmol/L    Calcium 8.8 8.6 - 10.5 mg/dL    Total Protein 6.8 6.0 - 8.5 g/dL    Albumin 4.0 3.5 - 5.2 g/dL    ALT (SGPT) 11 1 - 33 U/L    AST (SGOT) 14 1 - 32 U/L    Alkaline Phosphatase 70 39 - 117 U/L    Total Bilirubin 0.3 0.0 - 1.2 mg/dL    Globulin 2.8 gm/dL    A/G Ratio 1.4 g/dL    BUN/Creatinine Ratio 12.0 7.0 - 25.0    Anion Gap 8.9 5.0 - 15.0 mmol/L    eGFR 58.9 (L) >60.0 mL/min/1.73   High Sensitivity Troponin T    Collection Time: 12/24/24 10:24 AM    Specimen: Arm, Left; Blood   Result Value Ref Range    HS Troponin T 11 <14 ng/L   Green Top (Gel)    Collection Time: 12/24/24 10:24 AM   Result Value Ref Range    Extra Tube Hold for add-ons.    Lavender Top    Collection Time: 12/24/24 10:24 AM   Result Value Ref Range    Extra Tube hold for add-on    Gold Top - SST    Collection Time: 12/24/24 10:24 AM   Result Value Ref Range    Extra Tube Hold for add-ons.    Light Blue Top    Collection Time: 12/24/24 10:24 AM   Result Value Ref Range    Extra Tube Hold for add-ons.    CBC Auto Differential    Collection Time: 12/24/24 10:24 AM    Specimen: Arm, Left; Blood   Result Value Ref Range    WBC 7.04 3.40 - 10.80 10*3/mm3    RBC 4.24 3.77 - 5.28 10*6/mm3    Hemoglobin 12.9 12.0 - 15.9 g/dL    Hematocrit 38.7 34.0 - 46.6 %    MCV 91.3 79.0 - 97.0 fL    MCH 30.4 26.6 - 33.0 pg    MCHC 33.3 31.5 - 35.7 g/dL    RDW 12.2 (L) 12.3 - 15.4 %    RDW-SD 40.4 37.0 - 54.0 fl    MPV 9.4 6.0 - 12.0 fL    Platelets 292 140 - 450 10*3/mm3    Neutrophil %  59.1 42.7 - 76.0 %    Lymphocyte % 28.4 19.6 - 45.3 %    Monocyte % 8.2 5.0 - 12.0 %    Eosinophil % 3.1 0.3 - 6.2 %    Basophil % 0.9 0.0 - 1.5 %    Immature Grans % 0.3 0.0 - 0.5 %    Neutrophils, Absolute 4.16 1.70 - 7.00 10*3/mm3    Lymphocytes, Absolute 2.00 0.70 - 3.10 10*3/mm3    Monocytes, Absolute 0.58 0.10 - 0.90 10*3/mm3    Eosinophils, Absolute 0.22 0.00 - 0.40 10*3/mm3    Basophils, Absolute 0.06 0.00 - 0.20 10*3/mm3    Immature Grans, Absolute 0.02 0.00 - 0.05 10*3/mm3    nRBC 0.0 0.0 - 0.2 /100 WBC   High Sensitivity Troponin T 1Hr    Collection Time: 12/24/24 12:14 PM    Specimen: Blood   Result Value Ref Range    HS Troponin T 10 <14 ng/L    Troponin T Numeric Delta -1 Abnormal if >/=3 ng/L       Ordered the above labs and independently reviewed the results.        RADIOLOGY  CT Angiogram Chest    Result Date: 12/24/2024  CT ANGIOGRAM OF THE CHEST. MULTIPLE CORONAL, SAGITTAL, AND 3D RECONSTRUCTIONS  HISTORY: 74-year-old female with acute chest pain which seems to radiate to the back. Evaluate for aortic dissection.  TECHNIQUE: Radiation dose reduction techniques were utilized, including automated exposure control and exposure modulation based on body size. Cardiac gated CT angiogram of the chest was performed with 2mm images following the administration of IV contrast. Multiple coronal, sagittal, and 3D reconstruction images were obtained. There is no previous chest CT for comparison.  FINDINGS: 1. There is no aneurysmal dilatation of the thoracic aorta and there is no evidence for dissection. The ascending aorta measures 3.2 cm, aortic arch 2.4 cm, and descending aorta 2.3 cm. There is calcified plaque at the arch and there is calcified and irregular noncalcified plaque along the descending aorta without dissection.  No incidental pulmonary thromboemboli are seen.  2. There is localized pleural parenchymal thickening and ground glass density at the anterior aspect of the right upper lobe and to a  lesser degree the anterolateral aspect of the right middle lobe. There are postsurgical changes at the right breast and the lung findings likely represent radiation pneumonitis/fibrosis. There are a few linear scars at the lower lobes and no acute-appearing airspace opacities are seen. There are no pleural or pericardial effusions. There is no mediastinal or hilar lymphadenopathy and there is no axillary lymphadenopathy.       XR Chest 1 View    Result Date: 12/24/2024  XR CHEST 1 VW-12/24/2024  HISTORY: Chest pain.  The heart size is mildly enlarged. There is a linear band of suspected atelectasis in the left lung base. Left upper lung and right lung appear clear. There is some aortic calcification.  There is slight blunting of the left costophrenic sulcus.      1. Mild cardiomegaly. 2. Mild probable atelectasis of the left lung base. 3. There is slight blunting of the left costophrenic sulcus which could be from pleural scarring or minimal left pleural effusion.   This report was finalized on 12/24/2024 11:38 AM by Dr. Loc Rivas M.D on Workstation: [id number]       I ordered the above noted radiological studies. Reviewed by me and discussed with radiologist.  See dictation for official radiology interpretation.      MEDICATIONS GIVEN IN ER    Medications   sodium chloride 0.9 % flush 10 mL (has no administration in time range)   aspirin tablet 325 mg (325 mg Oral Not Given 12/24/24 1053)   iopamidol (ISOVUE-370) 76 % injection 95 mL (95 mL Intravenous Given 12/24/24 1305)         ADDITIONAL ORDERS CONSIDERED BUT NOT ORDERED:  Admission was considered but after careful review of the patient's presentation, physical examination, diagnostic results, and response to treatment the patient may be safely discharged with outpatient follow-up.       PROGRESS, DATA ANALYSIS, CONSULTS, AND MEDICAL DECISION MAKING    All labs have been independently interpreted by myself.  All radiology studies have been  independently interpreted by myself and discussed with radiologist dictating the report.   EKGs independently interpreted by myself.  Discussion below represents my analysis of pertinent findings related to patient's condition, differential diagnosis, treatment plan and final disposition.    I have discussed case with Dr. Thomas, emergency room physician.  He has performed his own bedside examination and agrees with treatment plan.    ED Course as of 12/24/24 1418   Tue Dec 24, 2024   1038 Patient presents with a 1 week history of substernal chest pain with a 1 day history of radiation through to the back.  Differential diagnoses include but not limited to ACS, aortic dissection, pericarditis.  Vitals stable on room air. [EE]   1044 EKG independently interpreted myself.  Time 1020.  Sinus rhythm, 70 bpm.  Normal P/TANIA.  QRS normal with normal axis.  No significant ST abnormalities.  Similar to previous EKG from 5/2/2024 [EE]   1044 WBC: 7.04 [EE]   1118 HS Troponin T: 11 [EE]   1118 Creatinine: 1.00 [EE]   1118 Chest x-ray independently interpreted myself shows no acute infiltrate. [EE]   1252 Troponin T Numeric Delta: -1 [EE]   1412 Updated patient on workup.  No evidence of PE or aneurysm.  Suspect likely inflammatory etiology to her symptoms.  Will treat with NSAIDs and muscle relaxers.  She does have an appointment with her cardiologist next week. [EE]      ED Course User Index  [EE] Arvin Mccauley PA       AS OF 14:18 EST VITALS:    BP - 138/72  HR - 80  TEMP - 98.9 °F (37.2 °C) (Tympanic)  O2 SATS - 97%        DIAGNOSIS  Final diagnoses:   Chest pain, unspecified type   History of breast cancer   Hypercholesterolemia         DISPOSITION  Discharged    Admission was considered but after careful review of the patient's presentation, physical examination, diagnostic results, and response to treatment the patient may be safely discharged with outpatient follow-up.         Dictated utilizing Dragon dictation      Arvin Mccauley, PA  12/24/24 0864

## 2024-12-24 NOTE — ED PROVIDER NOTES
MD ATTESTATION NOTE    The YOGESH and I have discussed this patient's history, physical exam, and treatment plan.  I have reviewed the documentation and personally had a face to face interaction with the patient. I affirm the documentation and agree with the treatment and plan.  The attached note describes my personal findings.      I provided a substantive portion of the care of the patient.  I personally performed the physical exam in its entirety, and below are my findings.        Brief HPI: This patient is a 74-year-old female presenting to the emergency room today with chest discomfort with radiation through to the back that she states has been present and now worsening for the last week.  She describes the pain is more of a burning type pain more so now currently in her back.  She does have some associated shortness of breath but denies nausea/vomiting, extremity pain, headache, or dizziness.      PHYSICAL EXAM  ED Triage Vitals   Temp Heart Rate Resp BP SpO2   12/24/24 1014 12/24/24 1014 12/24/24 1014 12/24/24 1023 12/24/24 1014   98.9 °F (37.2 °C) 113 20 143/72 98 %      Temp src Heart Rate Source Patient Position BP Location FiO2 (%)   12/24/24 1014 -- -- -- --   Tympanic             GENERAL: Resting comfortably and in no acute distress, nontoxic in appearance  HENT: nares patent  EYES: no scleral icterus  CV: regular rhythm, normal rate, no M/R/G  RESPIRATORY: normal effort, lungs clear bilaterally  ABDOMEN: soft, nontender, no rebound or guarding  MUSCULOSKELETAL: no deformity, no edema  NEURO: alert, moves all extremities, follows commands  PSYCH:  calm, cooperative  SKIN: warm, dry    Vital signs and nursing notes reviewed.      Differential diagnosis includes but is not limited to acute coronary syndrome, pneumonia, pneumothorax, pulmonary embolism, aortic dissection, GERD/gastritis, or peptic ulcer disease.      Plan: We will obtain an EKG, labs with serial cardiac enzymes, chest x-ray, as well as CTA  chest for further evaluation.      Chest x-ray independently interpreted by myself with my interpretation showing no cardiomegaly no air of edema, infiltrate, or pneumothorax.       Kareem Thomas MD  12/24/24 [id number]

## 2025-01-07 ENCOUNTER — TELEPHONE (OUTPATIENT)
Dept: GASTROENTEROLOGY | Facility: CLINIC | Age: 75
End: 2025-01-07
Payer: MEDICARE

## 2025-01-07 RX ORDER — CETIRIZINE HYDROCHLORIDE 10 MG/1
10 TABLET, FILM COATED ORAL DAILY
Qty: 90 TABLET | Refills: 0 | Status: SHIPPED | OUTPATIENT
Start: 2025-01-07

## 2025-01-07 NOTE — TELEPHONE ENCOUNTER
Rx Refill Note  Requested Prescriptions     Pending Prescriptions Disp Refills    EQ Allergy Relief, Cetirizine, 10 MG tablet [Pharmacy Med Name: EQ Allergy Relief (Cetirizine) 10 MG Oral Tablet] 90 tablet 0     Sig: Take 1 tablet by mouth once daily      Last office visit with prescribing clinician: 8/5/2024   Last telemedicine visit with prescribing clinician: Visit date not found   Next office visit with prescribing clinician: Visit date not found                         Would you like a call back once the refill request has been completed: [] Yes [] No    If the office needs to give you a call back, can they leave a voicemail: [] Yes [] No    Rebecca Madrigal Rep  01/07/25, 13:59 EST

## 2025-01-09 ENCOUNTER — TELEPHONE (OUTPATIENT)
Dept: GASTROENTEROLOGY | Facility: CLINIC | Age: 75
End: 2025-01-09
Payer: MEDICARE

## 2025-01-09 ENCOUNTER — OFFICE VISIT (OUTPATIENT)
Dept: GASTROENTEROLOGY | Facility: CLINIC | Age: 75
End: 2025-01-09
Payer: MEDICARE

## 2025-01-09 VITALS
HEART RATE: 76 BPM | BODY MASS INDEX: 30.97 KG/M2 | WEIGHT: 174.8 LBS | TEMPERATURE: 96.8 F | SYSTOLIC BLOOD PRESSURE: 162 MMHG | HEIGHT: 63 IN | DIASTOLIC BLOOD PRESSURE: 84 MMHG

## 2025-01-09 DIAGNOSIS — Z86.19 HISTORY OF HELICOBACTER PYLORI INFECTION: ICD-10-CM

## 2025-01-09 DIAGNOSIS — R07.89 CHEST PAIN, ATYPICAL: Primary | ICD-10-CM

## 2025-01-09 RX ORDER — BISOPROLOL FUMARATE 5 MG/1
5 TABLET, FILM COATED ORAL DAILY
COMMUNITY
Start: 2025-01-02 | End: 2025-02-01

## 2025-01-09 NOTE — PROGRESS NOTES
"Chief Complaint  Chest Pain and Heartburn    Subjective          History Of Present Illness:    Sari Self is a  74 y.o. female patient of Dr. Reza who presents as a follow up for IBS and GERD. She does have history of  H. Pylori gastritis.     Patient has been having some chest pain that was constant. It feels like a pressure and is in the substernal region. Patient did go to the ER on 12/24/24 and underwent EKG, troponin's, CXR, CTA all of which were normal. She was given a muscle relaxer which did help. She started having chest pains again and did try a higher dose of omeprazole briefly which did not show noticeable improvement.  Patient's cardiologist advised her to discuss her chest pain with us.  She did have a recent diagnosis of breast cancer and has undergone radiation therapy. No nausea, vomiting, dysphagia, odynophagia.  She does not feel like this is her typical reflux.  She denies any abnormal appetite or weight loss.    Patient reports her IBS is somewhat improved. She is using a daily probiotic. Currently using Chanyouji. Patient reports intermittent diarrhea. No black or bloody stools.     Last round of radiation was in July for her breast cancer.     Additional data reviewed:  Colonoscopy 8/15/2023 -normal TI, nonbleeding internal hemorrhoids, two 2 to 3 mm polyps in the sigmoid colon and cecum recall 5 years.  EGD 2/27/2018 -normal esophagus, stomach, duodenum.    Objective   Vital Signs:   /84   Pulse 76   Temp 96.8 °F (36 °C)   Ht 160 cm (63\")   Wt 79.3 kg (174 lb 12.8 oz)   BMI 30.96 kg/m²       Physical Exam  Vitals reviewed.   Constitutional:       General: She is not in acute distress.     Appearance: Normal appearance. She is not ill-appearing.   HENT:      Head: Normocephalic and atraumatic.      Nose: Nose normal.      Mouth/Throat:      Pharynx: Oropharynx is clear.   Eyes:      Conjunctiva/sclera: Conjunctivae normal.   Pulmonary:      Effort: Pulmonary " effort is normal.   Abdominal:      General: There is no distension.      Palpations: Abdomen is soft. There is no mass.      Tenderness: There is no abdominal tenderness.   Musculoskeletal:         General: No swelling. Normal range of motion.      Cervical back: Normal range of motion.   Skin:     General: Skin is warm and dry.      Findings: No bruising or rash.   Neurological:      General: No focal deficit present.      Mental Status: She is alert and oriented to person, place, and time.      Motor: No weakness.      Gait: Gait normal.   Psychiatric:         Mood and Affect: Mood normal.          Result Review :   The following data was reviewed by: Margy Eden PA-C on 01/09/2025:  CMP          8/5/2024    10:24 10/16/2024    10:07 12/24/2024    10:24   CMP   Glucose 96  106  121    BUN 20  19  12    Creatinine 0.84  1.02  1.00    EGFR  57.5  58.9    Sodium 138  138  136    Potassium 4.5  4.7  3.8    Chloride 107  103  106    Calcium 9.6  9.3  8.8    Total Protein 6.7      Total Protein  7.3  6.8    Albumin 4.1  4.1  4.0    Globulin 2.6      Globulin  3.2  2.8    Total Bilirubin 0.4  0.5  0.3    Alkaline Phosphatase 101  66  70    AST (SGOT) 14  14  14    ALT (SGPT) 12  7  11    Albumin/Globulin Ratio  1.3  1.4    BUN/Creatinine Ratio 23.8  18.6  12.0    Anion Gap  9.9  8.9      CBC          5/29/2024    14:38 10/16/2024    10:07 12/24/2024    10:24   CBC   WBC 9.93  6.61  7.04    RBC 4.59  4.39  4.24    Hemoglobin 13.3  12.9  12.9    Hematocrit 40.2  40.9  38.7    MCV 87.6  93.2  91.3    MCH 29.0  29.4  30.4    MCHC 33.1  31.5  33.3    RDW 12.9  12.7  12.2    Platelets 340  266  292        Assessment and Plan    Diagnoses and all orders for this visit:    1. Chest pain, atypical (Primary)  -     Case Request; Standing  -     Implement Anesthesia orders day of procedure.; Standing  -     Follow Anesthesia Guidelines / Protocol; Future  -     Case Request  -     H. Pylori Antigen, Stool - Stool, Per  Rectum    2. History of Helicobacter pylori infection  Overview:  Formatting of this note might be different from the original.  2/27/18-intestinal biopsy showed  1: DUODENUM, BIOPSY:   FRAGMENTS OF DUODENAL MUCOSA WITH PRESERVED VILLOUS ARCHITECTURE AND NO          SIGNIFICANT PATHOLOGIC CHANGE.   NO HISTOLOGIC EVIDENCE OF CELIAC SPRUE.    2: STOMACH, BIOPSY:  FRAGMENTS OF GASTRIC MUCOSA WITH MODERATE CHRONIC ACTIVE GASTRITIS.  SPECIAL STAIN (DIFF-QUIK) IS POSITIVE FOR HELICOBACTER PYLORI.    4/12/2018- h pylori test NEG-[facility] records    Orders:  -     Case Request; Standing  -     Implement Anesthesia orders day of procedure.; Standing  -     Follow Anesthesia Guidelines / Protocol; Future  -     Case Request  -     H. Pylori Antigen, Stool - Stool, Per Rectum       Recommend proceeding with updated EGD for further evaluation of atypical chest pain especially with patient's history of H. pylori and recent radiation therapy for her breast cancer.  Would recommend checking an H. pylori stool antigen today.  If positive for H. Pylori will treat before endoscopy.  Continue omeprazole 40 mg daily  Further recommendations to be made based on the findings of her EGD and stool studies.    Follow Up   Return for EGD.    Dragon dictation used throughout this note.             Margy Fuentes PA-C   [facility]  [address] [place] [address]  Office: [phone]

## 2025-01-09 NOTE — PROGRESS NOTES
Chief Complaint  No chief complaint on file.    Subjective     {Problem List  Visit Diagnosis   Encounters  Notes  Medications  Labs  Result Review Imaging  Media :23}     History Of Present Illness:    Sari Self is a  74 y.o. female presents for              Objective   Vital Signs:   There were no vitals taken for this visit.      Physical Exam     Result Review :{Labs  Result Review  Imaging  Med Tab  Media  Procedures :23}   {The following data was reviewed by (Optional):14181}  {Ambulatory Labs (Optional):83264}         Assessment and Plan {CC Problem List  Visit Diagnosis   ROS  Review (Popup)  [facility]  Quality  BestPractice  Medications  SmartSets  SnapShot Encounters  Media :23}   There are no diagnoses linked to this encounter.            Follow Up {Instructions Charge Capture  Follow-up Communications :23}  No follow-ups on file.    Dragon dictation used throughout this note.     {VENUS CoPilot Provider Statement:34688}            Margy Fuentes PA-C   [facility]  [address] [place] [address]  Office: [phone]

## 2025-01-21 ENCOUNTER — APPOINTMENT (OUTPATIENT)
Dept: WOMENS IMAGING | Facility: HOSPITAL | Age: 75
End: 2025-01-21
Payer: MEDICARE

## 2025-01-21 PROCEDURE — 77063 BREAST TOMOSYNTHESIS BI: CPT | Performed by: RADIOLOGY

## 2025-01-21 PROCEDURE — 77067 SCR MAMMO BI INCL CAD: CPT | Performed by: RADIOLOGY

## 2025-01-31 ENCOUNTER — OFFICE VISIT (OUTPATIENT)
Dept: SURGERY | Facility: CLINIC | Age: 75
End: 2025-01-31
Payer: MEDICARE

## 2025-01-31 VITALS
SYSTOLIC BLOOD PRESSURE: 132 MMHG | HEIGHT: 63 IN | DIASTOLIC BLOOD PRESSURE: 74 MMHG | HEART RATE: 68 BPM | WEIGHT: 177.2 LBS | OXYGEN SATURATION: 97 % | BODY MASS INDEX: 31.4 KG/M2

## 2025-01-31 DIAGNOSIS — E66.09 CLASS 1 OBESITY DUE TO EXCESS CALORIES WITH SERIOUS COMORBIDITY AND BODY MASS INDEX (BMI) OF 31.0 TO 31.9 IN ADULT: ICD-10-CM

## 2025-01-31 DIAGNOSIS — Z17.0 MALIGNANT NEOPLASM OF LOWER-OUTER QUADRANT OF RIGHT BREAST OF FEMALE, ESTROGEN RECEPTOR POSITIVE: Primary | ICD-10-CM

## 2025-01-31 DIAGNOSIS — Z86.79 HISTORY OF CEREBRAL ANEURYSM: ICD-10-CM

## 2025-01-31 DIAGNOSIS — N63.0 BREAST MASS IN FEMALE: ICD-10-CM

## 2025-01-31 DIAGNOSIS — Z92.29 HISTORY OF HORMONE REPLACEMENT THERAPY: ICD-10-CM

## 2025-01-31 DIAGNOSIS — E66.811 CLASS 1 OBESITY DUE TO EXCESS CALORIES WITH SERIOUS COMORBIDITY AND BODY MASS INDEX (BMI) OF 31.0 TO 31.9 IN ADULT: ICD-10-CM

## 2025-01-31 DIAGNOSIS — Z98.82 HISTORY OF BREAST AUGMENTATION: ICD-10-CM

## 2025-01-31 DIAGNOSIS — C50.511 MALIGNANT NEOPLASM OF LOWER-OUTER QUADRANT OF RIGHT BREAST OF FEMALE, ESTROGEN RECEPTOR POSITIVE: Primary | ICD-10-CM

## 2025-01-31 DIAGNOSIS — N64.1 BREAST, FAT NECROSIS: ICD-10-CM

## 2025-01-31 NOTE — PROGRESS NOTES
Chief Complaint: Sari Self is a 74 y.o. female who was seen in consultation at the request of No ref. provider found  for newly diagnosed DCIS and a postoperative visit    History of Present Illness:  Patient presents with newly diagnosed DCIS. She noted no new masses, skin changes, nipple discharge, nipple changes prior to her most recent imaging.  Her most recent imaging includes the followin2021, Bilateral Diagnostic MMG w/Satya ([facility])  Scattered areas of fibroglandular density.  There are bilateral pre-pectoral saline implants. Follow up oval mass right breast, 8:30 o'clock. This completes two years of follow-up.  There are distorted implants seen in both breasts.  Right Breast Ultrasound  Finding 1: 8:30 right breast consistent with an intramammary lymph node.  BI-RADS 2: Benign Finding    2022, Bilateral Screening MMG w/Satya ([facility])  Scattered areas of fibroglandular density.  There are bilateral pre-pectoral saline implants.  BI-RADS 1: Negative    2024, Bilateral Screening MMG w/Satya ([facility])  Scattered areas of fibroglandular density.  There are bilateral pre-pectoral saline implants.  There is an asymmetry measuring 7 mm seen in the CC view only seen in the middle one third of the right breast in the central region and in the sub areolar region.  BI-RADS 0    2024, RIGHT Diagnostic MMG w/Satya ([facility])  Scattered areas of fibroglandular density.  There are bilateral pre-pectoral saline implants.  Additional evaluation was performed for the asymmetry in the right breast, central small focal asymmetry measuring 4 mm middle one third 6:30 o'clock region of the right breast located 5 cm from the nipple. This is new since prior exam(s).   Right Breast Ultrasound  There is no sonographic correlate.  IMPRESSION:  BI-RADS 4     She then had the following biopsy:    24  [facility] STEREOTACTIC BREAST BIOPSY   Right breast 6:30 11 core needle biopsy 9 gauge a top hat shaped s-leonardo mark  biopsy clip was deployed. Clip in the expected location. Pathology is malignant and concordant.       03/04/2024, Stereotactic Biopsy ([facility])  Right breast 6:30, 11 core needle biopsy, 9-gauge.  Top hat shaped s-leonardo eviva biopsy clip was deployed, clip in the expected location. Pathology is malignant and concordant.    03/04/2024, Surgical Pathology Report ([facility])  1.  Right breast at 6:30 o'clock, (focal asymmetry with calcification), stereotactic core biopsy:               A.  Ductal carcinoma in situ (DCIS):                            1.  Low-grade cribriform and focal solid DCIS.                            2.  Scattered foci of DCIS span at least 3 mm. In single greatest dimension.                            3.  Focal microcalcification present within DCIS.               B.  No invasive carcinoma identified.    ER, 91-10%  NC, %  KI-67 13%     I then arranged for a MRI  03/15/2024, MRI Breast Bilateral  Middle third right breast 6 o'clock 4.5 cm from the nipple there is a post biopsy cavity that measures on the order of 2.2 cm. This represents the biopsy proven site of malignancy with the internal top hat shaped metallic clip.  4 cm posterior to the biopsy cavity non mass enhancement that measures 1.3 cm. No mammographic correlate. 5 mm inferior to the inferior margin of a retro glandular saline implant. No evidence for right axillary or internal chain adenopathy. Intact bilateral retro glandular saline implants are noted.  IMPRESSION  At the 6 o'clock position on the order of 4 cm posterior to the biopsy proven malignancy there is a 1.3 cm area of suspicious non mass enhancement. No mammographic correlate is appreciated.   Further evaluation with an MRI guided right breast biopsy and/or surgical excision of the tissue up to 4 cm posterior to the top hat shaped metallic clip is recommended.  No findings suspicious for malignancy in the left breast.     I then arranged for MRi guided biopsy:    4/11/24 [facility] MRI  BREAST BIOPSY  JEANETTE SELF   A 9 gauge, 9 core samples. Infinity clip was deployed at the biopsy site. Infinity clip at the expected location. Only visualized on nondisplaced implant views. The infinity clip and the TOPHAT clip at the site of biopsy proven right breast malignancy are  by approximately 5 cm. Pathology is malignancy and concordant.     4/9/24 [facility] PATHOLOGY   Breast, right 6 o'clock position, MRI guided core biopsy:  A.  Ductal carcinoma in situ (DCIS), low to intermediate nuclear grade with apocrine features, cribriform and solid architecture, measuring up to 6 mm maximally and involving up to 7 core fragments.  ER POSITIVE %  NH POSITIVE %  KI-67 17%      She had not had a breast biopsy in the past.  She has had her uterus and ovaries removed, is postmenopausal, and takes no hormones. She took HRt for 10-15 years remotely  Her family history includes the following: no Fh breast or ovarian cancer      5-8-24 Pathology from RIGHT lumpectomy and removal of implant and sentinel node biopsy folowed by oncoplastic lift bilateral Dr Brambila returned as :  Focal invasive mammary carcinoma, no special type intermediate grade, 3, 2, 1, 1.2 mm, no lymphovascular space invasion.  DCIS present spanning an area measuring 4 x 2.8 cm, low to intermediate grade, cribriform, papillary, micropapillary with comedonecrosis.  Margins are clear on the invasive carcinoma and the DCIS.  Pathologic stage T1a NX since no lymph nodes were sampled.  Estrogen estrogen , progesterone , HER2/miguel is 1+ negative.  Ki-67 14%.       She denies redness, warmth, drainage at incisions.  Denies significant discomfort      Interval History:  In the interim,  Jeanette Self  has done well.  Dr. Dougherty gave her whole breast radiation with no boost in 16 fractions, completing this July 30, 2024.  She saw Dr. Ortega and has been on 20 mg of tamoxifen.  She had some hot flashes but has continued on the 20 mg  dose and is now tolerating this better.    She has noted no changes in her breast exam. No new masses, skin changes, nipple changes, nipple discharge either breast.   She denies headache, bone pain, belly pain, cough, changes in vision or gait.  Her most recent imaging includes the following:  [facility] with [facility]osynthesis.  There are scattered areas of fibroglandular density.  Postsurgical scars in both breast.  Bilateral implant BX plantation noted.  Areas of fat necrosis in the subareolar right breast.  No suspicious findings BI-RADS 2      Review of Systems:  Review of Systems   Constitutional:  Positive for unexpected weight change (#6 LB WT GAIN).   Gastrointestinal:  Diarrhea: IBS.   Musculoskeletal:         LIMITED ROM RT SHOULDER   All other systems reviewed and are negative.       Past Medical and Surgical History:  Breast Biopsy History:  Patient had not had a breast biopsy prior to her cancer diagnosis.  Breast Cancer HIstory:  Patient does not have a past medical history of breast cancer.  Breast Operations, and year:  NONE   Obstetric/Gynecologic History:  Age menstrual periods began: 13  Patient is postmenopausal due to removal of her uterus in the following year:1985   Number of pregnancies:6  Number of live births: 2  Number of abortions or miscarriages: 1  Age of delivery of first child: 18  Patient breast fed, for the following lenth of time: 60 MONTHS   Length of time taking birth control pills: SHORT TIME   Patient took hormone replacement during the following dates: 15-20 YRS     Past Surgical History:   Procedure Laterality Date    BREAST LUMPECTOMY Right 05/08/2024    Procedure: RIGHT breast bracketed needle localized lumpectomy, oncplastics,  implant removal.;  Surgeon: Jennifer Kelly MD;  Location: [facility];  Service: General;  Laterality: Right;    BREAST SURGERY Bilateral 05/08/2024    Procedure: BILATERAL IMPLANT  REMOVAL WITH CAPSULECTOMY, RIGHT ONCOPLASTIC CLOSURE LEFT BREAST REDUCTION FOR SYMMETRY, USE OF SPY;  Surgeon: Lisa Brambila MD;  Location: [facility] MAIN OR;  Service: Plastics;  Laterality: Bilateral;    CARDIAC CATHETERIZATION      CATARACT EXTRACTION, BILATERAL  03/2020    CHOLECYSTECTOMY      COLONOSCOPY  11/10/2010    prep of colon fair,IH,stool in transverse colon,hepatic flexure and ascending colon,ileum normal,IBS    COLONOSCOPY N/A 02/27/2018    IH, diverticulosis, one 5mm polyp, tubular adenoma with low grade dysplasia    COLONOSCOPY N/A 08/15/2023    Procedure: COLONOSCOPY into cecum/terminal ileum with polypectomy;  Surgeon: Finesse Reza MD;  Location: [facility] ENDOSCOPY;  Service: Gastroenterology;  Laterality: N/A;  polyps    CYSTOSCOPY      ENDOSCOPY  11/27/2012    grd 1 reflux egitis,web upperd 3rd esoph    ENDOSCOPY N/A 02/27/2018    normal biopsies, H Pylori positive    FOOT NEUROMA SURGERY Left     HAND SURGERY      HYSTERECTOMY  1985    IR CEREBRAL ANEURYSM COILING  2012    JOINT REPLACEMENT Left     Partial left knee replacement    KNEE ARTHROPLASTY UNICOMPARTMENTAL Left 03/18/2022    Procedure: KNEE ARTHROPLASTY UNICOMPARTMENTAL;  Surgeon: Everton Zuniga MD;  Location: [facility] MAIN OR;  Service: Orthopedics;  Laterality: Left;    KNEE ARTHROSCOPY Left 07/03/2019    Procedure: KNEE ARTHROSCOPY ARTHRITIS DEBRIDEMENT PARTIAL MEDIAL AND LATERAL MENISECTOMY;  Surgeon: Nuris Bender MD;  Location: [facility] OR [facility];  Service: Orthopedics    KNEE ARTHROSCOPY Right 09/28/2020    Procedure: RIGHT KNEE ARTHROSCOPY, PARTIAL MEDIAL AND LATERAL MENISCECTOMIES, DEBRIDEMENT OF ARTHRITIS, AND INTERNAL FIXATION TIBAL PLATEAU INSUFFICIENCY FRACTURE;  Surgeon: Nuris Bender MD;  Location: [facility] OR [facility];  Service: Orthopedics;  Laterality: Right;    KNEE SURGERY  2019 & 2020    TENSION FREE VAGINAL TAPING WITH MINI ARC SLING      THUMB ARTHROSCOPY      TOTAL ABDOMINAL HYSTERECTOMY WITH SALPINGO  OOPHORECTOMY      TRIGGER POINT INJECTION      Hand, elbow, shoulders, knees    UPPER GASTROINTESTINAL ENDOSCOPY  02/27/2018    WRIST SURGERY       Past Medical History:   Diagnosis Date    Allergic     Ankle sprain 10/23    Arthritis     Breast cancer, right 2024    Cerebral aneurysm     had coiling    Cholelithiasis 2016    Gallbladder removed    Colon polyp     COVID-19 2020    Diarrhea 2024    Disease of thyroid gland     Dislocation, shoulder 1968    Dislocated several times    Diverticulosis     GERD (gastroesophageal reflux disease)     H/O cerebral aneurysm repair     History of 2019 novel coronavirus disease (COVID-19) 08/2020 AUGUST 2020 STATES VERY MILD SYMTOMS    Hyperlipidemia     Hypertension     Hyperthyroidism 2012?    Hypothyroidism     IBS (irritable bowel syndrome)     Knee swelling     Low back pain     OAB (overactive bladder)     Osteoporosis     Right knee pain     RLS (restless legs syndrome)     Rotator cuff syndrome 1968    Tears    Stress fracture of right tibia     Stroke     Tear of meniscus of knee 2019 & 2020    Surgery both knees    TIA (transient ischemic attack)     2011    Torn meniscus     RIGHT KNEE    Urinary tract infection 06/01/2018       Prior Hospitalizations, other than for surgery or childbirth, and year:  CEREBRAL ANEURYSM 2011    Social History     Socioeconomic History    Marital status:     Number of children: 5   Tobacco Use    Smoking status: Never    Smokeless tobacco: Never   Vaping Use    Vaping status: Never Used   Substance and Sexual Activity    Alcohol use: Not Currently     Alcohol/week: 1.0 standard drink of alcohol     Types: 1 Glasses of wine per week     Comment: Occasionally    Drug use: Never    Sexual activity: Not Currently     Partners: Male     Birth control/protection: Hysterectomy     Patient is .  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    Family History:  Family History   Problem Relation Age of Onset    Cancer Sister 60        CLL    Irritable bowel syndrome Daughter     Malig Hyperthermia Neg Hx        Vital Signs:  /74 (BP Location: Left arm, Patient Position: Sitting, Cuff Size: Adult)   Pulse 68   Ht 160 cm (63\")   Wt 80.4 kg (177 lb 3.2 oz)   LMP  (LMP Unknown)   SpO2 97%   BMI 31.39 kg/m²      Medications:    Current Outpatient Medications:     atorvastatin (LIPITOR) 20 MG tablet, TAKE 1 TABLET EVERY NIGHT, Disp: 90 tablet, Rfl: 1    bisoprolol (ZEBeta) 5 MG tablet, Take 1 tablet by mouth Daily., Disp: , Rfl:     EQ Allergy Relief, Cetirizine, 10 MG tablet, Take 1 tablet by mouth once daily, Disp: 90 tablet, Rfl: 0    gabapentin (NEURONTIN) 600 MG tablet, Take 1 tablet by mouth 4 (Four) Times a Day., Disp: , Rfl:     hydroCHLOROthiazide 12.5 MG tablet, TAKE 1 TABLET DAILY, Disp: 90 tablet, Rfl: 1    levothyroxine (SYNTHROID, LEVOTHROID) 75 MCG tablet, TAKE 1 TABLET EVERY MORNING, Disp: 90 tablet, Rfl: 1    omeprazole (priLOSEC) 40 MG capsule, Take 1 capsule by mouth Daily., Disp: 90 capsule, Rfl: 2    tamoxifen (NOLVADEX) 20 MG chemo tablet, Take 1 tablet by mouth Daily for 270 days., Disp: 90 tablet, Rfl: 2  No current facility-administered medications for this visit.    Facility-Administered Medications Ordered in Other Visits:     Chlorhexidine Gluconate Cloth 2 % pads, , Apply externally, BID, Everton Zuniga MD     Allergies:  Allergies   Allergen Reactions    Penicillins Hives     ..Beta lactam allergy details  Antibiotic reaction: hives  Age at reaction: adult  Dose to reaction time: unknown  Reason for antibiotic:  (uti)  Epinephrine required for reaction?: no       Bee Venom Swelling       Physical Examination:  /74 (BP Location: Left arm, Patient Position: Sitting, Cuff Size: Adult)   Pulse 68   Ht 160 cm (63\")   Wt 80.4 kg (177 lb 3.2 oz)   LMP  (LMP Unknown)   SpO2 97%   BMI 31.39 kg/m²   General " Appearance:  Patient is in no distress.  She is well kept and has an overweight build.   Psychiatric:  Patient with appropriate mood and affect. Alert and oriented to self, time, and place.    Breast, RIGHT:  medium sized, symmetric with the contralateral side.Well healing reduction pattern incisions from Dr Brambila oncoplastic pexjayme. No erythema, warmth, drainage.  Breast skin is without erythema, edema, rashes.  There are no visible abnormalities upon inspection during the arm-raising maneuver or with hands on hips in the sitting position. There is no nipple retraction, discharge or nipple/areolar skin changes. There eis palpable fat necrosis centered at the RIGHT 8:00 RA and inner ring locations, as seen on mammogram. Some associated tenderness.There are no other  masses palpable in the sitting or supine positions.     Breast, LEFT: medium sized, symmetric with the contralateral side. Well healing reduction pattern incisions from Dr Kosta montesinoslastic pexjayme. No erythema, warmth, drainage. Breast skin is without erythema, edema, rashes.  There are no visible abnormalities upon inspection during the arm-raising maneuver or with hands on hips in the sitting position. There is no nipple retraction, discharge or nipple/areolar skin changes.There are no masses palpable in the sitting or supine positions.    Lymphatic:  There is no axillary, cervical, infraclavicular, or supraclavicular adenopathy bilaterally.  Eyes:  Pupils are round and reactive to light.  Cardiovascular:  Heart rate and rhythm are regular.  Respiratory:  Lungs are clear bilaterally with no crackles or wheezes in any lung field.  Gastrointestinal:  Abdomen is soft, nondistended, and nontender.     Musculoskeletal:  Good strength in all 4 extremities.   There is good range of motion in both shoulders.    Skin:  No new skin lesions or rashes on the skin excluding the breast (see breast exam above).        Imagin2018, Bilateral Screening MMG  w/Satya ([facility])  There are scattered areas of fibroglandular density.  There are bilateral pre-pectoral saline implants.  Finding 1: New equal density, oval mass measuring 9 mm posterior one third inferior region of the right breast.  IMPRESSION:  BI-RADS 0    01/07/2019, Right Diagnostic MMG w/Satya ([facility])  Scattered areas of fibroglandular density.  Additional evaluation was performed for the oval mass in the right breast, inferior posterior one-third 8:30 o'clock region of the right breast located 9 cm from the nipple.  Right Breast Ultrasound  Ultrasound is suggestive of an oval intramammary lymph node with well defined, thin margins measuring 9 x 3 x 7 x mm. This corresponds to the mammographic finding.  IMPRESSION:  BI-RADS 3    07/19/2019, Right Diagnostic MMG w/Satya ([facility])  Scattered areas of fibroglandular density.  Follow-up oval mass in the right breast, 8:30 o'clock. There has been no significant change.  Right Breast Ultrasound:  A small stable intramammary lymph node project at 9:00, 10 cmfn.  BI-RADS 3    01/23/2020, Bilateral Diagnostic MMG w/Satya ([facility])  Scattered areas of fibroglandular density. Follow-up oval mass in the right breast, 8:30 o'clock. Bilateral pre pectoral saline implants with capsule calcifications are unchanged.  Right Breast Ultrasound  Demonstrates an oval elongated mass 9:00 10 cm from the nipple measuring 8 x 3 x 4 mm. this is unchanged. A follow-up mammogram and ultrasound in 6 months is recommended.  BI-RADS 3    09/22/2020, Right Breast Digital Diagnostic MMG w/Satya ([facility])  Scattered areas of fibroglandular density.  There are bilateral pre-pectoral saline implants.  Finding 1: Follow up oval mass right breast, 8:30 o'clock.  Finding 2: There is a distorted implant seen in the right breast an area of calcification associated with the right implant.  Right Breast Ultrasound  Finding 1: An oval elongated mass 4 x 2 x 4 mm posterior one third 8:30 right breast, mass has decreased in  size.  BI-RADS 3    03/31/2021, Bilateral Diagnostic MMG w/Satya ([facility])  Scattered areas of fibroglandular density.  There are bilateral pre-pectoral saline implants. Follow up oval mass right breast, 8:30 o'clock. This completes two years of follow-up.  There are distorted implants seen in both breasts.  Right Breast Ultrasound  Finding 1: 8:30 right breast consistent with an intramammary lymph node.  BI-RADS 2: Benign Finding    05/11/2022, Bilateral Screening MMG w/Satya ([facility])  Scattered areas of fibroglandular density.  There are bilateral pre-pectoral saline implants.  BI-RADS 1: Negative    01/18/2024, Bilateral Screening MMG w/Satya ([facility])  Scattered areas of fibroglandular density.  There are bilateral pre-pectoral saline implants.  There is an asymmetry measuring 7 mm seen in the CC view only seen in the middle one third of the right breast in the central region and in the sub areolar region.  BI-RADS 0    02/05/2024, RIGHT Diagnostic MMG w/Satya ([facility])  Scattered areas of fibroglandular density.  There are bilateral pre-pectoral saline implants.  Additional evaluation was performed for the asymmetry in the right breast, central small focal asymmetry measuring 4 mm middle one third 6:30 o'clock region of the right breast located 5 cm from the nipple. This is new since prior exam(s).   Right Breast Ultrasound  There is no sonographic correlate.  IMPRESSION:  BI-RADS 4    03/04/24  [facility] STEREOTACTIC BREAST BIOPSY   Right breast 6:30 11 core needle biopsy 9 gauge a top hat shaped s-leonardo eviva biopsy clip was deployed. Clip in the expected location. Pathology is malignant and concordant.     03/15/2024, MRI Breast Bilateral  Middle third right breast 6 o'clock 4.5 cm from the nipple there is a post biopsy cavity that measures on the order of 2.2 cm. This represents the biopsy proven site of malignancy with the internal top hat shaped metallic clip.  4 cm posterior to the biopsy cavity non mass enhancement that measures  1.3 cm. No mammographic correlate. 5 mm inferior to the inferior margin of a retro glandular saline implant. No evidence for right axillary or internal chain adenopathy. Intact bilateral retro glandular saline implants are noted.  IMPRESSION  At the 6 o'clock position on the order of 4 cm posterior to the biopsy proven malignancy there is a 1.3 cm area of suspicious non mass enhancement. No mammographic correlate is appreciated.   Further evaluation with an MRI guided right breast biopsy and/or surgical excision of the tissue up to 4 cm posterior to the top hat shaped metallic clip is recommended.  No findings suspicious for malignancy in the left breast.      [facility] with tomosynthesis.  There are scattered areas of fibroglandular density.  Postsurgical scars in both breast.  Bilateral implant BX plantation noted.  Areas of fat necrosis in the subareolar right breast.  No suspicious findings BI-RADS 2    Pathology:  03/04/2024, Stereotactic Biopsy ([facility])  Right breast 6:30, 11 core needle biopsy, 9-gauge.  Top hat shaped s-leonardo eviva biopsy clip was deployed, clip in the expected location. Pathology is malignant and concordant.    03/04/2024, Surgical Pathology Report ([facility])  1.  Right breast at 6:30 o'clock, (focal asymmetry with calcification), stereotactic core biopsy:               A.  Ductal carcinoma in situ (DCIS):                            1.  Low-grade cribriform and focal solid DCIS.                            2.  Scattered foci of DCIS span at least 3 mm. In single greatest dimension.                            3.  Focal microcalcification present within DCIS.               B.  No invasive carcinoma identified.    ER, 91-10%  NE, %  KI-67 13%    4/11/24 [facility] MRI BREAST BIOPSY  JEANETTE OLSON   A 9 gauge, 9 core samples. Infinity clip was deployed at the biopsy site. Infinity clip at the expected location. Only visualized on nondisplaced  implant views. The infinity clip and the TOPHAT clip at the site of biopsy proven right breast malignancy are  by approximately 5 cm. Pathology is malignancy and concordant.     4/9/24 [facility] PATHOLOGY   Breast, right 6 o'clock position, MRI guided core biopsy:  A.  Ductal carcinoma in situ (DCIS), low to intermediate nuclear grade with apocrine features, cribriform and solid architecture, measuring up to 6 mm maximally and involving up to 7 core fragments.  ER POSITIVE %  OK POSITIVE %  KI-67 17%      5-8-24 Pathology from RIGHT lumpectomy and removal of implant and sentinel node biopsy returned as :  Focal invasive mammary carcinoma, no special type intermediate grade, 3, 2, 1, 1.2 mm, no lymphovascular space invasion.  DCIS present spanning an area measuring 4 x 2.8 cm, low to intermediate grade, cribriform, papillary, micropapillary with comedonecrosis.  Margins are clear on the invasive carcinoma and the DCIS.  Pathologic stage T1a NX since no lymph nodes were sampled.  Estrogen estrogen , progesterone , HER2/miguel is 1+ negative.  Ki-67 14%.     Diagnosis is RIGHT LOQ cancer.  I will call and let her know and arrange for her to see medical oncology and radiation oncology.  Will not send Oncotype due to the small size               Final Diagnosis   1.  Right breast, needle localization oriented lumpectomy (with implant):               A.  Focal invasive ductal carcinoma (near Infinity clip):                            1.  Overall Zackery grade II (tubular score = 3, nuclear score = 2, mitotic score = 1)                            2.  Invasive carcinoma measures 1.2 mm maximally.  3.  No lymphovascular space invasion identified.               B.  Ductal carcinoma in situ (DCIS):                            1.  DCIS spans an area estimated at 40 mm x 28 mm x 10 mm.                            2.  Low to intermediate grade cribriform, papillary and micropapillary DCIS with                                    comedo type necrosis.                        3. Microcalcification present in DCIS.                  C.  No lobular neoplasia (LCIS, ALH) identified.               D.  All margins are negative for invasive carcinoma.  Invasive carcinoma measures 20 mm. from                      the closest (anterior) margin. Invasive carcinoma measures 35 mm or greater from all other                      surgical margins.     E   All margins are negative for DCIS                        DCIS measures 10 mm from the closest (anterior) margin of excision.                          All other margins measure 20 mm or greater from DCIS.                  F. Multiple biopsy site changes (X2) are identified transecting DCIS, and 2 metallic clips retrieved.               G. No Pagetoid involvement of skin by malignancy identified.               H. Associated calcified fibrous implant capsule and grossly intact synthetic implant.               I.  Previous Biomarkers: Estrogen receptors: Positive (%), Progesterone receptors: Positive                       (%),  Ki-67 = 13-17% (see [id number] and [id number]).        2.  Right breast, Additional anterior margin:               A. No atypical hyperplasia, in-situ carcinoma nor invasive carcinoma identified.               B. New margin is negative for malignancy by additional 8 mm     3.  Right breast, Additional superior margin:               A. No atypical hyperplasia, in-situ carcinoma nor invasive carcinoma identified.               B. New margin is negative for malignancy by additional 10 mm     4.  Right breast, Additional lateral margin:               A. No atypical hyperplasia, in-situ carcinoma nor invasive carcinoma identified.               B. New margin is negative for malignancy by additional 10 mm     5.  Right breast, Additional inferior margin:               A. No atypical hyperplasia, in-situ carcinoma nor invasive carcinoma identified.                B. New margin is negative for malignancy by additional 8 mm     6.  Right breast, Additional medial margin:               A. No atypical hyperplasia, in-situ carcinoma nor invasive carcinoma identified.               B. New margin is negative for malignancy by additional 6 mm     7.  Right breast, Additional posterior margin:               A. No atypical hyperplasia, in-situ carcinoma nor invasive carcinoma identified.               B. New margin is negative for malignancy by additional 6 mm     9.  Left breast capsule/tissue and implant:               A.  Fibrous capsule with marked dystrophic calcification.               B.  No atypical hyperplasia, carcinoma in situ nor invasive carcinoma identified.     10.  Additional right breast tissue, plastic repair:   A.  Benign skin and breast tissue (80 g).     See synoptic for tumor details   Electronically signed by Jordan Granados MD on 5/10/2024 at 1410   Synoptic Checklist   INVASIVE CARCINOMA OF THE BREAST: Resection   8th Edition - Protocol posted: 12/13/2023INVASIVE CARCINOMA OF THE BREAST: RESECTION - All Specimens        SPECIMEN   Procedure   Excision (less than total mastectomy)   Specimen Laterality   Right   TUMOR   Tumor Site   Clock position       6 o'clock   Histologic Type   Invasive carcinoma of no special type (ductal)   Histologic Grade (Zackery Histologic Score)       Glandular (Acinar) / Tubular Differentiation   Score 3   Nuclear Pleomorphism   Score 2   Mitotic Rate   Score 1   Overall Grade   Grade 2 (scores of 6 or 7)   Tumor Size   Greatest dimension of largest invasive focus (Millimeters): 1.2 mm   Tumor Focality   Single focus of invasive carcinoma   Ductal Carcinoma In Situ (DCIS)   Present       Negative for extensive intraductal component (EIC)   Size (Extent) of DCIS   Estimated size (extent) of DCIS is at least (Millimeters): 40 mm   Additional Dimension (Millimeters)   28 mm       10 mm   Architectural Patterns   Cribriform        "Micropapillary       Papillary   Nuclear Grade   Grade II (intermediate)   Necrosis   Present, central (expansive \"comedo\" necrosis)   Lobular Carcinoma In Situ (LCIS)   Not identified   Lymphatic and / or Vascular Invasion   Not identified   Dermal Lymphatic and / or Vascular Invasion   Not identified   Microcalcifications   Present in DCIS   Treatment Effect in the Breast   No known presurgical therapy   MARGINS   Margin Status for Invasive Carcinoma   All margins negative for invasive carcinoma   Distance from Invasive Carcinoma to Closest Margin   28 mm   Closest Margin(s) to Invasive Carcinoma   Anterior   Margin Status for DCIS   All margins negative for DCIS   Distance from DCIS to Closest Margin   18 mm   Closest Margin(s) to DCIS   Anterior   REGIONAL LYMPH NODES   Regional Lymph Node Status   Not applicable (no regional lymph nodes submitted or found)   pTNM CLASSIFICATION (AJCC 8th Edition)   Reporting of pT, pN, and (when applicable) pM categories is based on information available to the pathologist at the time the report is issued. As per the AJCC (Chapter 1, 8th Ed.) it is the managing physician’s responsibility to establish the final pathologic stage based upon all pertinent information, including but potentially not limited to this pathology report.   pT Category   pT1a   pN Category   pN not assigned (no nodes submitted or found)   SPECIAL STUDIES           Estrogen Receptor (ER) Status   Positive (greater than 10% of cells demonstrate nuclear positivity)           Progesterone Receptor (PgR) Status   Positive           Ki-67 Percentage of Positive Nuclei   17 %   Testing Performed on Case Number   See synoptic for biomarkers on invasive carcinoma   .   Breast Biomarker Reporting Template   Protocol posted: 12/13/2023BREAST BIOMARKER REPORTING TEMPLATE - All Specimens         Test(s) Performed       Estrogen Receptor (ER) Status   Positive (greater than 10% of cells demonstrate nuclear positivity) "   Percentage of Cells with Nuclear Positivity   %   Average Intensity of Staining   Strong   Test Type   Food and Drug Administration (FDA) cleared (test / vendor): [employer]   Primary Antibody   SP1   Scoring System   Jose Cruz   Proportion Score   5   Intensity Score   3   Total Jose Cruz Score   8   Test(s) Performed       Progesterone Receptor (PgR) Status   Positive   Percentage of Cells with Nuclear Positivity   %   Average Intensity of Staining   Strong   Test Type   Food and Drug Administration (FDA) cleared (test / vendor): [employer]   Primary Antibody   1E2   Scoring System   Jose Cruz   Proportion Score   5   Intensity Score   3   Total Jose Cruz Score   8   Test(s) Performed   Ki-67   Ki-67 Percentage of Positive Nuclei   14 %   Primary Antibody   30-9   Cold Ischemia and Fixation Times   Meet requirements specified in latest version of the ASCO / CAP Guidelines   Cold Ischemia Time (minutes)   41 min   Fixation Time (hours)   8 hours   Testing Performed on Block Number(s)   1N   METHODS   Fixative   Formalin   Image Analysis   Not performed   Comment(s)   Her 2 (IHC) will be reported in an addendum.   .      Comment     Immunostains for CK5/6, myosin, ER, AL and Ki67 are performed on block 1N and utilizing appropriate controls.  The majority of the areas of concern maintain intact myoepithelial layer staining by CK5/6 and SMMHC supporting DCIS.  An incidental focus lacks intact myoepithelial layer and is consistent with a small focus of invasive ductal carcinoma (1.2 mm).  All margins are widely free of DCIS and invasive carcinoma.  Biomarkers performed on the small invasive carcinoma component are reported in synoptic format.  HER2/miguel (IHC) will be performed and reported separately in an addendum.  Representative sections and immunostains were shared in consultation with Era Stone and Maris, who concurred.                 Procedures:      Assessment:   Diagnosis Plan   1. Malignant neoplasm of  lower-outer quadrant of right breast of female, estrogen receptor positive        2. History of hormone replacement therapy        3. Class 1 obesity due to excess calories with serious comorbidity and body mass index (BMI) of 31.0 to 31.9 in adult        4. History of cerebral aneurysm        5. History of breast augmentation        6. Breast, fat necrosis        7. Breast mass in female            1-  RIGHT breast LOQ. 4mm on mammogram middle 1/3 6:30, 5 CFN. Tophat marker in good position  DCIS, low grade, cribiform and solid, 3mm in core, no invasive component  ER , [place] , ki 67 13%    MRi showed additional lesion 5 cm from the index posteriorly- 1.3 cm region. Infinity marker in good position.  DCIS, low to intermediate grade, cribiform and solid  ER , [place] , ki 67 is 17%    The 2 markers are 5 cm apart AP.    Clinical stage TisN0- stage 0    5-8-24 Pathology from RIGHT lumpectomy and removal of implant and sentinel node biopsy folowed by oncoplastic lift bilateral Dr Brambila returned as :  Focal invasive mammary carcinoma, no special type intermediate grade, 3, 2, 1, 1.2 mm, no lymphovascular space invasion.  DCIS present spanning an area measuring 4 x 2.8 cm, low to intermediate grade, cribriform, papillary, micropapillary with comedonecrosis.  Margins are clear on the invasive carcinoma and the DCIS.    Pathologic stage T1a NX since no lymph nodes were sampled. Stage IA.   Estrogen estrogen , progesterone , HER2/miguel is 1+ negative.  Ki-67 14%.    Dr. Dougherty gave her whole breast radiation with no boost in 16 fractions, completing this July 30, 2024.  - Dr. Ortega - 20 mg of tamoxifen.    2-  10-15 years HRT  after menopause- off since  around 2002    3-  BMI 31, with  22# weight gain since , related to life stressors    4-  Past cerebral aneurysm with coiling by Dr Yang- on aspirin for some time, now off blood thinners      5-  Prepectoral saline implants- calcified  and removed at the time of her lumpectomy and lift 2024    6-7  RIGHT retroareolar fat necrosis post surgery and radiation- palpable and seen on imaging    Plan:  The patient goes by Sarah.  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    We reviewed her interval history, treatments, exam and imaging together today.  She is doing well with no evidence of disease and tolerating the tamoxifen 20 mg.  She has a Fu with Dr Ortega in 4-2025.    We reviewed the fat necrosis in the RIGHT breast as an exam finding.  I have not given her a Fu in our office but did give a reminder that her next mammogram is due 1-22-26 at [facility].      Jennifer Kelly MD      Next Appointment:  Return for any future concerns.      Today I spent 20 minutes doing the following: Reviewing records, labs, outside imaging and reports in preparation for the patient visit; obtaining medical history; performing the physical exam; counseling and educating the patient and any available family or caregivers; ordering medications, tests or procedures; coordinating care with any other physicians on her care team as needed, and documenting all of the above in the medical record as well as sending communications with her other healthcare professionals.    EMR Dragon/transcription disclaimer:    Please note that portions of this note were completed with a voice recognition program.

## 2025-02-20 ENCOUNTER — TELEPHONE (OUTPATIENT)
Dept: FAMILY MEDICINE CLINIC | Facility: CLINIC | Age: 75
End: 2025-02-20
Payer: MEDICARE

## 2025-02-20 DIAGNOSIS — R35.0 URINARY FREQUENCY: Primary | ICD-10-CM

## 2025-02-20 NOTE — TELEPHONE ENCOUNTER
IC from patient stating having UTI symptoms and requesting if could get an antibiotic without an appointment. Stated has history of UTI  and knows the symptoms. Informed patient still may have to schedule an appointment but would send a message back. Patient understood.

## 2025-02-21 RX ORDER — NITROFURANTOIN 25; 75 MG/1; MG/1
100 CAPSULE ORAL 2 TIMES DAILY
Qty: 14 CAPSULE | Refills: 0 | Status: SHIPPED | OUTPATIENT
Start: 2025-02-21

## 2025-02-21 NOTE — TELEPHONE ENCOUNTER
Pt is scheduled for to come in and leave urine sample on 2/24/25. No active orders in there for that. Please place if appropriate. Advised tat med was sent over as well.

## 2025-03-07 RX ORDER — TAMOXIFEN CITRATE 10 MG/1
10 TABLET ORAL DAILY
Qty: 90 TABLET | Refills: 0 | Status: SHIPPED | OUTPATIENT
Start: 2025-03-07

## 2025-03-21 ENCOUNTER — HOSPITAL ENCOUNTER (OUTPATIENT)
Facility: HOSPITAL | Age: 75
Setting detail: HOSPITAL OUTPATIENT SURGERY
Discharge: HOME | End: 2025-03-21
Attending: INTERNAL MEDICINE | Admitting: INTERNAL MEDICINE
Payer: MEDICARE

## 2025-03-21 ENCOUNTER — ANESTHESIA EVENT (OUTPATIENT)
Dept: GASTROENTEROLOGY | Facility: HOSPITAL | Age: 75
End: 2025-03-21
Payer: MEDICARE

## 2025-03-21 ENCOUNTER — ANESTHESIA (OUTPATIENT)
Dept: GASTROENTEROLOGY | Facility: HOSPITAL | Age: 75
End: 2025-03-21
Payer: MEDICARE

## 2025-03-21 VITALS
WEIGHT: 173 LBS | HEIGHT: 63 IN | BODY MASS INDEX: 30.65 KG/M2 | SYSTOLIC BLOOD PRESSURE: 128 MMHG | RESPIRATION RATE: 18 BRPM | DIASTOLIC BLOOD PRESSURE: 79 MMHG | OXYGEN SATURATION: 92 % | HEART RATE: 64 BPM

## 2025-03-21 DIAGNOSIS — Z86.19 HISTORY OF HELICOBACTER PYLORI INFECTION: ICD-10-CM

## 2025-03-21 DIAGNOSIS — R07.89 CHEST PAIN, ATYPICAL: ICD-10-CM

## 2025-03-21 PROCEDURE — 25010000002 GLYCOPYRROLATE 0.2 MG/ML SOLUTION: Performed by: NURSE ANESTHETIST, CERTIFIED REGISTERED

## 2025-03-21 PROCEDURE — 25010000002 LIDOCAINE 2% SOLUTION: Performed by: NURSE ANESTHETIST, CERTIFIED REGISTERED

## 2025-03-21 PROCEDURE — 43239 EGD BIOPSY SINGLE/MULTIPLE: CPT | Performed by: INTERNAL MEDICINE

## 2025-03-21 PROCEDURE — 25010000002 PROPOFOL 1000 MG/100ML EMULSION: Performed by: NURSE ANESTHETIST, CERTIFIED REGISTERED

## 2025-03-21 PROCEDURE — S0260 H&P FOR SURGERY: HCPCS | Performed by: INTERNAL MEDICINE

## 2025-03-21 PROCEDURE — 25810000003 LACTATED RINGERS PER 1000 ML: Performed by: INTERNAL MEDICINE

## 2025-03-21 PROCEDURE — 88305 TISSUE EXAM BY PATHOLOGIST: CPT | Performed by: INTERNAL MEDICINE

## 2025-03-21 RX ORDER — GLYCOPYRROLATE 0.2 MG/ML
INJECTION INTRAMUSCULAR; INTRAVENOUS AS NEEDED
Status: DISCONTINUED | OUTPATIENT
Start: 2025-03-21 | End: 2025-03-21 | Stop reason: SURG

## 2025-03-21 RX ORDER — SODIUM CHLORIDE, SODIUM LACTATE, POTASSIUM CHLORIDE, CALCIUM CHLORIDE 600; 310; 30; 20 MG/100ML; MG/100ML; MG/100ML; MG/100ML
30 INJECTION, SOLUTION INTRAVENOUS CONTINUOUS PRN
Status: ACTIVE | OUTPATIENT
Start: 2025-03-21 | End: 2025-03-21

## 2025-03-21 RX ORDER — PROPOFOL 10 MG/ML
INJECTION, EMULSION INTRAVENOUS AS NEEDED
Status: DISCONTINUED | OUTPATIENT
Start: 2025-03-21 | End: 2025-03-21 | Stop reason: SURG

## 2025-03-21 RX ORDER — SODIUM CHLORIDE 9 MG/ML
40 INJECTION, SOLUTION INTRAVENOUS AS NEEDED
Status: DISCONTINUED | OUTPATIENT
Start: 2025-03-21 | End: 2025-03-24 | Stop reason: HOSPADM

## 2025-03-21 RX ORDER — SODIUM CHLORIDE 0.9 % (FLUSH) 0.9 %
10 SYRINGE (ML) INJECTION EVERY 12 HOURS SCHEDULED
Status: DISCONTINUED | OUTPATIENT
Start: 2025-03-21 | End: 2025-03-24 | Stop reason: HOSPADM

## 2025-03-21 RX ORDER — SODIUM CHLORIDE 0.9 % (FLUSH) 0.9 %
10 SYRINGE (ML) INJECTION AS NEEDED
Status: DISCONTINUED | OUTPATIENT
Start: 2025-03-21 | End: 2025-03-24 | Stop reason: HOSPADM

## 2025-03-21 RX ORDER — OMEPRAZOLE 40 MG/1
40 CAPSULE, DELAYED RELEASE ORAL 2 TIMES DAILY
Qty: 180 CAPSULE | Refills: 2 | Status: SHIPPED | OUTPATIENT
Start: 2025-03-21 | End: 2025-03-24 | Stop reason: SDUPTHER

## 2025-03-21 RX ORDER — LIDOCAINE HYDROCHLORIDE 20 MG/ML
INJECTION, SOLUTION INFILTRATION; PERINEURAL AS NEEDED
Status: DISCONTINUED | OUTPATIENT
Start: 2025-03-21 | End: 2025-03-21 | Stop reason: SURG

## 2025-03-21 RX ADMIN — PROPOFOL INJECTABLE EMULSION 100 MG: 10 INJECTION, EMULSION INTRAVENOUS at 14:15

## 2025-03-21 RX ADMIN — SODIUM CHLORIDE, POTASSIUM CHLORIDE, SODIUM LACTATE AND CALCIUM CHLORIDE: 600; 310; 30; 20 INJECTION, SOLUTION INTRAVENOUS at 14:10

## 2025-03-21 RX ADMIN — GLYCOPYRROLATE 0.2 MG: 0.2 INJECTION INTRAMUSCULAR; INTRAVENOUS at 14:15

## 2025-03-21 RX ADMIN — PROPOFOL INJECTABLE EMULSION 200 MCG/KG/MIN: 10 INJECTION, EMULSION INTRAVENOUS at 14:16

## 2025-03-21 RX ADMIN — LIDOCAINE HYDROCHLORIDE 100 MG: 20 INJECTION, SOLUTION INFILTRATION; PERINEURAL at 14:15

## 2025-03-21 NOTE — ANESTHESIA PREPROCEDURE EVALUATION
Anesthesia Evaluation     NPO Solid Status: > 8 hours  NPO Liquid Status: < 2 hours           Airway   Mallampati: II  TM distance: >3 FB  Neck ROM: full  No difficulty expected  Dental - normal exam     Pulmonary    Cardiovascular     Patient on routine beta blocker    (+) hypertension, hyperlipidemia      Neuro/Psych  (+) CVA, numbness  GI/Hepatic/Renal/Endo    (+) GERD, renal disease- CRI, thyroid problem hypothyroidism    Musculoskeletal     Abdominal    Substance History      OB/GYN          Other   arthritis,   history of cancer remission                Anesthesia Plan    ASA 3     MAC     intravenous induction     Anesthetic plan, risks, benefits, and alternatives have been provided, discussed and informed consent has been obtained with: patient.    CODE STATUS:

## 2025-03-21 NOTE — H&P
[facility]  Pre Procedure History & Physical    Chief Complaint:   Time for my egd    Subjective     HPI:   74 y.o. female presenting to endoscopy unit today foregd    Past Medical History:   Past Medical History:   Diagnosis Date    Allergic     Aneurysm     Ankle sprain 10/23    Arthritis     Breast cancer, right 2024    Cerebral aneurysm     had coiling    Cholelithiasis 2016    Gallbladder removed    Colon polyp     COVID-19 2020    Diarrhea 2024    Disease of thyroid gland     Dislocation, shoulder 1968    Dislocated several times    Diverticulosis     GERD (gastroesophageal reflux disease)     H/O cerebral aneurysm repair     History of 2019 novel coronavirus disease (COVID-19) 08/2020 AUGUST 2020 STATES VERY MILD SYMTOMS    Hyperlipidemia     Hypertension     Hyperthyroidism 2012?    Hypothyroidism     IBS (irritable bowel syndrome)     Knee swelling     Low back pain     OAB (overactive bladder)     Osteoporosis     Right knee pain     RLS (restless legs syndrome)     Rotator cuff syndrome 1968    Tears    Stress fracture of right tibia     Stroke     Tear of meniscus of knee 2019 & 2020    Surgery both knees    TIA (transient ischemic attack)     2011    Torn meniscus     RIGHT KNEE    Urinary tract infection 06/01/2018       Family History:  Family History   Problem Relation Age of Onset    Heart disease Mother     Parkinsonism Father     Cancer Sister 60        CLL    Irritable bowel syndrome Daughter     Malig Hyperthermia Neg Hx        Social History:   reports that she has never smoked. She has never used smokeless tobacco. She reports that she does not currently use alcohol after a past usage of about 1.0 standard drink of alcohol per week. She reports that she does not use drugs.    Medications:   Medications Prior to Admission   Medication Sig Dispense Refill Last Dose/Taking    atorvastatin (LIPITOR) 20 MG tablet TAKE 1 TABLET EVERY NIGHT 90 tablet 1 3/20/2025     "bisoprolol (ZEBeta) 5 MG tablet Take 1 tablet by mouth Daily.   Past Week    EQ Allergy Relief, Cetirizine, 10 MG tablet Take 1 tablet by mouth once daily 90 tablet 0 3/20/2025    gabapentin (NEURONTIN) 600 MG tablet Take 1 tablet by mouth 4 (Four) Times a Day.   3/20/2025    levothyroxine (SYNTHROID, LEVOTHROID) 75 MCG tablet TAKE 1 TABLET EVERY MORNING 90 tablet 1 3/21/2025    omeprazole (priLOSEC) 40 MG capsule Take 1 capsule by mouth Daily. 90 capsule 2 3/20/2025    tamoxifen (NOLVADEX) 10 MG tablet Take 1 tablet by mouth Daily. 90 tablet 0 3/20/2025    hydroCHLOROthiazide 12.5 MG tablet TAKE 1 TABLET DAILY 90 tablet 1 Unknown       Allergies:  Penicillins and Bee venom    Objective     Blood pressure 123/66, pulse 61, resp. rate 16, height 160 cm (63\"), weight 78.5 kg (173 lb), SpO2 97%, not currently breastfeeding.  Physical Exam:   General: patient awake, alert and cooperative    Assessment & Plan     Diagnosis:  acp    Anticipated Surgical Procedure:  egd    The risks, benefits, and alternatives of this procedure have been discussed with the patient or the responsible party- the patient understands and agrees to proceed.                                                                 "

## 2025-03-21 NOTE — ANESTHESIA POSTPROCEDURE EVALUATION
Patient: Sari Self    Procedure Summary       Date: 03/21/25 Room / Location:  [facility]    Anesthesia Start: 1410 Anesthesia Stop: 1426    Procedure: ESOPHAGOGASTRODUODENOSCOPY WITH BIOPSIES (Esophagus) Diagnosis:       History of Helicobacter pylori infection      Chest pain, atypical      (History of Helicobacter pylori infection [Z86.19])      (Chest pain, atypical [R07.89])    Surgeons: Finesse Reza MD Provider: Johanne La MD    Anesthesia Type: MAC ASA Status: 3            Anesthesia Type: MAC    Vitals  Vitals Value Taken Time   /53 03/21/25 14:24   Temp     Pulse 57 03/21/25 14:24   Resp 14 03/21/25 14:24   SpO2 58 % 03/21/25 14:24           Post Anesthesia Care and Evaluation    Patient location during evaluation: bedside  Patient participation: complete - patient participated  Level of consciousness: awake  Pain management: adequate    Airway patency: patent  Anesthetic complications: No anesthetic complications    Cardiovascular status: acceptable  Respiratory status: acceptable  Hydration status: acceptable

## 2025-03-24 ENCOUNTER — TELEPHONE (OUTPATIENT)
Dept: GASTROENTEROLOGY | Facility: CLINIC | Age: 75
End: 2025-03-24
Payer: MEDICARE

## 2025-03-24 LAB
CYTO UR: NORMAL
LAB AP CASE REPORT: NORMAL
PATH REPORT.FINAL DX SPEC: NORMAL
PATH REPORT.GROSS SPEC: NORMAL

## 2025-03-24 RX ORDER — OMEPRAZOLE 40 MG/1
40 CAPSULE, DELAYED RELEASE ORAL 2 TIMES DAILY
Qty: 180 CAPSULE | Refills: 2 | Status: SHIPPED | OUTPATIENT
Start: 2025-03-24

## 2025-03-24 NOTE — TELEPHONE ENCOUNTER
Message from patient via my chart: Can you please call my new Rx to [facility]? It's much cheaper for me for a 90 day supply.

## 2025-04-02 ENCOUNTER — OFFICE VISIT (OUTPATIENT)
Dept: ONCOLOGY | Facility: CLINIC | Age: 75
End: 2025-04-02
Payer: MEDICARE

## 2025-04-02 ENCOUNTER — LAB (OUTPATIENT)
Dept: LAB | Facility: HOSPITAL | Age: 75
End: 2025-04-02
Payer: MEDICARE

## 2025-04-02 VITALS
OXYGEN SATURATION: 98 % | WEIGHT: 175.2 LBS | RESPIRATION RATE: 16 BRPM | DIASTOLIC BLOOD PRESSURE: 77 MMHG | TEMPERATURE: 97.8 F | HEART RATE: 59 BPM | SYSTOLIC BLOOD PRESSURE: 116 MMHG | HEIGHT: 63 IN | BODY MASS INDEX: 31.04 KG/M2

## 2025-04-02 DIAGNOSIS — D05.11 BREAST NEOPLASM, TIS (DCIS), RIGHT: ICD-10-CM

## 2025-04-02 DIAGNOSIS — D05.11 BREAST NEOPLASM, TIS (DCIS), RIGHT: Primary | ICD-10-CM

## 2025-04-02 DIAGNOSIS — Z12.31 ENCOUNTER FOR SCREENING MAMMOGRAM FOR MALIGNANT NEOPLASM OF BREAST: ICD-10-CM

## 2025-04-02 LAB
ALBUMIN SERPL-MCNC: 4.4 G/DL (ref 3.5–5.2)
ALBUMIN/GLOB SERPL: 1.5 G/DL
ALP SERPL-CCNC: 53 U/L (ref 39–117)
ALT SERPL W P-5'-P-CCNC: 18 U/L (ref 1–33)
ANION GAP SERPL CALCULATED.3IONS-SCNC: 14.7 MMOL/L (ref 5–15)
AST SERPL-CCNC: 17 U/L (ref 1–32)
BASOPHILS # BLD AUTO: 0.06 10*3/MM3 (ref 0–0.2)
BASOPHILS NFR BLD AUTO: 0.9 % (ref 0–1.5)
BILIRUB SERPL-MCNC: 0.4 MG/DL (ref 0–1.2)
BUN SERPL-MCNC: 20 MG/DL (ref 8–23)
BUN/CREAT SERPL: 20.4 (ref 7–25)
CALCIUM SPEC-SCNC: 10.1 MG/DL (ref 8.6–10.5)
CHLORIDE SERPL-SCNC: 102 MMOL/L (ref 98–107)
CO2 SERPL-SCNC: 20.3 MMOL/L (ref 22–29)
CREAT SERPL-MCNC: 0.98 MG/DL (ref 0.57–1)
DEPRECATED RDW RBC AUTO: 42.1 FL (ref 37–54)
EGFRCR SERPLBLD CKD-EPI 2021: 60.3 ML/MIN/1.73
EOSINOPHIL # BLD AUTO: 0.19 10*3/MM3 (ref 0–0.4)
EOSINOPHIL NFR BLD AUTO: 2.9 % (ref 0.3–6.2)
ERYTHROCYTE [DISTWIDTH] IN BLOOD BY AUTOMATED COUNT: 12.7 % (ref 12.3–15.4)
GLOBULIN UR ELPH-MCNC: 2.9 GM/DL
GLUCOSE SERPL-MCNC: 96 MG/DL (ref 65–99)
HCT VFR BLD AUTO: 42.1 % (ref 34–46.6)
HGB BLD-MCNC: 13.7 G/DL (ref 12–15.9)
IMM GRANULOCYTES # BLD AUTO: 0.01 10*3/MM3 (ref 0–0.05)
IMM GRANULOCYTES NFR BLD AUTO: 0.2 % (ref 0–0.5)
LYMPHOCYTES # BLD AUTO: 1.98 10*3/MM3 (ref 0.7–3.1)
LYMPHOCYTES NFR BLD AUTO: 30.2 % (ref 19.6–45.3)
MCH RBC QN AUTO: 29.5 PG (ref 26.6–33)
MCHC RBC AUTO-ENTMCNC: 32.5 G/DL (ref 31.5–35.7)
MCV RBC AUTO: 90.7 FL (ref 79–97)
MONOCYTES # BLD AUTO: 0.42 10*3/MM3 (ref 0.1–0.9)
MONOCYTES NFR BLD AUTO: 6.4 % (ref 5–12)
NEUTROPHILS NFR BLD AUTO: 3.89 10*3/MM3 (ref 1.7–7)
NEUTROPHILS NFR BLD AUTO: 59.4 % (ref 42.7–76)
NRBC BLD AUTO-RTO: 0 /100 WBC (ref 0–0.2)
PLATELET # BLD AUTO: 303 10*3/MM3 (ref 140–450)
PMV BLD AUTO: 9.8 FL (ref 6–12)
POTASSIUM SERPL-SCNC: 4 MMOL/L (ref 3.5–5.2)
PROT SERPL-MCNC: 7.3 G/DL (ref 6–8.5)
RBC # BLD AUTO: 4.64 10*6/MM3 (ref 3.77–5.28)
SODIUM SERPL-SCNC: 137 MMOL/L (ref 136–145)
WBC NRBC COR # BLD AUTO: 6.55 10*3/MM3 (ref 3.4–10.8)

## 2025-04-02 PROCEDURE — 85025 COMPLETE CBC W/AUTO DIFF WBC: CPT

## 2025-04-02 PROCEDURE — 36415 COLL VENOUS BLD VENIPUNCTURE: CPT

## 2025-04-02 PROCEDURE — 80053 COMPREHEN METABOLIC PANEL: CPT

## 2025-04-02 NOTE — PROGRESS NOTES
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    The 20 mg dose is causing a lot of hot flashes but she is putting up with it.  Her family doctor prescribed venlafaxine but she has not started because a side effect profile and because she really basically is doing this for prevention I have recommended cutting her tamoxifen in half and seeing if this helps with the side effects and if not to try Veozah before trying Effexor    She never however cut back to the 10 mg dose and she is still having issues and at this point I told her I would prescribe the 10 mg dose and she can start with 1 a day and if there is no difference she could go back to 2 pills a day and stay at 20 mg    Mammogram in January was thankfully benign and her bone density in June shows osteoporosis in the spine for which she is on Reclast    Past Medical History:   Diagnosis Date    Allergic     Aneurysm     Ankle sprain 10/23    Arthritis     Breast cancer, right 2024    Cerebral aneurysm     had coiling    Cholelithiasis 2016    Gallbladder removed    Colon polyp     COVID-19 2020    Diarrhea 2024    Disease of thyroid gland     Dislocation, shoulder 1968    Dislocated several times    Diverticulosis     GERD (gastroesophageal reflux disease)     H/O cerebral aneurysm repair     History of 2019 novel coronavirus disease (COVID-19) 08/2020 AUGUST 2020 STATES VERY MILD SYMTOMS    Hyperlipidemia     Hypertension     Hyperthyroidism 2012?    Hypothyroidism     IBS  (irritable bowel syndrome)     Knee swelling     Low back pain     OAB (overactive bladder)     Osteoporosis     Right knee pain     RLS (restless legs syndrome)     Rotator cuff syndrome 1968    Tears    Stress fracture of right tibia     Stroke     Tear of meniscus of knee 2019 & 2020    Surgery both knees    TIA (transient ischemic attack)     2011    Torn meniscus     RIGHT KNEE    Urinary tract infection 06/01/2018        Past Surgical History:   Procedure Laterality Date    BREAST LUMPECTOMY Right 05/08/2024    Procedure: RIGHT breast bracketed needle localized lumpectomy, oncplastics,  implant removal.;  Surgeon: Jennifer Kelly MD;  Location: [facility] OR;  Service: General;  Laterality: Right;    BREAST SURGERY Bilateral 05/08/2024    Procedure: BILATERAL IMPLANT REMOVAL WITH CAPSULECTOMY, RIGHT ONCOPLASTIC CLOSURE LEFT BREAST REDUCTION FOR SYMMETRY, USE OF SPY;  Surgeon: Lisa Brambila MD;  Location: [facility] OR;  Service: Plastics;  Laterality: Bilateral;    CARDIAC CATHETERIZATION      CATARACT EXTRACTION, BILATERAL  03/2020    CHOLECYSTECTOMY      COLONOSCOPY  11/10/2010    prep of colon fair,IH,stool in transverse colon,hepatic flexure and ascending colon,ileum normal,IBS    COLONOSCOPY N/A 02/27/2018    IH, diverticulosis, one 5mm polyp, tubular adenoma with low grade dysplasia    COLONOSCOPY N/A 08/15/2023    Procedure: COLONOSCOPY into cecum/terminal ileum with polypectomy;  Surgeon: Finesse Reza MD;  Location: [facility] ENDOSCOPY;  Service: Gastroenterology;  Laterality: N/A;  polyps    CYSTOSCOPY      ENDOSCOPY  11/27/2012    grd 1 reflux egitis,web upperd 3rd esoph    ENDOSCOPY N/A 02/27/2018    normal biopsies, H Pylori positive    ENDOSCOPY N/A 3/21/2025    Procedure: ESOPHAGOGASTRODUODENOSCOPY WITH BIOPSIES;  Surgeon: Finesse Reza MD;  Location: [facility] ENDOSCOPY;  Service: Gastroenterology;  Laterality: N/A;  PRE- ATYPICAL CHEST PAIN, RECENT RADIATION  POST- MILD GASTRITIS     FOOT NEUROMA SURGERY Left     HAND SURGERY      HYSTERECTOMY  1985    IR CEREBRAL ANEURYSM COILING  2012    JOINT REPLACEMENT Left     Partial left knee replacement    KNEE ARTHROPLASTY UNICOMPARTMENTAL Left 03/18/2022    Procedure: KNEE ARTHROPLASTY UNICOMPARTMENTAL;  Surgeon: Everton Znuiga MD;  Location: [facility];  Service: Orthopedics;  Laterality: Left;    KNEE ARTHROSCOPY Left 07/03/2019    Procedure: KNEE ARTHROSCOPY ARTHRITIS DEBRIDEMENT PARTIAL MEDIAL AND LATERAL MENISECTOMY;  Surgeon: Nuris Bender MD;  Location: [facility] OR [facility];  Service: Orthopedics    KNEE ARTHROSCOPY Right 09/28/2020    Procedure: RIGHT KNEE ARTHROSCOPY, PARTIAL MEDIAL AND LATERAL MENISCECTOMIES, DEBRIDEMENT OF ARTHRITIS, AND INTERNAL FIXATION TIBAL PLATEAU INSUFFICIENCY FRACTURE;  Surgeon: Nuris Bender MD;  Location: 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  This was biopsied and showed low-grade DCIS ER/KY positive.  The patient was referred to Dr. Kelly and underwent bilateral breast MRI which showed postbiopsy cavity in the right breast with nonspecific surrounding enhancement and intact retroglandular saline implants with no axillary adenopathy and a normal left breast    Patient was taken to surgery had lumpectomy the findings of a 1.2 mm invasive ductal carcinoma and associated DCIS measuring 40 mm with clear margins  no sentinel nodes were removed.  Removal of her implants and bilateral lift procedures and has done well postoperatively.  She is here to discuss treatment versus prevention    She is  6 para 5 first childbirth age 18 she did  breast-feed all 5 children for 12 months    Menarche age  13 menopause at 43 when she had an oopherectomy .  She had been on  hormonal therapy after menopause for 12 yrs then stopped    Family history is negative for any malignancy except for her sister with CLL  Genetic testing not done    She is not a smoker or drinker  No DVT MI  TIA  in  related to aneurysm which was coiled  Bilateral implants     She has had colon polyps and is up-to-date with colonoscopies    We discussed the fact that her invasive breast cancer is very small and does not need treatment and essentially radiation to the breast would help with lowering the risk of a second malignancy in the breast    Because of her known osteoporosis she is not a good candidate for aromatase inhibitor and I have recommended tamoxifen in an  adjuvant and preventative fashion if tolerated for 5 years and she is agreeable.    The side effects and toxicities of Tamoxifen were discussed with the patient, including hot flashes, mood swings,depression, DVT and endometrial cancer. A list of drugs that interfere with the efficacy of tamoxifen and are to be avoided were given to the patient.  She will have her bone density scheduled and review this and start her tamoxifen the last week of radiation which would be sometime in July  Current Outpatient Medications on File Prior to Visit   Medication Sig Dispense Refill    atorvastatin (LIPITOR) 20 MG tablet TAKE 1 TABLET EVERY NIGHT 90 tablet 1    EQ Allergy Relief, Cetirizine, 10 MG tablet Take 1 tablet by mouth once daily 90 tablet 0    gabapentin (NEURONTIN) 600 MG tablet Take 1 tablet by mouth 4 (Four) Times a Day.      hydroCHLOROthiazide 12.5 MG tablet TAKE 1 TABLET DAILY 90 tablet  "1    levothyroxine (SYNTHROID, LEVOTHROID) 75 MCG tablet TAKE 1 TABLET EVERY MORNING 90 tablet 1    omeprazole (priLOSEC) 40 MG capsule Take 1 capsule by mouth 2 (Two) Times a Day. 180 capsule 2    tamoxifen (NOLVADEX) 10 MG tablet Take 1 tablet by mouth Daily. 90 tablet 0    bisoprolol (ZEBeta) 5 MG tablet Take 1 tablet by mouth Daily.       Current Facility-Administered Medications on File Prior to Visit   Medication Dose Route Frequency Provider Last Rate Last Admin    Chlorhexidine Gluconate Cloth 2 % pads   Apply externally BID Everton Zuniga MD            ALLERGIES:    Allergies   Allergen Reactions    Penicillins Hives     ..Beta lactam allergy details  Antibiotic reaction: hives  Age at reaction: adult  Dose to reaction time: unknown  Reason for antibiotic:  (uti)  Epinephrine required for reaction?: no       Bee Venom Swelling        Social History     Socioeconomic History    Marital status:     Number of children: 5   Tobacco Use    Smoking status: Never    Smokeless tobacco: Never   Vaping Use    Vaping status: Never Used   Substance and Sexual Activity    Alcohol use: Not Currently     Alcohol/week: 1.0 standard drink of alcohol     Types: 1 Glasses of wine per week     Comment: Occasionally    Drug use: Never    Sexual activity: Not Currently     Partners: Male     Birth control/protection: Hysterectomy        Family History   Problem Relation Age of Onset    Heart disease Mother     Parkinsonism Father     Cancer Sister 60        CLL    Irritable bowel syndrome Daughter     Malig Hyperthermia Neg Hx         Review of Systems   All other systems reviewed and are negative.       Objective     Vitals:    04/02/25 1446   BP: 116/77   Pulse: 59   Resp: 16   Temp: 97.8 °F (36.6 °C)   TempSrc: Oral   SpO2: 98%   Weight: 79.5 kg (175 lb 3.2 oz)   Height: 160 cm (62.99\")   PainSc: 0-No pain         4/2/2025     2:45 PM   Current Status   ECOG score 0       Physical Exam  CONSTITUTIONAL:  Vital signs " reviewed.  No distress, looks comfortable.  EYES:  Conjunctivae and lids unremarkable.  PERRLA  EARS,NOSE,MOUTH,THROAT:  Ears and nose appear unremarkable.  Lips, teeth, gums appear unremarkable.  RESPIRATORY:  Normal respiratory effort.  Lungs clear to auscultation bilaterally.  BREASTS: Bilateral lift procedures well-healed no masses  CARDIOVASCULAR:  Normal S1, S2.  No murmurs rubs or gallops.  No significant lower extremity edema.  GASTROINTESTINAL: Abdomen appears unremarkable.  Nontender.  No hepatomegaly.  No splenomegaly.  LYMPHATIC:  No cervical, supraclavicular, axillary lymphadenopathy.  SKIN:  Warm.  No rashes.  PSYCHIATRIC:  Normal judgment and insight.  Normal mood and affect.  I have reexamined the patient and the results are consistent with the previously documented exam. Alex Ortega MD       RECENT LABS:  Hematology WBC   Date Value Ref Range Status   04/02/2025 6.55 3.40 - 10.80 10*3/mm3 Final   06/06/2023 6.84 4.5 - 11.0 10*3/uL Final     RBC   Date Value Ref Range Status   04/02/2025 4.64 3.77 - 5.28 10*6/mm3 Final   06/06/2023 4.41 4.0 - 5.2 10*6/uL Final     Hemoglobin   Date Value Ref Range Status   04/02/2025 13.7 12.0 - 15.9 g/dL Final   06/06/2023 12.6 12.0 - 16.0 g/dL Final     Hematocrit   Date Value Ref Range Status   04/02/2025 42.1 34.0 - 46.6 % Final   06/06/2023 39.0 36.0 - 46.0 % Final     Platelets   Date Value Ref Range Status   04/02/2025 303 140 - 450 10*3/mm3 Final   06/06/2023 331 140 - 440 10*3/uL Final              Component    Case Report   Surgical Pathology Report                         Case: [id number]                                   Authorizing Provider:  Nurys Edwards MD      Collected:           03/04/2024 02:52 PM           Ordering Location:     [facility]  Received:            03/04/2024 04:10 PM                                  LABORATORY                                                                   Pathologist:            Jordan Granados MD                                                         Specimen:    Breast, Right, Right breast FA with calcs 630 o'clock Ex 1452 Formalin 1500                Clinical Information    FA w/ calcs BI-RADS 4B   Final Diagnosis   1.  Right breast at 6:30 o'clock, (focal asymmetry with calcification), stereotactic core biopsy:               A.  Ductal carcinoma in situ (DCIS):                            1.  Low-grade cribriform and focal solid DCIS.                            2.  Scattered foci of DCIS span at least 3 mm. In single greatest dimension.                            3.  Focal microcalcification present within DCIS.               B.  No invasive carcinoma identified.   Electronically signed by Jordan Granados MD         Estrogen Receptor (ER) Status  Positive (greater than 10% of cells demonstrate nuclear positivity)   Percentage of Cells with Nuclear Positivity  %   Average Intensity of Staining  Strong   Test Type  Food and Drug Administration (FDA) cleared (test / vendor): [employer]   Primary Antibody  SP1   Scoring System  Jose Cruz   Proportion Score  5   Intensity Score  3   Total Jose Cruz Score  8   Test(s) Performed     Progesterone Receptor (PgR) Status  Positive   Percentage of Cells with Nuclear Positivity  %   Average Intensity of Staining  Strong   Test Type  Food and Drug Administration (FDA) cleared (test / vendor): [employer]   Primary Antibody  1E2   Scoring System  Jose Cruz   Proportion Score  5   Intensity Score  3   Total Jose Cruz Score  8   Test(s) Performed  Ki-67   Ki-67 Percentage of Positive Nuclei  13 %           Component    Case Report   Surgical Pathology Report                         Case: [id number]                                   Authorizing Provider:  Jennifer Kelly MD    Collected:           04/09/2024 09:00 AM           Ordering Location:     [facility]  Received:            04/09/2024 11:17 AM                                  MRI                                                                            Pathologist:           Donte De Luna MD                                                           Specimen:    Breast, Right, Right breast Bx MRI by Dr Fonseca @ 0900, 9 cores, formalin time 0915,                clock face 0600                                                                            Final Diagnosis   1.  Breast, right 6 o'clock position, MRI guided core biopsy:  A.  Ductal carcinoma in situ (DCIS), low to intermediate nuclear grade with apocrine features, cribriform and solid architecture, measuring up to 6 mm maximally and involving up to 7 core fragments.   Electronically signed by Donte De Luna MD on 4/11/2024 at 0806   Synoptic Checklist   Breast Biomarker Reporting Template   Protocol posted: 12/13/2023BREAST BIOMARKER REPORTING TEMPLATE - All Specimens  Test(s) Performed     Estrogen Receptor (ER) Status  Positive (greater than 10% of cells demonstrate nuclear positivity)   Percentage of Cells with Nuclear Positivity  %   Average Intensity of Staining  Strong   Test Type  Food and Drug Administration (FDA) cleared (test / vendor): [employer]   Primary Antibody  SP1   Test(s) Performed     Progesterone Receptor (PgR) Status  Positive   Percentage of Cells with Nuclear Positivity  %   Average Intensity of Staining  Moderate   Test Type  Food and Drug Administration (FDA) cleared (test / vendor): [employer]   Primary Antibody  1E2   Test(s) Performed  Ki-67   Ki-67 Percentage of Positive Nuclei  17 %   Primary Antibody  30-9             Synoptic Checklist  INVASIVE CARCINOMA OF THE BREAST: Resection (INVASIVE CARCINOMA OF THE BREAST: RESECTION - All Specimens) 8th Edition  - Protocol posted: 12/13/2023  SPECIMEN  Procedure Excision (less than total mastectomy)  Specimen Laterality Right  .  TUMOR  Tumor Site 6 o'clock  Histologic Type Invasive carcinoma of no special type (ductal)  Histologic Grade ([place]  "Histologic Score)  Glandular (Acinar) / Tubular Differentiation Score 3  Nuclear Pleomorphism Score 2  Mitotic Rate Score 1  Overall Grade Grade 2 (scores of 6 or 7)  Tumor Size Greatest dimension of largest invasive focus (Millimeters): 1.2 mm  Tumor Focality Single focus of invasive carcinoma  Ductal Carcinoma In Situ (DCIS) Present  Negative for extensive intraductal component (EIC)  Size (Extent) of DCIS Estimated size (extent) of DCIS is at least (Millimeters): 40 mm  Additional Dimension (Millimeters) 28 mm  10 mm  Architectural Patterns Cribriform  Micropapillary  Papillary  Nuclear Grade Grade II (intermediate)  Necrosis Present, central (expansive \"comedo\" necrosis)  Lobular Carcinoma In Situ (LCIS) Not identified  Lymphatic and / or Vascular Invasion Not identified  Dermal Lymphatic and / or Vascular Invasion Not identified  Microcalcifications Present in DCIS  Treatment Effect in the Breast No known presurgical therapy  .  MARGINS  Margin Status for Invasive Carcinoma All margins negative for invasive carcinoma  Distance from Invasive Carcinoma to Closest Margin 28 mm  Closest Margin(s) to Invasive Carcinoma Anterior  Margin Status for DCIS All margins negative for DCIS  Distance from DCIS to Closest Margin 18 mm  Closest Margin(s) to DCIS Anterior  .  REGIONAL LYMPH NODES  Regional Lymph Node Status Not applicable (no regional lymph nodes submitted or found)  .  pTNM CLASSIFICATION (AJCC 8th Edition)  Reporting of pT, pN, and (when applicable) pM categories is based on information available to the pathologist at the time the report is issued. As  per the AJCC (Chapter 1, 8th Ed.) it is the managing physician’s responsibility to establish the final pathologic stage based upon all pertinent  information, including but potentially not limited to this pathology report.  pT Category pT1a  pN Category pN not assigned (no nodes submitted or found)  .  [facility] Biomarker Reporting Template   Protocol posted: " 12/13/2023BREAST BIOMARKER REPORTING TEMPLATE - All Specimens  Test(s) Performed     Estrogen Receptor (ER) Status  Positive (greater than 10% of cells demonstrate nuclear positivity)   Percentage of Cells with Nuclear Positivity  %   Average Intensity of Staining  Strong   Test Type  Food and Drug Administration (FDA) cleared (test / vendor): [employer]   Primary Antibody  SP1   Scoring System  Jose Cruz   Proportion Score  5   Intensity Score  3   Total Jose Cruz Score  8   Test(s) Performed     Progesterone Receptor (PgR) Status  Positive   Percentage of Cells with Nuclear Positivity  %   Average Intensity of Staining  Strong   Test Type  Food and Drug Administration (FDA) cleared (test / vendor): [employer]   Primary Antibody  1E2   Scoring System  Jose Cruz   Proportion Score  5   Intensity Score  3   Total Jose Cruz Score  8   Test(s) Performed  Ki-67   Ki-67 Percentage of Positive Nuclei  14 %   Primary Antibody  30-9   Cold Ischemia and Fixation Times  Meet requirements specified in latest version of the ASCO / CAP Guidelines   Cold Ischemia Time (minutes)  41 min   Fixation Time (hours)  8 hours   Testing Performed on Block Number(s)  1N   METHODS   Fixative  Formalin   Image Analysis  Not performed   Comment(s)  Her 2 ([facility]) will be reported in an addendum. NEGATIVE       Assessment & Plan   1.pT1a Nx Right breast  ER/HI+ HER2 negative postlumpectomy with associated DCIS measuring 40 mm with clear margins  Radiation planned followed by tamoxifen 7/24  Tolerating tamoxifen 20 mg fairly well but a lot of hot flashes therefore we have decreased to 10 mg as of 10/24  Patient did not decrease to 10 mg as of 4/25 but I recommended she do this because of persistent hot flashes and the fact that there is good data that all she needs is prevention and that dose is adequate    2.  Osteoporosis on Reclast-bone density with osteoporosis in 6/24    3.  Negative family history of malignancy genetic testing not done    4.   Cerebral aneurysm post coiling in 2012 associated with a TIA    5.  Hypertension hypercholesterolemia on treatment    6.  Hypothyroidism on replacement    7.  Reflux on Prilosec    Plan  1.  Decrease tamoxifen to 10 mg daily-if she has no change in symptoms she will go back to 20 mg daily  2.  See me back in 12 months with mammogram in January 2026  3.  Reclast due in September 2025

## 2025-04-11 NOTE — TELEPHONE ENCOUNTER
Rx Refill Note  Requested Prescriptions     Pending Prescriptions Disp Refills    cetirizine (zyrTEC) 10 MG tablet [Pharmacy Med Name: Cetirizine HCl 10 MG Oral Tablet] 90 tablet 1     Sig: Take 1 tablet by mouth once daily      Last office visit with prescribing clinician: 8/5/2024   Last telemedicine visit with prescribing clinician: Visit date not found   Next office visit with prescribing clinician: 4/17/2025                         Would you like a call back once the refill request has been completed: [] Yes [] No    If the office needs to give you a call back, can they leave a voicemail: [] Yes [] No    Rebecca Madrigal Rep  04/11/25, 15:02 EDT

## 2025-04-12 RX ORDER — CETIRIZINE HYDROCHLORIDE 10 MG/1
10 TABLET ORAL DAILY
Qty: 90 TABLET | Refills: 1 | Status: SHIPPED | OUTPATIENT
Start: 2025-04-12

## 2025-04-17 ENCOUNTER — OFFICE VISIT (OUTPATIENT)
Dept: FAMILY MEDICINE CLINIC | Facility: CLINIC | Age: 75
End: 2025-04-17
Payer: MEDICARE

## 2025-04-17 VITALS
DIASTOLIC BLOOD PRESSURE: 82 MMHG | HEIGHT: 63 IN | SYSTOLIC BLOOD PRESSURE: 120 MMHG | BODY MASS INDEX: 30.65 KG/M2 | WEIGHT: 173 LBS | HEART RATE: 56 BPM | TEMPERATURE: 96.6 F | OXYGEN SATURATION: 98 %

## 2025-04-17 DIAGNOSIS — R53.83 OTHER FATIGUE: ICD-10-CM

## 2025-04-17 DIAGNOSIS — I10 ESSENTIAL HYPERTENSION: ICD-10-CM

## 2025-04-17 DIAGNOSIS — R73.9 HYPERGLYCEMIA: ICD-10-CM

## 2025-04-17 DIAGNOSIS — E78.2 MIXED HYPERLIPIDEMIA: ICD-10-CM

## 2025-04-17 DIAGNOSIS — Z00.00 MEDICARE ANNUAL WELLNESS VISIT, SUBSEQUENT: Primary | ICD-10-CM

## 2025-04-17 DIAGNOSIS — E03.9 HYPOTHYROIDISM, UNSPECIFIED TYPE: ICD-10-CM

## 2025-04-17 DIAGNOSIS — H61.23 BILATERAL IMPACTED CERUMEN: ICD-10-CM

## 2025-04-17 RX ORDER — ATORVASTATIN CALCIUM 20 MG/1
20 TABLET, FILM COATED ORAL NIGHTLY
Qty: 90 TABLET | Refills: 1 | Status: SHIPPED | OUTPATIENT
Start: 2025-04-17

## 2025-04-17 RX ORDER — LEVOTHYROXINE SODIUM 75 UG/1
75 TABLET ORAL EVERY MORNING
Qty: 90 TABLET | Refills: 1 | Status: SHIPPED | OUTPATIENT
Start: 2025-04-17

## 2025-04-17 NOTE — PROGRESS NOTES
Subjective   The ABCs of the Annual Wellness Visit  Medicare Wellness Visit      Sari Self is a 74 y.o. patient who presents for a Medicare Wellness Visit.    The following portions of the patient's history were reviewed and   updated as appropriate: allergies, current medications, past family history, past medical history, past social history, past surgical history, and problem list.    Compared to one year ago, the patient's physical   health is better.  Compared to one year ago, the patient's mental   health is the same.    Recent Hospitalizations:  She was not admitted to the hospital during the last year.     Current Medical Providers:  Patient Care Team:  Nurys Edwards MD as PCP - General (Family Medicine)  Liv Kyle MD (Internal Medicine)  Jennifer Kelly MD as Referring Physician (Breast Surgery)  Alex Ortega MD as Consulting Physician (Hematology and Oncology)  Kayla Starks MD as Consulting Physician (Radiation Oncology)  Finesse Reza MD as Consulting Physician (Gastroenterology)  Daniel Hare MD (Ophthalmology)  Kiersten Coleman MD (Dermatology)    Outpatient Medications Prior to Visit   Medication Sig Dispense Refill    bisoprolol (ZEBeta) 5 MG tablet Take 1 tablet by mouth Daily.      cetirizine (zyrTEC) 10 MG tablet Take 1 tablet by mouth once daily 90 tablet 1    gabapentin (NEURONTIN) 600 MG tablet Take 1 tablet by mouth 4 (Four) Times a Day.      hydroCHLOROthiazide 12.5 MG tablet TAKE 1 TABLET DAILY 90 tablet 1    omeprazole (priLOSEC) 40 MG capsule Take 1 capsule by mouth 2 (Two) Times a Day. 180 capsule 2    tamoxifen (NOLVADEX) 10 MG tablet Take 1 tablet by mouth Daily. 90 tablet 0    atorvastatin (LIPITOR) 20 MG tablet TAKE 1 TABLET EVERY NIGHT 90 tablet 1    levothyroxine (SYNTHROID, LEVOTHROID) 75 MCG tablet TAKE 1 TABLET EVERY MORNING 90 tablet 1     Facility-Administered Medications Prior to Visit   Medication Dose Route Frequency Provider Last Rate  "Last Admin    Chlorhexidine Gluconate Cloth 2 % pads   Apply externally BID Everton Zuniga MD         No opioid medication identified on active medication list. I have reviewed chart for other potential  high risk medication/s and harmful drug interactions in the elderly.      Aspirin is not on active medication list.  Aspirin use is not indicated based on review of current medical condition/s. Risk of harm outweighs potential benefits.  .    Patient Active Problem List   Diagnosis    Osteoporosis    Senile osteoporosis    Gastroesophageal reflux disease    Diarrhea    Tubular adenoma of colon    Helicobacter pylori (H. pylori) infection    Acute medial meniscus tear of left knee    Complex tear of medial meniscus of right knee as current injury    Stress fracture of right tibia    History of IBS    RLS (restless legs syndrome)    Hypothyroidism    Essential hypertension    Left-sided trigeminal neuralgia    Nonruptured cerebral aneurysm    Primary osteoarthritis of left knee    Abnormal EKG    Chest pain    Chronic kidney disease (CKD), stage III (moderate)    Grief reaction    Hematuria    History of Helicobacter pylori infection    History of recurrent UTIs    Tubulovillous adenoma of colon    Diarrhea    Gastroesophageal reflux disease    Helicobacter pylori (H. pylori) infection    Osteoporosis    RLS (restless legs syndrome)    Tubular adenoma of colon    Abnormal CXR    Pure hypercholesterolemia    Colon polyps    Jaw pain    Breast neoplasm, Tis (DCIS), right    Chest pain, atypical     Advance Care Planning Advance Directive is not on file.  ACP discussion was held with the patient during this visit. Patient does not have an advance directive, declines further assistance.            Objective   Vitals:    04/17/25 1121   BP: 120/82   Pulse: 56   Temp: 96.6 °F (35.9 °C)   TempSrc: Temporal   SpO2: 98%   Weight: 78.5 kg (173 lb)   Height: 160 cm (62.99\")       Estimated body mass index is 30.66 kg/m² as " "calculated from the following:    Height as of this encounter: 160 cm (62.99\").    Weight as of this encounter: 78.5 kg (173 lb).                Does the patient have evidence of cognitive impairment? No  Lab Results   Component Value Date    CHLPL 153 2025    TRIG 81 2025    HDL 61 (H) 2025    LDL 77 2025    VLDL 15 2025    HGBA1C 5.40 2025                                                                                                Health  Risk Assessment    Smoking Status:  Social History     Tobacco Use   Smoking Status Never   Smokeless Tobacco Never     Alcohol Consumption:  Social History     Substance and Sexual Activity   Alcohol Use Not Currently    Alcohol/week: 1.0 standard drink of alcohol    Types: 1 Glasses of wine per week    Comment: Occasionally       Fall Risk Screen  PONCEADI Fall Risk Assessment was completed, and patient is at LOW risk for falls.Assessment completed on:2025    Depression Screening   Little interest or pleasure in doing things? Several days   Feeling down, depressed, or hopeless? Not at all   PHQ-2 Total Score 1      Health Habits and Functional and Cognitive Screenin/17/2025    11:00 AM   Functional & Cognitive Status   Do you have difficulty preparing food and eating? No   Do you have difficulty bathing yourself, getting dressed or grooming yourself? No   Do you have difficulty using the toilet? No   Do you have difficulty moving around from place to place? No   Do you have trouble with steps or getting out of a bed or a chair? No   Current Diet Well Balanced Diet   Dental Exam Up to date   Eye Exam Up to date   Exercise (times per week) 2 times per week   Current Exercises Include Stationary Bicycling/Spin Class   Do you need help using the phone?  No   Are you deaf or do you have serious difficulty hearing?  No   Do you need help to go to places out of walking distance? No   Do you need help shopping? No   Do you need help " preparing meals?  No   Do you need help with housework?  No   Do you need help with laundry? No   Do you need help taking your medications? No   Do you need help managing money? No   Do you ever drive or ride in a car without wearing a seat belt? No   Have you felt unusual stress, anger or loneliness in the last month? No   Who do you live with? Child   If you need help, do you have trouble finding someone available to you? No   Have you been bothered in the last four weeks by sexual problems? No   Do you have difficulty concentrating, remembering or making decisions? No           Age-appropriate Screening Schedule:  Refer to the list below for future screening recommendations based on patient's age, sex and/or medical conditions. Orders for these recommended tests are listed in the plan section. The patient has been provided with a written plan.    Health Maintenance List  Health Maintenance   Topic Date Due    COVID-19 Vaccine (7 - Pfizer risk 2024-25 season) 05/01/2025    INFLUENZA VACCINE  07/01/2025    MAMMOGRAM  01/21/2026    ANNUAL WELLNESS VISIT  04/17/2026    LIPID PANEL  04/17/2026    DXA SCAN  06/06/2026    COLORECTAL CANCER SCREENING  08/15/2028    TDAP/TD VACCINES (2 - Td or Tdap) 10/28/2030    HEPATITIS C SCREENING  Completed    RSV Vaccine - Adults  Completed    Pneumococcal Vaccine 50+  Completed    ZOSTER VACCINE  Completed                                                                                                                                                CMS Preventative Services Quick Reference  Risk Factors Identified During Encounter  Dental Screening Recommended  Vision Screening Recommended    The above risks/problems have been discussed with the patient.  Pertinent information has been shared with the patient in the After Visit Summary.  An After Visit Summary and PPPS were made available to the patient.    Follow Up:   Next Medicare Wellness visit to be scheduled in 1 year.    "      Additional E&M Note during same encounter follows:  Patient has additional, significant, and separately identifiable condition(s)/problem(s) that require work above and beyond the Medicare Wellness Visit     Chief Complaint  Medicare Wellness-subsequent    Subjective    HPI  Sarah is also being seen today for additional medical problem/s.  She is also here for follow-up on hyperlipidemia and hypertension.           She is under the care of Dr. Ortega, a medical oncologist, and Dr. Ladonna Hernandez, a radiation oncologist. An esophagogastroduodenoscopy (EGD) was performed in early 04/2025 due to persistent chest pain, revealing gastritis and inflammation in the stomach. A follow-up with her ophthalmologist is scheduled for 05/2025.  A mammogram was conducted at the end of 01/2025. Cholesterol levels are believed to have decreased. Current medications include bisoprolol and intermittent hydrochlorothiazide based on blood pressure readings. Weight loss strategies are being sought due to recent weight gain. A previous no-carb diet resulted in a 25-pound weight loss but increased cholesterol levels. No food has been consumed today, and smoothies are being considered for the diet. Fluid accumulation in the ears occurred a few weeks ago, initially suspected to be an ear infection but later determined to be dryness. Debrox eardrops or baby oil have been advised for relief. Prilosec dosage was recently doubled. Leg swelling and fluid retention are managed with hydrochlorothiazide. A significant amount of time is spent sitting. Cardiologist visits occur biannually, with the most recent visit in 01/2025 or 02/2025.    PAST SURGICAL HISTORY:  - Partial mastectomy with reconstruction          Objective   Vital Signs:  /82   Pulse 56   Temp 96.6 °F (35.9 °C) (Temporal)   Ht 160 cm (62.99\")   Wt 78.5 kg (173 lb)   SpO2 98%   BMI 30.66 kg/m²   Physical Exam  Constitutional:       General: She is not in acute " distress.     Appearance: Normal appearance. She is well-developed.   HENT:      Head: Normocephalic and atraumatic.      Right Ear: There is impacted cerumen.      Left Ear: There is impacted cerumen.      Mouth/Throat:      Mouth: Mucous membranes are moist.   Eyes:      General:         Right eye: No discharge.         Left eye: No discharge.      Extraocular Movements: Extraocular movements intact.      Pupils: Pupils are equal, round, and reactive to light.   Cardiovascular:      Rate and Rhythm: Normal rate and regular rhythm.      Pulses: Normal pulses.      Heart sounds: Normal heart sounds.   Pulmonary:      Effort: Pulmonary effort is normal.      Breath sounds: Normal breath sounds. No wheezing or rales.   Abdominal:      General: Bowel sounds are normal.      Palpations: Abdomen is soft. There is no mass.      Tenderness: There is no abdominal tenderness.   Musculoskeletal:      Cervical back: Normal range of motion and neck supple.      Right lower leg: No edema.      Left lower leg: No edema.   Lymphadenopathy:      Cervical: No cervical adenopathy.   Neurological:      General: No focal deficit present.      Mental Status: She is alert and oriented to person, place, and time.                       Results  Diagnostic Testing   - EGD Biopsies: Gastritis and inflammation           Assessment and Plan        1. Medicare wellness visit.  - Blood pressure readings are within the normal range.  - Received shingles, RSV, and pneumonia vaccines; eye and dental exams are up-to-date.  - Mammogram conducted in 01/2025; advised to continue annual mammogram screenings.  - Comprehensive lab workup ordered today, including cholesterol, thyroid function tests (TSH and T4), and hemoglobin A1c; prescriptions for levothyroxine and atorvastatin refilled.  - - Interested in losing weight; discussed possibility of a no-carb diet.  Different diet plans discussed with patient.  Information on low-carb plan given to  patient      2 hypertension, blood pressure is stable at current medication continue same  3 hyperlipidemia on 20 mg of Lipitor denies any problems with the medication we will recheck lipids and CMP    4 hypothyroidism, will check TSH continue same  5 . Gastritis.  - Recent EGD revealed gastritis and inflammation.  - Currently taking Prilosec, dosage has been doubled.        4.  Bilateral cerumen  - Significant amount of ear wax noted.  - Recommended Debrox eardrops or hydrogen peroxide drops to alleviate itching and dryness.         Follow Up   No follow-ups on file.  Patient was given instructions and counseling regarding her condition or for health maintenance advice. Please see specific information pulled into the AVS if appropriate.  Patient or patient representative verbalized consent for the use of Ambient Listening during the visit with  Nurys Edwards MD for chart documentation. 4/27/2025  11:08 EDT

## 2025-04-18 LAB
CHOLEST SERPL-MCNC: 153 MG/DL (ref 0–200)
HBA1C MFR BLD: 5.4 % (ref 4.8–5.6)
HDLC SERPL-MCNC: 61 MG/DL (ref 40–60)
LDLC SERPL CALC-MCNC: 77 MG/DL (ref 0–100)
T4 FREE SERPL-MCNC: 1.51 NG/DL (ref 0.92–1.68)
TRIGL SERPL-MCNC: 81 MG/DL (ref 0–150)
TSH SERPL DL<=0.005 MIU/L-ACNC: 0.61 UIU/ML (ref 0.27–4.2)
VLDLC SERPL CALC-MCNC: 15 MG/DL (ref 5–40)

## 2025-05-19 RX ORDER — TAMOXIFEN CITRATE 10 MG/1
10 TABLET ORAL DAILY
Qty: 90 TABLET | Refills: 1 | Status: SHIPPED | OUTPATIENT
Start: 2025-05-19

## 2025-06-09 NOTE — PROGRESS NOTES
Patient: Sari Self  YOB: 1950  Date of Service: 6/9/2025    Chief Complaints: Bilateral shoulder pain    Subjective:    History of Present Illness: Pt is seen in the office today with complaints of bilateral shoulder pain has been almost a year since have seen her she states really over the last few weeks her shoulders are started bothering her no no history injury change in activity other than she has been cleaning out storage bands and thinks that is what it was.  There was not 1 particular event her past medical history is listed below and reviewed by me she has finished radiation for her breast cancer and is overall doing well        Allergies:   Allergies   Allergen Reactions    Penicillins Hives     ..Beta lactam allergy details  Antibiotic reaction: hives  Age at reaction: adult  Dose to reaction time: unknown  Reason for antibiotic:  (uti)  Epinephrine required for reaction?: no       Bee Venom Swelling       Medications:   Home Medications:  Current Outpatient Medications on File Prior to Visit   Medication Sig    atorvastatin (LIPITOR) 20 MG tablet Take 1 tablet by mouth Every Night.    bisoprolol (ZEBeta) 5 MG tablet Take 1 tablet by mouth Daily.    cetirizine (zyrTEC) 10 MG tablet Take 1 tablet by mouth once daily    gabapentin (NEURONTIN) 600 MG tablet Take 1 tablet by mouth 4 (Four) Times a Day.    hydroCHLOROthiazide 12.5 MG tablet TAKE 1 TABLET DAILY    levothyroxine (SYNTHROID, LEVOTHROID) 75 MCG tablet Take 1 tablet by mouth Every Morning.    omeprazole (priLOSEC) 40 MG capsule Take 1 capsule by mouth 2 (Two) Times a Day.    tamoxifen (NOLVADEX) 10 MG tablet TAKE 1 TABLET DAILY     Current Facility-Administered Medications on File Prior to Visit   Medication    Chlorhexidine Gluconate Cloth 2 % pads     Current Medications:  Scheduled Meds:  Continuous Infusions:No current facility-administered medications for this visit.    PRN Meds:.    I have reviewed the patient's medical  history in detail and updated the computerized patient record.  Review and summarization of old records include:    Past Medical History:   Diagnosis Date    Allergic     Aneurysm     Ankle sprain 10/23    Arthritis     Breast cancer, right 2024    Cerebral aneurysm     had coiling    Cholelithiasis 2016    Gallbladder removed    Chronic diarrhea     Colon polyp     COVID-19 2020    Diarrhea 2024    Disease of thyroid gland     Dislocation, shoulder 1968    Dislocated several times    Diverticulosis     GERD (gastroesophageal reflux disease)     H/O cerebral aneurysm repair     History of 2019 novel coronavirus disease (COVID-19) 08/2020 AUGUST 2020 STATES VERY MILD SYMTOMS    Hyperlipidemia     Hypertension     Hyperthyroidism 2012?    Hypothyroidism     IBS (irritable bowel syndrome)     Knee swelling     Low back pain     Melanoma 2021?    OAB (overactive bladder)     Osteoporosis     Right knee pain     RLS (restless legs syndrome)     Rotator cuff syndrome 1968    Tears    Stress fracture of right tibia     Stroke     Tear of meniscus of knee 2019 & 2020    Surgery both knees    TIA (transient ischemic attack)     2011    Torn meniscus     RIGHT KNEE    Urinary tract infection 06/01/2018        Past Surgical History:   Procedure Laterality Date    BRAIN SURGERY  2011    BREAST AUGMENTATION  1993    BREAST BIOPSY  03/04/24    Two biopsies on right breast    BREAST LUMPECTOMY Right 05/08/2024    Procedure: RIGHT breast bracketed needle localized lumpectomy, oncplastics,  implant removal.;  Surgeon: Jennifer Kelly MD;  Location: [facility];  Service: General;  Laterality: Right;    BREAST SURGERY Bilateral 05/08/2024    Procedure: BILATERAL IMPLANT REMOVAL WITH CAPSULECTOMY, RIGHT ONCOPLASTIC CLOSURE LEFT BREAST REDUCTION FOR SYMMETRY, USE OF SPY;  Surgeon: Lisa Brambila MD;  Location: [facility] OR;  Service: Plastics;  Laterality: Bilateral;    CARDIAC CATHETERIZATION      CATARACT  EXTRACTION, BILATERAL  03/2020    CHOLECYSTECTOMY      COLONOSCOPY  11/10/2010    prep of colon fair,IH,stool in transverse colon,hepatic flexure and ascending colon,ileum normal,IBS    COLONOSCOPY N/A 02/27/2018    IH, diverticulosis, one 5mm polyp, tubular adenoma with low grade dysplasia    COLONOSCOPY N/A 08/15/2023    Procedure: COLONOSCOPY into cecum/terminal ileum with polypectomy;  Surgeon: Finesse Reza MD;  Location: [facility] ENDOSCOPY;  Service: Gastroenterology;  Laterality: N/A;  polyps    CYSTOSCOPY      ENDOSCOPY  11/27/2012    grd 1 reflux egitis,web upperd 3rd esoph    ENDOSCOPY N/A 02/27/2018    normal biopsies, H Pylori positive    ENDOSCOPY N/A 03/21/2025    Procedure: ESOPHAGOGASTRODUODENOSCOPY WITH BIOPSIES;  Surgeon: Finesse Reza MD;  Location: [facility] ENDOSCOPY;  Service: Gastroenterology;  Laterality: N/A;  PRE- ATYPICAL CHEST PAIN, RECENT RADIATION  POST- MILD GASTRITIS    EYE SURGERY  March 2020    Cararat removal both eyes    FOOT NEUROMA SURGERY Left     HAND SURGERY      HYSTERECTOMY  1985    IR CEREBRAL ANEURYSM COILING  2012    JOINT REPLACEMENT Left     Partial left knee replacement    KNEE ARTHROPLASTY UNICOMPARTMENTAL Left 03/18/2022    Procedure: KNEE ARTHROPLASTY UNICOMPARTMENTAL;  Surgeon: Everton Zuniga MD;  Location: [facility] MAIN OR;  Service: Orthopedics;  Laterality: Left;    KNEE ARTHROSCOPY Left 07/03/2019    Procedure: KNEE ARTHROSCOPY ARTHRITIS DEBRIDEMENT PARTIAL MEDIAL AND LATERAL MENISECTOMY;  Surgeon: Nuris Bender MD;  Location: [facility] OR [facility];  Service: Orthopedics    KNEE ARTHROSCOPY Right 09/28/2020    Procedure: RIGHT KNEE ARTHROSCOPY, PARTIAL MEDIAL AND LATERAL MENISCECTOMIES, DEBRIDEMENT OF ARTHRITIS, AND INTERNAL FIXATION TIBAL PLATEAU INSUFFICIENCY FRACTURE;  Surgeon: Nuris Bender MD;  Location: [facility];  Service: Orthopedics;  Laterality: Right;    KNEE SURGERY  2019 & 2020    REDUCTION MAMMAPLASTY  05/24    Left side to match partial  mastectomy on right    TENSION FREE VAGINAL TAPING WITH MINI ARC SLING      THUMB ARTHROSCOPY      TOTAL ABDOMINAL HYSTERECTOMY WITH SALPINGO OOPHORECTOMY      TRIGGER POINT INJECTION      Hand, elbow, shoulders, knees    UPPER GASTROINTESTINAL ENDOSCOPY  02/27/2018    WRIST SURGERY          Social History     Occupational History    Not on file   Tobacco Use    Smoking status: Never    Smokeless tobacco: Never   Vaping Use    Vaping status: Never Used   Substance and Sexual Activity    Alcohol use: Not Currently     Alcohol/week: 1.0 standard drink of alcohol     Types: 1 Glasses of wine per week     Comment: Occasionally    Drug use: Never    Sexual activity: Not Currently     Partners: Male     Birth control/protection: Hysterectomy      Social History     Social History Narrative    Not on file        Family History   Problem Relation Age of Onset    Heart disease Mother     Irritable bowel syndrome Mother     Arthritis Mother         Passed away    Parkinsonism Father     Cancer Sister 60        CLL    Irritable bowel syndrome Daughter     Malig Hyperthermia Neg Hx        ROS: 14 point review of systems was performed and was negative except for documented findings in HPI and today's encounter.     Allergies:   Allergies   Allergen Reactions    Penicillins Hives     ..Beta lactam allergy details  Antibiotic reaction: hives  Age at reaction: adult  Dose to reaction time: unknown  Reason for antibiotic:  (uti)  Epinephrine required for reaction?: no       Bee Venom Swelling     Constitutional:  Denies fever, shaking or chills   Eyes:  Denies change in visual acuity   HENT:  Denies nasal congestion or sore throat   Respiratory:  Denies cough or shortness of breath   Cardiovascular:  Denies chest pain or severe LE edema   GI:  Denies abdominal pain, nausea, vomiting, bloody stools or diarrhea   Musculoskeletal:  Numbness, tingling, or loss of motor function only as noted above in history of present illness.  :  Denies painful urination or hematuria  Integument:  Denies rash, lesion or ulceration   Neurologic:  Denies headache or focal weakness  Endocrine:  Denies lymphadenopathy  Psych:  Denies confusion or change in mental status   Hem:  Denies active bleeding      Physical Exam: 74 y.o. female  Wt Readings from Last 3 Encounters:   04/17/25 78.5 kg (173 lb)   04/02/25 79.5 kg (175 lb 3.2 oz)   03/21/25 78.5 kg (173 lb)       There is no height or weight on file to calculate BMI.    There were no vitals filed for this visit.  Vital signs reviewed.   General Appearance:    Alert, cooperative, in no acute distress                    Ortho exam    Physical exam of the right shoulder reveals no overlying skin changes no lymphedema no lymphadenopathy.  Patient has active flexion 180 with mild symptoms abduction is similar external rotation is to 50 and internal rotation to the upper lumbar spine with mild symptoms.  Patient has good rotator cuff strength 4+ over 5 with isometric strength testing with pain.  Patient has a positive impingement and a positive Ruiz sign.  Patient has good cervical range of motion which is full and asymptomatic no radicular symptoms.  Patient has a normal elbow exam.  Good distal pulses are present  Patient has pain with overhead activity and a positive Neer sign and a positive empty can sign , a positive drop arm and a definitive painful arc     Physical exam of the left shoulder reveals no overlying skin changes no lymphedema no lymphadenopathy.  Patient has active flexion 180 with mild symptoms abduction is similar external rotation is to 50 and internal rotation to the upper lumbar spine with mild symptoms.  Patient has good rotator cuff strength 4+ over 5 with isometric strength testing with pain.  Patient has a positive impingement and a positive Ruiz sign.  Patient has good cervical range of motion which is full and asymptomatic no radicular symptoms.  Patient has a normal elbow exam.   Good distal pulses are presentPatient has pain with overhead activity and a positive Neer sign and a positive empty can sign  They have a positive drop arm any definitive painful arc on         Assessment: Bilateral shoulder pain I think this is impingement she does well with conservative measures she knows what to do from a therapy standpoint we will inject them both in the unlikely event she fails to improve we could consider other means of testing    Plan:   Follow up as indicated.  Ice, elevate, and rest as needed.  Discussed conservative measures of pain control including ice, bracing.  Also talked about the importance of strengthening   Large Joint Arthrocentesis: R subacromial bursa  Date/Time: 6/10/2025 4:16 PM  Consent given by: patient  Site marked: site marked  Timeout: Immediately prior to procedure a time out was called to verify the correct patient, procedure, equipment, support staff and site/side marked as required   Supporting Documentation  Indications: pain   Procedure Details  Location: shoulder - R subacromial bursa  Preparation: Patient was prepped and draped in the usual sterile fashion  Needle gauge: 21G.  Approach: posterior  Medications administered: 80 mg methylPREDNISolone acetate 80 MG/ML; 2 mL lidocaine PF 1% 1 %  Patient tolerance: patient tolerated the procedure well with no immediate complications      Large Joint Arthrocentesis: L subacromial bursa  Date/Time: 6/10/2025 4:16 PM  Consent given by: patient  Site marked: site marked  Timeout: Immediately prior to procedure a time out was called to verify the correct patient, procedure, equipment, support staff and site/side marked as required   Supporting Documentation  Indications: pain   Procedure Details  Location: shoulder - L subacromial bursa  Preparation: Patient was prepped and draped in the usual sterile fashion  Needle gauge: 21G.  Approach: posterior  Medications administered: 80 mg methylPREDNISolone acetate 80 MG/ML; 2 mL  lidocaine PF 1% 1 %  Patient tolerance: patient tolerated the procedure well with no immediate complications      Nuris Bender M.D.

## 2025-06-10 ENCOUNTER — OFFICE VISIT (OUTPATIENT)
Dept: ORTHOPEDIC SURGERY | Facility: CLINIC | Age: 75
End: 2025-06-10
Payer: MEDICARE

## 2025-06-10 VITALS — WEIGHT: 171.6 LBS | BODY MASS INDEX: 30.41 KG/M2 | HEIGHT: 63 IN | TEMPERATURE: 97.2 F

## 2025-06-10 DIAGNOSIS — M75.40 IMPINGEMENT SYNDROME OF SHOULDER REGION, UNSPECIFIED LATERALITY: Primary | ICD-10-CM

## 2025-06-10 RX ADMIN — METHYLPREDNISOLONE ACETATE 80 MG: 80 INJECTION, SUSPENSION INTRA-ARTICULAR; INTRALESIONAL; INTRAMUSCULAR; SOFT TISSUE at 16:16

## 2025-06-10 RX ADMIN — LIDOCAINE HYDROCHLORIDE 2 ML: 10 INJECTION, SOLUTION EPIDURAL; INFILTRATION; INTRACAUDAL; PERINEURAL at 16:16

## 2025-06-11 RX ORDER — LIDOCAINE HYDROCHLORIDE 10 MG/ML
2 INJECTION, SOLUTION EPIDURAL; INFILTRATION; INTRACAUDAL; PERINEURAL
Status: COMPLETED | OUTPATIENT
Start: 2025-06-10 | End: 2025-06-10

## 2025-06-11 RX ORDER — METHYLPREDNISOLONE ACETATE 80 MG/ML
80 INJECTION, SUSPENSION INTRA-ARTICULAR; INTRALESIONAL; INTRAMUSCULAR; SOFT TISSUE
Status: COMPLETED | OUTPATIENT
Start: 2025-06-10 | End: 2025-06-10

## 2025-08-13 ENCOUNTER — PATIENT MESSAGE (OUTPATIENT)
Dept: FAMILY MEDICINE CLINIC | Facility: CLINIC | Age: 75
End: 2025-08-13
Payer: MEDICARE

## 2025-08-14 RX ORDER — POTASSIUM CHLORIDE 750 MG/1
10 TABLET, EXTENDED RELEASE ORAL DAILY
Qty: 90 TABLET | Refills: 1 | Status: SHIPPED | OUTPATIENT
Start: 2025-08-14

## 2025-08-25 ENCOUNTER — TELEPHONE (OUTPATIENT)
Dept: GASTROENTEROLOGY | Facility: CLINIC | Age: 75
End: 2025-08-25
Payer: MEDICARE

## 2025-08-28 ENCOUNTER — OFFICE VISIT (OUTPATIENT)
Dept: GASTROENTEROLOGY | Facility: CLINIC | Age: 75
End: 2025-08-28
Payer: MEDICARE

## 2025-08-28 ENCOUNTER — TELEPHONE (OUTPATIENT)
Dept: FAMILY MEDICINE CLINIC | Facility: CLINIC | Age: 75
End: 2025-08-28
Payer: MEDICARE

## 2025-08-28 VITALS
HEIGHT: 63 IN | HEART RATE: 56 BPM | SYSTOLIC BLOOD PRESSURE: 122 MMHG | DIASTOLIC BLOOD PRESSURE: 81 MMHG | BODY MASS INDEX: 29.95 KG/M2 | TEMPERATURE: 96.8 F | WEIGHT: 169 LBS

## 2025-08-28 DIAGNOSIS — K58.2 IRRITABLE BOWEL SYNDROME WITH BOTH CONSTIPATION AND DIARRHEA: Primary | ICD-10-CM

## 2025-08-28 DIAGNOSIS — M81.0 AGE-RELATED OSTEOPOROSIS WITHOUT CURRENT PATHOLOGICAL FRACTURE: Primary | ICD-10-CM

## 2025-08-28 DIAGNOSIS — K21.9 GASTROESOPHAGEAL REFLUX DISEASE WITHOUT ESOPHAGITIS: ICD-10-CM

## 2025-08-28 PROCEDURE — 3079F DIAST BP 80-89 MM HG: CPT | Performed by: PHYSICIAN ASSISTANT

## 2025-08-28 PROCEDURE — 1160F RVW MEDS BY RX/DR IN RCRD: CPT | Performed by: PHYSICIAN ASSISTANT

## 2025-08-28 PROCEDURE — 1159F MED LIST DOCD IN RCRD: CPT | Performed by: PHYSICIAN ASSISTANT

## 2025-08-28 PROCEDURE — 99214 OFFICE O/P EST MOD 30 MIN: CPT | Performed by: PHYSICIAN ASSISTANT

## 2025-08-28 PROCEDURE — 3074F SYST BP LT 130 MM HG: CPT | Performed by: PHYSICIAN ASSISTANT

## 2025-08-29 RX ORDER — SODIUM CHLORIDE 9 MG/ML
20 INJECTION, SOLUTION INTRAVENOUS ONCE
OUTPATIENT
Start: 2025-08-29

## 2025-08-29 RX ORDER — ZOLEDRONIC ACID 0.05 MG/ML
5 INJECTION, SOLUTION INTRAVENOUS ONCE
OUTPATIENT
Start: 2025-08-29

## (undated) DEVICE — UNDERCAST PADDING: Brand: DEROYAL

## (undated) DEVICE — TRAP FLD MINIVAC MEGADYNE 100ML

## (undated) DEVICE — TUBING, SUCTION, 1/4" X 10', STRAIGHT: Brand: MEDLINE

## (undated) DEVICE — BNDG ELAS ELITE V/CLOSE 6IN 5YD LF STRL

## (undated) DEVICE — DRP C/ARM 41X74IN

## (undated) DEVICE — EXTRCT STPL SKIN STRL BX/12

## (undated) DEVICE — SPNG LAP 18X18IN LF STRL PK/5

## (undated) DEVICE — BIOPATCH™ ANTIMICROBIAL DRESSING WITH CHLORHEXIDINE GLUCONATE IS A HYDROPHILLIC POLYURETHANE ABSORPTIVE FOAM WITH CHLORHEXIDINE GLUCONATE (CHG) WHICH INHIBITS BACTERIAL GROWTH UNDER THE DRESSING. THE DRESSING IS INTENDED TO BE USED TO ABSORB EXUDATE, COVER A WOUND CAUSED BY VASCULAR AND NONVASCULAR PERCUTANEOUS MEDICAL DEVICES DURING SURGERY, AS WELL AS REDUCE LOCAL INFECTION AND COLONIZATION OF MICROORGANISMS.: Brand: BIOPATCH

## (undated) DEVICE — THE TORRENT IRRIGATION SCOPE CONNECTOR IS USED WITH THE TORRENT IRRIGATION TUBING TO PROVIDE IRRIGATION FLUIDS SUCH AS STERILE WATER DURING GASTROINTESTINAL ENDOSCOPIC PROCEDURES WHEN USED IN CONJUNCTION WITH AN IRRIGATION PUMP (OR ELECTROSURGICAL UNIT).: Brand: TORRENT

## (undated) DEVICE — DISPOSABLE TOURNIQUET CUFF SINGLE BLADDER, SINGLE PORT AND QUICK CONNECT CONNECTOR: Brand: COLOR CUFF

## (undated) DEVICE — MSK PROC CURAPLEX O2 2/ADAPT 7FT

## (undated) DEVICE — SKIN PREP TRAY W/CHG: Brand: MEDLINE INDUSTRIES, INC.

## (undated) DEVICE — PK ARTHSCP 40

## (undated) DEVICE — GLV SURG BIOGEL LTX PF 6 1/2

## (undated) DEVICE — LN SMPL CO2 SHTRM SD STREAM W/M LUER

## (undated) DEVICE — ADAPT CLN BIOGUARD AIR/H2O DISP

## (undated) DEVICE — MAT FLR ABSORBENT LG 4FT 10 2.5FT

## (undated) DEVICE — KT ORCA ORCAPOD DISP STRL

## (undated) DEVICE — SUT ETHLN 4/0 PS2 PLSTC 1667G

## (undated) DEVICE — SUT ETHLN 3/0 PS1 18IN 1663H

## (undated) DEVICE — PK KN TOTL 40

## (undated) DEVICE — SUT MNCRYL 3/0 PS2 18IN MCP497G

## (undated) DEVICE — ABL APOLLO RF M/PRT 50D

## (undated) DEVICE — DRSNG SURESITE WNDW 4X4.5

## (undated) DEVICE — JACKSON-PRATT 100CC BULB RESERVOIR: Brand: CARDINAL HEALTH

## (undated) DEVICE — BNDG ELAS ELITE V/CLOSE 4IN 5YD LF STRL

## (undated) DEVICE — BLD DISSCT COOL CUT SJ CRVD 4MM 13CM

## (undated) DEVICE — CANN NASL CO2 TRULINK W/O2 A/

## (undated) DEVICE — NEEDLE, QUINCKE 22GX3.5": Brand: MEDLINE INDUSTRIES, INC.

## (undated) DEVICE — SINGLE-USE BIOPSY FORCEPS: Brand: RADIAL JAW 4

## (undated) DEVICE — KT DRP SPY/PHI W/ICG 25MG STRL

## (undated) DEVICE — TBG PENCL TELESCP MEGADYNE SMOKE EVAC 10FT

## (undated) DEVICE — TOTAL TRAY, 16FR 10ML SIL FOLEY, URN: Brand: MEDLINE

## (undated) DEVICE — STERILE PATIENT PROTECTIVE PAD FOR IMP® KNEE POSITIONERS & COHESIVE WRAP (10 / CASE): Brand: DE MAYO KNEE POSITIONER®

## (undated) DEVICE — KT KN SCP W/ACCUPORT DS

## (undated) DEVICE — GLV SURG PREMIERPRO ORTHO LTX PF SZ7.5 BRN

## (undated) DEVICE — SUT VIC 0 CT1 36IN J946H

## (undated) DEVICE — CANN O2 ETCO2 FITS ALL CONN CO2 SMPL A/ 7IN DISP LF

## (undated) DEVICE — APPL DURAPREP IODOPHOR APL 26ML

## (undated) DEVICE — TBG PUMP ARTHSCP MAIN AR6400 16FT

## (undated) DEVICE — 2108 SERIES SAGITTAL BLADE, NO OFFSET  (12.4 X 1.19 X 82.1MM)

## (undated) DEVICE — GLV SURG SENSICARE W/ALOE PF LF 8 STRL

## (undated) DEVICE — GOWN,SIRUS,NON REINFRCD,LARGE,SET IN SL: Brand: MEDLINE

## (undated) DEVICE — ELECTRD BLD EZ CLN MOD XLNG 2.75IN

## (undated) DEVICE — MEDI-VAC YANKAUER SUCTION HANDLE W/BULBOUS TIP: Brand: CARDINAL HEALTH

## (undated) DEVICE — BITEBLOCK OMNI BLOC

## (undated) DEVICE — ANTIBACTERIAL UNDYED BRAIDED (POLYGLACTIN 910), SYNTHETIC ABSORBABLE SUTURE: Brand: COATED VICRYL

## (undated) DEVICE — GLV SURG SENSICARE PI PF LF 7 GRN STRL

## (undated) DEVICE — SENSR O2 OXIMAX FNGR A/ 18IN NONSTR

## (undated) DEVICE — GLV SURG SENSICARE PI MIC PF SZ7 LF STRL

## (undated) DEVICE — DRAPE,REIN 53X77,STERILE: Brand: MEDLINE

## (undated) DEVICE — GLV SURG BIOGEL LTX PF 7 1/2

## (undated) DEVICE — NDL HYPO PRECISIONGLIDE REG 25G 1 1/2

## (undated) DEVICE — LAG MINOR PROCEDURE: Brand: MEDLINE INDUSTRIES, INC.

## (undated) DEVICE — PK UNIV COMPL 40

## (undated) DEVICE — RECIPROCATING BLADE HEAVY DUTY LONG, OFFSET  (77.6 X 0.77 X 11.2MM)

## (undated) DEVICE — TBG 02 CRUSH RESIST LF CLR 7FT

## (undated) DEVICE — DRSNG WND GZ CURAD OIL EMULSION 3X3IN STRL

## (undated) DEVICE — SUT VIC 1 CT1 36IN J947H

## (undated) DEVICE — BNDG,ELSTC,MATRIX,STRL,6"X5YD,LF,HOOK&LP: Brand: MEDLINE

## (undated) DEVICE — FRCP BX RADJAW4 NDL 2.8 240CM LG OG BX40

## (undated) DEVICE — GLV SURG SENSICARE POLYISPRN W/ALOE PF LF 6.5 GRN STRL

## (undated) DEVICE — Device: Brand: DEFENDO AIR/WATER/SUCTION AND BIOPSY VALVE

## (undated) DEVICE — BLCK/BITE BLOX W/DENTL/RIM W/STRAP 54F

## (undated) DEVICE — PAD,ABDOMINAL,8"X10",ST,LF: Brand: MEDLINE

## (undated) DEVICE — STPLR SKIN VISISTAT WD 35CT

## (undated) DEVICE — 3M™ IOBAN™ 2 ANTIMICROBIAL INCISE DRAPE 6650EZ: Brand: IOBAN™ 2

## (undated) DEVICE — SMOKE EVACUATION TUBING WITH 7/8 IN TO 1/4 IN REDUCER: Brand: BUFFALO FILTER

## (undated) DEVICE — STRIP,CLOSURE,WOUND,MEDI-STRIP,1/2X4: Brand: MEDLINE

## (undated) DEVICE — GLV SURG BIOGEL LTX PF 7

## (undated) DEVICE — MARKER,SKIN,WI/RULER AND LABELS: Brand: MEDLINE

## (undated) DEVICE — CONN TBG Y 5 IN 1 LF STRL

## (undated) DEVICE — RECIPROCATING BLADE, DOUBLE SIDED, OFFSET  (70.0 X 0.64 X 12.6MM)

## (undated) DEVICE — TOWEL,OR,DSP,ST,BLUE,STD,4/PK,20PK/CS: Brand: MEDLINE

## (undated) DEVICE — Device

## (undated) DEVICE — DRP C/ARMOR

## (undated) DEVICE — PREMIUM WET SKIN PREP TRAY: Brand: MEDLINE INDUSTRIES, INC.

## (undated) DEVICE — STPLR SKIN SUBCUTICULAR INSORB 2030

## (undated) DEVICE — PROXIMATE RH ROTATING HEAD SKIN STAPLERS (35 WIDE) CONTAINS 35 STAINLESS STEEL STAPLES: Brand: PROXIMATE

## (undated) DEVICE — Device: Brand: ACCUMIX® MIXING SYSTEM

## (undated) DEVICE — GLV SURG BIOGEL M LTX PF 6 1/2

## (undated) DEVICE — SYS CLS SKIN PREMIERPRO EXOFINFUSION 22CM

## (undated) DEVICE — 3M™ IOBAN™ 2 ANTIMICROBIAL INCISE DRAPE 6640EZ: Brand: IOBAN™ 2